# Patient Record
Sex: FEMALE | Race: WHITE | NOT HISPANIC OR LATINO | Employment: OTHER | URBAN - METROPOLITAN AREA
[De-identification: names, ages, dates, MRNs, and addresses within clinical notes are randomized per-mention and may not be internally consistent; named-entity substitution may affect disease eponyms.]

---

## 2017-02-20 DIAGNOSIS — E11.9 DIABETES MELLITUS WITHOUT COMPLICATION (HCC): Primary | ICD-10-CM

## 2017-02-20 DIAGNOSIS — E78.5 HYPERLIPIDEMIA, UNSPECIFIED HYPERLIPIDEMIA TYPE: ICD-10-CM

## 2017-02-21 ENCOUNTER — OFFICE VISIT (OUTPATIENT)
Dept: FAMILY MEDICINE CLINIC | Facility: CLINIC | Age: 72
End: 2017-02-21

## 2017-02-21 ENCOUNTER — LAB (OUTPATIENT)
Dept: FAMILY MEDICINE CLINIC | Facility: CLINIC | Age: 72
End: 2017-02-21

## 2017-02-21 VITALS
DIASTOLIC BLOOD PRESSURE: 84 MMHG | HEART RATE: 106 BPM | HEIGHT: 62 IN | TEMPERATURE: 98.4 F | WEIGHT: 108 LBS | BODY MASS INDEX: 19.88 KG/M2 | OXYGEN SATURATION: 98 % | SYSTOLIC BLOOD PRESSURE: 130 MMHG

## 2017-02-21 VITALS
DIASTOLIC BLOOD PRESSURE: 84 MMHG | OXYGEN SATURATION: 98 % | BODY MASS INDEX: 19.88 KG/M2 | HEIGHT: 62 IN | SYSTOLIC BLOOD PRESSURE: 130 MMHG | TEMPERATURE: 98.4 F | WEIGHT: 108 LBS | HEART RATE: 106 BPM

## 2017-02-21 DIAGNOSIS — S86.011A ACHILLES TENDON SPRAIN, RIGHT, INITIAL ENCOUNTER: Primary | ICD-10-CM
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
## 2017-02-21 DIAGNOSIS — S00.512A ABRASION OF ORAL CAVITY, INITIAL ENCOUNTER: ICD-10-CM

## 2017-02-21 PROBLEM — S86.019A ACHILLES TENDON SPRAIN: Status: ACTIVE | Noted: 2017-02-21

## 2017-02-21 LAB
ALBUMIN SERPL-MCNC: 4.7 G/DL (ref 3.5–5.2)
ALBUMIN/GLOB SERPL: 1.6 G/DL
ALP SERPL-CCNC: 99 U/L (ref 39–117)
ALT SERPL-CCNC: 28 U/L (ref 1–33)
AST SERPL-CCNC: 36 U/L (ref 1–32)
BILIRUB SERPL-MCNC: 0.2 MG/DL (ref 0.1–1.2)
BUN SERPL-MCNC: 11 MG/DL (ref 8–23)
BUN/CREAT SERPL: 12.2 (ref 7–25)
CALCIUM SERPL-MCNC: 10.1 MG/DL (ref 8.6–10.5)
CHLORIDE SERPL-SCNC: 100 MMOL/L (ref 98–107)
CHOLEST SERPL-MCNC: 178 MG/DL (ref 0–200)
CHOLEST/HDLC SERPL: 2.14 {RATIO}
CO2 SERPL-SCNC: 26.1 MMOL/L (ref 22–29)
CREAT SERPL-MCNC: 0.9 MG/DL (ref 0.57–1)
GLOBULIN SER CALC-MCNC: 2.9 GM/DL
GLUCOSE SERPL-MCNC: 162 MG/DL (ref 65–99)
HBA1C MFR BLD: 5.4 % (ref 4.8–5.6)
HDLC SERPL-MCNC: 83 MG/DL (ref 40–60)
LDLC SERPL CALC-MCNC: 75 MG/DL (ref 0–100)
POTASSIUM SERPL-SCNC: 4.9 MMOL/L (ref 3.5–5.2)
PROT SERPL-MCNC: 7.6 G/DL (ref 6–8.5)
SODIUM SERPL-SCNC: 143 MMOL/L (ref 136–145)
TRIGL SERPL-MCNC: 101 MG/DL (ref 0–150)
VLDLC SERPL CALC-MCNC: 20.2 MG/DL (ref 5–40)

## 2017-02-21 PROCEDURE — 99213 OFFICE O/P EST LOW 20 MIN: CPT | Performed by: NURSE PRACTITIONER

## 2017-02-21 NOTE — PROGRESS NOTES
Subjective   Siobhan Prakash is a 72 y.o. female.     History of Present Illness     Pt's wheelchair became caught on sidewalk as she was entering the office this morning.  Patient states she fell backward.  She has a sore lip and sore right ankle.  A  witnessed the incident and assisted her up from the ground.      The following portions of the patient's history were reviewed and updated as appropriate: allergies, current medications, past family history, past medical history, past social history, past surgical history and problem list.    Review of Systems   HENT:        Per HPI   Respiratory: Positive for shortness of breath.    Musculoskeletal:        Per HPI   All other systems reviewed and are negative.      Objective   Physical Exam   Constitutional: She is oriented to person, place, and time. She appears well-developed. She appears cachectic.   HENT:   Mild abrasion of mucosa right side upper lip.  Teeth intact.  She does have an old/chronic crack in #8 right central incisor.     Pulmonary/Chest: Effort normal.   Musculoskeletal:        Right ankle: She exhibits normal range of motion, no swelling, no ecchymosis, no deformity and no laceration. No tenderness. Achilles tendon exhibits pain. Achilles tendon exhibits no defect and normal Lemon's test results.   Neurological: She is alert and oriented to person, place, and time.   Psychiatric: She has a normal mood and affect.   Nursing note and vitals reviewed.      Assessment/Plan   Diagnoses and all orders for this visit:    Achilles tendon sprain, right, initial encounter    Abrasion of oral cavity, initial encounter    Fall, initial encounter        I advised her to see her dentist has she has some gum disease an old fracture of right central incisor crown w/question of a recent fracture.      If right ankle pain worsens, does not improve as expected she should call--will order x-rays.  However, not indicated at this time.      An incident  report was filed by the .

## 2017-02-24 ENCOUNTER — OFFICE VISIT (OUTPATIENT)
Dept: FAMILY MEDICINE CLINIC | Facility: CLINIC | Age: 72
End: 2017-02-24

## 2017-02-24 VITALS
HEART RATE: 123 BPM | WEIGHT: 109.4 LBS | HEIGHT: 62 IN | BODY MASS INDEX: 20.13 KG/M2 | DIASTOLIC BLOOD PRESSURE: 80 MMHG | SYSTOLIC BLOOD PRESSURE: 130 MMHG | OXYGEN SATURATION: 91 %

## 2017-02-24 DIAGNOSIS — E11.9 DIABETES MELLITUS WITHOUT COMPLICATION (HCC): ICD-10-CM

## 2017-02-24 DIAGNOSIS — G47.00 INSOMNIA, UNSPECIFIED TYPE: ICD-10-CM

## 2017-02-24 DIAGNOSIS — B02.9 HERPES ZOSTER WITHOUT COMPLICATION: Primary | ICD-10-CM

## 2017-02-24 DIAGNOSIS — F41.8 MIXED ANXIETY DEPRESSIVE DISORDER: ICD-10-CM

## 2017-02-24 DIAGNOSIS — E78.5 HYPERLIPIDEMIA, UNSPECIFIED HYPERLIPIDEMIA TYPE: ICD-10-CM

## 2017-02-24 PROBLEM — S86.019A ACHILLES TENDON SPRAIN: Status: RESOLVED | Noted: 2017-02-21 | Resolved: 2017-02-24

## 2017-02-24 PROCEDURE — 99214 OFFICE O/P EST MOD 30 MIN: CPT | Performed by: NURSE PRACTITIONER

## 2017-02-24 RX ORDER — VALACYCLOVIR HYDROCHLORIDE 1 G/1
1000 TABLET, FILM COATED ORAL 3 TIMES DAILY
Qty: 21 TABLET | Refills: 0 | Status: SHIPPED | OUTPATIENT
Start: 2017-02-24 | End: 2017-11-15

## 2017-02-24 NOTE — PROGRESS NOTES
Subjective   Siobhan Prakash is a 72 y.o. female.     History of Present Illness       Here for 6 month f/u.    DM:  A1-c nondiabetic range.  Controlled with diet.  She was not fasting with last set of labs.    HLD:  All levels wnl on statin.      Anxiety/depression:  Stable on sertraline.    Insomnia:  Taking zolpidem 5 mg.  Needs refills.  Tolerating lower dose well.    Awakened 3 days ago with painful, itchy rash right side of neck.      The following portions of the patient's history were reviewed and updated as appropriate: allergies, current medications, past family history, past medical history, past social history, past surgical history and problem list.    Review of Systems   Respiratory: Negative.    Cardiovascular: Negative.    Skin: Positive for rash.   Neurological: Positive for tremors.   Psychiatric/Behavioral: Negative.    All other systems reviewed and are negative.      Objective   Physical Exam   Constitutional: She appears well-developed and well-nourished.   Cardiovascular: Normal rate, regular rhythm, S1 normal, S2 normal and normal heart sounds.    No murmur heard.  Pulses:       Carotid pulses are 2+ on the right side, and 2+ on the left side.  No carotid bruits   Pulmonary/Chest: Effort normal and breath sounds normal.   Neurological: She is alert.   Skin: Rash noted. Rash is vesicular.        Psychiatric: She has a normal mood and affect.   Nursing note and vitals reviewed.      Assessment/Plan   Siobhan was seen today for follow-up and rash.    Diagnoses and all orders for this visit:    Herpes zoster without complication    Hyperlipidemia, unspecified hyperlipidemia type    Diabetes mellitus without complication    Mixed anxiety depressive disorder    Insomnia, unspecified type    Other orders  -     valACYclovir (VALTREX) 1000 MG tablet; Take 1 tablet by mouth 3 (Three) Times a Day.      I reviewed lab results with her.  No changes.    If she has any postherpetic neuralgia she should  return to clinic.  Communicability discussed.    RTC 6 months

## 2017-02-26 RX ORDER — ZOLPIDEM TARTRATE 5 MG/1
5 TABLET ORAL NIGHTLY PRN
Qty: 30 TABLET | Refills: 5 | OUTPATIENT
Start: 2017-02-26 | End: 2017-08-07 | Stop reason: SDUPTHER

## 2017-03-06 RX ORDER — ZOLPIDEM TARTRATE 5 MG/1
TABLET ORAL
Qty: 30 TABLET | Refills: 0 | OUTPATIENT
Start: 2017-03-06

## 2017-03-21 RX ORDER — ATORVASTATIN CALCIUM 10 MG/1
TABLET, FILM COATED ORAL
Qty: 90 TABLET | Refills: 0 | Status: SHIPPED | OUTPATIENT
Start: 2017-03-21 | End: 2017-06-16 | Stop reason: SDUPTHER

## 2017-04-03 RX ORDER — LANSOPRAZOLE 15 MG/1
CAPSULE, DELAYED RELEASE ORAL
Qty: 30 CAPSULE | Refills: 6 | Status: SHIPPED | OUTPATIENT
Start: 2017-04-03 | End: 2017-10-17 | Stop reason: SDUPTHER

## 2017-04-03 RX ORDER — BUPROPION HYDROCHLORIDE 200 MG/1
TABLET, EXTENDED RELEASE ORAL
Qty: 180 TABLET | Refills: 0 | Status: SHIPPED | OUTPATIENT
Start: 2017-04-03 | End: 2017-06-29 | Stop reason: SDUPTHER

## 2017-06-08 RX ORDER — SERTRALINE HYDROCHLORIDE 100 MG/1
TABLET, FILM COATED ORAL
Qty: 90 TABLET | Refills: 2 | Status: SHIPPED | OUTPATIENT
Start: 2017-06-08 | End: 2017-11-15 | Stop reason: SDUPTHER

## 2017-06-16 RX ORDER — ATORVASTATIN CALCIUM 10 MG/1
TABLET, FILM COATED ORAL
Qty: 90 TABLET | Refills: 3 | Status: SHIPPED | OUTPATIENT
Start: 2017-06-16 | End: 2017-07-26

## 2017-06-29 RX ORDER — BUPROPION HYDROCHLORIDE 200 MG/1
TABLET, EXTENDED RELEASE ORAL
Qty: 180 TABLET | Refills: 3 | Status: SHIPPED | OUTPATIENT
Start: 2017-06-29 | End: 2017-11-15 | Stop reason: SDUPTHER

## 2017-07-26 ENCOUNTER — OFFICE VISIT (OUTPATIENT)
Dept: FAMILY MEDICINE CLINIC | Facility: CLINIC | Age: 72
End: 2017-07-26

## 2017-07-26 VITALS
SYSTOLIC BLOOD PRESSURE: 134 MMHG | HEIGHT: 62 IN | DIASTOLIC BLOOD PRESSURE: 82 MMHG | BODY MASS INDEX: 20.06 KG/M2 | TEMPERATURE: 97.7 F | HEART RATE: 97 BPM | WEIGHT: 109 LBS | OXYGEN SATURATION: 99 %

## 2017-07-26 DIAGNOSIS — G43.119 INTRACTABLE MIGRAINE WITH AURA WITHOUT STATUS MIGRAINOSUS: Primary | ICD-10-CM

## 2017-07-26 DIAGNOSIS — R25.2 CRAMP OF BOTH LOWER EXTREMITIES: ICD-10-CM

## 2017-07-26 DIAGNOSIS — M79.604 BILATERAL LEG PAIN: ICD-10-CM

## 2017-07-26 DIAGNOSIS — L82.1 SEBORRHEIC KERATOSIS: ICD-10-CM

## 2017-07-26 DIAGNOSIS — M79.605 BILATERAL LEG PAIN: ICD-10-CM

## 2017-07-26 PROBLEM — G43.909 MIGRAINE: Status: ACTIVE | Noted: 2017-07-26

## 2017-07-26 LAB
BUN SERPL-MCNC: 14 MG/DL (ref 8–23)
BUN/CREAT SERPL: 15.1 (ref 7–25)
CALCIUM SERPL-MCNC: 9.7 MG/DL (ref 8.6–10.5)
CHLORIDE SERPL-SCNC: 102 MMOL/L (ref 98–107)
CO2 SERPL-SCNC: 25.1 MMOL/L (ref 22–29)
CREAT SERPL-MCNC: 0.93 MG/DL (ref 0.57–1)
GLUCOSE SERPL-MCNC: 117 MG/DL (ref 65–99)
POTASSIUM SERPL-SCNC: 4.7 MMOL/L (ref 3.5–5.2)
SODIUM SERPL-SCNC: 144 MMOL/L (ref 136–145)

## 2017-07-26 PROCEDURE — 99214 OFFICE O/P EST MOD 30 MIN: CPT | Performed by: NURSE PRACTITIONER

## 2017-07-26 PROCEDURE — 96372 THER/PROPH/DIAG INJ SC/IM: CPT | Performed by: NURSE PRACTITIONER

## 2017-07-26 RX ORDER — SUMATRIPTAN 50 MG/1
TABLET, FILM COATED ORAL
Qty: 9 TABLET | Refills: 2 | Status: SHIPPED | OUTPATIENT
Start: 2017-07-26 | End: 2017-11-15 | Stop reason: SDUPTHER

## 2017-07-26 RX ORDER — KETOROLAC TROMETHAMINE 30 MG/ML
60 INJECTION, SOLUTION INTRAMUSCULAR; INTRAVENOUS ONCE
Status: COMPLETED | OUTPATIENT
Start: 2017-07-26 | End: 2017-07-26

## 2017-07-26 RX ADMIN — KETOROLAC TROMETHAMINE 60 MG: 30 INJECTION, SOLUTION INTRAMUSCULAR; INTRAVENOUS at 12:27

## 2017-07-26 NOTE — PROGRESS NOTES
Subjective   Siobhan Prakash is a 72 y.o. female.     History of Present Illness       C/o's of what sounds like muscle cramps of both legs x 2 months.  Occurs at rest.      Concerned about some skin lesions on her back that her  noticed recently.      C/o's more frequent migraines lately.  She has an aura prior to migraine.  Pain located front/bilateral temple area.  In remote past took Imitrex with good results.      The following portions of the patient's history were reviewed and updated as appropriate: allergies, current medications, past family history, past medical history, past social history, past surgical history and problem list.    Review of Systems   Musculoskeletal:        Leg cramps   Skin:        Per HPI   Neurological: Positive for headaches.   All other systems reviewed and are negative.      Objective   Physical Exam   Constitutional: Vital signs are normal. She appears well-developed and well-nourished.  Non-toxic appearance.   Cardiovascular: Normal rate.    2+/= dorsalis pedis pulses.  2+/= post tibial pulses.  Feet good color, warm to touch.  Capillary refill wnl.   Pulmonary/Chest: Effort normal.   Neurological: She is alert.   Skin:   Several 4-8 mm slightly raised, scaly, mildly hyperpigmented skin lesions located on her back.     Psychiatric: She has a normal mood and affect.   Nursing note and vitals reviewed.      Assessment/Plan   Siobhan was seen today for leg pain.    Diagnoses and all orders for this visit:    Intractable migraine with aura without status migrainosus  -     ketorolac (TORADOL) injection 60 mg; Inject 60 mg into the shoulder, thigh, or buttocks 1 (One) Time.    Bilateral leg pain    Cramp of both lower extremities  -     Basic Metabolic Panel    Seborrheic keratosis    Other orders  -     SUMAtriptan (IMITREX) 50 MG tablet; Take one tablet at onset of headache. May repeat dose one time in 2 hours if headache not relieved.      Consider pravastatin if muscle  cramps stop with d/c of atorvastatin.      Reassured pt/ regarding benign characteristics of skin lesions on her back.      Await BMP results.    RTC 2 weeks for f/u.      25 min spent with patient with >50% spent in counseling and coordination of care.

## 2017-07-27 ENCOUNTER — TELEPHONE (OUTPATIENT)
Dept: FAMILY MEDICINE CLINIC | Facility: CLINIC | Age: 72
End: 2017-07-27

## 2017-07-27 NOTE — TELEPHONE ENCOUNTER
----- Message from YEN Gutierrez sent at 7/27/2017  8:58 AM EDT -----  Call pt.  All electrolytes are wnl.  I am hopeful that her muscle spasms resolve with the discontinuation of the atorvastatin.

## 2017-07-27 NOTE — PROGRESS NOTES
Call pt.  All electrolytes are wnl.  I am hopeful that her muscle spasms resolve with the discontinuation of the atorvastatin.

## 2017-08-02 RX ORDER — BUPROPION HYDROCHLORIDE 200 MG/1
TABLET, EXTENDED RELEASE ORAL
Qty: 180 TABLET | Refills: 3 | Status: SHIPPED | OUTPATIENT
Start: 2017-08-02 | End: 2017-11-15

## 2017-08-07 RX ORDER — ZOLPIDEM TARTRATE 5 MG/1
5 TABLET ORAL NIGHTLY PRN
Qty: 30 TABLET | Refills: 0 | OUTPATIENT
Start: 2017-08-07 | End: 2017-08-09 | Stop reason: SDUPTHER

## 2017-08-09 ENCOUNTER — OFFICE VISIT (OUTPATIENT)
Dept: FAMILY MEDICINE CLINIC | Facility: CLINIC | Age: 72
End: 2017-08-09

## 2017-08-09 VITALS
BODY MASS INDEX: 20.06 KG/M2 | WEIGHT: 109 LBS | HEIGHT: 62 IN | SYSTOLIC BLOOD PRESSURE: 124 MMHG | OXYGEN SATURATION: 96 % | TEMPERATURE: 97.9 F | HEART RATE: 106 BPM | DIASTOLIC BLOOD PRESSURE: 78 MMHG

## 2017-08-09 DIAGNOSIS — R25.2 CRAMP OF BOTH LOWER EXTREMITIES: ICD-10-CM

## 2017-08-09 DIAGNOSIS — Z79.899 HIGH RISK MEDICATION USE: ICD-10-CM

## 2017-08-09 DIAGNOSIS — G47.00 INSOMNIA, UNSPECIFIED TYPE: Primary | ICD-10-CM

## 2017-08-09 PROCEDURE — 99213 OFFICE O/P EST LOW 20 MIN: CPT | Performed by: NURSE PRACTITIONER

## 2017-08-09 RX ORDER — AZITHROMYCIN 500 MG/1
TABLET, FILM COATED ORAL
Refills: 2 | COMMUNITY
Start: 2017-08-02 | End: 2017-08-09

## 2017-08-09 RX ORDER — ZOLPIDEM TARTRATE 5 MG/1
5 TABLET ORAL NIGHTLY PRN
Qty: 30 TABLET | Refills: 5 | OUTPATIENT
Start: 2017-08-09 | End: 2017-08-09 | Stop reason: SDUPTHER

## 2017-08-09 RX ORDER — ZOLPIDEM TARTRATE 5 MG/1
5 TABLET ORAL NIGHTLY PRN
Qty: 30 TABLET | Refills: 5
Start: 2017-08-09 | End: 2017-09-05 | Stop reason: SDUPTHER

## 2017-08-09 NOTE — PROGRESS NOTES
Subjective   Siobhan Prakash is a 72 y.o. female.     History of Present Illness     2 week f/u leg cramps.  Improved dramatically with d/c of atorvastatin.  Still with just very mild occasional cramps.  BMP with normal lytes.    Needs UDS for ambien rx.  Will be due for refills later this month.  Taking/tolerating 5 mg dose well.      The following portions of the patient's history were reviewed and updated as appropriate: allergies, current medications, past family history, past medical history, past social history, past surgical history and problem list.    Review of Systems   Psychiatric/Behavioral: Positive for sleep disturbance.   All other systems reviewed and are negative.      Objective   Physical Exam   Constitutional: Vital signs are normal. She appears well-developed and well-nourished.  Non-toxic appearance.   Cardiovascular: Normal rate.    Pulmonary/Chest: Effort normal.   Neurological: She is alert.   Psychiatric: She has a normal mood and affect.   Nursing note and vitals reviewed.      Assessment/Plan   Siobhan was seen today for follow-up.    Diagnoses and all orders for this visit:    Insomnia, unspecified type  -     Compliance Drug Analysis, Ur    High risk medication use  -     Compliance Drug Analysis, Ur    Cramp of both lower extremities    Other orders  -     Discontinue: zolpidem (AMBIEN) 5 MG tablet; Take 1 tablet by mouth At Night As Needed for Sleep.  -     zolpidem (AMBIEN) 5 MG tablet; Take 1 tablet by mouth At Night As Needed for Sleep.      Hold off on statin tx for now.  Will check labs in 3 months.    UDS today.

## 2017-08-16 LAB — DRUGS UR: NORMAL

## 2017-09-05 RX ORDER — ZOLPIDEM TARTRATE 5 MG/1
5 TABLET ORAL NIGHTLY PRN
Qty: 30 TABLET | Refills: 0 | OUTPATIENT
Start: 2017-09-05 | End: 2017-11-15 | Stop reason: SDUPTHER

## 2017-10-17 RX ORDER — LANSOPRAZOLE 15 MG/1
CAPSULE, DELAYED RELEASE ORAL
Qty: 30 CAPSULE | Refills: 2 | Status: SHIPPED | OUTPATIENT
Start: 2017-10-17 | End: 2017-11-15 | Stop reason: SDUPTHER

## 2017-11-13 DIAGNOSIS — E78.5 HYPERLIPIDEMIA, UNSPECIFIED HYPERLIPIDEMIA TYPE: ICD-10-CM

## 2017-11-13 DIAGNOSIS — J43.8 OTHER EMPHYSEMA (HCC): ICD-10-CM

## 2017-11-13 DIAGNOSIS — E55.9 VITAMIN D DEFICIENCY: ICD-10-CM

## 2017-11-13 DIAGNOSIS — I10 ESSENTIAL HYPERTENSION: Primary | ICD-10-CM

## 2017-11-14 LAB
25(OH)D3+25(OH)D2 SERPL-MCNC: 82.8 NG/ML (ref 30–100)
ALBUMIN SERPL-MCNC: 4.5 G/DL (ref 3.5–5.2)
ALBUMIN/GLOB SERPL: 1.3 G/DL
ALP SERPL-CCNC: 92 U/L (ref 39–117)
ALT SERPL-CCNC: 14 U/L (ref 1–33)
AST SERPL-CCNC: 22 U/L (ref 1–32)
BASOPHILS # BLD AUTO: 0.06 10*3/MM3 (ref 0–0.2)
BASOPHILS NFR BLD AUTO: 0.7 % (ref 0–1.5)
BILIRUB SERPL-MCNC: <0.2 MG/DL (ref 0.1–1.2)
BUN SERPL-MCNC: 16 MG/DL (ref 8–23)
BUN/CREAT SERPL: 17.8 (ref 7–25)
CALCIUM SERPL-MCNC: 10.3 MG/DL (ref 8.6–10.5)
CHLORIDE SERPL-SCNC: 103 MMOL/L (ref 98–107)
CHOLEST SERPL-MCNC: 257 MG/DL (ref 0–200)
CHOLEST/HDLC SERPL: 3.25 {RATIO}
CO2 SERPL-SCNC: 28.2 MMOL/L (ref 22–29)
CREAT SERPL-MCNC: 0.9 MG/DL (ref 0.57–1)
EOSINOPHIL # BLD AUTO: 0.24 10*3/MM3 (ref 0–0.7)
EOSINOPHIL NFR BLD AUTO: 2.8 % (ref 0.3–6.2)
ERYTHROCYTE [DISTWIDTH] IN BLOOD BY AUTOMATED COUNT: 13.5 % (ref 11.7–13)
GFR SERPLBLD CREATININE-BSD FMLA CKD-EPI: 62 ML/MIN/1.73
GFR SERPLBLD CREATININE-BSD FMLA CKD-EPI: 75 ML/MIN/1.73
GLOBULIN SER CALC-MCNC: 3.4 GM/DL
GLUCOSE SERPL-MCNC: 126 MG/DL (ref 65–99)
HCT VFR BLD AUTO: 43.3 % (ref 35.6–45.5)
HDLC SERPL-MCNC: 79 MG/DL (ref 40–60)
HGB BLD-MCNC: 13.4 G/DL (ref 11.9–15.5)
IMM GRANULOCYTES # BLD: 0.07 10*3/MM3 (ref 0–0.03)
IMM GRANULOCYTES NFR BLD: 0.8 % (ref 0–0.5)
LDLC SERPL CALC-MCNC: 152 MG/DL (ref 0–100)
LYMPHOCYTES # BLD AUTO: 3 10*3/MM3 (ref 0.9–4.8)
LYMPHOCYTES NFR BLD AUTO: 34.9 % (ref 19.6–45.3)
MCH RBC QN AUTO: 31.8 PG (ref 26.9–32)
MCHC RBC AUTO-ENTMCNC: 30.9 G/DL (ref 32.4–36.3)
MCV RBC AUTO: 102.9 FL (ref 80.5–98.2)
MONOCYTES # BLD AUTO: 0.63 10*3/MM3 (ref 0.2–1.2)
MONOCYTES NFR BLD AUTO: 7.3 % (ref 5–12)
NEUTROPHILS # BLD AUTO: 4.59 10*3/MM3 (ref 1.9–8.1)
NEUTROPHILS NFR BLD AUTO: 53.5 % (ref 42.7–76)
PLATELET # BLD AUTO: 441 10*3/MM3 (ref 140–500)
POTASSIUM SERPL-SCNC: 4.7 MMOL/L (ref 3.5–5.2)
PROT SERPL-MCNC: 7.9 G/DL (ref 6–8.5)
RBC # BLD AUTO: 4.21 10*6/MM3 (ref 3.9–5.2)
SODIUM SERPL-SCNC: 145 MMOL/L (ref 136–145)
TRIGL SERPL-MCNC: 132 MG/DL (ref 0–150)
VLDLC SERPL CALC-MCNC: 26.4 MG/DL (ref 5–40)
WBC # BLD AUTO: 8.59 10*3/MM3 (ref 4.5–10.7)

## 2017-11-15 ENCOUNTER — TELEPHONE (OUTPATIENT)
Dept: FAMILY MEDICINE CLINIC | Facility: CLINIC | Age: 72
End: 2017-11-15

## 2017-11-15 ENCOUNTER — OFFICE VISIT (OUTPATIENT)
Dept: FAMILY MEDICINE CLINIC | Facility: CLINIC | Age: 72
End: 2017-11-15

## 2017-11-15 VITALS
SYSTOLIC BLOOD PRESSURE: 130 MMHG | OXYGEN SATURATION: 98 % | TEMPERATURE: 98.3 F | HEART RATE: 103 BPM | DIASTOLIC BLOOD PRESSURE: 78 MMHG

## 2017-11-15 DIAGNOSIS — G47.00 INSOMNIA, UNSPECIFIED TYPE: ICD-10-CM

## 2017-11-15 DIAGNOSIS — G43.001 MIGRAINE WITHOUT AURA AND WITH STATUS MIGRAINOSUS, NOT INTRACTABLE: ICD-10-CM

## 2017-11-15 DIAGNOSIS — F41.8 MIXED ANXIETY DEPRESSIVE DISORDER: ICD-10-CM

## 2017-11-15 DIAGNOSIS — E78.5 HYPERLIPIDEMIA, UNSPECIFIED HYPERLIPIDEMIA TYPE: Primary | ICD-10-CM

## 2017-11-15 DIAGNOSIS — E55.9 VITAMIN D DEFICIENCY: ICD-10-CM

## 2017-11-15 DIAGNOSIS — K21.9 GASTROESOPHAGEAL REFLUX DISEASE WITHOUT ESOPHAGITIS: ICD-10-CM

## 2017-11-15 DIAGNOSIS — R73.02 GLUCOSE INTOLERANCE (IMPAIRED GLUCOSE TOLERANCE): ICD-10-CM

## 2017-11-15 PROCEDURE — 99214 OFFICE O/P EST MOD 30 MIN: CPT | Performed by: NURSE PRACTITIONER

## 2017-11-15 RX ORDER — GABAPENTIN 300 MG/1
CAPSULE ORAL
Refills: 2 | Status: ON HOLD | COMMUNITY
Start: 2017-11-08 | End: 2018-01-16 | Stop reason: SDUPTHER

## 2017-11-15 RX ORDER — ZOLPIDEM TARTRATE 5 MG/1
5 TABLET ORAL NIGHTLY PRN
Qty: 30 TABLET | Refills: 5 | OUTPATIENT
Start: 2017-11-15 | End: 2018-03-06 | Stop reason: SDUPTHER

## 2017-11-15 RX ORDER — LANSOPRAZOLE 15 MG/1
15 CAPSULE, DELAYED RELEASE ORAL DAILY
Qty: 90 CAPSULE | Refills: 2 | Status: SHIPPED | OUTPATIENT
Start: 2017-11-15 | End: 2018-01-10 | Stop reason: SDUPTHER

## 2017-11-15 RX ORDER — SUMATRIPTAN 50 MG/1
TABLET, FILM COATED ORAL
Qty: 9 TABLET | Refills: 2 | Status: SHIPPED | OUTPATIENT
Start: 2017-11-15 | End: 2019-04-22 | Stop reason: SDUPTHER

## 2017-11-15 RX ORDER — BUPROPION HYDROCHLORIDE 200 MG/1
200 TABLET, EXTENDED RELEASE ORAL 2 TIMES DAILY
Qty: 180 TABLET | Refills: 2 | Status: SHIPPED | OUTPATIENT
Start: 2017-11-15 | End: 2018-12-19

## 2017-11-15 RX ORDER — SERTRALINE HYDROCHLORIDE 100 MG/1
100 TABLET, FILM COATED ORAL DAILY
Qty: 90 TABLET | Refills: 2 | Status: SHIPPED | OUTPATIENT
Start: 2017-11-15 | End: 2018-02-15

## 2017-11-15 NOTE — PROGRESS NOTES
Subjective   Siobhan Prakash is a 72 y.o. female.     History of Present Illness     Doing well.    HLD shows LDL elevated.  Migraines, gerd, vitamin d deficiency, anxiety and insomnia stable on current plan/meds.    The following portions of the patient's history were reviewed and updated as appropriate: allergies, current medications, past family history, past medical history, past social history, past surgical history and problem list.    Review of Systems   Respiratory: Positive for cough and shortness of breath.         No SOA w/O2   Cardiovascular: Negative.    All other systems reviewed and are negative.      Objective   Physical Exam   Constitutional: Vital signs are normal. She appears well-developed and well-nourished.   Cardiovascular: Normal rate, regular rhythm, S1 normal, S2 normal and normal heart sounds.    No murmur heard.  Pulses:       Carotid pulses are 2+ on the right side, and 2+ on the left side.  No carotid bruits   Pulmonary/Chest: Effort normal and breath sounds normal.   O2 per NC   Neurological: She is alert.   Psychiatric: She has a normal mood and affect.   Nursing note and vitals reviewed.      Assessment/Plan   Siobhan was seen today for follow-up.    Diagnoses and all orders for this visit:    Hyperlipidemia, unspecified hyperlipidemia type    Migraine without aura and with status migrainosus, not intractable    Gastroesophageal reflux disease without esophagitis    Vitamin D deficiency    Glucose intolerance (impaired glucose tolerance)    Mixed anxiety depressive disorder    Insomnia, unspecified type    Other orders  -     zolpidem (AMBIEN) 5 MG tablet; Take 1 tablet by mouth At Night As Needed for Sleep.  -     SUMAtriptan (IMITREX) 50 MG tablet; Take one tablet at onset of headache. May repeat dose one time in 2 hours if headache not relieved.  -     sertraline (ZOLOFT) 50 MG tablet; Take 1 tablet by mouth Daily. 1 po daily with 100 mg dose  -     sertraline (ZOLOFT) 100 MG  tablet; Take 1 tablet by mouth Daily. 1 po q day.  Take with 50 mg dose.  -     lansoprazole (PREVACID) 15 MG capsule; Take 1 capsule by mouth Daily.  -     buPROPion SR (WELLBUTRIN SR) 200 MG 12 hr tablet; Take 1 tablet by mouth 2 (Two) Times a Day.        I reviewed lab results with her.  Decrease sat fat intake to lower LDL.   Refills given.  F/u 6 months.

## 2017-11-15 NOTE — TELEPHONE ENCOUNTER
----- Message from YEN Gutierrez sent at 11/15/2017  2:20 PM EST -----  Call in 6 refills total of patients zolpidem

## 2018-01-10 RX ORDER — LANSOPRAZOLE 15 MG/1
15 CAPSULE, DELAYED RELEASE ORAL DAILY
Qty: 90 CAPSULE | Refills: 0 | Status: SHIPPED | OUTPATIENT
Start: 2018-01-10 | End: 2019-02-28 | Stop reason: SDUPTHER

## 2018-01-15 ENCOUNTER — APPOINTMENT (OUTPATIENT)
Dept: GENERAL RADIOLOGY | Facility: HOSPITAL | Age: 73
End: 2018-01-15

## 2018-01-15 ENCOUNTER — APPOINTMENT (OUTPATIENT)
Dept: CT IMAGING | Facility: HOSPITAL | Age: 73
End: 2018-01-15

## 2018-01-15 ENCOUNTER — HOSPITAL ENCOUNTER (INPATIENT)
Facility: HOSPITAL | Age: 73
LOS: 8 days | Discharge: HOME HEALTH | End: 2018-01-23
Attending: EMERGENCY MEDICINE | Admitting: INTERNAL MEDICINE

## 2018-01-15 DIAGNOSIS — J18.9 PNEUMONIA OF BOTH LUNGS DUE TO INFECTIOUS ORGANISM, UNSPECIFIED PART OF LUNG: Primary | ICD-10-CM

## 2018-01-15 DIAGNOSIS — Z87.09 HISTORY OF COPD: ICD-10-CM

## 2018-01-15 DIAGNOSIS — R09.02 HYPOXIA: ICD-10-CM

## 2018-01-15 LAB
ALBUMIN SERPL-MCNC: 3.1 G/DL (ref 3.5–5.2)
ALBUMIN/GLOB SERPL: 0.7 G/DL
ALP SERPL-CCNC: 94 U/L (ref 39–117)
ALT SERPL W P-5'-P-CCNC: 10 U/L (ref 1–33)
ANION GAP SERPL CALCULATED.3IONS-SCNC: 8 MMOL/L
ARTERIAL PATENCY WRIST A: ABNORMAL
AST SERPL-CCNC: 15 U/L (ref 1–32)
ATMOSPHERIC PRESS: 757.7 MMHG
BASE EXCESS BLDA CALC-SCNC: 5.8 MMOL/L (ref 0–2)
BASOPHILS # BLD AUTO: 0.01 10*3/MM3 (ref 0–0.2)
BASOPHILS NFR BLD AUTO: 0.1 % (ref 0–1.5)
BDY SITE: ABNORMAL
BILIRUB SERPL-MCNC: 0.3 MG/DL (ref 0.1–1.2)
BUN BLD-MCNC: 12 MG/DL (ref 8–23)
BUN/CREAT SERPL: 18.2 (ref 7–25)
CALCIUM SPEC-SCNC: 8.9 MG/DL (ref 8.6–10.5)
CHLORIDE SERPL-SCNC: 93 MMOL/L (ref 98–107)
CO2 SERPL-SCNC: 34 MMOL/L (ref 22–29)
CREAT BLD-MCNC: 0.66 MG/DL (ref 0.57–1)
D-LACTATE SERPL-SCNC: 1.3 MMOL/L (ref 0.5–2)
DEPRECATED RDW RBC AUTO: 46.8 FL (ref 37–54)
EOSINOPHIL # BLD AUTO: 0.07 10*3/MM3 (ref 0–0.7)
EOSINOPHIL NFR BLD AUTO: 0.6 % (ref 0.3–6.2)
ERYTHROCYTE [DISTWIDTH] IN BLOOD BY AUTOMATED COUNT: 12.3 % (ref 11.7–13)
GAS FLOW AIRWAY: 4 LPM
GFR SERPL CREATININE-BSD FRML MDRD: 89 ML/MIN/1.73
GLOBULIN UR ELPH-MCNC: 4.5 GM/DL
GLUCOSE BLD-MCNC: 140 MG/DL (ref 65–99)
HCO3 BLDA-SCNC: 31.8 MMOL/L (ref 22–28)
HCT VFR BLD AUTO: 36.7 % (ref 35.6–45.5)
HGB BLD-MCNC: 11.1 G/DL (ref 11.9–15.5)
HOLD SPECIMEN: NORMAL
HOLD SPECIMEN: NORMAL
IMM GRANULOCYTES # BLD: 0.06 10*3/MM3 (ref 0–0.03)
IMM GRANULOCYTES NFR BLD: 0.5 % (ref 0–0.5)
LYMPHOCYTES # BLD AUTO: 1.32 10*3/MM3 (ref 0.9–4.8)
LYMPHOCYTES NFR BLD AUTO: 11.5 % (ref 19.6–45.3)
MCH RBC QN AUTO: 31.4 PG (ref 26.9–32)
MCHC RBC AUTO-ENTMCNC: 30.2 G/DL (ref 32.4–36.3)
MCV RBC AUTO: 103.7 FL (ref 80.5–98.2)
MODALITY: ABNORMAL
MONOCYTES # BLD AUTO: 1.17 10*3/MM3 (ref 0.2–1.2)
MONOCYTES NFR BLD AUTO: 10.2 % (ref 5–12)
NEUTROPHILS # BLD AUTO: 8.85 10*3/MM3 (ref 1.9–8.1)
NEUTROPHILS NFR BLD AUTO: 77.1 % (ref 42.7–76)
PCO2 BLDA: 51.2 MM HG (ref 35–45)
PH BLDA: 7.4 PH UNITS (ref 7.35–7.45)
PLATELET # BLD AUTO: 489 10*3/MM3 (ref 140–500)
PMV BLD AUTO: 9.6 FL (ref 6–12)
PO2 BLDA: 72.2 MM HG (ref 80–100)
POTASSIUM BLD-SCNC: 3.6 MMOL/L (ref 3.5–5.2)
PROT SERPL-MCNC: 7.6 G/DL (ref 6–8.5)
RBC # BLD AUTO: 3.54 10*6/MM3 (ref 3.9–5.2)
SAO2 % BLDCOA: 94 % (ref 92–99)
SODIUM BLD-SCNC: 135 MMOL/L (ref 136–145)
TOTAL RATE: 22 BREATHS/MINUTE
TROPONIN T SERPL-MCNC: <0.01 NG/ML (ref 0–0.03)
WBC NRBC COR # BLD: 11.48 10*3/MM3 (ref 4.5–10.7)
WHOLE BLOOD HOLD SPECIMEN: NORMAL
WHOLE BLOOD HOLD SPECIMEN: NORMAL

## 2018-01-15 PROCEDURE — 71046 X-RAY EXAM CHEST 2 VIEWS: CPT

## 2018-01-15 PROCEDURE — 36415 COLL VENOUS BLD VENIPUNCTURE: CPT | Performed by: EMERGENCY MEDICINE

## 2018-01-15 PROCEDURE — 85025 COMPLETE CBC W/AUTO DIFF WBC: CPT | Performed by: EMERGENCY MEDICINE

## 2018-01-15 PROCEDURE — 25010000002 CEFTRIAXONE PER 250 MG: Performed by: PHYSICIAN ASSISTANT

## 2018-01-15 PROCEDURE — 93010 ELECTROCARDIOGRAM REPORT: CPT | Performed by: INTERNAL MEDICINE

## 2018-01-15 PROCEDURE — 87040 BLOOD CULTURE FOR BACTERIA: CPT | Performed by: PHYSICIAN ASSISTANT

## 2018-01-15 PROCEDURE — 0 IOPAMIDOL PER 1 ML: Performed by: EMERGENCY MEDICINE

## 2018-01-15 PROCEDURE — 84484 ASSAY OF TROPONIN QUANT: CPT | Performed by: EMERGENCY MEDICINE

## 2018-01-15 PROCEDURE — 83605 ASSAY OF LACTIC ACID: CPT | Performed by: PHYSICIAN ASSISTANT

## 2018-01-15 PROCEDURE — 93005 ELECTROCARDIOGRAM TRACING: CPT

## 2018-01-15 PROCEDURE — 25010000002 AZITHROMYCIN PER 500 MG: Performed by: PHYSICIAN ASSISTANT

## 2018-01-15 PROCEDURE — 82803 BLOOD GASES ANY COMBINATION: CPT

## 2018-01-15 PROCEDURE — 94640 AIRWAY INHALATION TREATMENT: CPT

## 2018-01-15 PROCEDURE — 36600 WITHDRAWAL OF ARTERIAL BLOOD: CPT

## 2018-01-15 PROCEDURE — 80053 COMPREHEN METABOLIC PANEL: CPT | Performed by: EMERGENCY MEDICINE

## 2018-01-15 PROCEDURE — 99284 EMERGENCY DEPT VISIT MOD MDM: CPT

## 2018-01-15 PROCEDURE — 71275 CT ANGIOGRAPHY CHEST: CPT

## 2018-01-15 PROCEDURE — 25010000002 METHYLPREDNISOLONE PER 40 MG: Performed by: INTERNAL MEDICINE

## 2018-01-15 RX ORDER — CEFTRIAXONE SODIUM 1 G/50ML
1 INJECTION, SOLUTION INTRAVENOUS ONCE
Status: COMPLETED | OUTPATIENT
Start: 2018-01-15 | End: 2018-01-15

## 2018-01-15 RX ORDER — HYDROMORPHONE HYDROCHLORIDE 1 MG/ML
0.5 INJECTION, SOLUTION INTRAMUSCULAR; INTRAVENOUS; SUBCUTANEOUS ONCE
Status: COMPLETED | OUTPATIENT
Start: 2018-01-15 | End: 2018-01-15

## 2018-01-15 RX ORDER — SODIUM CHLORIDE 0.9 % (FLUSH) 0.9 %
10 SYRINGE (ML) INJECTION AS NEEDED
Status: DISCONTINUED | OUTPATIENT
Start: 2018-01-15 | End: 2018-01-23 | Stop reason: HOSPADM

## 2018-01-15 RX ORDER — ROFLUMILAST 500 UG/1
500 TABLET ORAL DAILY
Status: DISCONTINUED | OUTPATIENT
Start: 2018-01-16 | End: 2018-01-23 | Stop reason: HOSPADM

## 2018-01-15 RX ORDER — PANTOPRAZOLE SODIUM 40 MG/1
40 TABLET, DELAYED RELEASE ORAL EVERY MORNING
Status: DISCONTINUED | OUTPATIENT
Start: 2018-01-16 | End: 2018-01-23 | Stop reason: HOSPADM

## 2018-01-15 RX ORDER — BUPROPION HYDROCHLORIDE 150 MG/1
150 TABLET, EXTENDED RELEASE ORAL DAILY
Status: DISCONTINUED | OUTPATIENT
Start: 2018-01-16 | End: 2018-01-16

## 2018-01-15 RX ORDER — METHYLPREDNISOLONE SODIUM SUCCINATE 40 MG/ML
40 INJECTION, POWDER, LYOPHILIZED, FOR SOLUTION INTRAMUSCULAR; INTRAVENOUS EVERY 8 HOURS
Status: DISCONTINUED | OUTPATIENT
Start: 2018-01-15 | End: 2018-01-16

## 2018-01-15 RX ORDER — GABAPENTIN 300 MG/1
300 CAPSULE ORAL NIGHTLY
Status: DISCONTINUED | OUTPATIENT
Start: 2018-01-15 | End: 2018-01-23 | Stop reason: HOSPADM

## 2018-01-15 RX ORDER — ZOLPIDEM TARTRATE 5 MG/1
5 TABLET ORAL NIGHTLY PRN
Status: DISCONTINUED | OUTPATIENT
Start: 2018-01-15 | End: 2018-01-23 | Stop reason: HOSPADM

## 2018-01-15 RX ORDER — CEFTRIAXONE SODIUM 1 G/50ML
1 INJECTION, SOLUTION INTRAVENOUS EVERY 24 HOURS
Status: DISCONTINUED | OUTPATIENT
Start: 2018-01-15 | End: 2018-01-15

## 2018-01-15 RX ORDER — OXYCODONE HYDROCHLORIDE AND ACETAMINOPHEN 5; 325 MG/1; MG/1
1 TABLET ORAL EVERY 6 HOURS PRN
Status: DISCONTINUED | OUTPATIENT
Start: 2018-01-15 | End: 2018-01-23 | Stop reason: HOSPADM

## 2018-01-15 RX ORDER — IPRATROPIUM BROMIDE AND ALBUTEROL SULFATE 2.5; .5 MG/3ML; MG/3ML
3 SOLUTION RESPIRATORY (INHALATION)
Status: DISCONTINUED | OUTPATIENT
Start: 2018-01-15 | End: 2018-01-19

## 2018-01-15 RX ORDER — MELOXICAM 15 MG/1
15 TABLET ORAL DAILY
COMMUNITY
End: 2019-02-28 | Stop reason: SDUPTHER

## 2018-01-15 RX ORDER — SERTRALINE HYDROCHLORIDE 100 MG/1
100 TABLET, FILM COATED ORAL DAILY
Status: DISCONTINUED | OUTPATIENT
Start: 2018-01-16 | End: 2018-01-16

## 2018-01-15 RX ADMIN — AZITHROMYCIN MONOHYDRATE 500 MG: 500 INJECTION, POWDER, LYOPHILIZED, FOR SOLUTION INTRAVENOUS at 18:07

## 2018-01-15 RX ADMIN — HYDROCODONE POLISTIREX AND CHLORPHENIRAMINE POLISITREX 5 ML: 10; 8 SUSPENSION, EXTENDED RELEASE ORAL at 22:48

## 2018-01-15 RX ADMIN — SODIUM CHLORIDE 1000 ML: 9 INJECTION, SOLUTION INTRAVENOUS at 16:56

## 2018-01-15 RX ADMIN — HYDROMORPHONE HYDROCHLORIDE 0.5 MG: 10 INJECTION INTRAMUSCULAR; INTRAVENOUS; SUBCUTANEOUS at 18:14

## 2018-01-15 RX ADMIN — IOPAMIDOL 85 ML: 755 INJECTION, SOLUTION INTRAVENOUS at 17:05

## 2018-01-15 RX ADMIN — CEFTRIAXONE SODIUM 1 G: 1 INJECTION, SOLUTION INTRAVENOUS at 19:35

## 2018-01-15 RX ADMIN — GABAPENTIN 300 MG: 300 CAPSULE ORAL at 22:48

## 2018-01-15 RX ADMIN — METHYLPREDNISOLONE SODIUM SUCCINATE 40 MG: 40 INJECTION, POWDER, FOR SOLUTION INTRAMUSCULAR; INTRAVENOUS at 20:11

## 2018-01-15 RX ADMIN — IPRATROPIUM BROMIDE AND ALBUTEROL SULFATE 3 ML: .5; 3 SOLUTION RESPIRATORY (INHALATION) at 22:26

## 2018-01-15 NOTE — ED TRIAGE NOTES
Pt c/o CP that when she woke up at 10:00. Pt's pain was 10/10; pt's pain dropped to 8/10 after 325 mg aspirin and one 0.4 mg nitro.

## 2018-01-15 NOTE — H&P
Group: [facility]         H/p  NOTE    Patient Identification:  Siobhan Prakash  72 y.o.  female  1945  [id number]                 CC: Shortness of breath for the last 2 weeks.    History of Present Illness:  Patient of Dr. Hightower with known history of chronic obstructive pulmonary disease on 4 L of oxygen at home.  Patient states for the last 2 weeks she's been having progressive shortness of breath with purulent sputum low-grade fever.  She also reports some substernal chest pain worse with deep inspiration started early this morning.  No aspiration was reported.  In the emergency room patient had a CT chest done multifocal pneumonia.  She is not being hospitalized in the last 6 months.      Review of Systems  Constitutional: Negative for fever.   HENT: Negative for sore throat.    Eyes: Negative.    Respiratory: Positive for cough. Negative for shortness of breath.    Cardiovascular: Positive for chest pain.   Gastrointestinal: Negative for abdominal pain, diarrhea and vomiting.   Genitourinary: Negative for dysuria.   Musculoskeletal: Positive for back pain (resolved) and myalgias (BLE, resolved). Negative for neck pain.   Skin: Negative for rash.   Allergic/Immunologic: Negative.    Neurological: Negative for weakness, numbness and headaches.   Hematological: Negative.    Psychiatric/Behavioral: Negative.    All other systems reviewed and are negative.  Past Medical History:  Past Medical History:   Diagnosis Date   • Anxiety    • COPD (chronic obstructive pulmonary disease)    • Depression    • GERD (gastroesophageal reflux disease)    • Hyperlipidemia    • Migraine    • Osteoporosis    • Pneumonia        Past Surgical History:  Past Surgical History:   Procedure Laterality Date   • CHOLECYSTECTOMY     • HIP ARTHROPLASTY Right    • REDUCTION MAMMAPLASTY          Home Meds:    (Not in a hospital admission)    Allergies:  Allergies   Allergen Reactions   • Codeine Rash and Other (See  "Comments)     Chills   • Hydrocodone Rash and Other (See Comments)     Chills       Social History:   Social History     Social History   • Marital status:      Spouse name: Vlad   • Number of children: 2   • Years of education: N/A     Occupational History   • retired      Social History Main Topics   • Smoking status: Former Smoker     Quit date: 1999   • Smokeless tobacco: Never Used   • Alcohol use Yes      Comment: ocasional drinker   • Drug use: No   • Sexual activity: Not on file     Other Topics Concern   • Not on file     Social History Narrative       Family History:  History reviewed. No pertinent family history.    Physical Exam:  /64  Pulse 109  Temp 99.5 °F (37.5 °C) (Oral)   Resp 20  Ht 157.5 cm (62\")  Wt 49 kg (108 lb)  SpO2 93%  BMI 19.75 kg/m2 Body mass index is 19.75 kg/(m^2). 93% 49 kg (108 lb)  Physical Exam  Elderly female resting comfortably no distress no labored breathing  Neck is supple no bruit no adenopathy  Oral cavity moist mucous membrane  Chest diminished breath sounds crackles on the bases and occasional wheeze  CVS regular rate and rhythm no murmurs or gallop  Abdomen is soft no masses felt no tenderness  Extremities no edema no sinuses no clubbing  CNS no deficits  No hallucination and delusional joint deformities or skin rashes    LABS:  Lab Results   Component Value Date    CALCIUM 8.9 01/15/2018     Results from last 7 days  Lab Units 01/15/18  1415   SODIUM mmol/L 135*   POTASSIUM mmol/L 3.6   CHLORIDE mmol/L 93*   CO2 mmol/L 34.0*   BUN mg/dL 12   CREATININE mg/dL 0.66   GLUCOSE mg/dL 140*   CALCIUM mg/dL 8.9   WBC 10*3/mm3 11.48*   HEMOGLOBIN g/dL 11.1*   PLATELETS 10*3/mm3 489   ALT (SGPT) U/L 10   AST (SGOT) U/L 15     Lab Results   Component Value Date    TROPONINT <0.010 01/15/2018       Results from last 7 days  Lab Units 01/15/18  1415   TROPONIN T ng/mL <0.010           Results from last 7 days  Lab Units 01/15/18  1805   LACTATE mmol/L 1.3 "       Results from last 7 days  Lab Units 01/15/18  1738   PH, ARTERIAL pH units 7.401   PCO2, ARTERIAL mm Hg 51.2*   PO2 ART mm Hg 72.2*   FLOW RATE lpm 4   MODALITY  Cannula   O2 SATURATION CALC % 94.0                 Lab Results   Component Value Date    TSH 2.910 05/18/2016     Estimated Creatinine Clearance: 50.6 mL/min (by C-G formula based on Cr of 0.66).         Imaging: I personally visualized the images of scans/x-rays performed within last 3 days.      Assessment:  Acute on chronic hypoxemic respiratory failure  Community-acquired pneumonia  Severe underlying COPD  Mild hyponatremia    Recommendations:  Treat patient for community acquired pneumonia protocol with Rocephin and Zithromax  Steroid and bronchodilators  Normal saline and monitor sodium  Wean oxygen to baseline  Home medications were restarted  Dr. Hightower will follow in the morning          Breanna Bustamante MD  1/15/2018  7:08 PM      Much of this encounter note is an electronic transcription/translation of spoken language to printed text using Dragon Software.

## 2018-01-15 NOTE — ED PROVIDER NOTES
"EMERGENCY DEPARTMENT ENCOUNTER    CHIEF COMPLAINT  Chief Complaint: chest pain  History given by: patient and patient's   History limited by: nothing  Room Number: 37/37  PMD: YEN Burnett      HPI:  Pt is a 72 y.o. female who presents with CP that started at 6 hours ago and woke Pt up. Her pain improved from a 10/10 to an 8/10 after a full ASA and 1 NTG. Her pain is located under her left breast and describes it as a \"sharp pressure\". She also presents with a cough x3 weeks and had been experiencing some leg and back pain, but that has since resolved. She is on 4L of O2 at home at all times. Pt has never had a stress test or heart cath. Pt is 95% on 4L and her HR is 100. At presentation, her pain is a 6/10.    Duration: 6 hours  Timing: constant  Location: left chest  Radiation: none  Quality: \"sharp pressure\"  Intensity/Severity: moderate  Progression: improving  Associated Symptoms: cough, leg and back pain  Aggravating Factors: none  Alleviating Factors: none  Previous Episodes: none  Treatment before arrival: Pt took a NTG this morning that decreased her pain from a 10/10 to an 8/10.      MEDICAL RECORD REVIEW  Pt has a h/x of COPD and chronic migraines. Pt does not have any heart history present in [facility].    PAST MEDICAL HISTORY  Active Ambulatory Problems     Diagnosis Date Noted   • Abnormal weight loss 05/13/2016   • Postartificial menopausal syndrome 05/13/2016   • Subcutaneous cyst 05/13/2016   • Mixed anxiety depressive disorder 05/13/2016   • Gastroesophageal reflux disease 05/13/2016   • Hyperlipidemia 05/13/2016   • Insomnia 05/13/2016   • Macrocytosis 05/13/2016   • Tremor 05/13/2016   • Pulmonary emphysema 05/13/2016   • Vitamin D deficiency 05/13/2016   • Glucose intolerance (impaired glucose tolerance) 05/13/2016   • Fatigue 05/13/2016   • Osteoporosis 05/13/2016   • Herpes zoster without complication 02/24/2017   • Migraine 07/26/2017   • Bilateral leg pain 07/26/2017 "     Resolved Ambulatory Problems     Diagnosis Date Noted   • Osteopenia 04/21/2016   • Anemia 05/13/2016   • Iron deficiency 05/13/2016   • Pruritus 05/13/2016   • Urinary tract infection 05/13/2016   • Achilles tendon sprain 02/21/2017     Past Medical History:   Diagnosis Date   • Anxiety    • COPD (chronic obstructive pulmonary disease)    • Depression    • GERD (gastroesophageal reflux disease)    • Hyperlipidemia    • Migraine    • Osteoporosis    • Pneumonia        PAST SURGICAL HISTORY  Past Surgical History:   Procedure Laterality Date   • CHOLECYSTECTOMY     • HIP ARTHROPLASTY Right    • REDUCTION MAMMAPLASTY         FAMILY HISTORY  History reviewed. No pertinent family history.    SOCIAL HISTORY  Social History     Social History   • Marital status:      Spouse name: Vlad   • Number of children: 2   • Years of education: N/A     Occupational History   • retired      Social History Main Topics   • Smoking status: Former Smoker     Quit date: 1999   • Smokeless tobacco: Never Used   • Alcohol use Yes      Comment: ocasional drinker   • Drug use: No   • Sexual activity: Not on file     Other Topics Concern   • Not on file     Social History Narrative       ALLERGIES  Codeine and Hydrocodone    REVIEW OF SYSTEMS  Review of Systems   Constitutional: Negative for fever.   HENT: Negative for sore throat.    Eyes: Negative.    Respiratory: Positive for cough. Negative for shortness of breath.    Cardiovascular: Positive for chest pain.   Gastrointestinal: Negative for abdominal pain, diarrhea and vomiting.   Genitourinary: Negative for dysuria.   Musculoskeletal: Positive for back pain (resolved) and myalgias (BLE, resolved). Negative for neck pain.   Skin: Negative for rash.   Allergic/Immunologic: Negative.    Neurological: Negative for weakness, numbness and headaches.   Hematological: Negative.    Psychiatric/Behavioral: Negative.    All other systems reviewed and are negative.      PHYSICAL  EXAM  ED Triage Vitals   Temp Heart Rate Resp BP SpO2   01/15/18 1210 01/15/18 1207 01/15/18 1207 01/15/18 1207 01/15/18 1207   98.5 °F (36.9 °C) 106 16 105/74 91 %      Temp src Heart Rate Source Patient Position BP Location FiO2 (%)   01/15/18 1210 01/15/18 1207 -- -- --   Oral Monitor          Physical Exam   Constitutional: She is oriented to person, place, and time and well-developed, well-nourished, and in no distress. No distress.   HENT:   Head: Normocephalic and atraumatic.   Mouth/Throat: Oropharynx is clear and moist. Mucous membranes are dry.   Eyes: EOM are normal.   Neck: Normal range of motion. Neck supple.   Cardiovascular: Normal rate and regular rhythm.    Pulmonary/Chest: Effort normal. No respiratory distress. She has wheezes in the left lower field. She exhibits no tenderness.   Abdominal: Soft. She exhibits no distension. There is no tenderness. There is no rebound.   Musculoskeletal: Normal range of motion. She exhibits no edema.   No peripheral edema   Lymphadenopathy:     She has no cervical adenopathy.   Neurological: She is alert and oriented to person, place, and time.   Skin: Skin is warm and dry. No rash noted. No pallor.   Psychiatric: Mood, memory, affect and judgment normal.   Nursing note and vitals reviewed.      LAB RESULTS  Recent Results (from the past 24 hour(s))   Comprehensive Metabolic Panel    Collection Time: 01/15/18  2:15 PM   Result Value Ref Range    Glucose 140 (H) 65 - 99 mg/dL    BUN 12 8 - 23 mg/dL    Creatinine 0.66 0.57 - 1.00 mg/dL    Sodium 135 (L) 136 - 145 mmol/L    Potassium 3.6 3.5 - 5.2 mmol/L    Chloride 93 (L) 98 - 107 mmol/L    CO2 34.0 (H) 22.0 - 29.0 mmol/L    Calcium 8.9 8.6 - 10.5 mg/dL    Total Protein 7.6 6.0 - 8.5 g/dL    Albumin 3.10 (L) 3.50 - 5.20 g/dL    ALT (SGPT) 10 1 - 33 U/L    AST (SGOT) 15 1 - 32 U/L    Alkaline Phosphatase 94 39 - 117 U/L    Total Bilirubin 0.3 0.1 - 1.2 mg/dL    eGFR Non African Amer 89 >60 mL/min/1.73    Globulin 4.5  gm/dL    A/G Ratio 0.7 g/dL    BUN/Creatinine Ratio 18.2 7.0 - 25.0    Anion Gap 8.0 mmol/L   Troponin    Collection Time: 01/15/18  2:15 PM   Result Value Ref Range    Troponin T <0.010 0.000 - 0.030 ng/mL   Light Blue Top    Collection Time: 01/15/18  2:15 PM   Result Value Ref Range    Extra Tube hold for add-on    Green Top (Gel)    Collection Time: 01/15/18  2:15 PM   Result Value Ref Range    Extra Tube Hold for add-ons.    Lavender Top    Collection Time: 01/15/18  2:15 PM   Result Value Ref Range    Extra Tube hold for add-on    Gold Top - SST    Collection Time: 01/15/18  2:15 PM   Result Value Ref Range    Extra Tube Hold for add-ons.    CBC Auto Differential    Collection Time: 01/15/18  2:15 PM   Result Value Ref Range    WBC 11.48 (H) 4.50 - 10.70 10*3/mm3    RBC 3.54 (L) 3.90 - 5.20 10*6/mm3    Hemoglobin 11.1 (L) 11.9 - 15.5 g/dL    Hematocrit 36.7 35.6 - 45.5 %    .7 (H) 80.5 - 98.2 fL    MCH 31.4 26.9 - 32.0 pg    MCHC 30.2 (L) 32.4 - 36.3 g/dL    RDW 12.3 11.7 - 13.0 %    RDW-SD 46.8 37.0 - 54.0 fl    MPV 9.6 6.0 - 12.0 fL    Platelets 489 140 - 500 10*3/mm3    Neutrophil % 77.1 (H) 42.7 - 76.0 %    Lymphocyte % 11.5 (L) 19.6 - 45.3 %    Monocyte % 10.2 5.0 - 12.0 %    Eosinophil % 0.6 0.3 - 6.2 %    Basophil % 0.1 0.0 - 1.5 %    Immature Grans % 0.5 0.0 - 0.5 %    Neutrophils, Absolute 8.85 (H) 1.90 - 8.10 10*3/mm3    Lymphocytes, Absolute 1.32 0.90 - 4.80 10*3/mm3    Monocytes, Absolute 1.17 0.20 - 1.20 10*3/mm3    Eosinophils, Absolute 0.07 0.00 - 0.70 10*3/mm3    Basophils, Absolute 0.01 0.00 - 0.20 10*3/mm3    Immature Grans, Absolute 0.06 (H) 0.00 - 0.03 10*3/mm3   Blood Gas, Arterial    Collection Time: 01/15/18  5:38 PM   Result Value Ref Range    Site Arterial: right brachial     Sergio's Test N/A     pH, Arterial 7.401 7.350 - 7.450 pH units    pCO2, Arterial 51.2 (H) 35.0 - 45.0 mm Hg    pO2, Arterial 72.2 (L) 80.0 - 100.0 mm Hg    HCO3, Arterial 31.8 (H) 22.0 - 28.0 mmol/L    Base  Excess, Arterial 5.8 (H) 0.0 - 2.0 mmol/L    O2 Saturation Calculated 94.0 92.0 - 99.0 %    Barometric Pressure for Blood Gas 757.7 mmHg    Modality Cannula     Flow Rate 4 lpm    Rate 22 Breaths/minute       I ordered the above labs and reviewed the results    RADIOLOGY  CT Angiogram Chest With Contrast   Final Result           No pulmonary embolism. Multifocal pulmonary infiltrates, minimal   right pleural effusion, follow-up recommended.       Discussed by telephone with Dr. Chamberlain at time of interpretation,   1735, 01/15/2018.               This report was finalized on 1/15/2018 5:34 PM by Dr. Jase Singleton MD.          XR Chest 2 View   Final Result   Small atelectasis/infiltrate at the lung bases, may in part   reflect chronic change, follow-up suggested.       This report was finalized on 1/15/2018 1:47 PM by Dr. Jase Singleton MD.            Reviewed CT abd/pelvis which shows bilateral multi-focal infiltrates. No PE present. Independently viewed by me. Interpreted by radiologist.     I ordered the above noted radiological studies and reviewed the images on the PACS system.        EKG    ekg was interpreted by Dr. Smith      COURSE & MEDICAL DECISION MAKING  Pertinent Labs and Imaging studies that were ordered and reviewed are noted above.  Results were reviewed/discussed with the patient and they were also made aware of online assess.  Pt also made aware that some labs, such as cultures, will not be resulted during ER visit and follow up with PMD is necessary.       PROGRESS AND CONSULTS    Progress Notes:    ED Course     1630  I have reviewed Pt's labs prior to evaluation which show a slightly elevated white count and a slightly decreased sodium. Her troponin is normal. Her EKG was reviewed as ST and her CXR shows a questionable infiltrate at the bilateral lung bases.      1642  Reviewed Pt's history and workup with Dr. Smith.  After a bedside evaluation, Dr. Smith agrees with  "the plan of care.    1738  Placed call to Dr. Hightower (pulmonology) for consult.    1747  Dr. Smith spoke with Dr. Kimball (pulmonology) who agrees that Pt should be admitted to Dr. Hightower for telemetry for further evaluation.     1805   Based on the patient's labs and imaging findings and presenting symptoms, the doctor and I feel it is appropriate to admit the patient for further management, evaluation, and treatment.  I have discussed this with the admitting team.  I have also discussed this with the patient/family.  They are in agreement with admission.        MEDICATIONS GIVEN IN ER  Medications   sodium chloride 0.9 % flush 10 mL (not administered)   cefTRIAXone (ROCEPHIN) IVPB 1 g (not administered)     And   azithromycin (ZITHROMAX) 500 mg in sodium chloride 0.9 % 250 mL IVPB (not administered)   sodium chloride 0.9 % bolus 1,000 mL (1,000 mL Intravenous New Bag 1/15/18 1656)   iopamidol (ISOVUE-370) 76 % injection 100 mL (85 mL Intravenous Given 1/15/18 1705)       /69 (Patient Position: Lying)  Pulse 99  Temp 99.5 °F (37.5 °C) (Oral)   Resp 20  Ht 157.5 cm (62\")  Wt 49 kg (108 lb)  SpO2 93%  BMI 19.75 kg/m2      DIAGNOSIS  Final diagnoses:   Pneumonia of both lungs due to infectious organism, unspecified part of lung   History of COPD   Hypoxia       Risks, benefits, alternatives discussed with patient.  Pt consents to treatment and agrees to follow up with PMD tomorrow for further care and any other prescriptions.       Documentation assistance provided by josé luis Pace for Carol Chamberlain PA-C.  Information recorded by the josé luis was done at my direction and has been verified and validated by me.  Electronically signed by Carol Chamberlain on 1/15/2018 at time 6:06 PM     Deedee Pace  01/15/18 1807       Carol Chamberlain PA-C  01/15/18 1816    "

## 2018-01-15 NOTE — ED PROVIDER NOTES
I supervised care provided by the midlevel provider.    We have discussed this patient's history, physical exam, and treatment plan.   I have reviewed the note and personally saw and examined the patient and agree with the plan of care.          Pt has h/o COPD for which pt is on continuous 4 L supplemental O2. Pt presents to the ED c/o flu-like symptoms onset about 2 weeks ago. Pt reports that she has had cough, dyspnea, and congestion. Pt reports that she developed sternal chest pain this AM. Pt states that her chest pain worsens with inspiration. Pt denies recent documented fever and vomiting. On physical exam, pt appears cachetic. Pt is tachycardic. There are rales present in the lungs bilaterally. Pt's WBC is 11.48; pt's CXR shows atelectasis vs. infiltrate in the lung bases. CTA Chest shows no acute PE; there are multifocal pulmonary infiltrates present -> azithromycin and rocephin have been ordered.         Dr. Hightower - pulmonologist         EKG:           EKG time: 12:21 PM  Rhythm/Rate: Sinus tachycardia rate 108  P waves and DC: Normal P waves  QRS, axis: Normal QRS   ST and T waves: Normal ST     Interpreted Contemporaneously by me, independently viewed  Similar compared to prior 06/25/16          PROGRESS NOTES:    5:47 PM:  Discussed case with Dr. Bustamante, pulmonologist on-call for Dr. Hightower. He would like pt admitted to Dr. Hightower, pulmonologist, to a telemetry bed.         Documentation assistance provided by Evelia Llanes. Information recorded by the scribe was done at my direction and has been verified and validated by me.     Entered by Evelia Llanes, acting as scribe for Dr. Luis MD.             Evelia Llanes  01/15/18 7719       Jeff Smith MD  01/15/18 6832

## 2018-01-16 PROCEDURE — 87081 CULTURE SCREEN ONLY: CPT | Performed by: INTERNAL MEDICINE

## 2018-01-16 PROCEDURE — 25010000002 METHYLPREDNISOLONE PER 40 MG: Performed by: INTERNAL MEDICINE

## 2018-01-16 PROCEDURE — 25010000002 CEFTRIAXONE PER 250 MG: Performed by: INTERNAL MEDICINE

## 2018-01-16 PROCEDURE — 94799 UNLISTED PULMONARY SVC/PX: CPT

## 2018-01-16 PROCEDURE — 25010000002 AZITHROMYCIN PER 500 MG: Performed by: INTERNAL MEDICINE

## 2018-01-16 PROCEDURE — 25010000002 METHYLPREDNISOLONE PER 125 MG: Performed by: INTERNAL MEDICINE

## 2018-01-16 RX ORDER — CEFTRIAXONE SODIUM 1 G/50ML
1 INJECTION, SOLUTION INTRAVENOUS EVERY 24 HOURS
Status: DISPENSED | OUTPATIENT
Start: 2018-01-16 | End: 2018-01-21

## 2018-01-16 RX ORDER — METHYLPREDNISOLONE SODIUM SUCCINATE 125 MG/2ML
60 INJECTION, POWDER, LYOPHILIZED, FOR SOLUTION INTRAMUSCULAR; INTRAVENOUS EVERY 8 HOURS
Status: DISCONTINUED | OUTPATIENT
Start: 2018-01-16 | End: 2018-01-17

## 2018-01-16 RX ORDER — ARFORMOTEROL TARTRATE 15 UG/2ML
15 SOLUTION RESPIRATORY (INHALATION)
Status: DISCONTINUED | OUTPATIENT
Start: 2018-01-16 | End: 2018-01-19

## 2018-01-16 RX ORDER — BUPROPION HYDROCHLORIDE 150 MG/1
150 TABLET, EXTENDED RELEASE ORAL EVERY 12 HOURS SCHEDULED
Status: DISCONTINUED | OUTPATIENT
Start: 2018-01-16 | End: 2018-01-23

## 2018-01-16 RX ORDER — GABAPENTIN 300 MG/1
300 CAPSULE ORAL 3 TIMES DAILY
COMMUNITY
End: 2018-12-19

## 2018-01-16 RX ORDER — ROFLUMILAST 500 UG/1
500 TABLET ORAL DAILY
COMMUNITY

## 2018-01-16 RX ADMIN — METHYLPREDNISOLONE SODIUM SUCCINATE 40 MG: 40 INJECTION, POWDER, FOR SOLUTION INTRAMUSCULAR; INTRAVENOUS at 11:01

## 2018-01-16 RX ADMIN — SERTRALINE 100 MG: 100 TABLET, FILM COATED ORAL at 08:56

## 2018-01-16 RX ADMIN — ZOLPIDEM TARTRATE 5 MG: 5 TABLET ORAL at 23:15

## 2018-01-16 RX ADMIN — ZOLPIDEM TARTRATE 5 MG: 5 TABLET ORAL at 00:17

## 2018-01-16 RX ADMIN — METHYLPREDNISOLONE SODIUM SUCCINATE 60 MG: 125 INJECTION, POWDER, FOR SOLUTION INTRAMUSCULAR; INTRAVENOUS at 18:05

## 2018-01-16 RX ADMIN — PANTOPRAZOLE SODIUM 40 MG: 40 TABLET, DELAYED RELEASE ORAL at 08:55

## 2018-01-16 RX ADMIN — CEFTRIAXONE SODIUM 1 G: 1 INJECTION, SOLUTION INTRAVENOUS at 17:35

## 2018-01-16 RX ADMIN — HYDROCODONE POLISTIREX AND CHLORPHENIRAMINE POLISITREX 5 ML: 10; 8 SUSPENSION, EXTENDED RELEASE ORAL at 11:01

## 2018-01-16 RX ADMIN — OXYCODONE HYDROCHLORIDE AND ACETAMINOPHEN 1 TABLET: 5; 325 TABLET ORAL at 21:15

## 2018-01-16 RX ADMIN — ARFORMOTEROL TARTRATE 15 MCG: 15 SOLUTION RESPIRATORY (INHALATION) at 21:24

## 2018-01-16 RX ADMIN — IPRATROPIUM BROMIDE AND ALBUTEROL SULFATE 3 ML: .5; 3 SOLUTION RESPIRATORY (INHALATION) at 15:02

## 2018-01-16 RX ADMIN — BUPROPION HYDROCHLORIDE 150 MG: 150 TABLET, EXTENDED RELEASE ORAL at 08:56

## 2018-01-16 RX ADMIN — IPRATROPIUM BROMIDE AND ALBUTEROL SULFATE 3 ML: .5; 3 SOLUTION RESPIRATORY (INHALATION) at 20:02

## 2018-01-16 RX ADMIN — GABAPENTIN 300 MG: 300 CAPSULE ORAL at 21:01

## 2018-01-16 RX ADMIN — METHYLPREDNISOLONE SODIUM SUCCINATE 40 MG: 40 INJECTION, POWDER, FOR SOLUTION INTRAMUSCULAR; INTRAVENOUS at 03:50

## 2018-01-16 RX ADMIN — AZITHROMYCIN 500 MG: 500 INJECTION, POWDER, LYOPHILIZED, FOR SOLUTION INTRAVENOUS at 18:05

## 2018-01-16 RX ADMIN — BUPROPION HYDROCHLORIDE 150 MG: 150 TABLET, EXTENDED RELEASE ORAL at 21:01

## 2018-01-16 RX ADMIN — IPRATROPIUM BROMIDE AND ALBUTEROL SULFATE 3 ML: .5; 3 SOLUTION RESPIRATORY (INHALATION) at 10:44

## 2018-01-16 RX ADMIN — IPRATROPIUM BROMIDE AND ALBUTEROL SULFATE 3 ML: .5; 3 SOLUTION RESPIRATORY (INHALATION) at 07:31

## 2018-01-16 RX ADMIN — HYDROCODONE POLISTIREX AND CHLORPHENIRAMINE POLISITREX 5 ML: 10; 8 SUSPENSION, EXTENDED RELEASE ORAL at 23:16

## 2018-01-16 RX ADMIN — ROFLUMILAST 500 MCG: 500 TABLET ORAL at 08:56

## 2018-01-16 NOTE — PROGRESS NOTES
Discharge Planning Assessment  [facility]     Patient Name: Siobhan Prakash  MRN: [id number]  Today's Date: 1/16/2018    Admit Date: 1/15/2018          Discharge Needs Assessment       01/16/18 1259    Living Environment    Lives With spouse    Living Arrangements house    Transportation Available car;family or friend will provide            Discharge Plan       01/16/18 1259    Case Management/Social Work Plan    Plan Home.  to transport    Additional Comments IMM 1-15. Face sheet and pharmacy verified . Patient says YEN Greco  was her PCP. Dr. Bro's office has been closed. She has a new PCP but does not know the name.  to check and advise. Patient uses [facility] for home oxygen needs . She uses a transfer chair or a wheel chair . She is agreeable to using HH on discharge  if needed. . States [facility] has used [facility] the past and is agreeable to using [facility] if HH is needed on discharge . Called [facility] with HH and they will follow...........  JENNIFER Starkey        Discharge Placement     No information found                Demographic Summary       01/16/18 1255    Referral Information    Admission Type inpatient   IMM 1-15    Reason For Consult discharge planning    Primary Care Physician Information    Name Patient states her PCP has retired  and she is not sure  of the  new physician's name            Functional Status       01/16/18 1257    Functional Status Current    Ambulation 2-->assistive person    Transferring 2-->assistive person    Toileting 2-->assistive person    Bathing 0-->independent    Dressing 0-->independent    Eating 0-->independent    Communication 0-->understands/communicates without difficulty    Functional Status Prior    Ambulation 1-->assistive equipment    Transferring 0-->independent    Toileting 0-->independent    Bathing 0-->independent    Dressing 0-->independent    Eating 0-->independent    IADL    Medications independent    Meal  Preparation independent   states her  is the cook in training    Housekeeping assistive person    Laundry assistive person    Shopping assistive person    Oral Care independent    IADL Comments  takes care of housekeeping, shopping laundry etc    Cognitive/Perceptual/Developmental    Developmental Stage (Eriksson's Stages of Development) Stage 8 (65 years-death/Late Adulthood) Integrity vs. Despair            Psychosocial     None            Abuse/Neglect     None            Legal     None            Substance Abuse     None            Patient Forms     None          JENNIFER Starkey

## 2018-01-16 NOTE — PROGRESS NOTES
Clinical Pharmacy Services: Medication History    Siobhan Prakash is a 72 y.o. female presenting to [facility] for Hypoxia [R09.02]  History of COPD [Z87.09]  Pneumonia of both lungs due to infectious organism, unspecified part of lung [J18.9]    She  has a past medical history of Anxiety; COPD (chronic obstructive pulmonary disease); Depression; GERD (gastroesophageal reflux disease); Hyperlipidemia; Migraine; Osteoporosis; and Pneumonia.    Allergies as of 01/15/2018 - Yonis as Reviewed 01/15/2018   Allergen Reaction Noted   • Codeine Rash and Other (See Comments) 05/13/2016   • Hydrocodone Rash and Other (See Comments) 05/13/2016       Medication information was obtained from: Patient's pharmacy    Pharmacy and Phone Number: [facility] [address] [place], [place] - [address]  AT [facility] [phone] [facility] [phone] FX    Prior to Admission Medications     Prescriptions Last Dose Informant Patient Reported? Taking?    BIOTIN PO  Pharmacy Yes Yes    Take 1 tablet by mouth Daily.    buPROPion SR (WELLBUTRIN SR) 200 MG 12 hr tablet  Pharmacy No Yes    Take 1 tablet by mouth 2 (Two) Times a Day.    Cholecalciferol (VITAMIN D3) 5000 UNITS capsule capsule  Pharmacy Yes Yes    Take 5,000 Units by mouth Daily.    ferrous sulfate 325 (65 FE) MG tablet  Pharmacy Yes Yes    Take 325 mg by mouth 2 (Two) Times a Day With Meals.    fluticasone-salmeterol (ADVAIR) 500-50 MCG/DOSE DISKUS  Pharmacy Yes Yes    Inhale 1 puff 2 (Two) Times a Day.    gabapentin (NEURONTIN) 300 MG capsule  Pharmacy Yes Yes    Take 300 mg by mouth 3 (Three) Times a Day.    lansoprazole (PREVACID) 15 MG capsule  Pharmacy No Yes    Take 1 capsule by mouth Daily.    meloxicam (MOBIC) 15 MG tablet  Pharmacy Yes Yes    Take 15 mg by mouth Daily.    montelukast (SINGULAIR) 10 MG tablet  Pharmacy Yes Yes    Take 10 mg by mouth Every Night.    Multiple Vitamins-Minerals (MULTIVITAMIN ADULT PO)  Pharmacy Yes Yes     Take 1 tablet by mouth Daily.    PROLIA 60 MG/ML solution syringe  Pharmacy No Yes    INJECT 1ML UNDER THE SKIN EVERY 6 MONTHS    roflumilast (DALIRESP) 500 MCG tablet tablet  Pharmacy Yes Yes    Take 500 mcg by mouth Daily.    sertraline (ZOLOFT) 100 MG tablet  Pharmacy No Yes    Take 1 tablet by mouth Daily. 1 po q day.  Take with 50 mg dose.    sertraline (ZOLOFT) 50 MG tablet  Pharmacy No Yes    Take 1 tablet by mouth Daily. 1 po daily with 100 mg dose    SUMAtriptan (IMITREX) 50 MG tablet  Pharmacy No Yes    Take one tablet at onset of headache. May repeat dose one time in 2 hours if headache not relieved.    Tiotropium Bromide Monohydrate (SPIRIVA RESPIMAT) 2.5 MCG/ACT aerosol solution  Pharmacy Yes Yes    Inhale 2 puffs Daily.    VENTOLIN  (90 BASE) MCG/ACT inhaler  Pharmacy Yes Yes    Inhale 2 puffs Every 4 (Four) Hours As Needed for Wheezing or Shortness of Air.    zolpidem (AMBIEN) 5 MG tablet  Pharmacy No Yes    Take 1 tablet by mouth At Night As Needed for Sleep.            Medication notes:      Per pharmacy, patient has active prescription for atorvastatin 10 mg once daily. Patient said that atorvastatin has been discontinued by her family doctor  in July 2017 due to severe bilateral leg pain.     This medication list is complete to the best of my knowledge as of 1/16/2018    Please call pharmacy for any questions.     Maddie Garcia, PharmD, BCPS  01/16/18 12:18 PM

## 2018-01-16 NOTE — PLAN OF CARE
Problem: Patient Care Overview (Adult)  Goal: Plan of Care Review  Outcome: Ongoing (interventions implemented as appropriate)   01/16/18 9344   Coping/Psychosocial Response Interventions   Plan Of Care Reviewed With patient   Patient Care Overview   Progress improving   Outcome Evaluation   Outcome Summary/Follow up Plan Dyspnea with exertion. O2 at 4L. Anxious at times. Wheezes, lungs coarse bilaterally. Occ non-prod cough.        Problem: Fall Risk (Adult)  Goal: Absence of Falls  Outcome: Ongoing (interventions implemented as appropriate)      Problem: COPD, Chronic Bronchitis/Emphysema (Adult)  Goal: Signs and Symptoms of Listed Potential Problems Will be Absent or Manageable (COPD, Chronic Bronchitis/Emphysema)  Outcome: Ongoing (interventions implemented as appropriate)

## 2018-01-16 NOTE — ED NOTES
Attempted to call report two different times after 7:30, at 7:55 they stated they were still getting report and charge nurse could not come to phone.      Ashley Youssef RN  01/15/18 1956

## 2018-01-16 NOTE — PLAN OF CARE
Problem: Patient Care Overview (Adult)  Goal: Plan of Care Review  Outcome: Ongoing (interventions implemented as appropriate)   01/16/18 0422   Coping/Psychosocial Response Interventions   Plan Of Care Reviewed With patient   Patient Care Overview   Progress no change   Outcome Evaluation   Outcome Summary/Follow up Plan Very SOA with exertion. Sats down into low 70's after ambulating to BR with O2 4L with extension. Anxious at times. Cough better after Tussionex.     Goal: Adult Individualization and Mutuality  Outcome: Ongoing (interventions implemented as appropriate)    Goal: Discharge Needs Assessment  Outcome: Ongoing (interventions implemented as appropriate)      Problem: Fall Risk (Adult)  Goal: Identify Related Risk Factors and Signs and Symptoms  Outcome: Outcome(s) achieved Date Met: 01/16/18    Goal: Absence of Falls  Outcome: Ongoing (interventions implemented as appropriate)      Problem: Pneumonia (Adult)  Goal: Signs and Symptoms of Listed Potential Problems Will be Absent or Manageable (Pneumonia)  Outcome: Ongoing (interventions implemented as appropriate)      Problem: COPD, Chronic Bronchitis/Emphysema (Adult)  Goal: Signs and Symptoms of Listed Potential Problems Will be Absent or Manageable (COPD, Chronic Bronchitis/Emphysema)  Outcome: Ongoing (interventions implemented as appropriate)

## 2018-01-17 LAB
DEPRECATED RDW RBC AUTO: 45.8 FL (ref 37–54)
ERYTHROCYTE [DISTWIDTH] IN BLOOD BY AUTOMATED COUNT: 12.4 % (ref 11.7–13)
HCT VFR BLD AUTO: 31.7 % (ref 35.6–45.5)
HGB BLD-MCNC: 9.8 G/DL (ref 11.9–15.5)
MCH RBC QN AUTO: 31.5 PG (ref 26.9–32)
MCHC RBC AUTO-ENTMCNC: 30.9 G/DL (ref 32.4–36.3)
MCV RBC AUTO: 101.9 FL (ref 80.5–98.2)
PLATELET # BLD AUTO: 589 10*3/MM3 (ref 140–500)
PMV BLD AUTO: 9.6 FL (ref 6–12)
RBC # BLD AUTO: 3.11 10*6/MM3 (ref 3.9–5.2)
WBC NRBC COR # BLD: 16.17 10*3/MM3 (ref 4.5–10.7)

## 2018-01-17 PROCEDURE — 25010000002 METHYLPREDNISOLONE PER 40 MG: Performed by: INTERNAL MEDICINE

## 2018-01-17 PROCEDURE — 94799 UNLISTED PULMONARY SVC/PX: CPT

## 2018-01-17 PROCEDURE — 25010000002 CEFTRIAXONE PER 250 MG: Performed by: INTERNAL MEDICINE

## 2018-01-17 PROCEDURE — 97162 PT EVAL MOD COMPLEX 30 MIN: CPT

## 2018-01-17 PROCEDURE — 85027 COMPLETE CBC AUTOMATED: CPT | Performed by: INTERNAL MEDICINE

## 2018-01-17 PROCEDURE — 25010000002 AZITHROMYCIN PER 500 MG: Performed by: INTERNAL MEDICINE

## 2018-01-17 PROCEDURE — 25010000002 METHYLPREDNISOLONE PER 125 MG: Performed by: INTERNAL MEDICINE

## 2018-01-17 RX ORDER — METHYLPREDNISOLONE SODIUM SUCCINATE 40 MG/ML
40 INJECTION, POWDER, LYOPHILIZED, FOR SOLUTION INTRAMUSCULAR; INTRAVENOUS EVERY 8 HOURS
Status: DISCONTINUED | OUTPATIENT
Start: 2018-01-17 | End: 2018-01-18

## 2018-01-17 RX ORDER — FLUCONAZOLE 100 MG/1
100 TABLET ORAL EVERY 24 HOURS
Status: COMPLETED | OUTPATIENT
Start: 2018-01-17 | End: 2018-01-20

## 2018-01-17 RX ADMIN — PANTOPRAZOLE SODIUM 40 MG: 40 TABLET, DELAYED RELEASE ORAL at 07:02

## 2018-01-17 RX ADMIN — IPRATROPIUM BROMIDE AND ALBUTEROL SULFATE 3 ML: .5; 3 SOLUTION RESPIRATORY (INHALATION) at 15:04

## 2018-01-17 RX ADMIN — METHYLPREDNISOLONE SODIUM SUCCINATE 60 MG: 125 INJECTION, POWDER, FOR SOLUTION INTRAMUSCULAR; INTRAVENOUS at 12:19

## 2018-01-17 RX ADMIN — ARFORMOTEROL TARTRATE 15 MCG: 15 SOLUTION RESPIRATORY (INHALATION) at 20:48

## 2018-01-17 RX ADMIN — GABAPENTIN 300 MG: 300 CAPSULE ORAL at 20:15

## 2018-01-17 RX ADMIN — BUPROPION HYDROCHLORIDE 150 MG: 150 TABLET, EXTENDED RELEASE ORAL at 09:27

## 2018-01-17 RX ADMIN — SERTRALINE 150 MG: 100 TABLET, FILM COATED ORAL at 09:27

## 2018-01-17 RX ADMIN — METHYLPREDNISOLONE SODIUM SUCCINATE 60 MG: 125 INJECTION, POWDER, FOR SOLUTION INTRAMUSCULAR; INTRAVENOUS at 04:10

## 2018-01-17 RX ADMIN — FLUCONAZOLE 100 MG: 100 TABLET ORAL at 21:19

## 2018-01-17 RX ADMIN — CEFTRIAXONE SODIUM 1 G: 1 INJECTION, SOLUTION INTRAVENOUS at 18:27

## 2018-01-17 RX ADMIN — IPRATROPIUM BROMIDE AND ALBUTEROL SULFATE 3 ML: .5; 3 SOLUTION RESPIRATORY (INHALATION) at 12:13

## 2018-01-17 RX ADMIN — ARFORMOTEROL TARTRATE 15 MCG: 15 SOLUTION RESPIRATORY (INHALATION) at 10:12

## 2018-01-17 RX ADMIN — IPRATROPIUM BROMIDE AND ALBUTEROL SULFATE 3 ML: .5; 3 SOLUTION RESPIRATORY (INHALATION) at 08:16

## 2018-01-17 RX ADMIN — METHYLPREDNISOLONE SODIUM SUCCINATE 40 MG: 40 INJECTION, POWDER, FOR SOLUTION INTRAMUSCULAR; INTRAVENOUS at 20:15

## 2018-01-17 RX ADMIN — BUPROPION HYDROCHLORIDE 150 MG: 150 TABLET, EXTENDED RELEASE ORAL at 20:15

## 2018-01-17 RX ADMIN — HYDROCODONE POLISTIREX AND CHLORPHENIRAMINE POLISITREX 5 ML: 10; 8 SUSPENSION, EXTENDED RELEASE ORAL at 12:29

## 2018-01-17 RX ADMIN — IPRATROPIUM BROMIDE AND ALBUTEROL SULFATE 3 ML: .5; 3 SOLUTION RESPIRATORY (INHALATION) at 19:17

## 2018-01-17 RX ADMIN — OXYCODONE HYDROCHLORIDE AND ACETAMINOPHEN 1 TABLET: 5; 325 TABLET ORAL at 15:29

## 2018-01-17 RX ADMIN — OXYCODONE HYDROCHLORIDE AND ACETAMINOPHEN 1 TABLET: 5; 325 TABLET ORAL at 04:25

## 2018-01-17 RX ADMIN — OXYCODONE HYDROCHLORIDE AND ACETAMINOPHEN 1 TABLET: 5; 325 TABLET ORAL at 09:27

## 2018-01-17 RX ADMIN — OXYCODONE HYDROCHLORIDE AND ACETAMINOPHEN 1 TABLET: 5; 325 TABLET ORAL at 23:01

## 2018-01-17 RX ADMIN — AZITHROMYCIN 500 MG: 500 INJECTION, POWDER, LYOPHILIZED, FOR SOLUTION INTRAVENOUS at 19:07

## 2018-01-17 RX ADMIN — ROFLUMILAST 500 MCG: 500 TABLET ORAL at 09:27

## 2018-01-17 RX ADMIN — ZOLPIDEM TARTRATE 5 MG: 5 TABLET ORAL at 23:54

## 2018-01-17 NOTE — PROGRESS NOTES
"[facility] PULMONARY CARE         Dr Carlos Sayied   LOS: 2 days   Patient Care Team:  YEN Gutierrez as PCP - General  YEN Gutierrez as PCP - Family Medicine  Carl Perez MD as Consulting Physician (Orthopedic Surgery)    Chief Complaint: Acute on chronic hypoxemic respiratory failure in the setting off community-acquired pneumonia and underlying severe COPD    Interval History: Improved with higher dose of steroids.  Still with cough and congestion    REVIEW OF SYSTEMS:   CARDIOVASCULAR: No chest pain, chest pressure or chest discomfort. No palpitations or edema.   RESPIRATORY: Short of breath at rest.  Cough ongoing.  GASTROINTESTINAL: No anorexia, nausea, vomiting or diarrhea. No abdominal pain or blood.   HEMATOLOGIC: No bleeding or bruising.     Ventilator/Non-Invasive Ventilation Settings     None            Vital Signs  Temp:  [97.8 °F (36.6 °C)-98.6 °F (37 °C)] 98.4 °F (36.9 °C)  Heart Rate:  [107-124] 112  Resp:  [18-24] 18  BP: (130-158)/() 130/69    Intake/Output Summary (Last 24 hours) at 01/17/18 1438  Last data filed at 01/17/18 1147   Gross per 24 hour   Intake              360 ml   Output              100 ml   Net              260 ml     Flowsheet Rows         First Filed Value    Admission Height  157.5 cm (62\") Documented at 01/15/2018 1207    Admission Weight  49 kg (108 lb) Documented at 01/15/2018 1207          Physical Exam:   General Appearance:    Alert, cooperative, in no acute distress   Lungs:     Diminished breath sound with bilateral wheezing on the bases     Heart:    Regular rhythm and normal rate, normal S1 and S2, no            murmur, no gallop, no rub, no click   Chest Wall:    No abnormalities observed   Abdomen:     Normal bowel sounds, no masses, no organomegaly, soft        non-tender, non-distended, no guarding, no rebound                tenderness   Extremities:   Moves all extremities well, no edema, no cyanosis, no             redness "     Results Review:          Results from last 7 days  Lab Units 01/15/18  1415   SODIUM mmol/L 135*   POTASSIUM mmol/L 3.6   CHLORIDE mmol/L 93*   CO2 mmol/L 34.0*   BUN mg/dL 12   CREATININE mg/dL 0.66   GLUCOSE mg/dL 140*   CALCIUM mg/dL 8.9       Results from last 7 days  Lab Units 01/15/18  1415   TROPONIN T ng/mL <0.010       Results from last 7 days  Lab Units 01/17/18  0526 01/15/18  1415   WBC 10*3/mm3 16.17* 11.48*   HEMOGLOBIN g/dL 9.8* 11.1*   HEMATOCRIT % 31.7* 36.7   PLATELETS 10*3/mm3 589* 489                       Results from last 7 days  Lab Units 01/15/18  1738   PH, ARTERIAL pH units 7.401   PO2 ART mm Hg 72.2*   PCO2, ARTERIAL mm Hg 51.2*   HCO3 ART mmol/L 31.8*       I reviewed the patient's new clinical results.  I personally viewed and interpreted the patient's CXR        Medication Review:     arformoterol 15 mcg Nebulization BID - RT   azithromycin 500 mg Intravenous Q24H   buPROPion  mg Oral Q12H   ceftriaxone 1 g Intravenous Q24H   gabapentin 300 mg Oral Nightly   ipratropium-albuterol 3 mL Nebulization 4x Daily - RT   methylPREDNISolone sodium succinate 60 mg Intravenous Q8H   pantoprazole 40 mg Oral QAM   roflumilast 500 mcg Oral Daily   sertraline 150 mg Oral Daily            ASSESSMENT:   Acute on chronic hypoxemic respiratory failure  Community-acquired pneumonia  Severe underlying COPD  Mild hyponatremia    PLAN:  Continue antibiotics.  De-escalate based on cultures  Patient improved with increasing steroids.  We will wean steroids as tolerated   Continue aggressive bronchodilators with laba and short acting  Discontinue IV fluids  Wean oxygen to baseline  Continue physical therapy  Slow to improve  Discussed with   May need Trilogy long-term.    Breanna Bustamante MD  01/17/18  2:38 PM

## 2018-01-17 NOTE — THERAPY EVALUATION
Acute Care - Physical Therapy Initial Evaluation  [facility]     Patient Name: Siobhan Prakash  : 1945  MRN: [id number]  Today's Date: 2018      Date of Referral to PT: 18         Admit Date: 1/15/2018     Visit Dx:    ICD-10-CM ICD-9-CM   1. Pneumonia of both lungs due to infectious organism, unspecified part of lung J18.9 483.8   2. History of COPD Z87.09 V12.69   3. Hypoxia R09.02 799.02     Patient Active Problem List   Diagnosis   • Abnormal weight loss   • Postartificial menopausal syndrome   • Subcutaneous cyst   • Mixed anxiety depressive disorder   • Gastroesophageal reflux disease   • Hyperlipidemia   • Insomnia   • Macrocytosis   • Tremor   • Pulmonary emphysema   • Vitamin D deficiency   • Glucose intolerance (impaired glucose tolerance)   • Fatigue   • Osteoporosis   • Herpes zoster without complication   • Migraine   • Bilateral leg pain   • Pneumonia of both lungs due to infectious organism     Past Medical History:   Diagnosis Date   • Anxiety    • COPD (chronic obstructive pulmonary disease)    • Depression    • GERD (gastroesophageal reflux disease)    • Hyperlipidemia    • Migraine    • Osteoporosis    • Pneumonia      Past Surgical History:   Procedure Laterality Date   • CHOLECYSTECTOMY     • HIP ARTHROPLASTY Right    • REDUCTION MAMMAPLASTY            PT ASSESSMENT (last 72 hours)      PT Evaluation       18 0800 18 1259    Rehab Evaluation    Document Type evaluation  -LB     Subjective Information agree to therapy;complains of;dyspnea  -LB     Patient Effort, Rehab Treatment good  -LB     Symptoms Noted During/After Treatment shortness of breath  -LB     Symptoms Noted Comment pt performed PLB, still SOA with exertion.  Advised only short distance ambulation at this point  -LB     General Information    General Observations Pt in bed, 02  -LB     Pertinent History Of Current Problem pt admitted with chest pain and increased SOA, PNA  -LB      Precautions/Limitations fall precautions;oxygen therapy device and L/min   4 L 02 at home  -LB     Equipment Currently Used at Home none   Rec to use Rwx in home, wc in community  -LB     Plans/Goals Discussed With patient  -LB     Living Environment    Lives With  spouse  -SG    Living Arrangements  house  -SG    Transportation Available  car;family or friend will provide  -SG    Clinical Impression    Date of Referral to PT 01/17/18  -LB     Patient/Family Goals Statement To go home and feel better  -LB     Criteria for Skilled Therapeutic Interventions Met treatment indicated  -LB     Pathology/Pathophysiology Noted (Describe Specifically for Each System) pulmonary  -LB     Rehab Potential good, to achieve stated therapy goals  -LB     Vital Signs    Pretreatment Heart Rate (beats/min) 111  -LB     Posttreatment Resp Rate (breaths/min) 134  -LB     Pre SpO2 (%) 91  -LB     O2 Delivery Pre Treatment supplemental O2  -LB     Intra SpO2 (%) 76  -LB     O2 Delivery Intra Treatment supplemental O2  -LB     Post SpO2 (%) 88  -LB     O2 Delivery Post Treatment supplemental O2  -LB     Pre Patient Position Supine  -LB     Intra Patient Position Standing  -LB     Post Patient Position Supine  -LB     Recovery Time Pt needed at least 4-5 mins to recover to the high 80's  -LB     Pain Assessment    Pain Assessment No/denies pain  -LB     Cognitive Assessment/Intervention    Current Cognitive/Communication Assessment functional  -LB     Orientation Status oriented x 4  -LB     Follows Commands/Answers Questions 100% of the time;able to follow multi-step instructions  -LB     Personal Safety WNL/WFL  -LB     Personal Safety Interventions fall prevention program maintained;gait belt;muscle strengthening facilitated;nonskid shoes/slippers when out of bed  -LB     ROM (Range of Motion)    General ROM no range of motion deficits identified  -LB     MMT (Manual Muscle Testing)    General MMT Assessment no strength deficits  identified  -LB     Bed Mobility, Assessment/Treatment    Bed Mobility, Scoot/Bridge, [place] independent  -LB     Bed Mob, Supine to Sit, [place] independent  -LB     Bed Mob, Sit to Supine, [place] independent  -LB     Transfer Assessment/Treatment    Transfers, Sit-Stand [place] stand by assist  -LB     Transfers, Stand-Sit [place] stand by assist  -LB     Transfers, Sit-Stand-Sit, Assist Device rolling walker  -LB     Gait Assessment/Treatment    Gait, [place] Level contact guard assist;stand by assist  -LB     Gait, Assistive Device rolling walker  -LB     Gait, Distance (Feet) 50  -LB     Gait, Gait Deviations bilateral:;decreased heel strike;narrow base  -LB     Gait, Safety Issues balance decreased during turns  -LB     Gait, Comment increased resp rate with activity; encouraged PLB  -LB       01/15/18 2124 01/15/18 2117    General Information    Equipment Currently Used at Home  wheelchair;oxygen   wheelchair for long distances  -KJ    Living Environment    Lives With spouse  -KJ     Living Arrangements house  -KJ     Home Accessibility no concerns  -KJ     Stair Railings at Home none  -KJ     Type of Financial/Environmental Concern none  -KJ     Transportation Available car;family or friend will provide  -KJ       User Key  (r) = Recorded By, (t) = Taken By, (c) = Cosigned By    Initials Name Provider Type    LB An Lagunas, PT Physical Therapist    ELIDA Granados, RN Registered Nurse    JENNIFER Rao           Physical Therapy Education     Title: PT OT SLP Therapies (Done)     Topic: Physical Therapy (Done)     Point: Mobility training (Done)    Learning Progress Summary    Learner Readiness Method Response Comment Documented by Status   Patient Acceptance E SANDRA,NR  LB 01/17/18 0848 Done               Point: Precautions (Done)    Learning Progress Summary    Learner Readiness Method Response Comment Documented by Status   Patient Acceptance E SANDRA,NR   LB 01/17/18 0848 Done                      User Key     Initials Effective Dates Name Provider Type Discipline    LB 10/06/15 -  An Lagunas PT Physical Therapist PT                PT Recommendation and Plan  Anticipated Equipment Needs At Discharge: front wheeled walker (encouraged pt to use in the home)  Anticipated Discharge Disposition: home with home health  Planned Therapy Interventions: gait training, strengthening, transfer training  Plan of Care Review  Plan Of Care Reviewed With: patient  Outcome Summary/Follow up Plan: In PT, pt experienced increased resp rate and SOA with all activity.  Pt was maintained on 4L02 at all times and required 4-5 mins of recovery time after a short ambulation.  Encouraged pt to use her rolling walker at home          IP PT Goals       01/17/18 0849          Transfer Training PT LTG    Transfer Training PT LTG, Date Established 01/17/18  -LB      Transfer Training PT LTG, Time to Achieve 1 wk  -LB      Transfer Training PT LTG, Activity Type sit to stand/stand to sit  -LB      Transfer Training PT LTG, [place] Level conditional independence  -LB      Transfer Training PT LTG, Assist Device walker, rolling  -LB      Gait Training PT LTG    Gait Training Goal PT LTG, Date Established 01/17/18  -LB      Gait Training Goal PT LTG, Time to Achieve 1 wk  -LB      Gait Training Goal PT LTG, [place] Level supervision required  -LB      Gait Training Goal PT LTG, Assist Device walker, rolling  -LB      Gait Training Goal PT LTG, Distance to Achieve 65  -LB      Cardiopulmonary PT LTG    Cardiopulmonary PT LTG, Date Established 01/17/18  -LB      Cardiopulmonary PT LTG, Time to Achieve 1 wk  -LB      Cardiopulmonary PT LTG, Additional Goal 02 sats stay above 82% with activity  -LB        User Key  (r) = Recorded By, (t) = Taken By, (c) = Cosigned By    Initials Name Provider Type    LB An Lagunas PT Physical Therapist                Outcome Measures       01/17/18 0800           How much help from another person do you currently need...    Turning from your back to your side while in flat bed without using bedrails? 4  -LB      Moving from lying on back to sitting on the side of a flat bed without bedrails? 4  -LB      Moving to and from a bed to a chair (including a wheelchair)? 3  -LB      Standing up from a chair using your arms (e.g., wheelchair, bedside chair)? 3  -LB      Climbing 3-5 steps with a railing? 2  -LB      To walk in hospital room? 3  -LB      AM-PAC 6 Clicks Score 19  -LB      Functional Assessment    Outcome Measure Options AM-PAC 6 Clicks Basic Mobility (PT)  -LB        User Key  (r) = Recorded By, (t) = Taken By, (c) = Cosigned By    Initials Name Provider Type    DAVID Lagunas PT Physical Therapist           Time Calculation:         PT Charges       01/17/18 0853          Time Calculation    Start Time 0822  -LB      Stop Time 0841  -LB      Time Calculation (min) 19 min  -LB      PT Received On 01/17/18  -LB      PT - Next Appointment 01/18/18  -LB      PT Goal Re-Cert Due Date 01/24/18  -LB        User Key  (r) = Recorded By, (t) = Taken By, (c) = Cosigned By    Initials Name Provider Type    DAVID Lagunas PT Physical Therapist          Therapy Charges for Today     Code Description Service Date Service Provider Modifiers Qty    22236027495  PT EVAL MOD COMPLEXITY 2 1/17/2018 An Lagunas, PT GP 1          PT G-Codes  Outcome Measure Options: AM-PAC 6 Clicks Basic Mobility (PT)      An Lagunas PT  1/17/2018

## 2018-01-17 NOTE — PLAN OF CARE
Problem: Patient Care Overview (Adult)  Goal: Plan of Care Review  Outcome: Ongoing (interventions implemented as appropriate)   01/17/18 0849   Coping/Psychosocial Response Interventions   Plan Of Care Reviewed With patient   Outcome Evaluation   Outcome Summary/Follow up Plan In PT, pt experienced increased resp rate and SOA with all activity. Pt was maintained on 4L02 at all times and required 4-5 mins of recovery time after a short ambulation. Encouraged pt to use her rolling walker at home       Problem: Inpatient Physical Therapy  Goal: Transfer Training Goal 1 LTG- PT  Outcome: Ongoing (interventions implemented as appropriate)   01/17/18 0849   Transfer Training PT LTG   Transfer Training PT LTG, Date Established 01/17/18   Transfer Training PT LTG, Time to Achieve 1 wk   Transfer Training PT LTG, Activity Type sit to stand/stand to sit   Transfer Training PT LTG, [place] Level conditional independence   Transfer Training PT LTG, Assist Device walker, rolling     Goal: Gait Training Goal LTG- PT  Outcome: Ongoing (interventions implemented as appropriate)   01/17/18 0849   Gait Training PT LTG   Gait Training Goal PT LTG, Date Established 01/17/18   Gait Training Goal PT LTG, Time to Achieve 1 wk   Gait Training Goal PT LTG, [place] Level supervision required   Gait Training Goal PT LTG, Assist Device walker, rolling   Gait Training Goal PT LTG, Distance to Achieve 65     Goal: Cardiopulmonary Goal LTG- PT  Outcome: Ongoing (interventions implemented as appropriate)   01/17/18 0849   Cardiopulmonary PT LTG   Cardiopulmonary PT LTG, Date Established 01/17/18   Cardiopulmonary PT LTG, Time to Achieve 1 wk   Cardiopulmonary PT LTG, Additional Goal 02 sats stay above 82% with activity

## 2018-01-17 NOTE — PROGRESS NOTES
"[facility] PULMONARY CARE         Dr Carlos Sayied   LOS: 2 days   Patient Care Team:  YEN Gutierrez as PCP - General  YEN Gutierrez as PCP - Family Medicine  Carl Perez MD as Consulting Physician (Orthopedic Surgery)    Chief Complaint: Acute on chronic hypoxemic respiratory failure in the setting off community-acquired pneumonia and underlying severe COPD    Interval History: Feels a little better.  Still extremely short of breath at rest.    REVIEW OF SYSTEMS:   CARDIOVASCULAR: No chest pain, chest pressure or chest discomfort. No palpitations or edema.   RESPIRATORY: Short of breath at rest.  Cough ongoing.  GASTROINTESTINAL: No anorexia, nausea, vomiting or diarrhea. No abdominal pain or blood.   HEMATOLOGIC: No bleeding or bruising.     Ventilator/Non-Invasive Ventilation Settings     None            Vital Signs  Temp:  [97.8 °F (36.6 °C)-98.6 °F (37 °C)] 98.4 °F (36.9 °C)  Heart Rate:  [107-124] 112  Resp:  [18-24] 18  BP: (130-158)/() 130/69    Intake/Output Summary (Last 24 hours) at 01/17/18 1436  Last data filed at 01/17/18 1147   Gross per 24 hour   Intake              360 ml   Output              100 ml   Net              260 ml     Flowsheet Rows         First Filed Value    Admission Height  157.5 cm (62\") Documented at 01/15/2018 1207    Admission Weight  49 kg (108 lb) Documented at 01/15/2018 1207          Physical Exam:   General Appearance:    Alert, cooperative, in no acute distress   Lungs:     Diminished breath sound with bilateral wheezing on the bases     Heart:    Regular rhythm and normal rate, normal S1 and S2, no            murmur, no gallop, no rub, no click   Chest Wall:    No abnormalities observed   Abdomen:     Normal bowel sounds, no masses, no organomegaly, soft        non-tender, non-distended, no guarding, no rebound                tenderness   Extremities:   Moves all extremities well, no edema, no cyanosis, no             redness     Results " Review:          Results from last 7 days  Lab Units 01/15/18  1415   SODIUM mmol/L 135*   POTASSIUM mmol/L 3.6   CHLORIDE mmol/L 93*   CO2 mmol/L 34.0*   BUN mg/dL 12   CREATININE mg/dL 0.66   GLUCOSE mg/dL 140*   CALCIUM mg/dL 8.9       Results from last 7 days  Lab Units 01/15/18  1415   TROPONIN T ng/mL <0.010       Results from last 7 days  Lab Units 01/17/18  0526 01/15/18  1415   WBC 10*3/mm3 16.17* 11.48*   HEMOGLOBIN g/dL 9.8* 11.1*   HEMATOCRIT % 31.7* 36.7   PLATELETS 10*3/mm3 589* 489                       Results from last 7 days  Lab Units 01/15/18  1738   PH, ARTERIAL pH units 7.401   PO2 ART mm Hg 72.2*   PCO2, ARTERIAL mm Hg 51.2*   HCO3 ART mmol/L 31.8*       I reviewed the patient's new clinical results.  I personally viewed and interpreted the patient's CXR        Medication Review:     arformoterol 15 mcg Nebulization BID - RT   azithromycin 500 mg Intravenous Q24H   buPROPion  mg Oral Q12H   ceftriaxone 1 g Intravenous Q24H   gabapentin 300 mg Oral Nightly   ipratropium-albuterol 3 mL Nebulization 4x Daily - RT   methylPREDNISolone sodium succinate 60 mg Intravenous Q8H   pantoprazole 40 mg Oral QAM   roflumilast 500 mcg Oral Daily   sertraline 150 mg Oral Daily            ASSESSMENT:   Acute on chronic hypoxemic respiratory failure  Community-acquired pneumonia  Severe underlying COPD  Mild hyponatremia    PLAN:  Continue antibiotics  Patient improved with increasing steroids.    Continue aggressive bronchodilators with laba and short acting  Discontinue IV fluids  Wean oxygen to baseline  Continue physical therapy  Slow to improve    Breanna Bustamante MD  01/17/18  2:36 PM

## 2018-01-17 NOTE — PROGRESS NOTES
Continued Stay Note  [facility]     Patient Name: Siobhan Prakash  MRN: [id number]  Today's Date: 1/17/2018    Admit Date: 1/15/2018          Discharge Plan       01/17/18 1228    Case Management/Social Work Plan    Plan Home.  to transport    Additional Comments Patient had questions regarding her oxygen. She has used [employer] since 2003 and wanted a different home oxygen system/ concentrator. With patient permission, CSW called Charanjit Stafford/ [employer]. He called patient in her room to discuss options. Patient will contact Charanjit at discharge and he will arrange to meet with her at home to discuss needs. Patient says she has a rolling walker at  home. If DME is needed at discharge, she is agreeable to using [facility]. CCP to follow...........  JENNIFER Starkey              Discharge Codes     None            JENNIFER Starkey

## 2018-01-18 LAB — MRSA SPEC QL CULT: NORMAL

## 2018-01-18 PROCEDURE — 97110 THERAPEUTIC EXERCISES: CPT

## 2018-01-18 PROCEDURE — 25010000002 METHYLPREDNISOLONE PER 40 MG: Performed by: INTERNAL MEDICINE

## 2018-01-18 PROCEDURE — 94799 UNLISTED PULMONARY SVC/PX: CPT

## 2018-01-18 PROCEDURE — 25010000002 CEFTRIAXONE PER 250 MG: Performed by: INTERNAL MEDICINE

## 2018-01-18 RX ORDER — METHYLPREDNISOLONE SODIUM SUCCINATE 40 MG/ML
40 INJECTION, POWDER, LYOPHILIZED, FOR SOLUTION INTRAMUSCULAR; INTRAVENOUS EVERY 12 HOURS
Status: DISCONTINUED | OUTPATIENT
Start: 2018-01-19 | End: 2018-01-19

## 2018-01-18 RX ORDER — AZITHROMYCIN 250 MG/1
500 TABLET, FILM COATED ORAL
Status: COMPLETED | OUTPATIENT
Start: 2018-01-18 | End: 2018-01-22

## 2018-01-18 RX ADMIN — ARFORMOTEROL TARTRATE 15 MCG: 15 SOLUTION RESPIRATORY (INHALATION) at 09:34

## 2018-01-18 RX ADMIN — SERTRALINE 150 MG: 100 TABLET, FILM COATED ORAL at 09:34

## 2018-01-18 RX ADMIN — GABAPENTIN 300 MG: 300 CAPSULE ORAL at 20:34

## 2018-01-18 RX ADMIN — BUPROPION HYDROCHLORIDE 150 MG: 150 TABLET, EXTENDED RELEASE ORAL at 09:34

## 2018-01-18 RX ADMIN — HYDROCODONE POLISTIREX AND CHLORPHENIRAMINE POLISITREX 5 ML: 10; 8 SUSPENSION, EXTENDED RELEASE ORAL at 14:13

## 2018-01-18 RX ADMIN — PANTOPRAZOLE SODIUM 40 MG: 40 TABLET, DELAYED RELEASE ORAL at 09:33

## 2018-01-18 RX ADMIN — METHYLPREDNISOLONE SODIUM SUCCINATE 40 MG: 40 INJECTION, POWDER, FOR SOLUTION INTRAMUSCULAR; INTRAVENOUS at 14:15

## 2018-01-18 RX ADMIN — METHYLPREDNISOLONE SODIUM SUCCINATE 40 MG: 40 INJECTION, POWDER, FOR SOLUTION INTRAMUSCULAR; INTRAVENOUS at 04:25

## 2018-01-18 RX ADMIN — ROFLUMILAST 500 MCG: 500 TABLET ORAL at 09:34

## 2018-01-18 RX ADMIN — OXYCODONE HYDROCHLORIDE AND ACETAMINOPHEN 1 TABLET: 5; 325 TABLET ORAL at 22:32

## 2018-01-18 RX ADMIN — BUPROPION HYDROCHLORIDE 150 MG: 150 TABLET, EXTENDED RELEASE ORAL at 20:34

## 2018-01-18 RX ADMIN — FLUCONAZOLE 100 MG: 100 TABLET ORAL at 20:37

## 2018-01-18 RX ADMIN — OXYCODONE HYDROCHLORIDE AND ACETAMINOPHEN 1 TABLET: 5; 325 TABLET ORAL at 09:44

## 2018-01-18 RX ADMIN — IPRATROPIUM BROMIDE AND ALBUTEROL SULFATE 3 ML: .5; 3 SOLUTION RESPIRATORY (INHALATION) at 07:07

## 2018-01-18 RX ADMIN — AZITHROMYCIN 500 MG: 250 TABLET, FILM COATED ORAL at 18:22

## 2018-01-18 RX ADMIN — OXYCODONE HYDROCHLORIDE AND ACETAMINOPHEN 1 TABLET: 5; 325 TABLET ORAL at 15:51

## 2018-01-18 RX ADMIN — IPRATROPIUM BROMIDE AND ALBUTEROL SULFATE 3 ML: .5; 3 SOLUTION RESPIRATORY (INHALATION) at 16:57

## 2018-01-18 RX ADMIN — ARFORMOTEROL TARTRATE 15 MCG: 15 SOLUTION RESPIRATORY (INHALATION) at 20:57

## 2018-01-18 RX ADMIN — IPRATROPIUM BROMIDE AND ALBUTEROL SULFATE 3 ML: .5; 3 SOLUTION RESPIRATORY (INHALATION) at 19:05

## 2018-01-18 RX ADMIN — IPRATROPIUM BROMIDE AND ALBUTEROL SULFATE 3 ML: .5; 3 SOLUTION RESPIRATORY (INHALATION) at 11:57

## 2018-01-18 RX ADMIN — OXYCODONE HYDROCHLORIDE AND ACETAMINOPHEN 1 TABLET: 5; 325 TABLET ORAL at 04:25

## 2018-01-18 RX ADMIN — CEFTRIAXONE SODIUM 1 G: 1 INJECTION, SOLUTION INTRAVENOUS at 18:22

## 2018-01-18 NOTE — PROGRESS NOTES
"[facility] PULMONARY CARE         Dr Carlos Sayied   LOS: 3 days   Patient Care Team:  YEN Gutierrez as PCP - General  YEN Gutierrez as PCP - Family Medicine  Carl Perez MD as Consulting Physician (Orthopedic Surgery)    Chief Complaint: Acute on chronic hypoxemic respiratory failure in the setting off community-acquired pneumonia and underlying severe COPD    Interval History: Feels a little better today.  Still short of breath with minimal exertion.    REVIEW OF SYSTEMS:   CARDIOVASCULAR: No chest pain, chest pressure or chest discomfort. No palpitations or edema.   RESPIRATORY: Short of breath at rest.    GASTROINTESTINAL: No anorexia, nausea, vomiting or diarrhea. No abdominal pain or blood.   HEMATOLOGIC: No bleeding or bruising.     Ventilator/Non-Invasive Ventilation Settings     None            Vital Signs  Temp:  [97.9 °F (36.6 °C)-98.6 °F (37 °C)] 98.4 °F (36.9 °C)  Heart Rate:  [109-123] 119  Resp:  [18-24] 18  BP: (137-180)/() 173/87    Intake/Output Summary (Last 24 hours) at 01/18/18 1420  Last data filed at 01/18/18 0756   Gross per 24 hour   Intake              360 ml   Output              350 ml   Net               10 ml     Flowsheet Rows         First Filed Value    Admission Height  157.5 cm (62\") Documented at 01/15/2018 1207    Admission Weight  49 kg (108 lb) Documented at 01/15/2018 1207          Physical Exam:   General Appearance:    Alert, cooperative, in no acute distress   Lungs:     Diminished breath sound with bilateral wheezing on the bases     Heart:    Regular rhythm and normal rate, normal S1 and S2, no            murmur, no gallop, no rub, no click   Chest Wall:    No abnormalities observed   Abdomen:     Normal bowel sounds, no masses, no organomegaly, soft        non-tender, non-distended, no guarding, no rebound                tenderness   Extremities:   Moves all extremities well, no edema, no cyanosis, no             redness     Results " Review:          Results from last 7 days  Lab Units 01/15/18  1415   SODIUM mmol/L 135*   POTASSIUM mmol/L 3.6   CHLORIDE mmol/L 93*   CO2 mmol/L 34.0*   BUN mg/dL 12   CREATININE mg/dL 0.66   GLUCOSE mg/dL 140*   CALCIUM mg/dL 8.9       Results from last 7 days  Lab Units 01/15/18  1415   TROPONIN T ng/mL <0.010       Results from last 7 days  Lab Units 01/17/18  0526 01/15/18  1415   WBC 10*3/mm3 16.17* 11.48*   HEMOGLOBIN g/dL 9.8* 11.1*   HEMATOCRIT % 31.7* 36.7   PLATELETS 10*3/mm3 589* 489                       Results from last 7 days  Lab Units 01/15/18  1738   PH, ARTERIAL pH units 7.401   PO2 ART mm Hg 72.2*   PCO2, ARTERIAL mm Hg 51.2*   HCO3 ART mmol/L 31.8*       I reviewed the patient's new clinical results.  I personally viewed and interpreted the patient's CXR        Medication Review:     arformoterol 15 mcg Nebulization BID - RT   azithromycin 500 mg Oral Q24H   buPROPion  mg Oral Q12H   ceftriaxone 1 g Intravenous Q24H   fluconazole 100 mg Oral Q24H   gabapentin 300 mg Oral Nightly   ipratropium-albuterol 3 mL Nebulization 4x Daily - RT   methylPREDNISolone sodium succinate 40 mg Intravenous Q8H   pantoprazole 40 mg Oral QAM   roflumilast 500 mcg Oral Daily   sertraline 150 mg Oral Daily            ASSESSMENT:   Acute on chronic hypoxemic respiratory failure  Community-acquired pneumonia  Severe underlying COPD  Mild hyponatremia    PLAN:  Continue antibiotics.  Possibly switch to oral antibiotics in the morning.  Cultures negative to date.  Wean steroids as tolerated.  Continue aggressive bronchodilators with laba and short acting  Wean oxygen to baseline  Continue physical therapy  Slow to improve  Discussed with   May need Trilogy long-term.    Breanna Bustamante MD  01/18/18  2:20 PM

## 2018-01-18 NOTE — THERAPY TREATMENT NOTE
Acute Care - Physical Therapy Treatment Note  [facility]     Patient Name: Siobhan Prakash  : 1945  MRN: [id number]  Today's Date: 2018     Date of Referral to PT: 18       Admit Date: 1/15/2018    Visit Dx:    ICD-10-CM ICD-9-CM   1. Pneumonia of both lungs due to infectious organism, unspecified part of lung J18.9 483.8   2. History of COPD Z87.09 V12.69   3. Hypoxia R09.02 799.02     Patient Active Problem List   Diagnosis   • Abnormal weight loss   • Postartificial menopausal syndrome   • Subcutaneous cyst   • Mixed anxiety depressive disorder   • Gastroesophageal reflux disease   • Hyperlipidemia   • Insomnia   • Macrocytosis   • Tremor   • Pulmonary emphysema   • Vitamin D deficiency   • Glucose intolerance (impaired glucose tolerance)   • Fatigue   • Osteoporosis   • Herpes zoster without complication   • Migraine   • Bilateral leg pain   • Pneumonia of both lungs due to infectious organism               Adult Rehabilitation Note       18 0915          Rehab Assessment/Intervention    Discipline physical therapist  -EE      Document Type therapy note (daily note)  -EE      Subjective Information agree to therapy;complains of;dyspnea  -EE      Patient Effort, Rehab Treatment good  -EE      Symptoms Noted During/After Treatment shortness of breath;significant change in vital signs  -EE      Precautions/Limitations fall precautions;oxygen therapy device and L/min   4 L O2  -EE      Recorded by [EE] Sheeba Palafox, PT      Vital Signs    Pre SpO2 (%) 91  -EE      O2 Delivery Pre Treatment supplemental O2  -EE      Intra SpO2 (%) 75  -EE      O2 Delivery Intra Treatment supplemental O2  -EE      Post SpO2 (%) 89  -EE      O2 Delivery Post Treatment supplemental O2  -EE      Pre Patient Position Supine  -EE      Intra Patient Position Standing  -EE      Post Patient Position Supine  -EE      Recovery Time required 4 min to recover sats to upper 80s  -EE      Recorded by [EE] Sheeba Palafox  PT      Pain Assessment    Pain Assessment No/denies pain  -EE      Recorded by [EE] Sheeba Palafox, PT      Cognitive Assessment/Intervention    Current Cognitive/Communication Assessment functional  -EE      Orientation Status oriented x 4  -EE      Follows Commands/Answers Questions 100% of the time  -EE      Personal Safety WNL/WFL  -EE      Personal Safety Interventions fall prevention program maintained;gait belt;nonskid shoes/slippers when out of bed;supervised activity  -EE      Recorded by [EE] Sheeba Palafox, PT      Bed Mobility, Assessment/Treatment    Bed Mob, Supine to Sit, Harvey independent  -EE      Bed Mob, Sit to Supine, Harvey independent  -EE      Recorded by [EE] Sheeba Palafox, PT      Transfer Assessment/Treatment    Transfers, Sit-Stand Harvey stand by assist  -EE      Transfers, Stand-Sit Harvey stand by assist  -EE      Transfers, Sit-Stand-Sit, Assist Device rolling walker  -EE      Recorded by [EE] Sheeba Palafox, PT      Gait Assessment/Treatment    Gait, Harvey Level contact guard assist  -EE      Gait, Assistive Device rolling walker  -EE      Gait, Distance (Feet) 30  -EE      Gait, Gait Deviations ashley decreased;narrow base  -EE      Gait, Comment Increased SOA with activity; dyspnea limiting gait distance  -EE      Recorded by [EE] Sheeba Palafox PT      Positioning and Restraints    Pre-Treatment Position in bed  -EE      Post Treatment Position bed  -EE      In Bed fowlers;call light within reach;encouraged to call for assist;with nsg  -EE      Recorded by [EE] Sheeba Palafox PT        User Key  (r) = Recorded By, (t) = Taken By, (c) = Cosigned By    Initials Name Effective Dates    EE Sheeba Palafox PT 12/01/15 -                 IP PT Goals       01/17/18 0849          Transfer Training PT LTG    Transfer Training PT LTG, Date Established 01/17/18  -LB      Transfer Training PT LTG, Time to Achieve 1 wk  -LB      Transfer Training PT LTG, Activity Type sit to  stand/stand to sit  -LB      Transfer Training PT LTG, Mars Level conditional independence  -LB      Transfer Training PT LTG, Assist Device walker, rolling  -LB      Gait Training PT LTG    Gait Training Goal PT LTG, Date Established 01/17/18  -LB      Gait Training Goal PT LTG, Time to Achieve 1 wk  -LB      Gait Training Goal PT LTG, Mars Level supervision required  -LB      Gait Training Goal PT LTG, Assist Device walker, rolling  -LB      Gait Training Goal PT LTG, Distance to Achieve 65  -LB      Cardiopulmonary PT LTG    Cardiopulmonary PT LTG, Date Established 01/17/18  -LB      Cardiopulmonary PT LTG, Time to Achieve 1 wk  -LB      Cardiopulmonary PT LTG, Additional Goal 02 sats stay above 82% with activity  -LB        User Key  (r) = Recorded By, (t) = Taken By, (c) = Cosigned By    Initials Name Provider Type    LB An Lagunas, PT Physical Therapist          Physical Therapy Education     Title: PT OT SLP Therapies (Done)     Topic: Physical Therapy (Done)     Point: Mobility training (Done)    Learning Progress Summary    Learner Readiness Method Response Comment Documented by Status   Patient Acceptance E VU,NR  EE 01/18/18 0936 Done    Acceptance E VU,NR  LB 01/17/18 0848 Done               Point: Precautions (Done)    Learning Progress Summary    Learner Readiness Method Response Comment Documented by Status   Patient Acceptance E VU,NR  EE 01/18/18 0936 Done    Acceptance E VU,NR  LB 01/17/18 0848 Done                      User Key     Initials Effective Dates Name Provider Type Discipline    LB 10/06/15 -  An Lagunas, PT Physical Therapist PT     12/01/15 -  Sheeba Palafox, PT Physical Therapist PT                    PT Recommendation and Plan  Anticipated Equipment Needs At Discharge: front wheeled walker (encouraged pt to use in the home)  Anticipated Discharge Disposition: home with home health  Planned Therapy Interventions: gait training, strengthening, transfer training  PT  Frequency: daily  Plan of Care Review  Plan Of Care Reviewed With: patient  Progress: progress toward functional goals is gradual  Outcome Summary/Follow up Plan: Pt continues to demonstrate significant dyspnea on exertion; sats decreased into mid 70s after ambulating short distance on 4 L O2. Endurance and SOA limiting overall activity tolerance.           Outcome Measures       01/18/18 0900 01/17/18 0800       How much help from another person do you currently need...    Turning from your back to your side while in flat bed without using bedrails? 4  -EE 4  -LB     Moving from lying on back to sitting on the side of a flat bed without bedrails? 4  -EE 4  -LB     Moving to and from a bed to a chair (including a wheelchair)? 3  -EE 3  -LB     Standing up from a chair using your arms (e.g., wheelchair, bedside chair)? 3  -EE 3  -LB     Climbing 3-5 steps with a railing? 2  -EE 2  -LB     To walk in hospital room? 3  -EE 3  -LB     AM-PAC 6 Clicks Score 19  -EE 19  -LB     Functional Assessment    Outcome Measure Options AM-PAC 6 Clicks Basic Mobility (PT)  -EE AM-PAC 6 Clicks Basic Mobility (PT)  -LB       User Key  (r) = Recorded By, (t) = Taken By, (c) = Cosigned By    Initials Name Provider Type    DAVID Lagunas, NIKITA Physical Therapist    EE Sheeba Palafox PT Physical Therapist           Time Calculation:         PT Charges       01/18/18 0937          Time Calculation    Start Time 0915  -EE      Stop Time 0931  -EE      Time Calculation (min) 16 min  -EE      PT Received On 01/18/18  -EE      PT - Next Appointment 01/19/18  -EE        User Key  (r) = Recorded By, (t) = Taken By, (c) = Cosigned By    Initials Name Provider Type    EE Sheeba Palafox PT Physical Therapist          Therapy Charges for Today     Code Description Service Date Service Provider Modifiers Qty    46541507712 HC PT THER PROC EA 15 MIN 1/18/2018 Sheeba Palafox PT GP 1    89695620224 HC PT THER SUPP EA 15 MIN 1/18/2018 Sheeba Palafox PT GP 1           PT G-Codes  Outcome Measure Options: AM-PAC 6 Clicks Basic Mobility (PT)    Sheeba Palafox, PT  1/18/2018

## 2018-01-18 NOTE — PLAN OF CARE
Problem: Patient Care Overview (Adult)  Goal: Plan of Care Review  Outcome: Ongoing (interventions implemented as appropriate)   01/18/18 0435   Coping/Psychosocial Response Interventions   Plan Of Care Reviewed With patient   Patient Care Overview   Progress improving   Outcome Evaluation   Outcome Summary/Follow up Plan Pt on O2 4L. Lungs diminished. Pt has SOA on exertion and often at rest. Pt complains of back pain at times for which she was given norco. Will continue to monitor.      Goal: Adult Individualization and Mutuality  Outcome: Ongoing (interventions implemented as appropriate)    Goal: Discharge Needs Assessment  Outcome: Ongoing (interventions implemented as appropriate)      Problem: Fall Risk (Adult)  Goal: Absence of Falls  Outcome: Ongoing (interventions implemented as appropriate)      Problem: Pneumonia (Adult)  Goal: Signs and Symptoms of Listed Potential Problems Will be Absent or Manageable (Pneumonia)  Outcome: Ongoing (interventions implemented as appropriate)      Problem: COPD, Chronic Bronchitis/Emphysema (Adult)  Goal: Signs and Symptoms of Listed Potential Problems Will be Absent or Manageable (COPD, Chronic Bronchitis/Emphysema)  Outcome: Ongoing (interventions implemented as appropriate)

## 2018-01-18 NOTE — PLAN OF CARE
Problem: Patient Care Overview (Adult)  Goal: Plan of Care Review   01/18/18 0936   Coping/Psychosocial Response Interventions   Plan Of Care Reviewed With patient   Patient Care Overview   Progress progress toward functional goals is gradual   Outcome Evaluation   Outcome Summary/Follow up Plan Pt continues to demonstrate significant dyspnea on exertion; sats decreased into mid 70s after ambulating short distance on 4 L O2. Endurance and SOA limiting overall activity tolerance.

## 2018-01-19 PROCEDURE — 97110 THERAPEUTIC EXERCISES: CPT

## 2018-01-19 PROCEDURE — 94799 UNLISTED PULMONARY SVC/PX: CPT

## 2018-01-19 PROCEDURE — 25010000002 METHYLPREDNISOLONE PER 40 MG: Performed by: INTERNAL MEDICINE

## 2018-01-19 PROCEDURE — 25010000002 CEFTRIAXONE PER 250 MG: Performed by: INTERNAL MEDICINE

## 2018-01-19 RX ORDER — BUDESONIDE AND FORMOTEROL FUMARATE DIHYDRATE 160; 4.5 UG/1; UG/1
2 AEROSOL RESPIRATORY (INHALATION)
Status: DISCONTINUED | OUTPATIENT
Start: 2018-01-19 | End: 2018-01-23 | Stop reason: HOSPADM

## 2018-01-19 RX ORDER — GUAIFENESIN 600 MG/1
1200 TABLET, EXTENDED RELEASE ORAL EVERY 12 HOURS SCHEDULED
Status: DISCONTINUED | OUTPATIENT
Start: 2018-01-19 | End: 2018-01-23 | Stop reason: HOSPADM

## 2018-01-19 RX ORDER — PREDNISONE 20 MG/1
40 TABLET ORAL
Status: DISCONTINUED | OUTPATIENT
Start: 2018-01-20 | End: 2018-01-23 | Stop reason: HOSPADM

## 2018-01-19 RX ORDER — IPRATROPIUM BROMIDE AND ALBUTEROL SULFATE 2.5; .5 MG/3ML; MG/3ML
3 SOLUTION RESPIRATORY (INHALATION) EVERY 4 HOURS PRN
Status: DISCONTINUED | OUTPATIENT
Start: 2018-01-19 | End: 2018-01-23 | Stop reason: HOSPADM

## 2018-01-19 RX ADMIN — CEFTRIAXONE SODIUM 1 G: 1 INJECTION, SOLUTION INTRAVENOUS at 18:12

## 2018-01-19 RX ADMIN — OXYCODONE HYDROCHLORIDE AND ACETAMINOPHEN 1 TABLET: 5; 325 TABLET ORAL at 13:23

## 2018-01-19 RX ADMIN — ROFLUMILAST 500 MCG: 500 TABLET ORAL at 10:27

## 2018-01-19 RX ADMIN — HYDROCODONE POLISTIREX AND CHLORPHENIRAMINE POLISITREX 5 ML: 10; 8 SUSPENSION, EXTENDED RELEASE ORAL at 14:53

## 2018-01-19 RX ADMIN — IPRATROPIUM BROMIDE AND ALBUTEROL SULFATE 3 ML: .5; 3 SOLUTION RESPIRATORY (INHALATION) at 06:57

## 2018-01-19 RX ADMIN — METHYLPREDNISOLONE SODIUM SUCCINATE 40 MG: 40 INJECTION, POWDER, FOR SOLUTION INTRAMUSCULAR; INTRAVENOUS at 14:51

## 2018-01-19 RX ADMIN — GUAIFENESIN 1200 MG: 600 TABLET, EXTENDED RELEASE ORAL at 20:41

## 2018-01-19 RX ADMIN — IPRATROPIUM BROMIDE AND ALBUTEROL SULFATE 3 ML: .5; 3 SOLUTION RESPIRATORY (INHALATION) at 16:20

## 2018-01-19 RX ADMIN — ARFORMOTEROL TARTRATE 15 MCG: 15 SOLUTION RESPIRATORY (INHALATION) at 09:55

## 2018-01-19 RX ADMIN — METHYLPREDNISOLONE SODIUM SUCCINATE 40 MG: 40 INJECTION, POWDER, FOR SOLUTION INTRAMUSCULAR; INTRAVENOUS at 00:38

## 2018-01-19 RX ADMIN — GABAPENTIN 300 MG: 300 CAPSULE ORAL at 20:42

## 2018-01-19 RX ADMIN — ZOLPIDEM TARTRATE 5 MG: 5 TABLET ORAL at 00:29

## 2018-01-19 RX ADMIN — BUPROPION HYDROCHLORIDE 150 MG: 150 TABLET, EXTENDED RELEASE ORAL at 10:39

## 2018-01-19 RX ADMIN — OXYCODONE HYDROCHLORIDE AND ACETAMINOPHEN 1 TABLET: 5; 325 TABLET ORAL at 07:00

## 2018-01-19 RX ADMIN — AZITHROMYCIN 500 MG: 250 TABLET, FILM COATED ORAL at 18:11

## 2018-01-19 RX ADMIN — PANTOPRAZOLE SODIUM 40 MG: 40 TABLET, DELAYED RELEASE ORAL at 07:00

## 2018-01-19 RX ADMIN — IPRATROPIUM BROMIDE AND ALBUTEROL SULFATE 3 ML: .5; 3 SOLUTION RESPIRATORY (INHALATION) at 12:30

## 2018-01-19 RX ADMIN — FLUCONAZOLE 100 MG: 100 TABLET ORAL at 20:41

## 2018-01-19 RX ADMIN — OXYCODONE HYDROCHLORIDE AND ACETAMINOPHEN 1 TABLET: 5; 325 TABLET ORAL at 19:37

## 2018-01-19 RX ADMIN — SERTRALINE 150 MG: 100 TABLET, FILM COATED ORAL at 10:27

## 2018-01-19 RX ADMIN — BUPROPION HYDROCHLORIDE 150 MG: 150 TABLET, EXTENDED RELEASE ORAL at 20:41

## 2018-01-19 RX ADMIN — HYDROCODONE POLISTIREX AND CHLORPHENIRAMINE POLISITREX 5 ML: 10; 8 SUSPENSION, EXTENDED RELEASE ORAL at 02:34

## 2018-01-19 NOTE — PROGRESS NOTES
LOS: 4 days   Patient Care Team:  YEN Gutierrez as PCP - General  YEN Gutierrez as PCP - Family Medicine  Carl Perez MD as Consulting Physician (Orthopedic Surgery)    Subjective     Patient reports she is doing much better than when she came in she has improved she still gets very winded with exertion.  She did make it from her room to the nurse's desk today and back is a pretty short distance but the yesterday she can just make it to the commode and back.  There are nurses report that she is totally spent in oxygen saturation dropped into the 70s to do this on 4 L nasal cannula.  Patient reports today's is the first day she has really started coughing and getting just a little bit of mucus up.  She notes that she is urinating frequently almost like she has a urinary tract infection    Review of Systems:          Objective     Vital Signs  Vital Sign Min/Max for last 24 hours  Temp  Min: 97.9 °F (36.6 °C)  Max: 98.8 °F (37.1 °C)   BP  Min: 143/96  Max: 180/90   Pulse  Min: 103  Max: 123   Resp  Min: 18  Max: 24   SpO2  Min: 89 %  Max: 96 %   Flow (L/min)  Min: 4  Max: 5   No Data Recorded        Ventilator/Non-Invasive Ventilation Settings     None                       Body mass index is 20.12 kg/(m^2).  I/O last 3 completed shifts:  In: 480 [P.O.:480]  Out: 600 [Urine:600]  I/O this shift:  In: 540 [P.O.:540]  Out: 1200 [Urine:1200]        Physical Exam:  General Appearance: Well-developed white female she is sitting up in bed she is on 4 L nasal cannula O2 she gets quite winded talking but she certainly can talk well.  At rest her oxygen saturations are running 92-94%  Eyes: Conjunctiva are clear and anicteric  ENT: Oral mucous membranes are little dry no erythema or exudates nasal septum midline nasal mucosa is dry  Neck: No adenopathy or thyromegaly no jugular venous distention trachea midline  Lungs: Decreased but clear no wheezes rales or rhonchi no dullness on percussion  symmetric nonlabored expansion mild thoracic kyphosis  Cardiac: Tachycardic heart rates in the 1 teens regular rhythm no murmur  Abdomen: Soft no palpable organomegaly or masses  : Not examined  Musc/Skel: Grossly normal  Skin: No jaundice no petechiae she does have a lesion in the mid back just slightly to the left of midline that has a different lipid levels of pigmentation it is raised and rough 5 think is most likely a senile and to go but I can't rule out a melanoma  Neuro: Alert oriented cooperative pleasant following commands  Extremities/P Vascular: No clubbing cyanosis or edema she has palpable radial and dorsalis pedis pulses bilaterally  MSE: She is a little anxious       Labs:    Results from last 7 days  Lab Units 01/15/18  1415   GLUCOSE mg/dL 140*   SODIUM mmol/L 135*   POTASSIUM mmol/L 3.6   CO2 mmol/L 34.0*   CHLORIDE mmol/L 93*   ANION GAP mmol/L 8.0   CREATININE mg/dL 0.66   BUN mg/dL 12   BUN / CREAT RATIO  18.2   CALCIUM mg/dL 8.9   EGFR IF NONAFRICN AM mL/min/1.73 89   ALK PHOS U/L 94   TOTAL PROTEIN g/dL 7.6   ALT (SGPT) U/L 10   AST (SGOT) U/L 15   BILIRUBIN mg/dL 0.3   ALBUMIN g/dL 3.10*   GLOBULIN gm/dL 4.5   A/G RATIO g/dL 0.7     Estimated Creatinine Clearance: 50.8 mL/min (by C-G formula based on Cr of 0.66).        Results from last 7 days  Lab Units 01/17/18  0526 01/15/18  1415   WBC 10*3/mm3 16.17* 11.48*   RBC 10*6/mm3 3.11* 3.54*   HEMOGLOBIN g/dL 9.8* 11.1*   HEMATOCRIT % 31.7* 36.7   MCV fL 101.9* 103.7*   MCH pg 31.5 31.4   MCHC g/dL 30.9* 30.2*   RDW % 12.4 12.3   RDW-SD fl 45.8 46.8   MPV fL 9.6 9.6   PLATELETS 10*3/mm3 589* 489   NEUTROPHIL % %  --  77.1*   LYMPHOCYTE % %  --  11.5*   MONOCYTES % %  --  10.2   EOSINOPHIL % %  --  0.6   BASOPHIL % %  --  0.1   IMM GRAN % %  --  0.5   NEUTROS ABS 10*3/mm3  --  8.85*   LYMPHS ABS 10*3/mm3  --  1.32   MONOS ABS 10*3/mm3  --  1.17   EOS ABS 10*3/mm3  --  0.07   BASOS ABS 10*3/mm3  --  0.01   IMMATURE GRANS (ABS) 10*3/mm3  --   0.06*       Results from last 7 days  Lab Units 01/15/18  1738   PH, ARTERIAL pH units 7.401   PO2 ART mm Hg 72.2*   PCO2, ARTERIAL mm Hg 51.2*   HCO3 ART mmol/L 31.8*       Results from last 7 days  Lab Units 01/15/18  1415   TROPONIN T ng/mL <0.010               Results from last 7 days  Lab Units 01/15/18  1805   LACTATE mmol/L 1.3         Microbiology Results (last 10 days)     Procedure Component Value - Date/Time    MRSA Screen Culture - Swab, Nares [[id number]]  (Normal) Collected:  01/16/18 1412    Lab Status:  Final result Specimen:  Swab from Nares Updated:  01/18/18 1101     MRSA SCREEN CX No Methicillin Resistant Staphylococcus aureus isolated    Blood Culture - Blood, Blood, Venous Line [[id number]]  (Normal) Collected:  01/15/18 1805    Lab Status:  Preliminary result Specimen:  Blood from Arm, Right Updated:  01/19/18 1831     Blood Culture No growth at 4 days    Blood Culture - Blood, Blood, Venous Line [[id number]]  (Normal) Collected:  01/15/18 1757    Lab Status:  Preliminary result Specimen:  Blood from Arm, Left Updated:  01/19/18 1831     Blood Culture No growth at 4 days                arformoterol 15 mcg Nebulization BID - RT   azithromycin 500 mg Oral Q24H   buPROPion  mg Oral Q12H   ceftriaxone 1 g Intravenous Q24H   fluconazole 100 mg Oral Q24H   gabapentin 300 mg Oral Nightly   ipratropium-albuterol 3 mL Nebulization 4x Daily - RT   methylPREDNISolone sodium succinate 40 mg Intravenous Q12H   pantoprazole 40 mg Oral QAM   roflumilast 500 mcg Oral Daily   sertraline 150 mg Oral Daily          Diagnostics:  Xr Chest 2 View    Result Date: 1/15/2018  XR CHEST 2 VW-  HISTORY: Female who is 72 years-old,  chest pain  TECHNIQUE: Frontal and lateral views of the chest  COMPARISON: 06/25/2016  FINDINGS: Heart, mediastinum and pulmonary vasculature are unremarkable. Small atelectasis/infiltrate at the lung bases, may in part reflect chronic change. Minimal blunting of posterior costophrenic  angle could reflect minimal pleural effusion. Emphysematous changes are seen. No pneumothorax. Chronic midthoracic compression fracture.      Small atelectasis/infiltrate at the lung bases, may in part reflect chronic change, follow-up suggested.  This report was finalized on 1/15/2018 1:47 PM by Dr. Jase Singleton MD.      Ct Angiogram Chest With Contrast    Result Date: 1/15/2018  CT ANGIOGRAM CHEST W CONTRAST-  INDICATIONS: Chest pain, dyspnea, possible pulmonary embolism  Radiation dose reduction techniques were utilized, including automated exposure control and exposure modulation based on body size.  TECHNIQUE: CT ANGIOGRAPHY WITH THREE-DIMENSIONAL RECONSTRUCTIONS  COMPARISON: 06/25/2016  FINDINGS:   No pulmonary embolism. No aortic dissection.   The heart size is normal with minimal pericardial effusion. A few small subcentimeter short axis mediastinal lymph nodes are seen that are not significant by size criteria. 1 cm short axis bilateral hilar lymph nodes, similar to prior exam.  The airways appear clear.    Emphysematous changes are conspicuous. Minimal right pleural effusion.  The lungs show multifocal infiltrates, for example anteriorly in the right middle lobe, medially in the right lower lobe peripherally in the left upper and lower lobes. This appearance is generally worsened from the prior exam, aeration at the right lower lobe is actually improved from the prior exam.  Bronchiectasis is noted particularly in the right lower lobe, but also in the left lower lobe.    Upper abdominal structures show right adrenal myelolipoma similar to prior.  Degenerative changes are seen in the spine. Chronic T7 compression fracture. T12 compression fracture (40% loss of height) Is age indeterminate, change from the prior exam, correlate clinically (if further evaluation is indicated, MRI could be considered).           No pulmonary embolism. Multifocal pulmonary infiltrates, minimal right pleural effusion,  follow-up recommended.  Discussed by telephone with Dr. Chamberlain at time of interpretation, 1735, 01/15/2018.    This report was finalized on 1/15/2018 5:34 PM by Dr. Jase Singleton MD.           Did review her CT scan of the chest    Active Hospital Problems (** Indicates Principal Problem)    Diagnosis Date Noted   • Pneumonia of both lungs due to infectious organism [J18.9] 01/15/2018      Resolved Hospital Problems    Diagnosis Date Noted Date Resolved   No resolved problems to display.         Assessment/Plan     1. Pneumonia community-acquired complete antibiotic course  2. Acute exacerbation COPD better we will try and transition her to inhalers with when necessary nebs and to by mouth steroids  3. Acute exacerbation bronchiectasis I will add Mucinex to her regimen  4. Acute on chronic hypoxemic and hypercapnic respiratory failure he is weaned back to her home O2 she had mild hypercapnia when she presented acutely ill suspect that has improved as her respiratory function is improved.  5. anemia mild, macrocytic we'll check indices  6. Mild hyponatremia follow-up sodium multiple possible reasons with her chronic lung disease and SSRI use  7. Gastroesophageal reflux disease  8. Hyperlipidemia  9. Osteoporosis  10. Skin lesion mid back does have some unusual discoloration it's probably just senile and to go up but I can't rule out a melanoma and I recommend she see a dermatologist or her primary care physician once she is better for further evaluation she verbalizes understanding  11. Polyuria I am going to check a urinalysis I would find it on likely that she's got a urinary tract infection a left which she thinks she has given the antibiotic she is on.  Will follow-up the glucose level as well with her on steroids    Plan for disposition: Hopefully home in the next several days depending on her functional status.  I did discuss with the patient she is a no code.  She would want most other  therapies.    Alfie Hightower MD  01/19/18  6:47 PM    Time:

## 2018-01-19 NOTE — PROGRESS NOTES
Continued Stay Note  [facility]     Patient Name: Siobhan Prakash  MRN: [id number]  Today's Date: 1/19/2018    Admit Date: 1/15/2018          Discharge Plan       01/19/18 1651    Case Management/Social Work Plan    Plan Home.  to transport     Additional Comments Patient still plans to go home on discharge and anticipates no needs . States her  will transport her. If HH is indicated, patient agreeable to using [facility] . ................JENNIFER Starkey              Discharge Codes     None            JENNIFER Starkey

## 2018-01-19 NOTE — PLAN OF CARE
Problem: Patient Care Overview (Adult)  Goal: Plan of Care Review  Outcome: Ongoing (interventions implemented as appropriate)   01/19/18 0354   Coping/Psychosocial Response Interventions   Plan Of Care Reviewed With patient   Patient Care Overview   Progress no change   Outcome Evaluation   Outcome Summary/Follow up Plan Remains very SOA with any exertion. Nonproductive cough. O2 4L NC. BSC with asist of 1 PRN.     Goal: Adult Individualization and Mutuality  Outcome: Ongoing (interventions implemented as appropriate)    Goal: Discharge Needs Assessment  Outcome: Ongoing (interventions implemented as appropriate)      Problem: Fall Risk (Adult)  Goal: Absence of Falls  Outcome: Ongoing (interventions implemented as appropriate)      Problem: Pneumonia (Adult)  Goal: Signs and Symptoms of Listed Potential Problems Will be Absent or Manageable (Pneumonia)  Outcome: Ongoing (interventions implemented as appropriate)      Problem: COPD, Chronic Bronchitis/Emphysema (Adult)  Goal: Signs and Symptoms of Listed Potential Problems Will be Absent or Manageable (COPD, Chronic Bronchitis/Emphysema)  Outcome: Ongoing (interventions implemented as appropriate)

## 2018-01-19 NOTE — PLAN OF CARE
Problem: Patient Care Overview (Adult)  Goal: Plan of Care Review   01/19/18 0908   Coping/Psychosocial Response Interventions   Plan Of Care Reviewed With patient   Patient Care Overview   Progress progress towards functional goals is fair   Outcome Evaluation   Outcome Summary/Follow up Plan Pt able to tolerate increased gait distance, but continues to demnonstrate significant change in O2 sats with activity. O2 sats decreased to mid 70s while ambulating on 5 L O2; required 4-5 min of recovery to return to high 80s.

## 2018-01-19 NOTE — THERAPY TREATMENT NOTE
Acute Care - Physical Therapy Treatment Note  [facility]     Patient Name: Siobhan Prakash  : 1945  MRN: [id number]  Today's Date: 2018     Date of Referral to PT: 18       Admit Date: 1/15/2018    Visit Dx:    ICD-10-CM ICD-9-CM   1. Pneumonia of both lungs due to infectious organism, unspecified part of lung J18.9 483.8   2. History of COPD Z87.09 V12.69   3. Hypoxia R09.02 799.02     Patient Active Problem List   Diagnosis   • Abnormal weight loss   • Postartificial menopausal syndrome   • Subcutaneous cyst   • Mixed anxiety depressive disorder   • Gastroesophageal reflux disease   • Hyperlipidemia   • Insomnia   • Macrocytosis   • Tremor   • Pulmonary emphysema   • Vitamin D deficiency   • Glucose intolerance (impaired glucose tolerance)   • Fatigue   • Osteoporosis   • Herpes zoster without complication   • Migraine   • Bilateral leg pain   • Pneumonia of both lungs due to infectious organism               Adult Rehabilitation Note       18 0848 18 0915       Rehab Assessment/Intervention    Discipline physical therapist  -EE physical therapist  -EE     Document Type therapy note (daily note)  -EE therapy note (daily note)  -EE     Subjective Information agree to therapy;complains of;dyspnea   cough  -EE agree to therapy;complains of;dyspnea  -EE     Patient Effort, Rehab Treatment good  -EE good  -EE     Symptoms Noted During/After Treatment fatigue;shortness of breath;significant change in vital signs  -EE shortness of breath;significant change in vital signs  -EE     Precautions/Limitations fall precautions;oxygen therapy device and L/min   4.5 L O2  -EE fall precautions;oxygen therapy device and L/min   4 L O2  -EE     Recorded by [EE] Sheeba Palafox, PT [EE] Sheeba Palafox, PT     Vital Signs    Pretreatment Heart Rate (beats/min) 119  -EE      Intratreatment Heart Rate (beats/min) 146  -EE      Posttreatment Heart Rate (beats/min) 131  -EE      Pre SpO2 (%) 90  -EE 91  -EE      O2 Delivery Pre Treatment supplemental O2  -EE supplemental O2  -EE     Intra SpO2 (%) 76  -EE 75  -EE     O2 Delivery Intra Treatment supplemental O2  -EE supplemental O2  -EE     Post SpO2 (%) 88  -EE 89  -EE     O2 Delivery Post Treatment supplemental O2  -EE supplemental O2  -EE     Pre Patient Position Supine  -EE Supine  -EE     Intra Patient Position Standing  -EE Standing  -EE     Post Patient Position Supine  -EE Supine  -EE     Recovery Time 4 min  -EE required 4 min to recover sats to upper 80s  -EE     Recorded by [EE] Sheeba Palafox, PT [EE] Sheeba Palafox PT     Pain Assessment    Pain Assessment No/denies pain  -EE No/denies pain  -EE     Recorded by [EE] Sheeba Palafox PT [EE] Sheeba Palafox PT     Cognitive Assessment/Intervention    Current Cognitive/Communication Assessment functional  -EE functional  -EE     Orientation Status oriented x 4  -EE oriented x 4  -EE     Follows Commands/Answers Questions 100% of the time  -% of the time  -EE     Personal Safety WNL/WFL  -EE WNL/WFL  -EE     Personal Safety Interventions fall prevention program maintained;gait belt;supervised activity;nonskid shoes/slippers when out of bed  -EE fall prevention program maintained;gait belt;nonskid shoes/slippers when out of bed;supervised activity  -EE     Recorded by [EE] Sheeba Palafox PT [EE] Sheeba Palafox PT     Bed Mobility, Assessment/Treatment    Bed Mob, Supine to Sit, [place] independent  -EE independent  -EE     Bed Mob, Sit to Supine, [place] independent  -EE independent  -EE     Recorded by [EE] Sheeba Palafox PT [EE] Sheeba Palafox PT     Transfer Assessment/Treatment    Transfers, Sit-Stand [place] stand by assist  -EE stand by assist  -EE     Transfers, Stand-Sit [place] stand by assist  -EE stand by assist  -EE     Transfers, Sit-Stand-Sit, Assist Device rolling walker  -EE rolling walker  -EE     Recorded by [EE] Sheeba Palafox PT [EE] Sheeba Palafox PT     Gait Assessment/Treatment    Gait,  [place] Level contact guard assist  -EE contact guard assist  -EE     Gait, Assistive Device rolling walker  -EE rolling walker  -EE     Gait, Distance (Feet) 65  -EE 30  -EE     Gait, Gait Deviations ashley decreased;narrow base  -EE ashley decreased;narrow base  -EE     Gait, Safety Issues supplemental O2  -EE      Gait, Impairments strength decreased  -EE      Gait, Comment Verbal cues for pursed lip breathing  -EE Increased SOA with activity; dyspnea limiting gait distance  -EE     Recorded by [EE] Sheeba Palafox, PT [EE] Sheeba Palafox PT     Positioning and Restraints    Pre-Treatment Position in bed  -EE in bed  -EE     Post Treatment Position bed  -EE bed  -EE     In Bed fowlers;call light within reach;encouraged to call for assist;notified nsg;with family/caregiver  -EE fowlers;call light within reach;encouraged to call for assist;with nsg  -EE     Recorded by [EE] Sheeba Palafox, PT [EE] Sheeba Palafox PT       User Key  (r) = Recorded By, (t) = Taken By, (c) = Cosigned By    Initials Name Effective Dates    EE Sheeba Palafox, PT 12/01/15 -                 IP PT Goals       01/17/18 0849          Transfer Training PT LTG    Transfer Training PT LTG, Date Established 01/17/18  -LB      Transfer Training PT LTG, Time to Achieve 1 wk  -LB      Transfer Training PT LTG, Activity Type sit to stand/stand to sit  -LB      Transfer Training PT LTG, [place] Level conditional independence  -LB      Transfer Training PT LTG, Assist Device walker, rolling  -LB      Gait Training PT LTG    Gait Training Goal PT LTG, Date Established 01/17/18  -LB      Gait Training Goal PT LTG, Time to Achieve 1 wk  -LB      Gait Training Goal PT LTG, [place] Level supervision required  -LB      Gait Training Goal PT LTG, Assist Device walker, rolling  -LB      Gait Training Goal PT LTG, Distance to Achieve 65  -LB      Cardiopulmonary PT LTG    Cardiopulmonary PT LTG, Date Established 01/17/18  -LB      Cardiopulmonary PT LTG,  Time to Achieve 1 wk  -LB      Cardiopulmonary PT LTG, Additional Goal 02 sats stay above 82% with activity  -LB        User Key  (r) = Recorded By, (t) = Taken By, (c) = Cosigned By    Initials Name Provider Type    LB An Lagunas, PT Physical Therapist          Physical Therapy Education     Title: PT OT SLP Therapies (Done)     Topic: Physical Therapy (Done)     Point: Mobility training (Done)    Learning Progress Summary    Learner Readiness Method Response Comment Documented by Status   Patient Acceptance E,TB VU,NR  EE 01/19/18 0908 Done    Acceptance E VU,NR  EE 01/18/18 0936 Done    Acceptance E VU,NR  LB 01/17/18 0848 Done               Point: Precautions (Done)    Learning Progress Summary    Learner Readiness Method Response Comment Documented by Status   Patient Acceptance E VU,NR  EE 01/18/18 0936 Done    Acceptance E VU,NR  LB 01/17/18 0848 Done                      User Key     Initials Effective Dates Name Provider Type Discipline     10/06/15 -  An Lagunas, PT Physical Therapist PT     12/01/15 -  Sheeba Palafox, PT Physical Therapist PT                    PT Recommendation and Plan  Anticipated Equipment Needs At Discharge: front wheeled walker (encouraged pt to use in the home)  Anticipated Discharge Disposition: home with home health  Planned Therapy Interventions: gait training, strengthening, transfer training  PT Frequency: daily  Plan of Care Review  Plan Of Care Reviewed With: patient  Progress: progress towards functional goals is fair  Outcome Summary/Follow up Plan: Pt able to tolerate increased gait distance, but continues to demnonstrate significant change in O2 sats with activity. O2 sats decreased to mid 70s while ambulating on 5 L O2; required 4-5 min of recovery to return to high 80s.           Outcome Measures       01/19/18 0900 01/18/18 0900 01/17/18 0800    How much help from another person do you currently need...    Turning from your back to your side while in flat bed  without using bedrails? 4  -EE 4  -EE 4  -LB    Moving from lying on back to sitting on the side of a flat bed without bedrails? 4  -EE 4  -EE 4  -LB    Moving to and from a bed to a chair (including a wheelchair)? 3  -EE 3  -EE 3  -LB    Standing up from a chair using your arms (e.g., wheelchair, bedside chair)? 3  -EE 3  -EE 3  -LB    Climbing 3-5 steps with a railing? 2  -EE 2  -EE 2  -LB    To walk in hospital room? 3  -EE 3  -EE 3  -LB    AM-PAC 6 Clicks Score 19  -EE 19  -EE 19  -LB    Functional Assessment    Outcome Measure Options AM-PAC 6 Clicks Basic Mobility (PT)  -EE AM-PAC 6 Clicks Basic Mobility (PT)  -EE AM-PAC 6 Clicks Basic Mobility (PT)  -LB      User Key  (r) = Recorded By, (t) = Taken By, (c) = Cosigned By    Initials Name Provider Type    DAVID Lagunas, PT Physical Therapist    EE Sheeba Palafox PT Physical Therapist           Time Calculation:         PT Charges       01/19/18 0909          Time Calculation    Start Time 0848  -EE      Stop Time 0905  -EE      Time Calculation (min) 17 min  -EE      PT Received On 01/19/18  -EE      PT - Next Appointment 01/20/18  -EE        User Key  (r) = Recorded By, (t) = Taken By, (c) = Cosigned By    Initials Name Provider Type    EE Sheeba Palafox PT Physical Therapist          Therapy Charges for Today     Code Description Service Date Service Provider Modifiers Qty    71159918930 HC PT THER PROC EA 15 MIN 1/18/2018 Sheeba Palafox PT GP 1    34919720548 HC PT THER SUPP EA 15 MIN 1/18/2018 Sheeba Palafox PT GP 1    01286663444 HC PT THER PROC EA 15 MIN 1/19/2018 Sheeba Palafox, PT GP 1          PT G-Codes  Outcome Measure Options: AM-PAC 6 Clicks Basic Mobility (PT)    Sheeba Palafox PT  1/19/2018

## 2018-01-20 LAB
ANION GAP SERPL CALCULATED.3IONS-SCNC: 8.6 MMOL/L
BACTERIA SPEC AEROBE CULT: NORMAL
BACTERIA SPEC AEROBE CULT: NORMAL
BILIRUB UR QL STRIP: NEGATIVE
BUN BLD-MCNC: 16 MG/DL (ref 8–23)
BUN/CREAT SERPL: 24.6 (ref 7–25)
CALCIUM SPEC-SCNC: 8.3 MG/DL (ref 8.6–10.5)
CHLORIDE SERPL-SCNC: 95 MMOL/L (ref 98–107)
CLARITY UR: ABNORMAL
CO2 SERPL-SCNC: 34.4 MMOL/L (ref 22–29)
COLOR UR: YELLOW
CREAT BLD-MCNC: 0.65 MG/DL (ref 0.57–1)
DEPRECATED RDW RBC AUTO: 48 FL (ref 37–54)
ERYTHROCYTE [DISTWIDTH] IN BLOOD BY AUTOMATED COUNT: 12.8 % (ref 11.7–13)
FOLATE SERPL-MCNC: 6.43 NG/ML (ref 4.78–24.2)
GFR SERPL CREATININE-BSD FRML MDRD: 90 ML/MIN/1.73
GLUCOSE BLD-MCNC: 99 MG/DL (ref 65–99)
GLUCOSE UR STRIP-MCNC: NEGATIVE MG/DL
HCT VFR BLD AUTO: 33.2 % (ref 35.6–45.5)
HGB BLD-MCNC: 10.2 G/DL (ref 11.9–15.5)
HGB UR QL STRIP.AUTO: NEGATIVE
IRON 24H UR-MRATE: 54 MCG/DL (ref 37–145)
IRON SATN MFR SERPL: 19 % (ref 20–50)
KETONES UR QL STRIP: NEGATIVE
LEUKOCYTE ESTERASE UR QL STRIP.AUTO: NEGATIVE
MCH RBC QN AUTO: 31.8 PG (ref 26.9–32)
MCHC RBC AUTO-ENTMCNC: 30.7 G/DL (ref 32.4–36.3)
MCV RBC AUTO: 103.4 FL (ref 80.5–98.2)
NITRITE UR QL STRIP: NEGATIVE
PH UR STRIP.AUTO: 7.5 [PH] (ref 5–8)
PLATELET # BLD AUTO: 671 10*3/MM3 (ref 140–500)
PMV BLD AUTO: 9.3 FL (ref 6–12)
POTASSIUM BLD-SCNC: 3.8 MMOL/L (ref 3.5–5.2)
PROT UR QL STRIP: NEGATIVE
RBC # BLD AUTO: 3.21 10*6/MM3 (ref 3.9–5.2)
RETICS/RBC NFR AUTO: 2.8 % (ref 0.5–1.5)
SODIUM BLD-SCNC: 138 MMOL/L (ref 136–145)
SP GR UR STRIP: 1.01 (ref 1–1.03)
TIBC SERPL-MCNC: 277 MCG/DL
TRANSFERRIN SERPL-MCNC: 186 MG/DL (ref 200–360)
UROBILINOGEN UR QL STRIP: ABNORMAL
VIT B12 BLD-MCNC: 869 PG/ML (ref 211–946)
WBC NRBC COR # BLD: 14.37 10*3/MM3 (ref 4.5–10.7)

## 2018-01-20 PROCEDURE — 94799 UNLISTED PULMONARY SVC/PX: CPT

## 2018-01-20 PROCEDURE — 63710000001 PREDNISONE PER 1 MG: Performed by: INTERNAL MEDICINE

## 2018-01-20 PROCEDURE — 97110 THERAPEUTIC EXERCISES: CPT

## 2018-01-20 PROCEDURE — 82607 VITAMIN B-12: CPT | Performed by: INTERNAL MEDICINE

## 2018-01-20 PROCEDURE — 82746 ASSAY OF FOLIC ACID SERUM: CPT | Performed by: INTERNAL MEDICINE

## 2018-01-20 PROCEDURE — 85027 COMPLETE CBC AUTOMATED: CPT | Performed by: INTERNAL MEDICINE

## 2018-01-20 PROCEDURE — 84466 ASSAY OF TRANSFERRIN: CPT | Performed by: INTERNAL MEDICINE

## 2018-01-20 PROCEDURE — 83540 ASSAY OF IRON: CPT | Performed by: INTERNAL MEDICINE

## 2018-01-20 PROCEDURE — 25010000002 CEFTRIAXONE PER 250 MG: Performed by: INTERNAL MEDICINE

## 2018-01-20 PROCEDURE — 94640 AIRWAY INHALATION TREATMENT: CPT

## 2018-01-20 PROCEDURE — 85045 AUTOMATED RETICULOCYTE COUNT: CPT | Performed by: INTERNAL MEDICINE

## 2018-01-20 PROCEDURE — 83880 ASSAY OF NATRIURETIC PEPTIDE: CPT | Performed by: INTERNAL MEDICINE

## 2018-01-20 PROCEDURE — 81003 URINALYSIS AUTO W/O SCOPE: CPT | Performed by: INTERNAL MEDICINE

## 2018-01-20 PROCEDURE — 80048 BASIC METABOLIC PNL TOTAL CA: CPT | Performed by: INTERNAL MEDICINE

## 2018-01-20 RX ADMIN — AZITHROMYCIN 500 MG: 250 TABLET, FILM COATED ORAL at 17:13

## 2018-01-20 RX ADMIN — GUAIFENESIN 1200 MG: 600 TABLET, EXTENDED RELEASE ORAL at 20:16

## 2018-01-20 RX ADMIN — OXYCODONE HYDROCHLORIDE AND ACETAMINOPHEN 1 TABLET: 5; 325 TABLET ORAL at 13:06

## 2018-01-20 RX ADMIN — ROFLUMILAST 500 MCG: 500 TABLET ORAL at 08:23

## 2018-01-20 RX ADMIN — ZOLPIDEM TARTRATE 5 MG: 5 TABLET ORAL at 01:37

## 2018-01-20 RX ADMIN — GUAIFENESIN 1200 MG: 600 TABLET, EXTENDED RELEASE ORAL at 08:23

## 2018-01-20 RX ADMIN — OXYCODONE HYDROCHLORIDE AND ACETAMINOPHEN 1 TABLET: 5; 325 TABLET ORAL at 01:38

## 2018-01-20 RX ADMIN — BUDESONIDE AND FORMOTEROL FUMARATE DIHYDRATE 2 PUFF: 160; 4.5 AEROSOL RESPIRATORY (INHALATION) at 19:07

## 2018-01-20 RX ADMIN — HYDROCODONE POLISTIREX AND CHLORPHENIRAMINE POLISITREX 5 ML: 10; 8 SUSPENSION, EXTENDED RELEASE ORAL at 23:06

## 2018-01-20 RX ADMIN — BUDESONIDE AND FORMOTEROL FUMARATE DIHYDRATE 2 PUFF: 160; 4.5 AEROSOL RESPIRATORY (INHALATION) at 11:05

## 2018-01-20 RX ADMIN — OXYCODONE HYDROCHLORIDE AND ACETAMINOPHEN 1 TABLET: 5; 325 TABLET ORAL at 18:57

## 2018-01-20 RX ADMIN — OXYCODONE HYDROCHLORIDE AND ACETAMINOPHEN 1 TABLET: 5; 325 TABLET ORAL at 07:14

## 2018-01-20 RX ADMIN — FLUCONAZOLE 100 MG: 100 TABLET ORAL at 20:16

## 2018-01-20 RX ADMIN — BUPROPION HYDROCHLORIDE 150 MG: 150 TABLET, EXTENDED RELEASE ORAL at 08:23

## 2018-01-20 RX ADMIN — HYDROCODONE POLISTIREX AND CHLORPHENIRAMINE POLISITREX 5 ML: 10; 8 SUSPENSION, EXTENDED RELEASE ORAL at 07:14

## 2018-01-20 RX ADMIN — GABAPENTIN 300 MG: 300 CAPSULE ORAL at 20:17

## 2018-01-20 RX ADMIN — IPRATROPIUM BROMIDE AND ALBUTEROL SULFATE 3 ML: .5; 3 SOLUTION RESPIRATORY (INHALATION) at 11:08

## 2018-01-20 RX ADMIN — PREDNISONE 40 MG: 20 TABLET ORAL at 08:23

## 2018-01-20 RX ADMIN — PANTOPRAZOLE SODIUM 40 MG: 40 TABLET, DELAYED RELEASE ORAL at 06:29

## 2018-01-20 RX ADMIN — BUPROPION HYDROCHLORIDE 150 MG: 150 TABLET, EXTENDED RELEASE ORAL at 20:16

## 2018-01-20 RX ADMIN — SERTRALINE 150 MG: 100 TABLET, FILM COATED ORAL at 08:23

## 2018-01-20 NOTE — PLAN OF CARE
Problem: Patient Care Overview (Adult)  Goal: Plan of Care Review  Outcome: Ongoing (interventions implemented as appropriate)   01/20/18 0450   Coping/Psychosocial Response Interventions   Plan Of Care Reviewed With patient   Outcome Evaluation   Outcome Summary/Follow up Plan Patient sleeping at this time. SOA increase with exertion. Urine sent. On O2. Vitals as charted. Monitored.     Goal: Discharge Needs Assessment  Outcome: Ongoing (interventions implemented as appropriate)      Problem: Fall Risk (Adult)  Goal: Absence of Falls  Outcome: Ongoing (interventions implemented as appropriate)      Problem: COPD, Chronic Bronchitis/Emphysema (Adult)  Goal: Signs and Symptoms of Listed Potential Problems Will be Absent or Manageable (COPD, Chronic Bronchitis/Emphysema)  Outcome: Ongoing (interventions implemented as appropriate)

## 2018-01-20 NOTE — PLAN OF CARE
Problem: Patient Care Overview (Adult)  Goal: Plan of Care Review  Outcome: Ongoing (interventions implemented as appropriate)   01/20/18 1500   Coping/Psychosocial Response Interventions   Plan Of Care Reviewed With patient   Patient Care Overview   Progress no change   Outcome Evaluation   Outcome Summary/Follow up Plan c/o right back pain percocet given with relief,, n/p cough, tussinex ordered with relief, soa with exertion, sats drop when up to bsc, o2 5 l n/c, up with help, will continue to monitor      Goal: Adult Individualization and Mutuality  Outcome: Ongoing (interventions implemented as appropriate)    Goal: Discharge Needs Assessment  Outcome: Ongoing (interventions implemented as appropriate)      Problem: Fall Risk (Adult)  Goal: Absence of Falls  Outcome: Ongoing (interventions implemented as appropriate)      Problem: Pneumonia (Adult)  Goal: Signs and Symptoms of Listed Potential Problems Will be Absent or Manageable (Pneumonia)  Outcome: Ongoing (interventions implemented as appropriate)      Problem: COPD, Chronic Bronchitis/Emphysema (Adult)  Goal: Signs and Symptoms of Listed Potential Problems Will be Absent or Manageable (COPD, Chronic Bronchitis/Emphysema)  Outcome: Ongoing (interventions implemented as appropriate)

## 2018-01-20 NOTE — PLAN OF CARE
Problem: Patient Care Overview (Adult)  Goal: Plan of Care Review   01/20/18 1640   Coping/Psychosocial Response Interventions   Plan Of Care Reviewed With patient   Patient Care Overview   Progress improving   AMBULATING MUCH FURTHER.  HAS TO STOP MULTIPLE TIME IN ORDER FOR O2 SATS TO RECOVER.

## 2018-01-20 NOTE — THERAPY TREATMENT NOTE
Acute Care - Physical Therapy Treatment Note  [facility]     Patient Name: Siobhan Prakash  : 1945  MRN: [id number]  Today's Date: 2018     Date of Referral to PT: 18       Admit Date: 1/15/2018    Visit Dx:    ICD-10-CM ICD-9-CM   1. Pneumonia of both lungs due to infectious organism, unspecified part of lung J18.9 483.8   2. History of COPD Z87.09 V12.69   3. Hypoxia R09.02 799.02     Patient Active Problem List   Diagnosis   • Abnormal weight loss   • Postartificial menopausal syndrome   • Subcutaneous cyst   • Mixed anxiety depressive disorder   • Gastroesophageal reflux disease   • Hyperlipidemia   • Insomnia   • Macrocytosis   • Tremor   • Pulmonary emphysema   • Vitamin D deficiency   • Glucose intolerance (impaired glucose tolerance)   • Fatigue   • Osteoporosis   • Herpes zoster without complication   • Migraine   • Bilateral leg pain   • Pneumonia of both lungs due to infectious organism               Adult Rehabilitation Note       18 1638 18 0848 18 0915    Rehab Assessment/Intervention    Discipline physical therapist  -JR physical therapist  -EE physical therapist  -EE    Document Type therapy note (daily note)  -JR therapy note (daily note)  -EE therapy note (daily note)  -EE    Subjective Information agree to therapy;complains of;fatigue  -JR agree to therapy;complains of;dyspnea   cough  -EE agree to therapy;complains of;dyspnea  -EE    Patient Effort, Rehab Treatment good  -JR good  -EE good  -EE    Symptoms Noted During/After Treatment fatigue;shortness of breath  -JR fatigue;shortness of breath;significant change in vital signs  -EE shortness of breath;significant change in vital signs  -EE    Precautions/Limitations oxygen therapy device and L/min   USES 4 L OF O2 AT HOME.   -JR fall precautions;oxygen therapy device and L/min   4.5 L O2  -EE fall precautions;oxygen therapy device and L/min   4 L O2  -EE    Recorded by [JR] Deni Calvillo, PT [EE] Sheeba  Javy, PT [EE] Sheeba Palafox, PT    Vital Signs    Pretreatment Heart Rate (beats/min) 123  -  -EE     Intratreatment Heart Rate (beats/min) 137  -  -EE     Posttreatment Heart Rate (beats/min) 130  -  -EE     Pre SpO2 (%) 92  -JR 90  -EE 91  -EE    O2 Delivery Pre Treatment supplemental O2   4L  -JR supplemental O2  -EE supplemental O2  -EE    Intra SpO2 (%) 80  -JR 76  -EE 75  -EE    O2 Delivery Intra Treatment supplemental O2   4L.  TURNED O2 UP TO 10L TO INCREASE SATS.   -JR supplemental O2  -EE supplemental O2  -EE    Post SpO2 (%) 91  -JR 88  -EE 89  -EE    O2 Delivery Post Treatment supplemental O2  -JR supplemental O2  -EE supplemental O2  -EE    Pre Patient Position  Supine  -EE Supine  -EE    Intra Patient Position  Standing  -EE Standing  -EE    Post Patient Position  Supine  -EE Supine  -EE    Recovery Time  4 min  -EE required 4 min to recover sats to upper 80s  -EE    Recorded by [JR] Deni Calvillo, PT [EE] Sheeba Palafox PT [EE] Sheeba Palafox PT    Pain Assessment    Pain Assessment No/denies pain  -JR No/denies pain  -EE No/denies pain  -EE    Recorded by [JR] Deni Calvillo PT [EE] Sheeba Palafox PT [EE] Sheeba Palafox PT    Cognitive Assessment/Intervention    Current Cognitive/Communication Assessment functional  -JR functional  -EE functional  -EE    Orientation Status oriented x 4  -JR oriented x 4  -EE oriented x 4  -EE    Follows Commands/Answers Questions 100% of the time  -% of the time  -% of the time  -EE    Personal Safety WNL/WFL  -JR WNL/WFL  -EE WNL/WFL  -EE    Personal Safety Interventions  fall prevention program maintained;gait belt;supervised activity;nonskid shoes/slippers when out of bed  -EE fall prevention program maintained;gait belt;nonskid shoes/slippers when out of bed;supervised activity  -EE    Recorded by [JR] Deni Calvillo, PT [EE] Sheeba Palafox PT [EE] Sheeba Palafox, PT    Bed Mobility, Assessment/Treatment    Bed Mob, Supine to Sit,  [place] independent  -JR independent  -EE independent  -EE    Bed Mob, Sit to Supine, [place] independent  -JR independent  -EE independent  -EE    Recorded by [JR] Deni Calvillo, PT [EE] Sheeba Palafox PT [EE] Sheeba Palafox PT    Transfer Assessment/Treatment    Transfers, Sit-Stand [place] stand by assist  -JR stand by assist  -EE stand by assist  -EE    Transfers, Stand-Sit [place] stand by assist  -JR stand by assist  -EE stand by assist  -EE    Transfers, Sit-Stand-Sit, Assist Device rolling walker  -JR rolling walker  -EE rolling walker  -EE    Recorded by [JR] Deni Calvillo, PT [EE] Sheeba Palafox PT [EE] Sheeba Palafox PT    Gait Assessment/Treatment    Gait, [place] Level contact guard assist  -JR contact guard assist  -EE contact guard assist  -EE    Gait, Assistive Device rolling walker  -JR rolling walker  -EE rolling walker  -EE    Gait, Distance (Feet) 200  -JR 65  -EE 30  -EE    Gait, Gait Deviations ashley decreased  -JR ashley decreased;narrow base  -EE ashley decreased;narrow base  -EE    Gait, Safety Issues supplemental O2  -JR supplemental O2  -EE     Gait, Impairments strength decreased;other (see comments)   MULTIPLE BREAKS DUE TO O2 SATS DROPPING.    -JR strength decreased  -EE     Gait, Comment  Verbal cues for pursed lip breathing  -EE Increased SOA with activity; dyspnea limiting gait distance  -EE    Recorded by [JR] Deni Calvillo, PT [EE] Sheeba Palafox PT [EE] Sheeba Palafox PT    Positioning and Restraints    Pre-Treatment Position in bed  -JR in bed  -EE in bed  -EE    Post Treatment Position bed  -JR bed  -EE bed  -EE    In Bed notified nsg;supine;call light within reach;encouraged to call for assist  -JR fowlers;call light within reach;encouraged to call for assist;notified nsg;with family/caregiver  -EE fowlers;call light within reach;encouraged to call for assist;with nsg  -EE    Recorded by [JR] Deni Calvillo, PT [EE] Sheeba Palafox PT [EE] Sheeba Palafox PT       User Key  (r) = Recorded By, (t) = Taken By, (c) = Cosigned By    Initials Name Effective Dates    EE Sheeba Palafox, PT 12/01/15 -     JR Deni Calvillo, PT 01/07/16 -                 IP PT Goals       01/17/18 0849          Transfer Training PT LTG    Transfer Training PT LTG, Date Established 01/17/18  -LB      Transfer Training PT LTG, Time to Achieve 1 wk  -LB      Transfer Training PT LTG, Activity Type sit to stand/stand to sit  -LB      Transfer Training PT LTG, [place] Level conditional independence  -LB      Transfer Training PT LTG, Assist Device walker, rolling  -LB      Gait Training PT LTG    Gait Training Goal PT LTG, Date Established 01/17/18  -LB      Gait Training Goal PT LTG, Time to Achieve 1 wk  -LB      Gait Training Goal PT LTG, [place] Level supervision required  -LB      Gait Training Goal PT LTG, Assist Device walker, rolling  -LB      Gait Training Goal PT LTG, Distance to Achieve 65  -LB      Cardiopulmonary PT LTG    Cardiopulmonary PT LTG, Date Established 01/17/18  -LB      Cardiopulmonary PT LTG, Time to Achieve 1 wk  -LB      Cardiopulmonary PT LTG, Additional Goal 02 sats stay above 82% with activity  -LB        User Key  (r) = Recorded By, (t) = Taken By, (c) = Cosigned By    Initials Name Provider Type    DAVID Lagunas, PT Physical Therapist          Physical Therapy Education     Title: PT OT SLP Therapies (Done)     Topic: Physical Therapy (Done)     Point: Mobility training (Done)    Learning Progress Summary    Learner Readiness Method Response Comment Documented by Status   Patient Acceptance E HAILY FLORES JR 01/20/18 1642 Done    Acceptance E,TB VU,NR  EE 01/19/18 0908 Done    Acceptance E VU,NR  EE 01/18/18 0936 Done    Acceptance E VU,NR  LB 01/17/18 0848 Done               Point: Precautions (Done)    Learning Progress Summary    Learner Readiness Method Response Comment Documented by Status   Patient Acceptance E HAILY FLORES JR 01/20/18 1642 Done    Acceptance E  VU,NR  EE 01/18/18 0936 Done    Acceptance E VU,NR  LB 01/17/18 0848 Done                      User Key     Initials Effective Dates Name Provider Type Discipline    LB 10/06/15 -  An Lagunas, PT Physical Therapist PT    EE 12/01/15 -  Sheeba Palafox, PT Physical Therapist PT     01/07/16 -  Deni Calvillo, PT Physical Therapist PT                    PT Recommendation and Plan  Anticipated Equipment Needs At Discharge: front wheeled walker (encouraged pt to use in the home)  Anticipated Discharge Disposition: home with home health  Planned Therapy Interventions: gait training, strengthening, transfer training  PT Frequency: daily  Plan of Care Review  Plan Of Care Reviewed With: patient  Progress: improving          Outcome Measures       01/20/18 1643 01/19/18 0900 01/18/18 0900    How much help from another person do you currently need...    Turning from your back to your side while in flat bed without using bedrails? 4  -JR 4  -EE 4  -EE    Moving from lying on back to sitting on the side of a flat bed without bedrails? 4  -JR 4  -EE 4  -EE    Moving to and from a bed to a chair (including a wheelchair)? 4  -JR 3  -EE 3  -EE    Standing up from a chair using your arms (e.g., wheelchair, bedside chair)? 3  -JR 3  -EE 3  -EE    Climbing 3-5 steps with a railing? 3  -JR 2  -EE 2  -EE    To walk in hospital room? 3  -JR 3  -EE 3  -EE    AM-PAC 6 Clicks Score 21  -JR 19  -EE 19  -EE    Functional Assessment    Outcome Measure Options  AM-PAC 6 Clicks Basic Mobility (PT)  -EE AM-PAC 6 Clicks Basic Mobility (PT)  -EE      User Key  (r) = Recorded By, (t) = Taken By, (c) = Cosigned By    Initials Name Provider Type    EE Sheeba Palafox, PT Physical Therapist    JR Deni Calvillo, PT Physical Therapist           Time Calculation:         PT Charges       01/20/18 1643          Time Calculation    Start Time 1610  -JR      Stop Time 1644  -JR      Time Calculation (min) 34 min  -JR      PT Received On 01/20/18  -JR      PT  - Next Appointment 01/21/18  -        User Key  (r) = Recorded By, (t) = Taken By, (c) = Cosigned By    Initials Name Provider Type    JR Deni Calvillo, PT Physical Therapist          Therapy Charges for Today     Code Description Service Date Service Provider Modifiers Qty    98405380475 HC PT THER PROC EA 15 MIN 1/20/2018 Deni Calvillo, PT GP 2          PT G-Codes  Outcome Measure Options: AM-PAC 6 Clicks Basic Mobility (PT)    Deni Calvillo, PT  1/20/2018

## 2018-01-20 NOTE — PLAN OF CARE
Problem: Patient Care Overview (Adult)  Goal: Plan of Care Review  Outcome: Ongoing (interventions implemented as appropriate)   01/19/18 6955   Coping/Psychosocial Response Interventions   Plan Of Care Reviewed With patient   Patient Care Overview   Progress no change   Outcome Evaluation   Outcome Summary/Follow up Plan Pt balanced rest with activity throughout day. Becomes SOA with activity with significant changes in O2 sats. Rests and O2 returns to baseline. C/O pain, PO pain medication given with some relief noted. VSS, will continue to monitor     Goal: Adult Individualization and Mutuality  Outcome: Ongoing (interventions implemented as appropriate)    Goal: Discharge Needs Assessment  Outcome: Ongoing (interventions implemented as appropriate)      Problem: Fall Risk (Adult)  Goal: Absence of Falls  Outcome: Ongoing (interventions implemented as appropriate)      Problem: Pneumonia (Adult)  Goal: Signs and Symptoms of Listed Potential Problems Will be Absent or Manageable (Pneumonia)  Outcome: Ongoing (interventions implemented as appropriate)      Problem: COPD, Chronic Bronchitis/Emphysema (Adult)  Goal: Signs and Symptoms of Listed Potential Problems Will be Absent or Manageable (COPD, Chronic Bronchitis/Emphysema)  Outcome: Ongoing (interventions implemented as appropriate)

## 2018-01-21 ENCOUNTER — APPOINTMENT (OUTPATIENT)
Dept: GENERAL RADIOLOGY | Facility: HOSPITAL | Age: 73
End: 2018-01-21

## 2018-01-21 PROCEDURE — 94799 UNLISTED PULMONARY SVC/PX: CPT

## 2018-01-21 PROCEDURE — 63710000001 PREDNISONE PER 1 MG: Performed by: INTERNAL MEDICINE

## 2018-01-21 PROCEDURE — 71045 X-RAY EXAM CHEST 1 VIEW: CPT

## 2018-01-21 RX ADMIN — OXYCODONE HYDROCHLORIDE AND ACETAMINOPHEN 1 TABLET: 5; 325 TABLET ORAL at 01:28

## 2018-01-21 RX ADMIN — OXYCODONE HYDROCHLORIDE AND ACETAMINOPHEN 1 TABLET: 5; 325 TABLET ORAL at 19:19

## 2018-01-21 RX ADMIN — PREDNISONE 40 MG: 20 TABLET ORAL at 09:18

## 2018-01-21 RX ADMIN — GUAIFENESIN 1200 MG: 600 TABLET, EXTENDED RELEASE ORAL at 21:03

## 2018-01-21 RX ADMIN — OXYCODONE HYDROCHLORIDE AND ACETAMINOPHEN 1 TABLET: 5; 325 TABLET ORAL at 13:28

## 2018-01-21 RX ADMIN — PANTOPRAZOLE SODIUM 40 MG: 40 TABLET, DELAYED RELEASE ORAL at 06:27

## 2018-01-21 RX ADMIN — AZITHROMYCIN 500 MG: 250 TABLET, FILM COATED ORAL at 18:02

## 2018-01-21 RX ADMIN — GUAIFENESIN 1200 MG: 600 TABLET, EXTENDED RELEASE ORAL at 09:21

## 2018-01-21 RX ADMIN — ROFLUMILAST 500 MCG: 500 TABLET ORAL at 09:18

## 2018-01-21 RX ADMIN — HYDROCODONE POLISTIREX AND CHLORPHENIRAMINE POLISITREX 5 ML: 10; 8 SUSPENSION, EXTENDED RELEASE ORAL at 11:51

## 2018-01-21 RX ADMIN — OXYCODONE HYDROCHLORIDE AND ACETAMINOPHEN 1 TABLET: 5; 325 TABLET ORAL at 07:13

## 2018-01-21 RX ADMIN — BUPROPION HYDROCHLORIDE 150 MG: 150 TABLET, EXTENDED RELEASE ORAL at 09:18

## 2018-01-21 RX ADMIN — GABAPENTIN 300 MG: 300 CAPSULE ORAL at 21:03

## 2018-01-21 RX ADMIN — SERTRALINE 150 MG: 100 TABLET, FILM COATED ORAL at 09:18

## 2018-01-21 RX ADMIN — BUPROPION HYDROCHLORIDE 150 MG: 150 TABLET, EXTENDED RELEASE ORAL at 21:04

## 2018-01-21 NOTE — PLAN OF CARE
Problem: Patient Care Overview (Adult)  Goal: Plan of Care Review  Outcome: Ongoing (interventions implemented as appropriate)   01/21/18 3090   Coping/Psychosocial Response Interventions   Plan Of Care Reviewed With patient   Patient Care Overview   Progress improving   Outcome Evaluation   Outcome Summary/Follow up Plan c/o back pain percocet given with relief, soa with exertion, o2 4l N/C, up to bsc, 4 bm Dr Elaine restrepo, will continue to monitor      Goal: Adult Individualization and Mutuality  Outcome: Ongoing (interventions implemented as appropriate)    Goal: Discharge Needs Assessment  Outcome: Ongoing (interventions implemented as appropriate)      Problem: Pneumonia (Adult)  Goal: Signs and Symptoms of Listed Potential Problems Will be Absent or Manageable (Pneumonia)  Outcome: Ongoing (interventions implemented as appropriate)      Problem: COPD, Chronic Bronchitis/Emphysema (Adult)  Goal: Signs and Symptoms of Listed Potential Problems Will be Absent or Manageable (COPD, Chronic Bronchitis/Emphysema)  Outcome: Ongoing (interventions implemented as appropriate)

## 2018-01-21 NOTE — PROGRESS NOTES
"[facility]     Mar/chart reviewed  F/u copd with ae  Still with cough, she said using the spiriva and duonebs together caused a bad \"reaction\"  She feels better overall    Vital Sign Min/Max for last 24 hours  Temp  Min: 97.9 °F (36.6 °C)  Max: 98 °F (36.7 °C)   BP  Min: 150/81  Max: 172/96   Pulse  Min: 99  Max: 116   Resp  Min: 18  Max: 22   SpO2  Min: 90 %  Max: 100 %   Flow (L/min)  Min: 4  Max: 5   No Data Recorded     Nad, axox3,   perrl, eomi, no icterus,  mmm, no jvd, trachea midline, neck supple,  chest decreased, coarse bilaterally, no crackles, + wheezes faint,   rrr,   soft, nt, nd +bs,  no c/c/ e    1. Pneumonia community-acquired complete antibiotic course  2. Acute exacerbation COPD better we will try and transition her to inhalers with when necessary nebs and to by mouth steroids  3. Acute exacerbation bronchiectasis I will add Mucinex to her regimen  4. Acute on chronic hypoxemic and hypercapnic respiratory failure he is weaned back to her home O2 she had mild hypercapnia when she presented acutely ill suspect that has improved as her respiratory function is improved.  5. anemia mild, macrocytic we'll check indices  6. Mild hyponatremia follow-up sodium multiple possible reasons with her chronic lung disease and SSRI use  7. Gastroesophageal reflux disease  8. Hyperlipidemia  9. Osteoporosis  10. Skin lesion mid back does have some unusual discoloration it's probably just senile and to go up but I can't rule out a melanoma and I recommend she see a dermatologist or her primary care physician once she is better for further evaluation she verbalizes understanding  11. Polyuria I am going to check a urinalysis I would find it on likely that she's got a urinary tract infection a left which she thinks she has given the antibiotic she is on.  Will follow-up the glucose level as well with her on steroids     Plan for disposition: Hopefully home in the next several days depending on her " functional status.

## 2018-01-21 NOTE — PLAN OF CARE
Problem: Patient Care Overview (Adult)  Goal: Plan of Care Review  Outcome: Ongoing (interventions implemented as appropriate)   01/21/18 0518   Coping/Psychosocial Response Interventions   Plan Of Care Reviewed With patient   Outcome Evaluation   Outcome Summary/Follow up Plan Patient medicated for back pain. Still with SOA with exertion. O2 sats dropped with exertion as well. Still having tremors,patient thinks due to symbicort. Vitals as charted. Monitored.      Goal: Discharge Needs Assessment  Outcome: Ongoing (interventions implemented as appropriate)      Problem: Fall Risk (Adult)  Goal: Absence of Falls  Outcome: Ongoing (interventions implemented as appropriate)      Problem: Pneumonia (Adult)  Goal: Signs and Symptoms of Listed Potential Problems Will be Absent or Manageable (Pneumonia)  Outcome: Ongoing (interventions implemented as appropriate)

## 2018-01-21 NOTE — SIGNIFICANT NOTE
01/21/18 0941   Rehab Treatment   Discipline physical therapist   Treatment Not Performed patient/family declined treatment  (Patient reports she is having more pain this morning, did not sleep last night, and had a bad reaction to one of the medications. Does not want to participate at this time. Will check back on patient tomorrow for skilled PT.)   Recommendation   PT - Next Appointment 01/22/18

## 2018-01-22 LAB — NT-PROBNP SERPL-MCNC: 1362 PG/ML (ref 5–900)

## 2018-01-22 PROCEDURE — 94799 UNLISTED PULMONARY SVC/PX: CPT

## 2018-01-22 PROCEDURE — 63710000001 PREDNISONE PER 1 MG: Performed by: INTERNAL MEDICINE

## 2018-01-22 PROCEDURE — 25010000002 FUROSEMIDE PER 20 MG: Performed by: INTERNAL MEDICINE

## 2018-01-22 PROCEDURE — 94640 AIRWAY INHALATION TREATMENT: CPT

## 2018-01-22 PROCEDURE — 97110 THERAPEUTIC EXERCISES: CPT

## 2018-01-22 RX ORDER — FUROSEMIDE 10 MG/ML
60 INJECTION INTRAMUSCULAR; INTRAVENOUS ONCE
Status: COMPLETED | OUTPATIENT
Start: 2018-01-22 | End: 2018-01-22

## 2018-01-22 RX ADMIN — GUAIFENESIN 1200 MG: 600 TABLET, EXTENDED RELEASE ORAL at 20:45

## 2018-01-22 RX ADMIN — AZITHROMYCIN 500 MG: 250 TABLET, FILM COATED ORAL at 17:40

## 2018-01-22 RX ADMIN — PREDNISONE 40 MG: 20 TABLET ORAL at 08:59

## 2018-01-22 RX ADMIN — GABAPENTIN 300 MG: 300 CAPSULE ORAL at 20:46

## 2018-01-22 RX ADMIN — BUPROPION HYDROCHLORIDE 150 MG: 150 TABLET, EXTENDED RELEASE ORAL at 08:59

## 2018-01-22 RX ADMIN — OXYCODONE HYDROCHLORIDE AND ACETAMINOPHEN 1 TABLET: 5; 325 TABLET ORAL at 07:50

## 2018-01-22 RX ADMIN — BUPROPION HYDROCHLORIDE 150 MG: 150 TABLET, EXTENDED RELEASE ORAL at 20:45

## 2018-01-22 RX ADMIN — ROFLUMILAST 500 MCG: 500 TABLET ORAL at 08:59

## 2018-01-22 RX ADMIN — HYDROCODONE POLISTIREX AND CHLORPHENIRAMINE POLISITREX 5 ML: 10; 8 SUSPENSION, EXTENDED RELEASE ORAL at 12:44

## 2018-01-22 RX ADMIN — OXYCODONE HYDROCHLORIDE AND ACETAMINOPHEN 1 TABLET: 5; 325 TABLET ORAL at 01:52

## 2018-01-22 RX ADMIN — OXYCODONE HYDROCHLORIDE AND ACETAMINOPHEN 1 TABLET: 5; 325 TABLET ORAL at 20:46

## 2018-01-22 RX ADMIN — HYDROCODONE POLISTIREX AND CHLORPHENIRAMINE POLISITREX 5 ML: 10; 8 SUSPENSION, EXTENDED RELEASE ORAL at 00:27

## 2018-01-22 RX ADMIN — PANTOPRAZOLE SODIUM 40 MG: 40 TABLET, DELAYED RELEASE ORAL at 06:35

## 2018-01-22 RX ADMIN — OXYCODONE HYDROCHLORIDE AND ACETAMINOPHEN 1 TABLET: 5; 325 TABLET ORAL at 14:10

## 2018-01-22 RX ADMIN — GUAIFENESIN 1200 MG: 600 TABLET, EXTENDED RELEASE ORAL at 08:59

## 2018-01-22 RX ADMIN — TIOTROPIUM BROMIDE 1 CAPSULE: 18 CAPSULE ORAL; RESPIRATORY (INHALATION) at 22:01

## 2018-01-22 RX ADMIN — FUROSEMIDE 60 MG: 10 INJECTION, SOLUTION INTRAMUSCULAR; INTRAVENOUS at 09:20

## 2018-01-22 RX ADMIN — SERTRALINE 150 MG: 100 TABLET, FILM COATED ORAL at 08:59

## 2018-01-22 NOTE — PROGRESS NOTES
LOS: 7 days   Patient Care Team:  YEN Gutierrez as PCP - General  YEN Gutierrez as PCP - Family Medicine  Carl Perez MD as Consulting Physician (Orthopedic Surgery)    Subjective   Patient reports she is doing better although she was going to attempt to shower with some assistance today and just didn't think she could make it.  Patient reports she is not taking her Symbicort and nor is she using nebulizers.  The combination made her too shaky.  Patient reports a brisk diuresis after the diuretics this morning    Review of Systems:          Objective     Vital Signs  Vital Sign Min/Max for last 24 hours  Temp  Min: 97.5 °F (36.4 °C)  Max: 97.8 °F (36.6 °C)   BP  Min: 140/80  Max: 157/79   Pulse  Min: 95  Max: 108   Resp  Min: 20  Max: 20   SpO2  Min: 94 %  Max: 100 %   Flow (L/min)  Min: 4  Max: 4   No Data Recorded        Ventilator/Non-Invasive Ventilation Settings     None                       Body mass index is 20.12 kg/(m^2).  I/O last 3 completed shifts:  In: 250 [P.O.:250]  Out: 102 [Urine:100; Stool:2]           Physical Exam:  General Appearance: Well-developed white female she is sitting up in bed she is on 4 L nasal cannula O2 she gets quite winded talking but she certainly can talk well.  At rest her oxygen saturations are running 92-93% but she desaturates with talking.  Eyes: Conjunctiva are clear and anicteric  ENT: Oral mucous membranes are little dry no erythema or exudates nasal septum midline nasal mucosa is dry  Neck: No adenopathy or thyromegaly no jugular venous distention trachea midline  Lungs: Decreased but clear no wheezes rales or rhonchi no dullness on percussion symmetric nonlabored expansion mild thoracic kyphosis  Cardiac: Regular rate and rhythm heart rate in the mid 90s  Abdomen: Soft no palpable organomegaly or masses  : Not examined  Musc/Skel: Grossly normal  Skin: No jaundice no petechiae she does have a lesion in the mid back just slightly to the  left of midline that has a different lipid levels of pigmentation it is raised and rough 5 think is most likely a senile lentigo but I can't rule out a melanoma  Neuro: Alert oriented cooperative pleasant following commands  Extremities/P Vascular: No clubbing cyanosis or edema she has palpable radial and dorsalis pedis pulses bilaterally  MSE: She is a little anxious       Labs:    Results from last 7 days  Lab Units 01/20/18  0634 01/15/18  1415   GLUCOSE mg/dL 99 140*   SODIUM mmol/L 138 135*   POTASSIUM mmol/L 3.8 3.6   CO2 mmol/L 34.4* 34.0*   CHLORIDE mmol/L 95* 93*   ANION GAP mmol/L 8.6 8.0   CREATININE mg/dL 0.65 0.66   BUN mg/dL 16 12   BUN / CREAT RATIO  24.6 18.2   CALCIUM mg/dL 8.3* 8.9   EGFR IF NONAFRICN AM mL/min/1.73 90 89   ALK PHOS U/L  --  94   TOTAL PROTEIN g/dL  --  7.6   ALT (SGPT) U/L  --  10   AST (SGOT) U/L  --  15   BILIRUBIN mg/dL  --  0.3   ALBUMIN g/dL  --  3.10*   GLOBULIN gm/dL  --  4.5   A/G RATIO g/dL  --  0.7     Estimated Creatinine Clearance: 50.8 mL/min (by C-G formula based on Cr of 0.65).        Results from last 7 days  Lab Units 01/20/18  0634 01/17/18  0526 01/15/18  1415   WBC 10*3/mm3 14.37* 16.17* 11.48*   RBC 10*6/mm3 3.21* 3.11* 3.54*   HEMOGLOBIN g/dL 10.2* 9.8* 11.1*   HEMATOCRIT % 33.2* 31.7* 36.7   MCV fL 103.4* 101.9* 103.7*   MCH pg 31.8 31.5 31.4   MCHC g/dL 30.7* 30.9* 30.2*   RDW % 12.8 12.4 12.3   RDW-SD fl 48.0 45.8 46.8   MPV fL 9.3 9.6 9.6   PLATELETS 10*3/mm3 671* 589* 489   NEUTROPHIL % %  --   --  77.1*   LYMPHOCYTE % %  --   --  11.5*   MONOCYTES % %  --   --  10.2   EOSINOPHIL % %  --   --  0.6   BASOPHIL % %  --   --  0.1   IMM GRAN % %  --   --  0.5   NEUTROS ABS 10*3/mm3  --   --  8.85*   LYMPHS ABS 10*3/mm3  --   --  1.32   MONOS ABS 10*3/mm3  --   --  1.17   EOS ABS 10*3/mm3  --   --  0.07   BASOS ABS 10*3/mm3  --   --  0.01   IMMATURE GRANS (ABS) 10*3/mm3  --   --  0.06*       Results from last 7 days  Lab Units 01/15/18  1738   PH, ARTERIAL pH  units 7.401   PO2 ART mm Hg 72.2*   PCO2, ARTERIAL mm Hg 51.2*   HCO3 ART mmol/L 31.8*       Results from last 7 days  Lab Units 01/15/18  1415   TROPONIN T ng/mL <0.010               Results from last 7 days  Lab Units 01/15/18  1805   LACTATE mmol/L 1.3         Microbiology Results (last 10 days)     Procedure Component Value - Date/Time    MRSA Screen Culture - Swab, Nares [[id number]]  (Normal) Collected:  01/16/18 1412    Lab Status:  Final result Specimen:  Swab from Nares Updated:  01/18/18 1101     MRSA SCREEN CX No Methicillin Resistant Staphylococcus aureus isolated    Blood Culture - Blood, Blood, Venous Line [[id number]]  (Normal) Collected:  01/15/18 1805    Lab Status:  Final result Specimen:  Blood from Arm, Right Updated:  01/20/18 1831     Blood Culture No growth at 5 days    Blood Culture - Blood, Blood, Venous Line [[id number]]  (Normal) Collected:  01/15/18 1757    Lab Status:  Final result Specimen:  Blood from Arm, Left Updated:  01/20/18 1831     Blood Culture No growth at 5 days                azithromycin 500 mg Oral Q24H   budesonide-formoterol 2 puff Inhalation BID - RT   buPROPion  mg Oral Q12H   gabapentin 300 mg Oral Nightly   guaiFENesin 1,200 mg Oral Q12H   pantoprazole 40 mg Oral QAM   predniSONE 40 mg Oral Daily With Breakfast   roflumilast 500 mcg Oral Daily   sertraline 150 mg Oral Daily          Diagnostics:  Xr Chest 2 View    Result Date: 1/15/2018  XR CHEST 2 VW-  HISTORY: Female who is 72 years-old,  chest pain  TECHNIQUE: Frontal and lateral views of the chest  COMPARISON: 06/25/2016  FINDINGS: Heart, mediastinum and pulmonary vasculature are unremarkable. Small atelectasis/infiltrate at the lung bases, may in part reflect chronic change. Minimal blunting of posterior costophrenic angle could reflect minimal pleural effusion. Emphysematous changes are seen. No pneumothorax. Chronic midthoracic compression fracture.      Small atelectasis/infiltrate at the lung bases, may  in part reflect chronic change, follow-up suggested.  This report was finalized on 1/15/2018 1:47 PM by Dr. Jase Singleton MD.      Ct Angiogram Chest With Contrast    Result Date: 1/15/2018  CT ANGIOGRAM CHEST W CONTRAST-  INDICATIONS: Chest pain, dyspnea, possible pulmonary embolism  Radiation dose reduction techniques were utilized, including automated exposure control and exposure modulation based on body size.  TECHNIQUE: CT ANGIOGRAPHY WITH THREE-DIMENSIONAL RECONSTRUCTIONS  COMPARISON: 06/25/2016  FINDINGS:   No pulmonary embolism. No aortic dissection.   The heart size is normal with minimal pericardial effusion. A few small subcentimeter short axis mediastinal lymph nodes are seen that are not significant by size criteria. 1 cm short axis bilateral hilar lymph nodes, similar to prior exam.  The airways appear clear.    Emphysematous changes are conspicuous. Minimal right pleural effusion.  The lungs show multifocal infiltrates, for example anteriorly in the right middle lobe, medially in the right lower lobe peripherally in the left upper and lower lobes. This appearance is generally worsened from the prior exam, aeration at the right lower lobe is actually improved from the prior exam.  Bronchiectasis is noted particularly in the right lower lobe, but also in the left lower lobe.    Upper abdominal structures show right adrenal myelolipoma similar to prior.  Degenerative changes are seen in the spine. Chronic T7 compression fracture. T12 compression fracture (40% loss of height) Is age indeterminate, change from the prior exam, correlate clinically (if further evaluation is indicated, MRI could be considered).           No pulmonary embolism. Multifocal pulmonary infiltrates, minimal right pleural effusion, follow-up recommended.  Discussed by telephone with Dr. Chamberlain at time of interpretation, 1735, 01/15/2018.    This report was finalized on 1/15/2018 5:34 PM by Dr. Jase Singleton MD.            CXR reviewed severe hyperinflation and emphysema changes new / increased from admission bilateral right greater than Left pleural effusio    Active Hospital Problems (** Indicates Principal Problem)    Diagnosis Date Noted   • Pneumonia of both lungs due to infectious organism [J18.9] 01/15/2018      Resolved Hospital Problems    Diagnosis Date Noted Date Resolved   No resolved problems to display.         Assessment/Plan     1. Pneumonia community-acquired complete antibiotic course  2. Acute exacerbation COPD better we will try and transition her to inhalers with when necessary nebs and to by mouth steroids  3. Acute exacerbation bronchiectasis I will add Mucinex to her regimen  4. Acute on chronic hypoxemic and hypercapnic respiratory failure he is weaned back to her home O2 she had mild hypercapnia when she presented acutely ill suspect that has improved as her respiratory function is improved.I will check gas tomorrow  5. anemia mild, looks like chronic disease  6. Mild hyponatremia follow-up sodium multiple possible reasons with her chronic lung disease and SSRI use  7. Gastroesophageal reflux disease  8. Hyperlipidemia  9. Osteoporosis  10. Skin lesion mid back does have some unusual discoloration it's probably just senile and to go up but I can't rule out a melanoma and I recommend she see a dermatologist or her primary care physician once she is better for further evaluation she verbalizes understanding  11. Polyuria UA negative  12. Bilateral pleural effusion right greater than left suspect some CHF will diurese and check BNP    Plan for disposition: Possibly home tomorrow    Alfie Hightower MD  01/22/18  8:51 AM    Time:

## 2018-01-22 NOTE — PLAN OF CARE
Problem: Patient Care Overview (Adult)  Goal: Plan of Care Review  Outcome: Ongoing (interventions implemented as appropriate)   01/22/18 1720   Coping/Psychosocial Response Interventions   Plan Of Care Reviewed With patient   Patient Care Overview   Progress progress toward functional goals as expected   Outcome Evaluation   Outcome Summary/Follow up Plan pt complains of intermittent pain in back and chest due to coughing. pain medication given with good results. coughing medication given with good results. pt worked with physical therapy today. pt currenty resting in bed. no signs or symptoms of distress. will continue to monitor.      Goal: Adult Individualization and Mutuality  Outcome: Ongoing (interventions implemented as appropriate)    Goal: Discharge Needs Assessment  Outcome: Ongoing (interventions implemented as appropriate)      Problem: Fall Risk (Adult)  Goal: Absence of Falls  Outcome: Ongoing (interventions implemented as appropriate)      Problem: Pneumonia (Adult)  Goal: Signs and Symptoms of Listed Potential Problems Will be Absent or Manageable (Pneumonia)  Outcome: Ongoing (interventions implemented as appropriate)      Problem: COPD, Chronic Bronchitis/Emphysema (Adult)  Goal: Signs and Symptoms of Listed Potential Problems Will be Absent or Manageable (COPD, Chronic Bronchitis/Emphysema)  Outcome: Ongoing (interventions implemented as appropriate)

## 2018-01-22 NOTE — PLAN OF CARE
Problem: Patient Care Overview (Adult)  Goal: Plan of Care Review  Outcome: Ongoing (interventions implemented as appropriate)   01/22/18 0833   Coping/Psychosocial Response Interventions   Plan Of Care Reviewed With patient   Outcome Evaluation   Outcome Summary/Follow up Plan Medicated for back pain with good results. On 4 l NC the whole night. SOB with exertion noted. No tremors or diarrhea . Fall precautions enforced. Monitored.     Goal: Discharge Needs Assessment  Outcome: Ongoing (interventions implemented as appropriate)      Problem: Fall Risk (Adult)  Goal: Absence of Falls  Outcome: Ongoing (interventions implemented as appropriate)      Problem: Pneumonia (Adult)  Goal: Signs and Symptoms of Listed Potential Problems Will be Absent or Manageable (Pneumonia)  Outcome: Ongoing (interventions implemented as appropriate)

## 2018-01-22 NOTE — PLAN OF CARE
Problem: Patient Care Overview (Adult)  Goal: Plan of Care Review   01/22/18 9459   Coping/Psychosocial Response Interventions   Plan Of Care Reviewed With patient   Patient Care Overview   Progress progress toward functional goals as expected   Outcome Evaluation   Outcome Summary/Follow up Plan Pt able to tolerate increased gait distance; continues to demonstrate significant dyspnea on exertion. O2 increased from 4 to 5 L during activity; O2 sats at 79% following ambulation with multiple rest breaks. Several minutes rest required to recover sats to upper 80s. No new concerns; will continue to increase activity tolerance and endurance as able.

## 2018-01-22 NOTE — PROGRESS NOTES
"[facility]      Mar/chart reviewed  F/u copd with ae  Still with cough,  She feels better overall  Having back/flank pain  Also c/o \"diarrhea\" but on closer question she is just have frequent small stools it sounds like    Vital Sign Min/Max for last 24 hours  Temp  Min: 97.6 °F (36.4 °C)  Max: 97.8 °F (36.6 °C)   BP  Min: 140/80  Max: 156/84   Pulse  Min: 96  Max: 105   Resp  Min: 20  Max: 22   SpO2  Min: 92 %  Max: 100 %   Flow (L/min)  Min: 4  Max: 5   No Data Recorded     Nad, axox3,   perrl, eomi, no icterus,  mmm, no jvd, trachea midline, neck supple,  chest decreased, coarse bilaterally, no crackles, + wheezes faint,   rrr,   soft, nt, nd +bs,  no c/c/ e     1. Pneumonia community-acquired complete antibiotic course  2. Acute exacerbation COPD better we will try and transition her to inhalers with when necessary nebs and to by mouth steroids  3. Acute exacerbation bronchiectasis I will add Mucinex to her regimen  4. Acute on chronic hypoxemic and hypercapnic respiratory failure he is weaned back to her home O2 she had mild hypercapnia when she presented acutely ill suspect that has improved as her respiratory function is improved.  5. anemia mild, macrocytic we'll check indices  6. Mild hyponatremia follow-up sodium multiple possible reasons with her chronic lung disease and SSRI use  7. Gastroesophageal reflux disease  8. Hyperlipidemia  9. Osteoporosis  10. Skin lesion mid back does have some unusual discoloration it's probably just senile and to go up but I can't rule out a melanoma and I recommend she see a dermatologist or her primary care physician once she is better for further evaluation she verbalizes understanding  11. Polyuria I am going to check a urinalysis I would find it on likely that she's got a urinary tract infection a left which she thinks she has given the antibiotic she is on.  Will follow-up the glucose level as well with her on steroids    \"diarrhea\"  I think this is " actually more constipation or fecal incotinence  Would try fiber, she said she will bring her benefiber from home

## 2018-01-23 VITALS
BODY MASS INDEX: 20.24 KG/M2 | RESPIRATION RATE: 22 BRPM | OXYGEN SATURATION: 93 % | TEMPERATURE: 97.4 F | HEIGHT: 62 IN | DIASTOLIC BLOOD PRESSURE: 82 MMHG | SYSTOLIC BLOOD PRESSURE: 162 MMHG | WEIGHT: 110 LBS | HEART RATE: 106 BPM

## 2018-01-23 LAB
ARTERIAL PATENCY WRIST A: POSITIVE
ATMOSPHERIC PRESS: 741.9 MMHG
BASE EXCESS BLDA CALC-SCNC: 9 MMOL/L (ref 0–2)
BDY SITE: ABNORMAL
GAS FLOW AIRWAY: 3 LPM
HCO3 BLDA-SCNC: 34.8 MMOL/L (ref 22–28)
MODALITY: ABNORMAL
PCO2 BLDA: 51.8 MM HG (ref 35–45)
PH BLDA: 7.44 PH UNITS (ref 7.35–7.45)
PO2 BLDA: 57.5 MM HG (ref 80–100)
SAO2 % BLDCOA: 89.7 % (ref 92–99)
TOTAL RATE: 24 BREATHS/MINUTE

## 2018-01-23 PROCEDURE — 94799 UNLISTED PULMONARY SVC/PX: CPT

## 2018-01-23 PROCEDURE — 82803 BLOOD GASES ANY COMBINATION: CPT

## 2018-01-23 PROCEDURE — 36600 WITHDRAWAL OF ARTERIAL BLOOD: CPT

## 2018-01-23 PROCEDURE — 63710000001 PREDNISONE PER 1 MG: Performed by: INTERNAL MEDICINE

## 2018-01-23 RX ORDER — MONTELUKAST SODIUM 10 MG/1
10 TABLET ORAL NIGHTLY
Status: DISCONTINUED | OUTPATIENT
Start: 2018-01-23 | End: 2018-01-23 | Stop reason: HOSPADM

## 2018-01-23 RX ORDER — GUAIFENESIN 600 MG/1
1200 TABLET, EXTENDED RELEASE ORAL EVERY 12 HOURS SCHEDULED
Qty: 60 TABLET | Refills: 11 | Status: SHIPPED | OUTPATIENT
Start: 2018-01-23

## 2018-01-23 RX ORDER — BUPROPION HYDROCHLORIDE 100 MG/1
200 TABLET, EXTENDED RELEASE ORAL EVERY 12 HOURS SCHEDULED
Status: DISCONTINUED | OUTPATIENT
Start: 2018-01-23 | End: 2018-01-23 | Stop reason: HOSPADM

## 2018-01-23 RX ADMIN — PREDNISONE 40 MG: 20 TABLET ORAL at 08:53

## 2018-01-23 RX ADMIN — TIOTROPIUM BROMIDE 1 CAPSULE: 18 CAPSULE ORAL; RESPIRATORY (INHALATION) at 12:55

## 2018-01-23 RX ADMIN — PANTOPRAZOLE SODIUM 40 MG: 40 TABLET, DELAYED RELEASE ORAL at 06:33

## 2018-01-23 RX ADMIN — OXYCODONE HYDROCHLORIDE AND ACETAMINOPHEN 1 TABLET: 5; 325 TABLET ORAL at 10:43

## 2018-01-23 RX ADMIN — IPRATROPIUM BROMIDE AND ALBUTEROL SULFATE 3 ML: .5; 3 SOLUTION RESPIRATORY (INHALATION) at 09:46

## 2018-01-23 RX ADMIN — HYDROCODONE POLISTIREX AND CHLORPHENIRAMINE POLISITREX 5 ML: 10; 8 SUSPENSION, EXTENDED RELEASE ORAL at 13:16

## 2018-01-23 RX ADMIN — SERTRALINE 150 MG: 100 TABLET, FILM COATED ORAL at 08:53

## 2018-01-23 RX ADMIN — HYDROCODONE POLISTIREX AND CHLORPHENIRAMINE POLISITREX 5 ML: 10; 8 SUSPENSION, EXTENDED RELEASE ORAL at 00:56

## 2018-01-23 RX ADMIN — BUPROPION HYDROCHLORIDE 150 MG: 150 TABLET, EXTENDED RELEASE ORAL at 08:53

## 2018-01-23 RX ADMIN — OXYCODONE HYDROCHLORIDE AND ACETAMINOPHEN 1 TABLET: 5; 325 TABLET ORAL at 03:44

## 2018-01-23 RX ADMIN — GUAIFENESIN 1200 MG: 600 TABLET, EXTENDED RELEASE ORAL at 08:52

## 2018-01-23 RX ADMIN — ROFLUMILAST 500 MCG: 500 TABLET ORAL at 08:53

## 2018-01-23 NOTE — PROGRESS NOTES
LOS: 8 days   Patient Care Team:  YEN Gutierrez as PCP - General  YEN Gutierrez as PCP - Family Medicine  Carl Perez MD as Consulting Physician (Orthopedic Surgery)    Subjective   Patient's upset today she says everybody misunderstands her about the Trilogy she's willing to try it she just can't wear any kind of mask on her face.  I explained to her that we'll have to see off her mouth and nose to blow air into one or both of these 2 ventilate her.  She understands that she says she just can't tolerate one at this time.  She is complaining she didn't sleep well last night because of her body Waking her up.  She is complaining that she doesn't like Symbicort she wants her Advair which we don't carry here.  She just wants to go home today    Review of Systems:          Objective     Vital Signs  Vital Sign Min/Max for last 24 hours  Temp  Min: 97.2 °F (36.2 °C)  Max: 97.5 °F (36.4 °C)   BP  Min: 130/68  Max: 167/80   Pulse  Min: 94  Max: 110   Resp  Min: 18  Max: 28   SpO2  Min: 92 %  Max: 96 %   Flow (L/min)  Min: 4  Max: 4   No Data Recorded        Ventilator/Non-Invasive Ventilation Settings     None                       Body mass index is 20.12 kg/(m^2).  I/O last 3 completed shifts:  In: 820 [P.O.:820]  Out: -   I/O this shift:  In: -   Out: 100 [Urine:100]        Physical Exam:  General Appearance: Well-developed white female she is sitting up in bed she is on 4 L nasal cannula O2 she gets quite winded talking but she certainly can talk well.  At rest her oxygen saturations are running 93% but she desaturates with talking.  Eyes: Conjunctiva are clear and anicteric  ENT: Oral mucous membranes are little dry no erythema or exudates nasal septum midline nasal mucosa is dry  Neck: No adenopathy or thyromegaly no jugular venous distention trachea midline  Lungs: Decreased but clear no wheezes rales or rhonchi no dullness on percussion symmetric nonlabored expansion mild thoracic  kyphosis  Cardiac: Regular rate and rhythm heart rate in the mid 90s  Abdomen: Soft no palpable organomegaly or masses  : Not examined  Musc/Skel: Grossly normal  Skin: No jaundice no petechiae she does have a lesion in the mid back just slightly to the left of midline that has a different lipid levels of pigmentation it is raised and rough 5 think is most likely a senile lentigo but I can't rule out a melanoma  Neuro: Alert oriented cooperative pleasant following commands  Extremities/P Vascular: No clubbing cyanosis or edema she has palpable radial and dorsalis pedis pulses bilaterally  MSE: She is  anxious       Labs:    Results from last 7 days  Lab Units 01/20/18  0634   GLUCOSE mg/dL 99   SODIUM mmol/L 138   POTASSIUM mmol/L 3.8   CO2 mmol/L 34.4*   CHLORIDE mmol/L 95*   ANION GAP mmol/L 8.6   CREATININE mg/dL 0.65   BUN mg/dL 16   BUN / CREAT RATIO  24.6   CALCIUM mg/dL 8.3*   EGFR IF NONAFRICN AM mL/min/1.73 90     Estimated Creatinine Clearance: 50.8 mL/min (by C-G formula based on Cr of 0.65).        Results from last 7 days  Lab Units 01/20/18  0634 01/17/18  0526   WBC 10*3/mm3 14.37* 16.17*   RBC 10*6/mm3 3.21* 3.11*   HEMOGLOBIN g/dL 10.2* 9.8*   HEMATOCRIT % 33.2* 31.7*   MCV fL 103.4* 101.9*   MCH pg 31.8 31.5   MCHC g/dL 30.7* 30.9*   RDW % 12.8 12.4   RDW-SD fl 48.0 45.8   MPV fL 9.3 9.6   PLATELETS 10*3/mm3 671* 589*       Results from last 7 days  Lab Units 01/23/18  0603   PH, ARTERIAL pH units 7.436   PO2 ART mm Hg 57.5*   PCO2, ARTERIAL mm Hg 51.8*   HCO3 ART mmol/L 34.8*           Results from last 7 days  Lab Units 01/20/18  0634   PROBNP pg/mL 1362.0*                 Microbiology Results (last 10 days)     Procedure Component Value - Date/Time    MRSA Screen Culture - Swab, Nares [[id number]]  (Normal) Collected:  01/16/18 1412    Lab Status:  Final result Specimen:  Swab from Nares Updated:  01/18/18 1101     MRSA SCREEN CX No Methicillin Resistant Staphylococcus aureus isolated    Blood  Culture - Blood, Blood, Venous Line [[id number]]  (Normal) Collected:  01/15/18 1805    Lab Status:  Final result Specimen:  Blood from Arm, Right Updated:  01/20/18 1831     Blood Culture No growth at 5 days    Blood Culture - Blood, Blood, Venous Line [[id number]]  (Normal) Collected:  01/15/18 1757    Lab Status:  Final result Specimen:  Blood from Arm, Left Updated:  01/20/18 1831     Blood Culture No growth at 5 days                budesonide-formoterol 2 puff Inhalation BID - RT   buPROPion  mg Oral Q12H   gabapentin 300 mg Oral Nightly   guaiFENesin 1,200 mg Oral Q12H   montelukast 10 mg Oral Nightly   pantoprazole 40 mg Oral QAM   predniSONE 40 mg Oral Daily With Breakfast   roflumilast 500 mcg Oral Daily   sertraline 150 mg Oral Daily   tiotropium 1 capsule Inhalation Daily - RT          Diagnostics:  Xr Chest 2 View    Result Date: 1/15/2018  XR CHEST 2 VW-  HISTORY: Female who is 72 years-old,  chest pain  TECHNIQUE: Frontal and lateral views of the chest  COMPARISON: 06/25/2016  FINDINGS: Heart, mediastinum and pulmonary vasculature are unremarkable. Small atelectasis/infiltrate at the lung bases, may in part reflect chronic change. Minimal blunting of posterior costophrenic angle could reflect minimal pleural effusion. Emphysematous changes are seen. No pneumothorax. Chronic midthoracic compression fracture.      Small atelectasis/infiltrate at the lung bases, may in part reflect chronic change, follow-up suggested.  This report was finalized on 1/15/2018 1:47 PM by Dr. Jase Singleton MD.      Ct Angiogram Chest With Contrast    Result Date: 1/15/2018  CT ANGIOGRAM CHEST W CONTRAST-  INDICATIONS: Chest pain, dyspnea, possible pulmonary embolism  Radiation dose reduction techniques were utilized, including automated exposure control and exposure modulation based on body size.  TECHNIQUE: CT ANGIOGRAPHY WITH THREE-DIMENSIONAL RECONSTRUCTIONS  COMPARISON: 06/25/2016  FINDINGS:   No pulmonary  embolism. No aortic dissection.   The heart size is normal with minimal pericardial effusion. A few small subcentimeter short axis mediastinal lymph nodes are seen that are not significant by size criteria. 1 cm short axis bilateral hilar lymph nodes, similar to prior exam.  The airways appear clear.    Emphysematous changes are conspicuous. Minimal right pleural effusion.  The lungs show multifocal infiltrates, for example anteriorly in the right middle lobe, medially in the right lower lobe peripherally in the left upper and lower lobes. This appearance is generally worsened from the prior exam, aeration at the right lower lobe is actually improved from the prior exam.  Bronchiectasis is noted particularly in the right lower lobe, but also in the left lower lobe.    Upper abdominal structures show right adrenal myelolipoma similar to prior.  Degenerative changes are seen in the spine. Chronic T7 compression fracture. T12 compression fracture (40% loss of height) Is age indeterminate, change from the prior exam, correlate clinically (if further evaluation is indicated, MRI could be considered).           No pulmonary embolism. Multifocal pulmonary infiltrates, minimal right pleural effusion, follow-up recommended.  Discussed by telephone with Dr. Chamberlain at time of interpretation, 1735, 01/15/2018.    This report was finalized on 1/15/2018 5:34 PM by Dr. Jase Singleton MD.           CXR reviewed severe hyperinflation and emphysema changes new / increased from admission bilateral right greater than Left pleural effusio    Active Hospital Problems (** Indicates Principal Problem)    Diagnosis Date Noted   • Pneumonia of both lungs due to infectious organism [J18.9] 01/15/2018      Resolved Hospital Problems    Diagnosis Date Noted Date Resolved   No resolved problems to display.         Assessment/Plan     1. Pneumonia community-acquired complete antibiotic course  2. Acute exacerbation COPD better we will try  and transition her to inhalers with when necessary nebs and to by mouth steroids  3. Acute exacerbation bronchiectasis I will add Mucinex to her regimen  4. Acute on chronic hypoxemic and hypercapnic respiratory failure he is weaned back to her home O2 she had mild hypercapnia After breaking yesterday to try Trilogy.  We checked her blood gases she does qualify.  She decided today she was way too claustrophobic to where the mask.  She says she's claustrophobic and closet there is no way she can wear her mask tight fitting over her face ,mouth and nose.  Before I don't think noninvasive ventilation is can be an option  5. anemia mild, looks like chronic disease  6. Mild hyponatremia follow-up sodium multiple possible reasons with her chronic lung disease and SSRI use  7. Gastroesophageal reflux disease  8. Hyperlipidemia  9. Osteoporosis  10. Skin lesion mid back does have some unusual discoloration it's probably just senile and to go up but I can't rule out a melanoma and I recommend she see a dermatologist or her primary care physician once she is better for further evaluation she verbalizes understanding  11. Polyuria UA negative  12. Bilateral pleural effusion right greater than left suspect some CHF looks like she did have a little bit of diastolic CHF she did diurese very well to Lasix    Plan for disposition: Home today    Alfie Hightower MD  01/23/18  2:28 PM    Time:

## 2018-01-23 NOTE — PROGRESS NOTES
Discharge Planning Assessment  [facility]     Patient Name: Siobhan Prakash  MRN: [id number]  Today's Date: 1/23/2018    Admit Date: 1/15/2018      Discharge Plan       01/23/18 1235    Case Management/Social Work Plan    Plan Home with  and [facility]      Additional Comments Spoke with pt at bedside. Pt plans to return home with her spouse and [facility] at discharge. Per MD note pt to likely dc today. Pt is requesting a 3n1 commode. Pt would like to use [employer] and have the 3n1 commode delivered to her house. Pt will need an order from the MD for the 3n1 commode. CCP to follow.         Discharge Placement     Facility/Agency Request Status Selected? Address Phone Number Fax Number    [facility] Accepted    Yes [address] [address] [phone]              Cinthya Aldridge RN

## 2018-01-23 NOTE — NURSING NOTE
Per Dr. Carroll, pt needs to follow up with Dr. Hightower tomorrow for PO cough medicine.  Karoline Trevino RN

## 2018-01-23 NOTE — NURSING NOTE
ABG results called to Dr. Hightower. No new orders. Per MD, have CCP start working on obtaining the patient a trilogy machine  Karoline Trevino RN

## 2018-01-23 NOTE — DISCHARGE SUMMARY
Discharge summary      Diagnoses:  1. Community-acquired pneumonia  2. Acute exacerbation COPD  3. Acute exacerbation bronchiectasis  4. Acute on chronic hypoxemic and hypercapnic respiratory failure  5. Anemia chronic disease  6. Hyponatremia resolved  7. Gastroesophageal reflux disease  8. Hyperlipidemia  9. Osteoporosis  10. Mild diastolic CHF with bilateral pleural effusions  11. Skin lesion back needs follow-up with dermatology    Hospital course: Patient was admitted with exacerbation of bronchiectasis COPD possible pneumonia she completed an antibiotic course she has improved she had a lot of anxiety she has hypoxic and hypercapnic rest or failure PCO2 is about 51 initially she was off her trying the noninvasive ventilator but once they went to try and fit her for a mask she realizes no way she could tolerate she has very severe claustrophobia can even be in a closet and doesn't feel there is any way she can be unaware a mask device to seal mouth and nose for ventilation.  She will go home with 3 L O2 at rest may increase to 4 L with exertion.  I'll see her in follow-up in a few weeks in the office she'll follow-up with her primary care physician in about a week.  She has completed steroids and antibiotics and she doesn't like the end inhalers and nebulizer here she is going to resume her home Advair and Spiriva.  Patient has a skin lesion on her back that I was asked to look at I think it's most likely a senile keratoses but there is certainly some unusual coloration to this lesion and I strongly recommend she see her primary care or a dermatologist to rule out a melanoma

## 2018-01-23 NOTE — DISCHARGE INSTR - LAB
Please make a follow-up appointment with Dr. Hightower in 3 weeks. His office is located at [address] [place] [address]. His office phone number is [phone].

## 2018-01-24 NOTE — PROGRESS NOTES
Case Management Discharge Note    Final Note: Pt dc'd home with [facility]     Discharge Placement     Facility/Agency Request Status Selected? Address Phone Number Fax Number    [facility] Accepted    Yes [address] [place] [address], [facility] [address] [phone]        Other: Other (private vehicle )    Discharge Codes: 06  Discharged/transferred to home under care of organized home health service organization in anticipation of skilled care

## 2018-01-29 ENCOUNTER — OFFICE VISIT (OUTPATIENT)
Dept: FAMILY MEDICINE CLINIC | Facility: CLINIC | Age: 73
End: 2018-01-29

## 2018-01-29 VITALS
SYSTOLIC BLOOD PRESSURE: 126 MMHG | HEART RATE: 113 BPM | DIASTOLIC BLOOD PRESSURE: 70 MMHG | HEIGHT: 62 IN | OXYGEN SATURATION: 88 %

## 2018-01-29 DIAGNOSIS — J44.1 COPD EXACERBATION (HCC): Primary | ICD-10-CM

## 2018-01-29 PROCEDURE — 99214 OFFICE O/P EST MOD 30 MIN: CPT | Performed by: NURSE PRACTITIONER

## 2018-01-29 RX ORDER — BENZONATATE 100 MG/1
200 CAPSULE ORAL 3 TIMES DAILY PRN
Qty: 30 CAPSULE | Refills: 0 | Status: SHIPPED | OUTPATIENT
Start: 2018-01-29 | End: 2020-01-01

## 2018-01-29 NOTE — PROGRESS NOTES
Siobhan Prakash is a 73 y.o. female.NP states that she was at [facility] for 8 days with pneumonia. She was released last Tuesday. She admits to weakness, severe back spasms, and loose BM's.  H/O COPD and was released from the hospital on the 23rd, states she feels about the same.  Accompanied by her  who reports that she is doing ok, he does not think she is worse.      Assessment/Plan   Problem List Items Addressed This Visit     None             No Follow-up on file.  There are no Patient Instructions on file for this visit.    No chief complaint on file.    Social History   Substance Use Topics   • Smoking status: Former Smoker     Quit date: 1999   • Smokeless tobacco: Never Used   • Alcohol use Yes      Comment: ocasional drinker       History of Present Illness     The following portions of the patient's history were reviewed and updated as appropriate:PMHroutine: Social history , Allergies, Current Medications and Active Problem List    Review of Systems    Objective   There were no vitals filed for this visit.  There is no height or weight on file to calculate BMI.  Physical Exam   Constitutional: She appears well-developed and well-nourished. She appears distressed.   HENT:   Head: Normocephalic and atraumatic.   Right Ear: External ear normal.   Left Ear: External ear normal.   Cardiovascular: Normal rate.    Pulmonary/Chest: She is in respiratory distress. She has rales.   Musculoskeletal: Normal range of motion.   Neurological: She is alert.   Skin: Skin is warm.   Nursing note and vitals reviewed.    Reviewed Data:  Admission on 01/15/2018, Discharged on 01/23/2018   Component Date Value Ref Range Status   • Glucose 01/15/2018 140* 65 - 99 mg/dL Final   • BUN 01/15/2018 12  8 - 23 mg/dL Final   • Creatinine 01/15/2018 0.66  0.57 - 1.00 mg/dL Final   • Sodium 01/15/2018 135* 136 - 145 mmol/L Final   • Potassium 01/15/2018 3.6  3.5 - 5.2 mmol/L Final   • Chloride 01/15/2018 93* 98 - 107  mmol/L Final   • CO2 01/15/2018 34.0* 22.0 - 29.0 mmol/L Final   • Calcium 01/15/2018 8.9  8.6 - 10.5 mg/dL Final   • Total Protein 01/15/2018 7.6  6.0 - 8.5 g/dL Final   • Albumin 01/15/2018 3.10* 3.50 - 5.20 g/dL Final   • ALT (SGPT) 01/15/2018 10  1 - 33 U/L Final   • AST (SGOT) 01/15/2018 15  1 - 32 U/L Final   • Alkaline Phosphatase 01/15/2018 94  39 - 117 U/L Final   • Total Bilirubin 01/15/2018 0.3  0.1 - 1.2 mg/dL Final   • eGFR Non African Amer 01/15/2018 89  >60 mL/min/1.73 Final   • Globulin 01/15/2018 4.5  gm/dL Final   • A/G Ratio 01/15/2018 0.7  g/dL Final   • BUN/Creatinine Ratio 01/15/2018 18.2  7.0 - 25.0 Final   • Anion Gap 01/15/2018 8.0  mmol/L Final   • Troponin T 01/15/2018 <0.010  0.000 - 0.030 ng/mL Final   • Extra Tube 01/15/2018 hold for add-on   Final    Auto resulted   • Extra Tube 01/15/2018 Hold for add-ons.   Final    Auto resulted.   • Extra Tube 01/15/2018 hold for add-on   Final    Auto resulted   • Extra Tube 01/15/2018 Hold for add-ons.   Final    Auto resulted.   • WBC 01/15/2018 11.48* 4.50 - 10.70 10*3/mm3 Final   • RBC 01/15/2018 3.54* 3.90 - 5.20 10*6/mm3 Final   • Hemoglobin 01/15/2018 11.1* 11.9 - 15.5 g/dL Final   • Hematocrit 01/15/2018 36.7  35.6 - 45.5 % Final   • MCV 01/15/2018 103.7* 80.5 - 98.2 fL Final   • MCH 01/15/2018 31.4  26.9 - 32.0 pg Final   • MCHC 01/15/2018 30.2* 32.4 - 36.3 g/dL Final   • RDW 01/15/2018 12.3  11.7 - 13.0 % Final   • RDW-SD 01/15/2018 46.8  37.0 - 54.0 fl Final   • MPV 01/15/2018 9.6  6.0 - 12.0 fL Final   • Platelets 01/15/2018 489  140 - 500 10*3/mm3 Final   • Neutrophil % 01/15/2018 77.1* 42.7 - 76.0 % Final   • Lymphocyte % 01/15/2018 11.5* 19.6 - 45.3 % Final   • Monocyte % 01/15/2018 10.2  5.0 - 12.0 % Final   • Eosinophil % 01/15/2018 0.6  0.3 - 6.2 % Final   • Basophil % 01/15/2018 0.1  0.0 - 1.5 % Final   • Immature Grans % 01/15/2018 0.5  0.0 - 0.5 % Final   • Neutrophils, Absolute 01/15/2018 8.85* 1.90 - 8.10 10*3/mm3 Final   •  Lymphocytes, Absolute 01/15/2018 1.32  0.90 - 4.80 10*3/mm3 Final   • Monocytes, Absolute 01/15/2018 1.17  0.20 - 1.20 10*3/mm3 Final   • Eosinophils, Absolute 01/15/2018 0.07  0.00 - 0.70 10*3/mm3 Final   • Basophils, Absolute 01/15/2018 0.01  0.00 - 0.20 10*3/mm3 Final   • Immature Grans, Absolute 01/15/2018 0.06* 0.00 - 0.03 10*3/mm3 Final   • Blood Culture 01/15/2018 No growth at 5 days   Final   • Blood Culture 01/15/2018 No growth at 5 days   Final   • Lactate 01/15/2018 1.3  0.5 - 2.0 mmol/L Final   • Site 01/15/2018 Arterial: right brachial   Final   • Sergio's Test 01/15/2018 N/A   Final   • pH, Arterial 01/15/2018 7.401  7.350 - 7.450 pH units Final   • pCO2, Arterial 01/15/2018 51.2* 35.0 - 45.0 mm Hg Final   • pO2, Arterial 01/15/2018 72.2* 80.0 - 100.0 mm Hg Final   • HCO3, Arterial 01/15/2018 31.8* 22.0 - 28.0 mmol/L Final   • Base Excess, Arterial 01/15/2018 5.8* 0.0 - 2.0 mmol/L Final   • O2 Saturation Calculated 01/15/2018 94.0  92.0 - 99.0 % Final   • Barometric Pressure for Blood Gas 01/15/2018 757.7  mmHg Final   • Modality 01/15/2018 Cannula   Final   • Flow Rate 01/15/2018 4  lpm Final   • Rate 01/15/2018 22  Breaths/minute Final   • MRSA SCREEN CX 01/16/2018 No Methicillin Resistant Staphylococcus aureus isolated   Final   • WBC 01/17/2018 16.17* 4.50 - 10.70 10*3/mm3 Final   • RBC 01/17/2018 3.11* 3.90 - 5.20 10*6/mm3 Final   • Hemoglobin 01/17/2018 9.8* 11.9 - 15.5 g/dL Final   • Hematocrit 01/17/2018 31.7* 35.6 - 45.5 % Final   • MCV 01/17/2018 101.9* 80.5 - 98.2 fL Final   • MCH 01/17/2018 31.5  26.9 - 32.0 pg Final   • MCHC 01/17/2018 30.9* 32.4 - 36.3 g/dL Final   • RDW 01/17/2018 12.4  11.7 - 13.0 % Final   • RDW-SD 01/17/2018 45.8  37.0 - 54.0 fl Final   • MPV 01/17/2018 9.6  6.0 - 12.0 fL Final   • Platelets 01/17/2018 589* 140 - 500 10*3/mm3 Final   • Color,  01/20/2018 Yellow  Yellow, Straw Final   • Appearance,  01/20/2018 Cloudy* Clear Final   • pH,  01/20/2018 7.5  5.0 - 8.0  Final   • Specific Gravity, UA 01/20/2018 1.012  1.005 - 1.030 Final   • Glucose, UA 01/20/2018 Negative  Negative Final   • Ketones, UA 01/20/2018 Negative  Negative Final   • Bilirubin, UA 01/20/2018 Negative  Negative Final   • Blood, UA 01/20/2018 Negative  Negative Final   • Protein, UA 01/20/2018 Negative  Negative Final   • Leuk Esterase, UA 01/20/2018 Negative  Negative Final   • Nitrite, UA 01/20/2018 Negative  Negative Final   • Urobilinogen, UA 01/20/2018 0.2 E.U./dL  0.2 - 1.0 E.U./dL Final   • Vitamin B-12 01/20/2018 869  211 - 946 pg/mL Final   • Folate 01/20/2018 6.43  4.78 - 24.20 ng/mL Final   • Iron 01/20/2018 54  37 - 145 mcg/dL Final   • Iron Saturation 01/20/2018 19* 20 - 50 % Final   • Transferrin 01/20/2018 186* 200 - 360 mg/dL Final   • TIBC 01/20/2018 277  mcg/dL Final   • Reticulocyte % 01/20/2018 2.80* 0.50 - 1.50 % Final   • WBC 01/20/2018 14.37* 4.50 - 10.70 10*3/mm3 Final   • RBC 01/20/2018 3.21* 3.90 - 5.20 10*6/mm3 Final   • Hemoglobin 01/20/2018 10.2* 11.9 - 15.5 g/dL Final   • Hematocrit 01/20/2018 33.2* 35.6 - 45.5 % Final   • MCV 01/20/2018 103.4* 80.5 - 98.2 fL Final   • MCH 01/20/2018 31.8  26.9 - 32.0 pg Final   • MCHC 01/20/2018 30.7* 32.4 - 36.3 g/dL Final   • RDW 01/20/2018 12.8  11.7 - 13.0 % Final   • RDW-SD 01/20/2018 48.0  37.0 - 54.0 fl Final   • MPV 01/20/2018 9.3  6.0 - 12.0 fL Final   • Platelets 01/20/2018 671* 140 - 500 10*3/mm3 Final   • Glucose 01/20/2018 99  65 - 99 mg/dL Final   • BUN 01/20/2018 16  8 - 23 mg/dL Final   • Creatinine 01/20/2018 0.65  0.57 - 1.00 mg/dL Final   • Sodium 01/20/2018 138  136 - 145 mmol/L Final   • Potassium 01/20/2018 3.8  3.5 - 5.2 mmol/L Final   • Chloride 01/20/2018 95* 98 - 107 mmol/L Final   • CO2 01/20/2018 34.4* 22.0 - 29.0 mmol/L Final   • Calcium 01/20/2018 8.3* 8.6 - 10.5 mg/dL Final   • eGFR Non African Amer 01/20/2018 90  >60 mL/min/1.73 Final   • BUN/Creatinine Ratio 01/20/2018 24.6  7.0 - 25.0 Final   • Anion Gap  01/20/2018 8.6  mmol/L Final   • proBNP 01/20/2018 1362.0* 5.0 - 900.0 pg/mL Final   • Site 01/23/2018 Arterial: left radial   Final   • Sergio's Test 01/23/2018 Positive   Final   • pH, Arterial 01/23/2018 7.436  7.350 - 7.450 pH units Final   • pCO2, Arterial 01/23/2018 51.8* 35.0 - 45.0 mm Hg Final   • pO2, Arterial 01/23/2018 57.5* 80.0 - 100.0 mm Hg Final   • HCO3, Arterial 01/23/2018 34.8* 22.0 - 28.0 mmol/L Final   • Base Excess, Arterial 01/23/2018 9.0* 0.0 - 2.0 mmol/L Final   • O2 Saturation Calculated 01/23/2018 89.7* 92.0 - 99.0 % Final   • Barometric Pressure for Blood Gas 01/23/2018 741.9  mmHg Final   • Modality 01/23/2018 Cannula   Final   • Flow Rate 01/23/2018 3  lpm Final   • Rate 01/23/2018 24  Breaths/minute Final     Discussed going to the emergency room and she and her  did not think that she needed to go at this time but would take her if she worsened.

## 2018-02-09 ENCOUNTER — HOSPITAL ENCOUNTER (EMERGENCY)
Facility: HOSPITAL | Age: 73
Discharge: HOME | End: 2018-02-09
Attending: EMERGENCY MEDICINE | Admitting: EMERGENCY MEDICINE

## 2018-02-09 VITALS
BODY MASS INDEX: 20.03 KG/M2 | SYSTOLIC BLOOD PRESSURE: 152 MMHG | RESPIRATION RATE: 24 BRPM | DIASTOLIC BLOOD PRESSURE: 86 MMHG | HEART RATE: 112 BPM | WEIGHT: 102 LBS | TEMPERATURE: 97.3 F | HEIGHT: 60 IN | OXYGEN SATURATION: 98 %

## 2018-02-09 DIAGNOSIS — R04.0 ACUTE ANTERIOR EPISTAXIS: Primary | ICD-10-CM

## 2018-02-09 PROCEDURE — 99284 EMERGENCY DEPT VISIT MOD MDM: CPT

## 2018-02-09 RX ORDER — OXYMETAZOLINE HYDROCHLORIDE 0.05 G/100ML
2 SPRAY NASAL 2 TIMES DAILY
Qty: 20 ML | Refills: 0 | Status: SHIPPED | OUTPATIENT
Start: 2018-02-09 | End: 2018-12-19

## 2018-02-09 RX ADMIN — SILVER NITRATE APPLICATORS 1 APPLICATION: 25; 75 STICK TOPICAL at 04:41

## 2018-02-09 RX ADMIN — PHENYLEPHRINE HYDROCHLORIDE 2 SPRAY: 0.5 SPRAY NASAL at 04:41

## 2018-02-09 NOTE — DISCHARGE INSTRUCTIONS
Nosebleed  Nosebleeds are common. A nosebleed can be caused by many things, including:  · Getting hit hard in the nose.  · Infections.  · Dryness in your nose.  · A dry climate.  · Medicines.  · Picking your nose.  · Your home heating and cooling systems.  HOME CARE   · Try controlling your nosebleed by pinching your nostrils gently. Do this for at least 10 minutes.  · Avoid blowing or sniffing your nose for a number of hours after having a nosebleed.  · Do not put gauze inside of your nose yourself. If your nose was packed by your doctor, try to keep the pack inside of your nose until your doctor removes it.  ¨ If a gauze pack was used and it starts to fall out, gently replace it or cut off the end of it.  ¨ If a balloon catheter was used to pack your nose, do not cut or remove it unless told by your doctor.  · Avoid lying down while you are having a nosebleed. Sit up and lean forward.  · Use a nasal spray decongestant to help with a nosebleed as told by your doctor.  · Do not use petroleum jelly or mineral oil in your nose. These can drip into your lungs.  · Keep your house humid by using:  ¨ Less air conditioning.  ¨ A humidifier.  · Aspirin and blood thinners make bleeding more likely. If you are prescribed these medicines and you have nosebleeds, ask your doctor if you should stop taking the medicines or adjust the dose. Do not stop medicines unless told by your doctor.  · Resume your normal activities as you are able. Avoid straining, lifting, or bending at your waist for several days.  · If your nosebleed was caused by dryness in your nose, use over-the-counter saline nasal spray or gel. If you must use a lubricant:  ¨ Choose one that is water-soluble.  ¨ Use it only as needed.  ¨ Do not use it within several hours of lying down.  · Keep all follow-up visits as told by your doctor. This is important.  GET HELP IF:  · You have a fever.  · You get frequent nosebleeds.  · You are getting nosebleeds more  often.  GET HELP RIGHT AWAY IF:  · Your nosebleed lasts longer than 20 minutes.  · Your nosebleed occurs after an injury to your face, and your nose looks crooked or broken.  · You have unusual bleeding from other parts of your body.  · You have unusual bruising on other parts of your body.  · You feel light-headed or dizzy.  · You become sweaty.  · You throw up (vomit) blood.  · You have a nosebleed after a head injury.  This information is not intended to replace advice given to you by your health care provider. Make sure you discuss any questions you have with your health care provider.  Document Released: 09/26/2009 Document Revised: 01/08/2016 Document Reviewed: 08/03/2015  [employer] Interactive Patient Education © 2017 [employer]

## 2018-02-09 NOTE — ED PROVIDER NOTES
The patient presents complaining of L sided nose bleed, her third in 5 days.    PEx:  Recurrent nosebleed on L side  Oozing from L nare and large fresh clot that was able to be removed with suction and blowing nose  Put neosynephrine drops in the nose  Will reexamine to see if it needs to be packed    I supervised care provided by the midlevel provider.   We have discussed this patient's history, physical exam, and treatment plan.  I have reviewed the note and personally saw and examined the patient and agree with the plan of care. See attending note.    Documentation assistance provided by josé luis Verma for Dr. Banegas.  Information recorded by the scribe was done at my direction and has been verified and validated by me.             Ashley Verma  02/09/18 0605       Patrick Banegas MD  02/09/18 6809

## 2018-02-09 NOTE — ED PROVIDER NOTES
EMERGENCY DEPARTMENT ENCOUNTER    CHIEF COMPLAINT  Chief Complaint: epistaxis  History given by: patient, son  History limited by: none  Room Number: 11/11  PMD: YEN Joseph      HPI:  Pt is a 73 y.o. female who presents complaining of left sided epistaxis. Pt's son is bedside and reports this is the third bleed in 5 days. Previous nose bleeds were alleviated with packing and reoccurred after a day. Family have purchased a humidifier for the room and is due to have a humidifier attached to her home O2 concentrator later today. Family and pt estimate a coffee cup total of blood loss over the course of the episodes. She denies dizziness or light headedness.    Duration: pta  Onset: sudden  Timing: constant  Location: left nare  Radiation: none  Quality: n/a  Intensity/Severity: moderate  Progression: improved  Associated Symptoms: none  Aggravating Factors: none  Alleviating Factors: none  Previous Episodes: pt had previous episodes over the past 5 days  Treatment before arrival: EMS care and intervention (packing placed in left nare).    PAST MEDICAL HISTORY  Active Ambulatory Problems     Diagnosis Date Noted   • Abnormal weight loss 05/13/2016   • Postartificial menopausal syndrome 05/13/2016   • Subcutaneous cyst 05/13/2016   • Mixed anxiety depressive disorder 05/13/2016   • Gastroesophageal reflux disease 05/13/2016   • Hyperlipidemia 05/13/2016   • Insomnia 05/13/2016   • Macrocytosis 05/13/2016   • Tremor 05/13/2016   • Pulmonary emphysema 05/13/2016   • Vitamin D deficiency 05/13/2016   • Glucose intolerance (impaired glucose tolerance) 05/13/2016   • Fatigue 05/13/2016   • Osteoporosis 05/13/2016   • Herpes zoster without complication 02/24/2017   • Migraine 07/26/2017   • Bilateral leg pain 07/26/2017   • Pneumonia of both lungs due to infectious organism 01/15/2018     Resolved Ambulatory Problems     Diagnosis Date Noted   • Osteopenia 04/21/2016   • Anemia 05/13/2016   • Iron deficiency  05/13/2016   • Pruritus 05/13/2016   • Urinary tract infection 05/13/2016   • Achilles tendon sprain 02/21/2017     Past Medical History:   Diagnosis Date   • Anxiety    • COPD (chronic obstructive pulmonary disease)    • Depression    • GERD (gastroesophageal reflux disease)    • Hyperlipidemia    • Migraine    • Osteoporosis    • Pneumonia        PAST SURGICAL HISTORY  Past Surgical History:   Procedure Laterality Date   • CHOLECYSTECTOMY     • HIP ARTHROPLASTY Right    • REDUCTION MAMMAPLASTY         FAMILY HISTORY  No family history on file.    SOCIAL HISTORY  Social History     Social History   • Marital status:      Spouse name: Vlad   • Number of children: 2   • Years of education: N/A     Occupational History   • retired      Social History Main Topics   • Smoking status: Former Smoker     Quit date: 1999   • Smokeless tobacco: Never Used   • Alcohol use Yes      Comment: ocasional drinker   • Drug use: No   • Sexual activity: Not on file     Other Topics Concern   • Not on file     Social History Narrative       ALLERGIES  Codeine and Hydrocodone    REVIEW OF SYSTEMS  Review of Systems   Constitutional: Negative for fever.   HENT: Positive for nosebleeds.    Respiratory: Negative for shortness of breath.    Cardiovascular: Negative for chest pain.   Neurological: Negative for dizziness, weakness and light-headedness.       PHYSICAL EXAM  ED Triage Vitals   Temp Heart Rate Resp BP SpO2   02/09/18 0349 02/09/18 0349 02/09/18 0349 02/09/18 0349 02/09/18 0349   97.3 °F (36.3 °C) 123 18 152/100 95 %      Temp src Heart Rate Source Patient Position BP Location FiO2 (%)   02/09/18 0349 -- -- -- --   Tympanic           Physical Exam   Constitutional: She is oriented to person, place, and time and well-developed, well-nourished, and in no distress.   HENT:   Nose: Epistaxis (left nare) is observed.   Bleeding appears to be stopped. No broken mucosa or areas to be cauterized.   Eyes: EOM are normal.   Neck:  Normal range of motion.   Cardiovascular: Normal rate and regular rhythm.    Pulmonary/Chest: Effort normal and breath sounds normal. No respiratory distress.   Neurological: She is alert and oriented to person, place, and time. She has normal sensation and normal strength.   Skin: Skin is warm and dry.   Psychiatric: Affect normal.   Nursing note and vitals reviewed.    PROCEDURES  Epistaxis Management  Date/Time: 2/9/2018 5:42 AM  Performed by: JUANA SIMMONS III  Authorized by: CELESTINE PERDOMO     Procedure details:     Treatment site:  L posterior    Repair method: skyla synephrine.    Treatment complexity:  Limited    Treatment episode: recurring                  PROGRESS AND CONSULTS  ED Course   0550  Dr. Perdomo made aware of the pt.    0620  Pt reports bleeding has stopped. No fresh blood noted. Discussed the plan to d/c with skyla synephrine and follow up with ENT.     MEDICAL DECISION MAKING  Results were reviewed/discussed with the patient and they were also made aware of online access. Pt also made aware that some labs, such as cultures, will not be resulted during ER visit and follow up with PMD is necessary.     MDM       DIAGNOSIS  Final diagnoses:   Acute anterior epistaxis       DISPOSITION  DISCHARGE    Patient discharged in stable condition.    Reviewed implications of results, diagnosis, meds, responsibility to follow up, warning signs and symptoms of possible worsening, potential complications and reasons to return to ER.    Patient/Family voiced understanding of above instructions.    Discussed plan for discharge, as there is no emergent indication for admission.  Pt/family is agreeable and understands need for follow up and repeat testing.  Pt is aware that discharge does not mean that nothing is wrong but it indicates no emergency is present that requires admission and they must continue care with follow-up as given below or physician of their choice.     FOLLOW-UP  Lyudmila Granados,  APRN  [address] [facility] [id number] [phone]          Librado Prajapati MD  [address]  [facility] [address] [phone]               Medication List      New Prescriptions          oxymetazoline 0.05 % nasal spray   Commonly known as:  AFRIN NASAL SPRAY   2 sprays into each nostril 2 (Two) Times a Day.           Latest Documented Vital Signs:  As of 6:36 AM  BP- 152/86 HR- 106 Temp- 97.3 °F (36.3 °C) (Tympanic) O2 sat- 92%    --  Documentation assistance provided by josé luis Carpenter for Zack Kumar PA-C.  Information recorded by the scribe was done at my direction and has been verified and validated by me.     Mi Carpenter  02/09/18 0623       SHAILA Sargent III  02/09/18 0636

## 2018-02-13 ENCOUNTER — TELEPHONE (OUTPATIENT)
Dept: SOCIAL WORK | Facility: HOSPITAL | Age: 73
End: 2018-02-13

## 2018-02-13 NOTE — TELEPHONE ENCOUNTER
Spoke with pt today in f/u and she states she has not had another nose bleed since she left the ER. She is waiting for her APRN's office to call her back for f/u. She states she will not be following up with Dr. Prajapati per her choice. No other questions or concerns voiced by pt at this time. Araceli BERNABE

## 2018-02-15 ENCOUNTER — OFFICE VISIT (OUTPATIENT)
Dept: FAMILY MEDICINE CLINIC | Facility: CLINIC | Age: 73
End: 2018-02-15

## 2018-02-15 VITALS
SYSTOLIC BLOOD PRESSURE: 120 MMHG | WEIGHT: 102 LBS | BODY MASS INDEX: 20.03 KG/M2 | HEIGHT: 60 IN | DIASTOLIC BLOOD PRESSURE: 62 MMHG | HEART RATE: 70 BPM | OXYGEN SATURATION: 98 %

## 2018-02-15 DIAGNOSIS — J43.8 OTHER EMPHYSEMA (HCC): ICD-10-CM

## 2018-02-15 DIAGNOSIS — R04.0 EPISTAXIS: Primary | ICD-10-CM

## 2018-02-15 DIAGNOSIS — F41.9 ANXIETY: ICD-10-CM

## 2018-02-15 PROCEDURE — 99214 OFFICE O/P EST MOD 30 MIN: CPT | Performed by: NURSE PRACTITIONER

## 2018-02-15 RX ORDER — SERTRALINE HYDROCHLORIDE 100 MG/1
100 TABLET, FILM COATED ORAL 2 TIMES DAILY
Qty: 60 TABLET | Refills: 1 | Status: SHIPPED | OUTPATIENT
Start: 2018-02-15 | End: 2018-12-19 | Stop reason: SDUPTHER

## 2018-02-15 NOTE — PROGRESS NOTES
Siobhan Prakash is a 73 y.o. female.Patient states that for 2 weeks she has nose bleeds. One lasted for 14 hours, she was seen in the ER. Is able to get them stopped is on O2 24 hrs a day.  Seen by pulmonology yesterday, decreased appetite with weight loss.   Having anxiety which seems to worsen when she is short of breath, has home nurse who wanted her to come and get a prescription for xanax.      Assessment/Plan   Problem List Items Addressed This Visit     None             No Follow-up on file.  There are no Patient Instructions on file for this visit.    No chief complaint on file.    Social History   Substance Use Topics   • Smoking status: Former Smoker     Quit date: 1999   • Smokeless tobacco: Never Used   • Alcohol use Yes      Comment: ocasional drinker       History of Present Illness     The following portions of the patient's history were reviewed and updated as appropriate:PMHroutine: Social history , Allergies, Current Medications and Active Problem List    Review of Systems   Constitutional: Positive for appetite change and fatigue.   HENT: Positive for congestion.    Respiratory: Positive for cough and shortness of breath.    Psychiatric/Behavioral: The patient is nervous/anxious.        Objective   There were no vitals filed for this visit.  There is no height or weight on file to calculate BMI.  Physical Exam   Constitutional: She appears distressed.   HENT:   Head: Normocephalic.   Right Ear: External ear normal.   Left Ear: External ear normal.   Eyes: EOM are normal.   Cardiovascular: Normal rate and regular rhythm.    Pulmonary/Chest: She is in respiratory distress.   Neurological: She is alert.   Nursing note and vitals reviewed.    Reviewed Data:  Admission on 01/15/2018, Discharged on 01/23/2018   Component Date Value Ref Range Status   • Glucose 01/15/2018 140* 65 - 99 mg/dL Final   • BUN 01/15/2018 12  8 - 23 mg/dL Final   • Creatinine 01/15/2018 0.66  0.57 - 1.00 mg/dL Final   •  Sodium 01/15/2018 135* 136 - 145 mmol/L Final   • Potassium 01/15/2018 3.6  3.5 - 5.2 mmol/L Final   • Chloride 01/15/2018 93* 98 - 107 mmol/L Final   • CO2 01/15/2018 34.0* 22.0 - 29.0 mmol/L Final   • Calcium 01/15/2018 8.9  8.6 - 10.5 mg/dL Final   • Total Protein 01/15/2018 7.6  6.0 - 8.5 g/dL Final   • Albumin 01/15/2018 3.10* 3.50 - 5.20 g/dL Final   • ALT (SGPT) 01/15/2018 10  1 - 33 U/L Final   • AST (SGOT) 01/15/2018 15  1 - 32 U/L Final   • Alkaline Phosphatase 01/15/2018 94  39 - 117 U/L Final   • Total Bilirubin 01/15/2018 0.3  0.1 - 1.2 mg/dL Final   • eGFR Non African Amer 01/15/2018 89  >60 mL/min/1.73 Final   • Globulin 01/15/2018 4.5  gm/dL Final   • A/G Ratio 01/15/2018 0.7  g/dL Final   • BUN/Creatinine Ratio 01/15/2018 18.2  7.0 - 25.0 Final   • Anion Gap 01/15/2018 8.0  mmol/L Final   • Troponin T 01/15/2018 <0.010  0.000 - 0.030 ng/mL Final   • Extra Tube 01/15/2018 hold for add-on   Final    Auto resulted   • Extra Tube 01/15/2018 Hold for add-ons.   Final    Auto resulted.   • Extra Tube 01/15/2018 hold for add-on   Final    Auto resulted   • Extra Tube 01/15/2018 Hold for add-ons.   Final    Auto resulted.   • WBC 01/15/2018 11.48* 4.50 - 10.70 10*3/mm3 Final   • RBC 01/15/2018 3.54* 3.90 - 5.20 10*6/mm3 Final   • Hemoglobin 01/15/2018 11.1* 11.9 - 15.5 g/dL Final   • Hematocrit 01/15/2018 36.7  35.6 - 45.5 % Final   • MCV 01/15/2018 103.7* 80.5 - 98.2 fL Final   • MCH 01/15/2018 31.4  26.9 - 32.0 pg Final   • MCHC 01/15/2018 30.2* 32.4 - 36.3 g/dL Final   • RDW 01/15/2018 12.3  11.7 - 13.0 % Final   • RDW-SD 01/15/2018 46.8  37.0 - 54.0 fl Final   • MPV 01/15/2018 9.6  6.0 - 12.0 fL Final   • Platelets 01/15/2018 489  140 - 500 10*3/mm3 Final   • Neutrophil % 01/15/2018 77.1* 42.7 - 76.0 % Final   • Lymphocyte % 01/15/2018 11.5* 19.6 - 45.3 % Final   • Monocyte % 01/15/2018 10.2  5.0 - 12.0 % Final   • Eosinophil % 01/15/2018 0.6  0.3 - 6.2 % Final   • Basophil % 01/15/2018 0.1  0.0 - 1.5 %  Final   • Immature Grans % 01/15/2018 0.5  0.0 - 0.5 % Final   • Neutrophils, Absolute 01/15/2018 8.85* 1.90 - 8.10 10*3/mm3 Final   • Lymphocytes, Absolute 01/15/2018 1.32  0.90 - 4.80 10*3/mm3 Final   • Monocytes, Absolute 01/15/2018 1.17  0.20 - 1.20 10*3/mm3 Final   • Eosinophils, Absolute 01/15/2018 0.07  0.00 - 0.70 10*3/mm3 Final   • Basophils, Absolute 01/15/2018 0.01  0.00 - 0.20 10*3/mm3 Final   • Immature Grans, Absolute 01/15/2018 0.06* 0.00 - 0.03 10*3/mm3 Final   • Blood Culture 01/15/2018 No growth at 5 days   Final   • Blood Culture 01/15/2018 No growth at 5 days   Final   • Lactate 01/15/2018 1.3  0.5 - 2.0 mmol/L Final   • Site 01/15/2018 Arterial: right brachial   Final   • Sergio's Test 01/15/2018 N/A   Final   • pH, Arterial 01/15/2018 7.401  7.350 - 7.450 pH units Final   • pCO2, Arterial 01/15/2018 51.2* 35.0 - 45.0 mm Hg Final   • pO2, Arterial 01/15/2018 72.2* 80.0 - 100.0 mm Hg Final   • HCO3, Arterial 01/15/2018 31.8* 22.0 - 28.0 mmol/L Final   • Base Excess, Arterial 01/15/2018 5.8* 0.0 - 2.0 mmol/L Final   • O2 Saturation Calculated 01/15/2018 94.0  92.0 - 99.0 % Final   • Barometric Pressure for Blood Gas 01/15/2018 757.7  mmHg Final   • Modality 01/15/2018 Cannula   Final   • Flow Rate 01/15/2018 4  lpm Final   • Rate 01/15/2018 22  Breaths/minute Final   • MRSA SCREEN CX 01/16/2018 No Methicillin Resistant Staphylococcus aureus isolated   Final   • WBC 01/17/2018 16.17* 4.50 - 10.70 10*3/mm3 Final   • RBC 01/17/2018 3.11* 3.90 - 5.20 10*6/mm3 Final   • Hemoglobin 01/17/2018 9.8* 11.9 - 15.5 g/dL Final   • Hematocrit 01/17/2018 31.7* 35.6 - 45.5 % Final   • MCV 01/17/2018 101.9* 80.5 - 98.2 fL Final   • MCH 01/17/2018 31.5  26.9 - 32.0 pg Final   • MCHC 01/17/2018 30.9* 32.4 - 36.3 g/dL Final   • RDW 01/17/2018 12.4  11.7 - 13.0 % Final   • RDW-SD 01/17/2018 45.8  37.0 - 54.0 fl Final   • MPV 01/17/2018 9.6  6.0 - 12.0 fL Final   • Platelets 01/17/2018 589* 140 - 500 10*3/mm3 Final   •  Color, UA 01/20/2018 Yellow  Yellow, Straw Final   • Appearance, UA 01/20/2018 Cloudy* Clear Final   • pH, UA 01/20/2018 7.5  5.0 - 8.0 Final   • Specific Gravity, UA 01/20/2018 1.012  1.005 - 1.030 Final   • Glucose, UA 01/20/2018 Negative  Negative Final   • Ketones, UA 01/20/2018 Negative  Negative Final   • Bilirubin, UA 01/20/2018 Negative  Negative Final   • Blood, UA 01/20/2018 Negative  Negative Final   • Protein, UA 01/20/2018 Negative  Negative Final   • Leuk Esterase, UA 01/20/2018 Negative  Negative Final   • Nitrite, UA 01/20/2018 Negative  Negative Final   • Urobilinogen, UA 01/20/2018 0.2 E.U./dL  0.2 - 1.0 E.U./dL Final   • Vitamin B-12 01/20/2018 869  211 - 946 pg/mL Final   • Folate 01/20/2018 6.43  4.78 - 24.20 ng/mL Final   • Iron 01/20/2018 54  37 - 145 mcg/dL Final   • Iron Saturation 01/20/2018 19* 20 - 50 % Final   • Transferrin 01/20/2018 186* 200 - 360 mg/dL Final   • TIBC 01/20/2018 277  mcg/dL Final   • Reticulocyte % 01/20/2018 2.80* 0.50 - 1.50 % Final   • WBC 01/20/2018 14.37* 4.50 - 10.70 10*3/mm3 Final   • RBC 01/20/2018 3.21* 3.90 - 5.20 10*6/mm3 Final   • Hemoglobin 01/20/2018 10.2* 11.9 - 15.5 g/dL Final   • Hematocrit 01/20/2018 33.2* 35.6 - 45.5 % Final   • MCV 01/20/2018 103.4* 80.5 - 98.2 fL Final   • MCH 01/20/2018 31.8  26.9 - 32.0 pg Final   • MCHC 01/20/2018 30.7* 32.4 - 36.3 g/dL Final   • RDW 01/20/2018 12.8  11.7 - 13.0 % Final   • RDW-SD 01/20/2018 48.0  37.0 - 54.0 fl Final   • MPV 01/20/2018 9.3  6.0 - 12.0 fL Final   • Platelets 01/20/2018 671* 140 - 500 10*3/mm3 Final   • Glucose 01/20/2018 99  65 - 99 mg/dL Final   • BUN 01/20/2018 16  8 - 23 mg/dL Final   • Creatinine 01/20/2018 0.65  0.57 - 1.00 mg/dL Final   • Sodium 01/20/2018 138  136 - 145 mmol/L Final   • Potassium 01/20/2018 3.8  3.5 - 5.2 mmol/L Final   • Chloride 01/20/2018 95* 98 - 107 mmol/L Final   • CO2 01/20/2018 34.4* 22.0 - 29.0 mmol/L Final   • Calcium 01/20/2018 8.3* 8.6 - 10.5 mg/dL Final   •  eGFR Non African Amer 01/20/2018 90  >60 mL/min/1.73 Final   • BUN/Creatinine Ratio 01/20/2018 24.6  7.0 - 25.0 Final   • Anion Gap 01/20/2018 8.6  mmol/L Final   • proBNP 01/20/2018 1362.0* 5.0 - 900.0 pg/mL Final   • Site 01/23/2018 Arterial: left radial   Final   • Sergio's Test 01/23/2018 Positive   Final   • pH, Arterial 01/23/2018 7.436  7.350 - 7.450 pH units Final   • pCO2, Arterial 01/23/2018 51.8* 35.0 - 45.0 mm Hg Final   • pO2, Arterial 01/23/2018 57.5* 80.0 - 100.0 mm Hg Final   • HCO3, Arterial 01/23/2018 34.8* 22.0 - 28.0 mmol/L Final   • Base Excess, Arterial 01/23/2018 9.0* 0.0 - 2.0 mmol/L Final   • O2 Saturation Calculated 01/23/2018 89.7* 92.0 - 99.0 % Final   • Barometric Pressure for Blood Gas 01/23/2018 741.9  mmHg Final   • Modality 01/23/2018 Cannula   Final   • Flow Rate 01/23/2018 3  lpm Final   • Rate 01/23/2018 24  Breaths/minute Final     Discussed weight loss and will start with ensure daily and she needs to take in more calories.Vaseline to bilateral naris for nose bleeds

## 2018-02-18 ENCOUNTER — HOSPITAL ENCOUNTER (EMERGENCY)
Facility: HOSPITAL | Age: 73
Discharge: HOME | End: 2018-02-18
Attending: EMERGENCY MEDICINE | Admitting: EMERGENCY MEDICINE

## 2018-02-18 VITALS
RESPIRATION RATE: 20 BRPM | SYSTOLIC BLOOD PRESSURE: 130 MMHG | HEIGHT: 62 IN | OXYGEN SATURATION: 97 % | TEMPERATURE: 98.5 F | HEART RATE: 77 BPM | DIASTOLIC BLOOD PRESSURE: 79 MMHG | WEIGHT: 102 LBS | BODY MASS INDEX: 18.77 KG/M2

## 2018-02-18 DIAGNOSIS — R04.0 ACUTE ANTERIOR EPISTAXIS: Primary | ICD-10-CM

## 2018-02-18 DIAGNOSIS — J44.9 CHRONIC OBSTRUCTIVE PULMONARY DISEASE, UNSPECIFIED COPD TYPE (HCC): ICD-10-CM

## 2018-02-18 LAB
BASOPHILS # BLD AUTO: 0.01 10*3/MM3 (ref 0–0.2)
BASOPHILS NFR BLD AUTO: 0.1 % (ref 0–1.5)
DEPRECATED RDW RBC AUTO: 54.4 FL (ref 37–54)
EOSINOPHIL # BLD AUTO: 0.01 10*3/MM3 (ref 0–0.7)
EOSINOPHIL NFR BLD AUTO: 0.1 % (ref 0.3–6.2)
ERYTHROCYTE [DISTWIDTH] IN BLOOD BY AUTOMATED COUNT: 14.7 % (ref 11.7–13)
HCT VFR BLD AUTO: 33.4 % (ref 35.6–45.5)
HGB BLD-MCNC: 10 G/DL (ref 11.9–15.5)
IMM GRANULOCYTES # BLD: 0.04 10*3/MM3 (ref 0–0.03)
IMM GRANULOCYTES NFR BLD: 0.4 % (ref 0–0.5)
LYMPHOCYTES # BLD AUTO: 1.9 10*3/MM3 (ref 0.9–4.8)
LYMPHOCYTES NFR BLD AUTO: 19.3 % (ref 19.6–45.3)
MCH RBC QN AUTO: 30.6 PG (ref 26.9–32)
MCHC RBC AUTO-ENTMCNC: 29.9 G/DL (ref 32.4–36.3)
MCV RBC AUTO: 102.1 FL (ref 80.5–98.2)
MONOCYTES # BLD AUTO: 0.59 10*3/MM3 (ref 0.2–1.2)
MONOCYTES NFR BLD AUTO: 6 % (ref 5–12)
NEUTROPHILS # BLD AUTO: 7.27 10*3/MM3 (ref 1.9–8.1)
NEUTROPHILS NFR BLD AUTO: 74.1 % (ref 42.7–76)
PLATELET # BLD AUTO: 507 10*3/MM3 (ref 140–500)
PMV BLD AUTO: 8.9 FL (ref 6–12)
RBC # BLD AUTO: 3.27 10*6/MM3 (ref 3.9–5.2)
WBC NRBC COR # BLD: 9.82 10*3/MM3 (ref 4.5–10.7)

## 2018-02-18 PROCEDURE — 85025 COMPLETE CBC W/AUTO DIFF WBC: CPT | Performed by: EMERGENCY MEDICINE

## 2018-02-18 PROCEDURE — 99284 EMERGENCY DEPT VISIT MOD MDM: CPT

## 2018-02-18 PROCEDURE — 36415 COLL VENOUS BLD VENIPUNCTURE: CPT

## 2018-02-18 RX ADMIN — PHENYLEPHRINE HYDROCHLORIDE 2 SPRAY: 0.5 SPRAY NASAL at 14:45

## 2018-02-18 RX ADMIN — COCAINE HYDROCHLORIDE: 40 SOLUTION TOPICAL at 14:45

## 2018-02-18 NOTE — ED TRIAGE NOTES
Pt reports nosebleed every other day for last 3 weeks, today bleeding led to SOA d/t not being able to use NC for oxygen

## 2018-02-18 NOTE — ED PROVIDER NOTES
EMERGENCY DEPARTMENT ENCOUNTER    CHIEF COMPLAINT  Chief Complaint: epistasis  History given by: patient  History limited by: none  Room Number: 53/53  PMD: YEN Joseph Dr., pulmonology     HPI:  Pt is a 73 y.o. female who presents complaining of acute epistasis w/ a hx of frequent episodes for the past month. She denies CP, acute/worsening SOA, and injuries. Pt uses supplemental O2 at home and advised her humidifier on the machine does not work properly. She is compliant with her medications and is scheduled to see her ENT (Dr. Pinzon) in two days. Pt used her skyla-synephrine earlier today w/o relief.    Duration/Onset/Timing: pta  Location: nose  Radiation: n/a  Quality: bleeding  Intensity/Severity: moderate  Associated Symptoms: none  Aggravating or Alleviating Factors: pt uses supplemental O2 at home.  Previous Episodes: Pt reports a hx of recent nose bleeds      PAST MEDICAL HISTORY  Active Ambulatory Problems     Diagnosis Date Noted   • Abnormal weight loss 05/13/2016   • Postartificial menopausal syndrome 05/13/2016   • Subcutaneous cyst 05/13/2016   • Mixed anxiety depressive disorder 05/13/2016   • Gastroesophageal reflux disease 05/13/2016   • Hyperlipidemia 05/13/2016   • Insomnia 05/13/2016   • Macrocytosis 05/13/2016   • Tremor 05/13/2016   • Pulmonary emphysema 05/13/2016   • Vitamin D deficiency 05/13/2016   • Glucose intolerance (impaired glucose tolerance) 05/13/2016   • Fatigue 05/13/2016   • Osteoporosis 05/13/2016   • Herpes zoster without complication 02/24/2017   • Migraine 07/26/2017   • Bilateral leg pain 07/26/2017   • Pneumonia of both lungs due to infectious organism 01/15/2018     Resolved Ambulatory Problems     Diagnosis Date Noted   • Osteopenia 04/21/2016   • Anemia 05/13/2016   • Iron deficiency 05/13/2016   • Pruritus 05/13/2016   • Urinary tract infection 05/13/2016   • Achilles tendon sprain 02/21/2017     Past Medical History:   Diagnosis Date   • Anxiety    •  COPD (chronic obstructive pulmonary disease)    • Depression    • GERD (gastroesophageal reflux disease)    • Hyperlipidemia    • Migraine    • Osteoporosis    • Pneumonia        PAST SURGICAL HISTORY  Past Surgical History:   Procedure Laterality Date   • CHOLECYSTECTOMY     • HIP ARTHROPLASTY Right    • REDUCTION MAMMAPLASTY         FAMILY HISTORY  History reviewed. No pertinent family history.    SOCIAL HISTORY  Social History     Social History   • Marital status:      Spouse name: Vlad   • Number of children: 2   • Years of education: N/A     Occupational History   • retired      Social History Main Topics   • Smoking status: Former Smoker     Quit date: 1999   • Smokeless tobacco: Never Used   • Alcohol use Yes      Comment: ocasional drinker   • Drug use: No   • Sexual activity: Not on file     Other Topics Concern   • Not on file     Social History Narrative   • No narrative on file       ALLERGIES  Codeine and Hydrocodone    REVIEW OF SYSTEMS  Review of Systems   Constitutional: Negative for fever.   HENT: Positive for nosebleeds.    Respiratory: Positive for shortness of breath (chronic).    Cardiovascular: Negative for chest pain.       PHYSICAL EXAM  ED Triage Vitals   Temp Heart Rate Resp BP SpO2   02/18/18 1416 02/18/18 1413 02/18/18 1416 02/18/18 1413 02/18/18 1413   98 °F (36.7 °C) 98 20 132/82 99 %      Temp src Heart Rate Source Patient Position BP Location FiO2 (%)   -- -- -- -- --              Physical Exam   Constitutional: No distress.   HENT:   Head: Normocephalic and atraumatic.   Pt has dried blood in both nares, R>L   Cardiovascular: Regular rhythm.    No murmur heard.  Slightly tachycardiac.    Pulmonary/Chest: No respiratory distress. She has decreased breath sounds (slightly decreased bilaterally).   Abdominal: There is no tenderness.   Musculoskeletal: She exhibits no tenderness.   No pedal edema or calf tenderness.   Skin: No rash noted.   Nursing note and vitals  reviewed.      LAB RESULTS  Lab Results (last 24 hours)     Procedure Component Value Units Date/Time    CBC & Differential [[id number]] Collected:  02/18/18 1439    Specimen:  Blood Updated:  02/18/18 1509    Narrative:       The following orders were created for panel order CBC & Differential.  Procedure                               Abnormality         Status                     ---------                               -----------         ------                     CBC Auto Differential[[id number]]        Abnormal            Final result                 Please view results for these tests on the individual orders.    CBC Auto Differential [[id number]]  (Abnormal) Collected:  02/18/18 1439    Specimen:  Blood Updated:  02/18/18 1509     WBC 9.82 10*3/mm3      RBC 3.27 (L) 10*6/mm3      Hemoglobin 10.0 (L) g/dL      Hematocrit 33.4 (L) %      .1 (H) fL      MCH 30.6 pg      MCHC 29.9 (L) g/dL      RDW 14.7 (H) %      RDW-SD 54.4 (H) fl      MPV 8.9 fL      Platelets 507 (H) 10*3/mm3      Neutrophil % 74.1 %      Lymphocyte % 19.3 (L) %      Monocyte % 6.0 %      Eosinophil % 0.1 (L) %      Basophil % 0.1 %      Immature Grans % 0.4 %      Neutrophils, Absolute 7.27 10*3/mm3      Lymphocytes, Absolute 1.90 10*3/mm3      Monocytes, Absolute 0.59 10*3/mm3      Eosinophils, Absolute 0.01 10*3/mm3      Basophils, Absolute 0.01 10*3/mm3      Immature Grans, Absolute 0.04 (H) 10*3/mm3           I ordered the above labs and reviewed the results    PROCEDURES  Epistaxis Management  Date/Time: 2/18/2018 3:25 PM  Performed by: DAVE SUN  Authorized by: DAVE SUN     Consent:     Consent obtained:  Verbal    Consent given by:  Patient  Anesthesia (see MAR for exact dosages):     Anesthesia method:  Topical application    Topical anesthetic:  Cocaine  Procedure details:     Treatment site:  L posterior    Treatment method:  Silver nitrate    Treatment complexity:  Limited    Treatment episode: recurring     Post-procedure details:     Patient tolerance of procedure:  Tolerated well, no immediate complications          PROGRESS AND CONSULTS  ED Course   1433  CBC ordered. Lawrence-synephrine and 4% cocaine external solution ordered.    1445  Applied lawrence-synephrine and exteranl cocaine solution in the left nostril.     1525  BP- 124/77 HR- 78 Temp- 98 °F (36.7 °C) O2 sat- 97%  Rechecked the patient who is in NAD and is resting comfortably. Performed nasal cautery. Discussed labs being unremarkable and the plan to get the bleeding managed and to d/c. Pt understands and agrees with the plan, all questions answered.    1553  BP- 124/77 HR- 78 Temp- 98 °F (36.7 °C) O2 sat- 97%  Rechecked the patient who is in NAD and is resting comfortably. Pt's bleeding has improved. Pt made aware the plan to d/c after a brief period of continued monitoring. Pt understands and agrees with the plan, all questions answered.    MEDICAL DECISION MAKING  Results were reviewed/discussed with the patient and they were also made aware of online access. Pt also made aware that some labs, such as cultures, will not be resulted during ER visit and follow up with PMD is necessary.     MDM       DIAGNOSIS  Final diagnoses:   Acute anterior epistaxis   Chronic obstructive pulmonary disease, unspecified COPD type       DISPOSITION  DISCHARGE    Patient discharged in stable condition.    Reviewed implications of results, diagnosis, meds, responsibility to follow up, warning signs and symptoms of possible worsening, potential complications and reasons to return to ER.    Patient/Family voiced understanding of above instructions.    Discussed plan for discharge, as there is no emergent indication for admission.  Pt/family is agreeable and understands need for follow up and repeat testing.  Pt is aware that discharge does not mean that nothing is wrong but it indicates no emergency is present that requires admission and they must continue care with follow-up as  given below or physician of their choice.     FOLLOW-UP  Lyudmila Granados, APRN  [address] [facility] [address] [phone]          Cipriano Pinzon MD  [address] [facility] [address]  [facility] [id number] [phone]               Medication List      Notice     No changes were made to your prescriptions during this visit.        Latest Documented Vital Signs:  As of 3:54 PM  BP- 124/77 HR- 78 Temp- 98 °F (36.7 °C) O2 sat- 97%    --  Documentation assistance provided by josé luis Carpenter for Dr. Sebastian.  Information recorded by the scribe was done at my direction and has been verified and validated by me.     Mi Carpenter  02/18/18 1358       Carl Sebastian MD  02/18/18 3167

## 2018-02-21 ENCOUNTER — TELEPHONE (OUTPATIENT)
Dept: SOCIAL WORK | Facility: HOSPITAL | Age: 73
End: 2018-02-21

## 2018-02-21 NOTE — TELEPHONE ENCOUNTER
Called pt today in f/u and her  answered the phone and state's she is in the ENT's office at this time. He also states that her nose bleed has stopped at this time. No other questions or concerns voiced by  at this time. Araceli BERNABE

## 2018-02-27 ENCOUNTER — TELEPHONE (OUTPATIENT)
Dept: FAMILY MEDICINE CLINIC | Facility: CLINIC | Age: 73
End: 2018-02-27

## 2018-02-27 ENCOUNTER — OFFICE VISIT (OUTPATIENT)
Dept: FAMILY MEDICINE CLINIC | Facility: CLINIC | Age: 73
End: 2018-02-27

## 2018-02-27 VITALS
TEMPERATURE: 98.5 F | SYSTOLIC BLOOD PRESSURE: 110 MMHG | OXYGEN SATURATION: 90 % | HEART RATE: 116 BPM | DIASTOLIC BLOOD PRESSURE: 64 MMHG

## 2018-02-27 DIAGNOSIS — R35.0 FREQUENCY OF URINATION: Primary | ICD-10-CM

## 2018-02-27 PROCEDURE — 99213 OFFICE O/P EST LOW 20 MIN: CPT | Performed by: NURSE PRACTITIONER

## 2018-02-27 RX ORDER — SULFAMETHOXAZOLE AND TRIMETHOPRIM 800; 160 MG/1; MG/1
1 TABLET ORAL 2 TIMES DAILY
Qty: 20 TABLET | Refills: 0 | Status: SHIPPED | OUTPATIENT
Start: 2018-02-27 | End: 2018-03-02

## 2018-02-27 RX ORDER — PHENAZOPYRIDINE HYDROCHLORIDE 200 MG/1
200 TABLET, FILM COATED ORAL 3 TIMES DAILY PRN
Qty: 6 TABLET | Refills: 0 | Status: SHIPPED | OUTPATIENT
Start: 2018-02-27 | End: 2018-12-19

## 2018-02-27 NOTE — PROGRESS NOTES
Siobhan Prakash is a 73 y.o. female.Patient has had urine frequency for 2 days. No pain. Using AZO.      Assessment/Plan   Problem List Items Addressed This Visit     None      Visit Diagnoses     Frequency of urination    -  Primary    Relevant Orders    Urine Culture - Urine, Urine, Clean Catch             No Follow-up on file.  There are no Patient Instructions on file for this visit.    Chief Complaint   Patient presents with   • Urinary Tract Infection     Social History   Substance Use Topics   • Smoking status: Former Smoker     Quit date: 1999   • Smokeless tobacco: Never Used   • Alcohol use Yes      Comment: ocasional drinker       History of Present Illness     The following portions of the patient's history were reviewed and updated as appropriate:PMHroutine: Social history , Allergies, Current Medications and Active Problem List    Review of Systems   Constitutional: Positive for fatigue.   Respiratory: Positive for shortness of breath.    Genitourinary: Positive for frequency and urgency. Negative for dysuria, hematuria and pelvic pain.       Objective   Vitals:    02/27/18 1435   BP: 110/64   Pulse: 116   Temp: 98.5 °F (36.9 °C)   SpO2: 90%     There is no height or weight on file to calculate BMI.  Physical Exam   Constitutional: She appears well-developed and well-nourished. She appears distressed.   HENT:   Head: Normocephalic and atraumatic.   Eyes: EOM are normal.   Neck: No thyromegaly present.   Cardiovascular: Normal rate and regular rhythm.    Pulmonary/Chest: Effort normal and breath sounds normal.   Abdominal: Soft. Bowel sounds are normal.   Musculoskeletal: Normal range of motion.   Neurological: She is alert.   Skin: Skin is warm.   Nursing note and vitals reviewed.    Reviewed Data:  Admission on 02/18/2018, Discharged on 02/18/2018   Component Date Value Ref Range Status   • WBC 02/18/2018 9.82  4.50 - 10.70 10*3/mm3 Final   • RBC 02/18/2018 3.27* 3.90 - 5.20 10*6/mm3 Final   •  Hemoglobin 02/18/2018 10.0* 11.9 - 15.5 g/dL Final   • Hematocrit 02/18/2018 33.4* 35.6 - 45.5 % Final   • MCV 02/18/2018 102.1* 80.5 - 98.2 fL Final   • MCH 02/18/2018 30.6  26.9 - 32.0 pg Final   • MCHC 02/18/2018 29.9* 32.4 - 36.3 g/dL Final   • RDW 02/18/2018 14.7* 11.7 - 13.0 % Final   • RDW-SD 02/18/2018 54.4* 37.0 - 54.0 fl Final   • MPV 02/18/2018 8.9  6.0 - 12.0 fL Final   • Platelets 02/18/2018 507* 140 - 500 10*3/mm3 Final   • Neutrophil % 02/18/2018 74.1  42.7 - 76.0 % Final   • Lymphocyte % 02/18/2018 19.3* 19.6 - 45.3 % Final   • Monocyte % 02/18/2018 6.0  5.0 - 12.0 % Final   • Eosinophil % 02/18/2018 0.1* 0.3 - 6.2 % Final   • Basophil % 02/18/2018 0.1  0.0 - 1.5 % Final   • Immature Grans % 02/18/2018 0.4  0.0 - 0.5 % Final   • Neutrophils, Absolute 02/18/2018 7.27  1.90 - 8.10 10*3/mm3 Final   • Lymphocytes, Absolute 02/18/2018 1.90  0.90 - 4.80 10*3/mm3 Final   • Monocytes, Absolute 02/18/2018 0.59  0.20 - 1.20 10*3/mm3 Final   • Eosinophils, Absolute 02/18/2018 0.01  0.00 - 0.70 10*3/mm3 Final   • Basophils, Absolute 02/18/2018 0.01  0.00 - 0.20 10*3/mm3 Final   • Immature Grans, Absolute 02/18/2018 0.04* 0.00 - 0.03 10*3/mm3 Final     Started on bactrim and pyridium and will call with urine culture results

## 2018-02-28 ENCOUNTER — TELEPHONE (OUTPATIENT)
Dept: FAMILY MEDICINE CLINIC | Facility: CLINIC | Age: 73
End: 2018-02-28

## 2018-02-28 NOTE — TELEPHONE ENCOUNTER
Spouse called stating that patient has had uncontrollable shaking, he was advised to take Siobhan to the emergency room. He voiced understanding.

## 2018-03-01 LAB
BACTERIA UR CULT: NO GROWTH
BACTERIA UR CULT: NORMAL

## 2018-03-02 ENCOUNTER — TELEPHONE (OUTPATIENT)
Dept: FAMILY MEDICINE CLINIC | Facility: CLINIC | Age: 73
End: 2018-03-02

## 2018-03-05 ENCOUNTER — OFFICE VISIT (OUTPATIENT)
Dept: FAMILY MEDICINE CLINIC | Facility: CLINIC | Age: 73
End: 2018-03-05

## 2018-03-05 VITALS
HEIGHT: 62 IN | DIASTOLIC BLOOD PRESSURE: 64 MMHG | SYSTOLIC BLOOD PRESSURE: 120 MMHG | HEART RATE: 118 BPM | OXYGEN SATURATION: 90 %

## 2018-03-05 DIAGNOSIS — N32.81 OAB (OVERACTIVE BLADDER): Primary | ICD-10-CM

## 2018-03-05 DIAGNOSIS — F51.01 PRIMARY INSOMNIA: ICD-10-CM

## 2018-03-05 PROCEDURE — 99213 OFFICE O/P EST LOW 20 MIN: CPT | Performed by: NURSE PRACTITIONER

## 2018-03-05 RX ORDER — DARIFENACIN HYDROBROMIDE 7.5 MG/1
7.5 TABLET, EXTENDED RELEASE ORAL DAILY
Qty: 30 TABLET | Refills: 1 | Status: SHIPPED | OUTPATIENT
Start: 2018-03-05 | End: 2018-03-06 | Stop reason: ALTCHOICE

## 2018-03-05 NOTE — PROGRESS NOTES
Siobhan Prakash is a 73 y.o. female.Patient presents for a follow up of urinary problems.  A urine culture was ordered at her previous visit on 2/27/18, it came back normal. Patient also reports diarrhea. She has taken Imodium, which does help.     Also needs a refill on her ambien for her insomnia.       Assessment/Plan   Problem List Items Addressed This Visit     None             No Follow-up on file.  There are no Patient Instructions on file for this visit.    No chief complaint on file.    Social History   Substance Use Topics   • Smoking status: Former Smoker     Quit date: 1999   • Smokeless tobacco: Never Used   • Alcohol use Yes      Comment: ocasional drinker       History of Present Illness     The following portions of the patient's history were reviewed and updated as appropriate:PMHroutine: Social history , Allergies, Current Medications and Active Problem List    Review of Systems   Gastrointestinal: Positive for diarrhea.   Genitourinary: Positive for decreased urine volume, frequency and urgency. Negative for difficulty urinating, flank pain and hematuria.       Objective   There were no vitals filed for this visit.  There is no height or weight on file to calculate BMI.  Physical Exam   Constitutional: She appears well-developed and well-nourished. No distress.   HENT:   Head: Normocephalic and atraumatic.   Eyes: EOM are normal.   Cardiovascular: Normal rate and regular rhythm.    Pulmonary/Chest: Effort normal and breath sounds normal.   Musculoskeletal: Normal range of motion.   Neurological: She is alert.   Skin: Skin is warm.   Nursing note and vitals reviewed.    Reviewed Data:  Office Visit on 02/27/2018   Component Date Value Ref Range Status   • Urine Culture 02/27/2018 Final report   Final   • Result 1 02/27/2018 No growth   Final   Admission on 02/18/2018, Discharged on 02/18/2018   Component Date Value Ref Range Status   • WBC 02/18/2018 9.82  4.50 - 10.70 10*3/mm3 Final   • RBC  02/18/2018 3.27* 3.90 - 5.20 10*6/mm3 Final   • Hemoglobin 02/18/2018 10.0* 11.9 - 15.5 g/dL Final   • Hematocrit 02/18/2018 33.4* 35.6 - 45.5 % Final   • MCV 02/18/2018 102.1* 80.5 - 98.2 fL Final   • MCH 02/18/2018 30.6  26.9 - 32.0 pg Final   • MCHC 02/18/2018 29.9* 32.4 - 36.3 g/dL Final   • RDW 02/18/2018 14.7* 11.7 - 13.0 % Final   • RDW-SD 02/18/2018 54.4* 37.0 - 54.0 fl Final   • MPV 02/18/2018 8.9  6.0 - 12.0 fL Final   • Platelets 02/18/2018 507* 140 - 500 10*3/mm3 Final   • Neutrophil % 02/18/2018 74.1  42.7 - 76.0 % Final   • Lymphocyte % 02/18/2018 19.3* 19.6 - 45.3 % Final   • Monocyte % 02/18/2018 6.0  5.0 - 12.0 % Final   • Eosinophil % 02/18/2018 0.1* 0.3 - 6.2 % Final   • Basophil % 02/18/2018 0.1  0.0 - 1.5 % Final   • Immature Grans % 02/18/2018 0.4  0.0 - 0.5 % Final   • Neutrophils, Absolute 02/18/2018 7.27  1.90 - 8.10 10*3/mm3 Final   • Lymphocytes, Absolute 02/18/2018 1.90  0.90 - 4.80 10*3/mm3 Final   • Monocytes, Absolute 02/18/2018 0.59  0.20 - 1.20 10*3/mm3 Final   • Eosinophils, Absolute 02/18/2018 0.01  0.00 - 0.70 10*3/mm3 Final   • Basophils, Absolute 02/18/2018 0.01  0.00 - 0.20 10*3/mm3 Final   • Immature Grans, Absolute 02/18/2018 0.04* 0.00 - 0.03 10*3/mm3 Final     Will try the vesicare and refer to urology which pt is requesting

## 2018-03-06 ENCOUNTER — TELEPHONE (OUTPATIENT)
Dept: FAMILY MEDICINE CLINIC | Facility: CLINIC | Age: 73
End: 2018-03-06

## 2018-03-06 DIAGNOSIS — N32.81 OAB (OVERACTIVE BLADDER): Primary | ICD-10-CM

## 2018-03-06 RX ORDER — SOLIFENACIN SUCCINATE 5 MG/1
5 TABLET, FILM COATED ORAL DAILY
Qty: 30 TABLET | Refills: 1 | Status: SHIPPED | OUTPATIENT
Start: 2018-03-06 | End: 2018-11-19 | Stop reason: SDUPTHER

## 2018-03-06 RX ORDER — ZOLPIDEM TARTRATE 5 MG/1
5 TABLET ORAL NIGHTLY PRN
Qty: 30 TABLET | Refills: 5 | OUTPATIENT
Start: 2018-03-06 | End: 2018-08-27 | Stop reason: SDUPTHER

## 2018-03-06 NOTE — TELEPHONE ENCOUNTER
----- Message from Shira Deluna sent at 3/6/2018 11:26 AM EST -----  darifenacin (ENABLEX) 7.5 MG 24 hr tablet  - Lyudmila prescribed yesterday - prescription is not covered by insurance - cost is $400.  Needs an alternative    [facility] [address] [place], [place] - [address] AT [facility] - [phone] [facility] [phone] FX

## 2018-04-05 ENCOUNTER — TELEPHONE (OUTPATIENT)
Dept: FAMILY MEDICINE CLINIC | Facility: CLINIC | Age: 73
End: 2018-04-05

## 2018-04-06 ENCOUNTER — TELEPHONE (OUTPATIENT)
Dept: FAMILY MEDICINE CLINIC | Facility: CLINIC | Age: 73
End: 2018-04-06

## 2018-04-24 ENCOUNTER — TELEPHONE (OUTPATIENT)
Dept: FAMILY MEDICINE CLINIC | Facility: CLINIC | Age: 73
End: 2018-04-24

## 2018-04-24 DIAGNOSIS — M81.8 OTHER OSTEOPOROSIS, UNSPECIFIED PATHOLOGICAL FRACTURE PRESENCE: Primary | ICD-10-CM

## 2018-04-24 NOTE — TELEPHONE ENCOUNTER
Patient states that she needs a calcium check for a Prolia injections .Please contact the patient to schedule a lab appointment.

## 2018-04-24 NOTE — TELEPHONE ENCOUNTER
[facility] called requesting to have Prolia shipped to our office for administration. Tala Bro ordered this. We have never done this at the office. What do you recommend?

## 2018-04-25 ENCOUNTER — RESULTS ENCOUNTER (OUTPATIENT)
Dept: FAMILY MEDICINE CLINIC | Facility: CLINIC | Age: 73
End: 2018-04-25

## 2018-04-25 DIAGNOSIS — M81.8 OTHER OSTEOPOROSIS, UNSPECIFIED PATHOLOGICAL FRACTURE PRESENCE: ICD-10-CM

## 2018-04-25 DIAGNOSIS — E83.51 LOW CALCIUM LEVELS: Primary | ICD-10-CM

## 2018-06-25 ENCOUNTER — LAB (OUTPATIENT)
Dept: FAMILY MEDICINE CLINIC | Facility: CLINIC | Age: 73
End: 2018-06-25

## 2018-06-25 ENCOUNTER — TELEPHONE (OUTPATIENT)
Dept: FAMILY MEDICINE CLINIC | Facility: CLINIC | Age: 73
End: 2018-06-25

## 2018-06-25 DIAGNOSIS — Z79.899 LONG-TERM USE OF HIGH-RISK MEDICATION: Primary | ICD-10-CM

## 2018-06-25 DIAGNOSIS — M81.8 OTHER OSTEOPOROSIS WITHOUT CURRENT PATHOLOGICAL FRACTURE: Primary | ICD-10-CM

## 2018-06-25 DIAGNOSIS — Z79.899 LONG-TERM USE OF HIGH-RISK MEDICATION: ICD-10-CM

## 2018-06-25 NOTE — TELEPHONE ENCOUNTER
----- Message from Carlton Amaya sent at 6/25/2018 10:36 AM EDT -----  Regarding: Pt Question  Contact: [phone]  Pt wants to know if she needs appt for lab or to see Lyudmila for calcium blood draw because she's getting a prolia shot this Wed.

## 2018-06-26 LAB
BUN SERPL-MCNC: 17 MG/DL (ref 8–27)
BUN/CREAT SERPL: 23 (ref 12–28)
CALCIUM SERPL-MCNC: 9.8 MG/DL (ref 8.7–10.3)
CHLORIDE SERPL-SCNC: 102 MMOL/L (ref 96–106)
CO2 SERPL-SCNC: 26 MMOL/L (ref 20–29)
CREAT SERPL-MCNC: 0.73 MG/DL (ref 0.57–1)
GFR SERPLBLD CREATININE-BSD FMLA CKD-EPI: 83 ML/MIN/1.73
GFR SERPLBLD CREATININE-BSD FMLA CKD-EPI: 96 ML/MIN/1.73
GLUCOSE SERPL-MCNC: 132 MG/DL (ref 65–99)
POTASSIUM SERPL-SCNC: 4 MMOL/L (ref 3.5–5.2)
SODIUM SERPL-SCNC: 145 MMOL/L (ref 134–144)

## 2018-06-27 ENCOUNTER — TELEPHONE (OUTPATIENT)
Dept: FAMILY MEDICINE CLINIC | Facility: CLINIC | Age: 73
End: 2018-06-27

## 2018-06-28 ENCOUNTER — HOSPITAL ENCOUNTER (OUTPATIENT)
Dept: INFUSION THERAPY | Facility: HOSPITAL | Age: 73
Discharge: HOME | End: 2018-06-28

## 2018-07-06 NOTE — TELEPHONE ENCOUNTER
Susanne at [facility] made aware of authorization. She will contact [facility] to have them contact the patient.

## 2018-07-16 ENCOUNTER — TELEPHONE (OUTPATIENT)
Dept: FAMILY MEDICINE CLINIC | Facility: CLINIC | Age: 73
End: 2018-07-16

## 2018-07-16 NOTE — TELEPHONE ENCOUNTER
Patient contacted the office with questions regarding Prolia injections. She was made aware that this been approved from 6/2018-6-2020

## 2018-07-18 ENCOUNTER — INFUSION (OUTPATIENT)
Dept: ONCOLOGY | Facility: HOSPITAL | Age: 73
End: 2018-07-18

## 2018-07-18 DIAGNOSIS — M81.0 OSTEOPOROSIS, UNSPECIFIED OSTEOPOROSIS TYPE, UNSPECIFIED PATHOLOGICAL FRACTURE PRESENCE: Primary | ICD-10-CM

## 2018-07-18 PROCEDURE — 96372 THER/PROPH/DIAG INJ SC/IM: CPT | Performed by: NURSE PRACTITIONER

## 2018-07-18 PROCEDURE — 25010000002 DENOSUMAB 60 MG/ML SOLUTION: Performed by: NURSE PRACTITIONER

## 2018-07-18 RX ADMIN — DENOSUMAB 60 MG: 60 INJECTION SUBCUTANEOUS at 14:49

## 2018-07-24 ENCOUNTER — TRANSCRIBE ORDERS (OUTPATIENT)
Dept: ADMINISTRATIVE | Facility: HOSPITAL | Age: 73
End: 2018-07-24

## 2018-07-24 DIAGNOSIS — J44.9 CHRONIC OBSTRUCTIVE PULMONARY DISEASE, UNSPECIFIED COPD TYPE (HCC): Primary | ICD-10-CM

## 2018-08-02 ENCOUNTER — HOSPITAL ENCOUNTER (OUTPATIENT)
Dept: CT IMAGING | Facility: HOSPITAL | Age: 73
Discharge: HOME | End: 2018-08-02
Attending: INTERNAL MEDICINE | Admitting: INTERNAL MEDICINE

## 2018-08-02 DIAGNOSIS — J44.9 CHRONIC OBSTRUCTIVE PULMONARY DISEASE, UNSPECIFIED COPD TYPE (HCC): ICD-10-CM

## 2018-08-02 PROCEDURE — 71250 CT THORAX DX C-: CPT

## 2018-08-27 DIAGNOSIS — F51.01 PRIMARY INSOMNIA: ICD-10-CM

## 2018-08-28 ENCOUNTER — TELEPHONE (OUTPATIENT)
Dept: FAMILY MEDICINE CLINIC | Facility: CLINIC | Age: 73
End: 2018-08-28

## 2018-08-28 RX ORDER — ZOLPIDEM TARTRATE 5 MG/1
TABLET ORAL
Qty: 30 TABLET | Refills: 1 | OUTPATIENT
Start: 2018-08-28 | End: 2018-10-31 | Stop reason: SDUPTHER

## 2018-09-12 ENCOUNTER — OFFICE VISIT (OUTPATIENT)
Dept: FAMILY MEDICINE CLINIC | Facility: CLINIC | Age: 73
End: 2018-09-12

## 2018-09-12 VITALS
WEIGHT: 105 LBS | DIASTOLIC BLOOD PRESSURE: 88 MMHG | HEIGHT: 62 IN | BODY MASS INDEX: 19.32 KG/M2 | HEART RATE: 65 BPM | SYSTOLIC BLOOD PRESSURE: 158 MMHG

## 2018-09-12 DIAGNOSIS — D50.9 IRON DEFICIENCY ANEMIA, UNSPECIFIED IRON DEFICIENCY ANEMIA TYPE: ICD-10-CM

## 2018-09-12 DIAGNOSIS — Z79.899 HIGH RISK MEDICATION USE: ICD-10-CM

## 2018-09-12 DIAGNOSIS — Z00.00 MEDICARE ANNUAL WELLNESS VISIT, SUBSEQUENT: Primary | ICD-10-CM

## 2018-09-12 DIAGNOSIS — E55.9 VITAMIN D DEFICIENCY: ICD-10-CM

## 2018-09-12 DIAGNOSIS — Z12.39 SCREENING FOR BREAST CANCER: ICD-10-CM

## 2018-09-12 DIAGNOSIS — M81.8 OTHER OSTEOPOROSIS WITHOUT CURRENT PATHOLOGICAL FRACTURE: ICD-10-CM

## 2018-09-12 PROCEDURE — 96160 PT-FOCUSED HLTH RISK ASSMT: CPT | Performed by: NURSE PRACTITIONER

## 2018-09-12 PROCEDURE — G0439 PPPS, SUBSEQ VISIT: HCPCS | Performed by: NURSE PRACTITIONER

## 2018-09-12 RX ORDER — THEOPHYLLINE 400 MG/1
400 TABLET, EXTENDED RELEASE ORAL DAILY
COMMUNITY
Start: 2018-08-20

## 2018-09-12 NOTE — PROGRESS NOTES
QUICK REFERENCE INFORMATION:  The ABCs of the Annual Wellness Visit    Subsequent Medicare Wellness Visit    HEALTH RISK ASSESSMENT    1945    Recent Hospitalizations:  Recently treated at the following:  [facility].        Current Medical Providers:  Patient Care Team:  Lyudmila Granados APRN as PCP - General (Family Medicine)  Carl Perez MD as Consulting Physician (Orthopedic Surgery)        Smoking Status:  History   Smoking Status   • Former Smoker   • Quit date: 1999   Smokeless Tobacco   • Never Used       Alcohol Consumption:  History   Alcohol Use   • Yes     Comment: ocasional drinker       Depression Screen:   PHQ-2/PHQ-9 Depression Screening 9/12/2018   Little interest or pleasure in doing things 0   Feeling down, depressed, or hopeless 1   Total Score 1       Health Habits and Functional and Cognitive Screening:  Functional & Cognitive Status 9/12/2018   Do you have difficulty preparing food and eating? Yes   Do you have difficulty bathing yourself, getting dressed or grooming yourself? Yes   Do you have difficulty using the toilet? Yes   Do you have difficulty moving around from place to place? Yes   Do you have trouble with steps or getting out of a bed or a chair? Yes   In the past year have you fallen or experienced a near fall? No   Current Diet Well Balanced Diet   Dental Exam Not up to date   Eye Exam Not up to date   Exercise (times per week) 0 times per week   Do you need help using the phone?  No   Are you deaf or do you have serious difficulty hearing?  No   Do you need help with transportation? Yes   Do you need help shopping? Yes   Do you need help preparing meals?  Yes   Do you need help with housework?  Yes   Do you need help with laundry? Yes   Do you need help taking your medications? No   Do you need help managing money? No   Do you ever drive or ride in a car without wearing a seat belt? Yes   Have you felt unusual stress, anger or loneliness in the last month?  Yes   Who do you live with? Spouse   If you need help, do you have trouble finding someone available to you? No   Have you been bothered in the last four weeks by sexual problems? No   Do you have difficulty concentrating, remembering or making decisions? No           Does the patient have evidence of cognitive impairment? No    Aspirin use counseling: Does not need ASA (and currently is not on it)      Recent Lab Results:  CMP:  Lab Results   Component Value Date     (H) 09/12/2018    BUN 19 09/12/2018    CREATININE 0.71 09/12/2018    EGFRIFNONA 86 09/12/2018    EGFRIFAFRI 99 09/12/2018    BCR 27 09/12/2018     09/12/2018    K 3.5 09/12/2018    CO2 29 09/12/2018    CALCIUM 10.0 09/12/2018    PROTENTOTREF 7.1 09/12/2018    ALBUMIN 4.2 09/12/2018    LABGLOBREF 2.9 09/12/2018    LABIL2 1.4 09/12/2018    BILITOT <0.2 09/12/2018    ALKPHOS 90 09/12/2018    AST 14 09/12/2018    ALT 9 09/12/2018     Lipid Panel:  Lab Results   Component Value Date    TRIG 132 11/13/2017    HDL 79 (H) 11/13/2017    VLDL 26.4 11/13/2017    LDLHDL 0.9 11/02/2015     HbA1c:  Lab Results   Component Value Date    HGBA1C 5.40 02/21/2017       Visual Acuity:  No exam data present    Age-appropriate Screening Schedule:  Refer to the list below for future screening recommendations based on patient's age, sex and/or medical conditions. Orders for these recommended tests are listed in the plan section. The patient has been provided with a written plan.    Health Maintenance   Topic Date Due   • URINE MICROALBUMIN  1945   • TDAP/TD VACCINES (1 - Tdap) 01/18/1966   • ZOSTER VACCINE (1 of 2) 01/18/1997   • DIABETIC FOOT EXAM  02/01/2016   • HEMOGLOBIN A1C  08/21/2017   • MAMMOGRAM  07/13/2018   • DXA SCAN  07/13/2018   • DIABETIC EYE EXAM  07/26/2018   • INFLUENZA VACCINE  08/01/2018   • LIPID PANEL  11/13/2018   • COLONOSCOPY  01/01/2021   • PNEUMOCOCCAL VACCINES (65+ LOW/MEDIUM RISK)  Completed        Subjective   History of  Present Illness    Siobhan Prakash is a 73 y.o. female who presents for an Subsequent Wellness Visit.    The following portions of the patient's history were reviewed and updated as appropriate: allergies, current medications, past family history, past medical history, past social history, past surgical history and problem list.    Outpatient Medications Prior to Visit   Medication Sig Dispense Refill   • benzonatate (TESSALON PERLES) 100 MG capsule Take 2 capsules by mouth 3 (Three) Times a Day As Needed (cough). 30 capsule 0   • BIOTIN PO Take 1 tablet by mouth Daily.     • buPROPion SR (WELLBUTRIN SR) 200 MG 12 hr tablet Take 1 tablet by mouth 2 (Two) Times a Day. 180 tablet 2   • Cholecalciferol (VITAMIN D3) 5000 UNITS capsule capsule Take 5,000 Units by mouth Daily.     • ferrous sulfate 325 (65 FE) MG tablet Take 325 mg by mouth 2 (Two) Times a Day With Meals.     • fluticasone-salmeterol (ADVAIR) 500-50 MCG/DOSE DISKUS Inhale 1 puff 2 (Two) Times a Day.     • gabapentin (NEURONTIN) 300 MG capsule Take 300 mg by mouth 3 (Three) Times a Day.     • guaiFENesin (MUCINEX) 600 MG 12 hr tablet Take 2 tablets by mouth Every 12 (Twelve) Hours. 60 tablet 11   • lansoprazole (PREVACID) 15 MG capsule Take 1 capsule by mouth Daily. 90 capsule 0   • meloxicam (MOBIC) 15 MG tablet Take 15 mg by mouth Daily.     • montelukast (SINGULAIR) 10 MG tablet Take 10 mg by mouth Every Night.     • Multiple Vitamins-Minerals (MULTIVITAMIN ADULT PO) Take 1 tablet by mouth Daily.     • oxymetazoline (AFRIN NASAL SPRAY) 0.05 % nasal spray 2 sprays into each nostril 2 (Two) Times a Day. 20 mL 0   • phenazopyridine (PYRIDIUM) 200 MG tablet Take 1 tablet by mouth 3 (Three) Times a Day As Needed for bladder spasms. 6 tablet 0   • roflumilast (DALIRESP) 500 MCG tablet tablet Take 500 mcg by mouth Daily.     • sertraline (ZOLOFT) 100 MG tablet Take 1 tablet by mouth 2 (Two) Times a Day. 60 tablet 1   • SUMAtriptan (IMITREX) 50 MG tablet Take  "one tablet at onset of headache. May repeat dose one time in 2 hours if headache not relieved. 9 tablet 2   • Tiotropium Bromide Monohydrate (SPIRIVA RESPIMAT) 2.5 MCG/ACT aerosol solution Inhale 2 puffs Daily.     • VENTOLIN  (90 BASE) MCG/ACT inhaler Inhale 2 puffs Every 4 (Four) Hours As Needed for Wheezing or Shortness of Air.     • zolpidem (AMBIEN) 5 MG tablet TAKE 1 TABLET BY MOUTH EVERY NIGHT AT BEDTIME AS NEEDED 30 tablet 1   • solifenacin (VESICARE) 5 MG tablet Take 1 tablet by mouth Daily. 30 tablet 1     No facility-administered medications prior to visit.        Patient Active Problem List   Diagnosis   • Abnormal weight loss   • Postartificial menopausal syndrome   • Subcutaneous cyst   • Mixed anxiety depressive disorder   • Gastroesophageal reflux disease   • Hyperlipidemia   • Insomnia   • Macrocytosis   • Tremor   • Pulmonary emphysema (CMS/HCC)   • Vitamin D deficiency   • Glucose intolerance (impaired glucose tolerance)   • Fatigue   • Osteoporosis   • Herpes zoster without complication   • Migraine   • Bilateral leg pain   • Pneumonia of both lungs due to infectious organism       Advance Care Planning:  has an advance directive - a copy HAS NOT been provided    Identification of Risk Factors:  Risk factors include: chronic pain and depression.    Review of Systems    Compared to one year ago, the patient feels her physical health is the same.  Compared to one year ago, the patient feels her mental health is the same.    Objective     Physical Exam    Vitals:    09/12/18 1443   BP: 158/88   Pulse: 65   Weight: 47.6 kg (105 lb)   Height: 157.5 cm (62\")   PainSc: 0-No pain  Comment: patient reported       Patient's Body mass index is 19.2 kg/m². BMI is below normal parameters. Recommendations include: is followe dy pulmonology.      Assessment/Plan   Patient Self-Management and Personalized Health Advice  The patient has been provided with information about: weight management and preventive " services including:   · Advance directive, Bone densitometry screening.    Visit Diagnoses:    ICD-10-CM ICD-9-CM   1. Medicare annual wellness visit, subsequent Z00.00 V70.0   2. Iron deficiency anemia, unspecified iron deficiency anemia type D50.9 280.9   3. Vitamin D deficiency E55.9 268.9   4. Other osteoporosis without current pathological fracture M81.8 733.09   5. Screening for breast cancer Z12.31 V76.10   6. High risk medication use Z79.899 V58.69       Orders Placed This Encounter   Procedures   • dexa bone density axial     Order Specific Question:   Reason for Exam:     Answer:   osteoporosis   • Mammo Screening Bilateral With CAD     Standing Status:   Future     Standing Expiration Date:   9/12/2019     Order Specific Question:   Reason for Exam:     Answer:   screening   • Iron   • Vitamin B12 and Folate   • Ferritin   • Reticulocytes   • Comprehensive Metabolic Panel   • Vitamin D 25 hydroxy   • CBC and Differential     Order Specific Question:   Manual Differential     Answer:   No       Outpatient Encounter Prescriptions as of 9/12/2018   Medication Sig Dispense Refill   • benzonatate (TESSALON PERLES) 100 MG capsule Take 2 capsules by mouth 3 (Three) Times a Day As Needed (cough). 30 capsule 0   • BIOTIN PO Take 1 tablet by mouth Daily.     • buPROPion SR (WELLBUTRIN SR) 200 MG 12 hr tablet Take 1 tablet by mouth 2 (Two) Times a Day. 180 tablet 2   • Cholecalciferol (VITAMIN D3) 5000 UNITS capsule capsule Take 5,000 Units by mouth Daily.     • ferrous sulfate 325 (65 FE) MG tablet Take 325 mg by mouth 2 (Two) Times a Day With Meals.     • fluticasone-salmeterol (ADVAIR) 500-50 MCG/DOSE DISKUS Inhale 1 puff 2 (Two) Times a Day.     • gabapentin (NEURONTIN) 300 MG capsule Take 300 mg by mouth 3 (Three) Times a Day.     • guaiFENesin (MUCINEX) 600 MG 12 hr tablet Take 2 tablets by mouth Every 12 (Twelve) Hours. 60 tablet 11   • lansoprazole (PREVACID) 15 MG capsule Take 1 capsule by mouth Daily. 90  capsule 0   • meloxicam (MOBIC) 15 MG tablet Take 15 mg by mouth Daily.     • montelukast (SINGULAIR) 10 MG tablet Take 10 mg by mouth Every Night.     • Multiple Vitamins-Minerals (MULTIVITAMIN ADULT PO) Take 1 tablet by mouth Daily.     • oxymetazoline (AFRIN NASAL SPRAY) 0.05 % nasal spray 2 sprays into each nostril 2 (Two) Times a Day. 20 mL 0   • phenazopyridine (PYRIDIUM) 200 MG tablet Take 1 tablet by mouth 3 (Three) Times a Day As Needed for bladder spasms. 6 tablet 0   • roflumilast (DALIRESP) 500 MCG tablet tablet Take 500 mcg by mouth Daily.     • sertraline (ZOLOFT) 100 MG tablet Take 1 tablet by mouth 2 (Two) Times a Day. 60 tablet 1   • SUMAtriptan (IMITREX) 50 MG tablet Take one tablet at onset of headache. May repeat dose one time in 2 hours if headache not relieved. 9 tablet 2   • theophylline (UNIPHYL) 400 MG 24 hr tablet      • Tiotropium Bromide Monohydrate (SPIRIVA RESPIMAT) 2.5 MCG/ACT aerosol solution Inhale 2 puffs Daily.     • VENTOLIN  (90 BASE) MCG/ACT inhaler Inhale 2 puffs Every 4 (Four) Hours As Needed for Wheezing or Shortness of Air.     • zolpidem (AMBIEN) 5 MG tablet TAKE 1 TABLET BY MOUTH EVERY NIGHT AT BEDTIME AS NEEDED 30 tablet 1   • solifenacin (VESICARE) 5 MG tablet Take 1 tablet by mouth Daily. 30 tablet 1     No facility-administered encounter medications on file as of 9/12/2018.        Reviewed use of high risk medication in the elderly: yes  Reviewed for potential of harmful drug interactions in the elderly: not applicable    Follow Up:  No Follow-up on file.     An After Visit Summary and PPPS with all of these plans were given to the patient.

## 2018-09-13 LAB
25(OH)D3+25(OH)D2 SERPL-MCNC: 59 NG/ML (ref 30–100)
ALBUMIN SERPL-MCNC: 4.2 G/DL (ref 3.5–4.8)
ALBUMIN/GLOB SERPL: 1.4 {RATIO} (ref 1.2–2.2)
ALP SERPL-CCNC: 90 IU/L (ref 39–117)
ALT SERPL-CCNC: 9 IU/L (ref 0–32)
AST SERPL-CCNC: 14 IU/L (ref 0–40)
BASOPHILS # BLD AUTO: 0 X10E3/UL (ref 0–0.2)
BASOPHILS NFR BLD AUTO: 0 %
BILIRUB SERPL-MCNC: <0.2 MG/DL (ref 0–1.2)
BUN SERPL-MCNC: 19 MG/DL (ref 8–27)
BUN/CREAT SERPL: 27 (ref 12–28)
CALCIUM SERPL-MCNC: 10 MG/DL (ref 8.7–10.3)
CHLORIDE SERPL-SCNC: 100 MMOL/L (ref 96–106)
CO2 SERPL-SCNC: 29 MMOL/L (ref 20–29)
CREAT SERPL-MCNC: 0.71 MG/DL (ref 0.57–1)
EOSINOPHIL # BLD AUTO: 0.1 X10E3/UL (ref 0–0.4)
EOSINOPHIL NFR BLD AUTO: 1 %
ERYTHROCYTE [DISTWIDTH] IN BLOOD BY AUTOMATED COUNT: 13.2 % (ref 12.3–15.4)
FERRITIN SERPL-MCNC: 40 NG/ML (ref 15–150)
FOLATE SERPL-MCNC: 8.1 NG/ML
GLOBULIN SER CALC-MCNC: 2.9 G/DL (ref 1.5–4.5)
GLUCOSE SERPL-MCNC: 122 MG/DL (ref 65–99)
HCT VFR BLD AUTO: 40.3 % (ref 34–46.6)
HGB BLD-MCNC: 13.4 G/DL (ref 11.1–15.9)
IMM GRANULOCYTES # BLD: 0 X10E3/UL (ref 0–0.1)
IMM GRANULOCYTES NFR BLD: 0 %
IRON SERPL-MCNC: 74 UG/DL (ref 27–139)
LYMPHOCYTES # BLD AUTO: 1.6 X10E3/UL (ref 0.7–3.1)
LYMPHOCYTES NFR BLD AUTO: 23 %
MCH RBC QN AUTO: 31.7 PG (ref 26.6–33)
MCHC RBC AUTO-ENTMCNC: 33.3 G/DL (ref 31.5–35.7)
MCV RBC AUTO: 95 FL (ref 79–97)
MONOCYTES # BLD AUTO: 0.5 X10E3/UL (ref 0.1–0.9)
MONOCYTES NFR BLD AUTO: 7 %
NEUTROPHILS # BLD AUTO: 4.9 X10E3/UL (ref 1.4–7)
NEUTROPHILS NFR BLD AUTO: 69 %
PLATELET # BLD AUTO: 410 X10E3/UL (ref 150–379)
POTASSIUM SERPL-SCNC: 3.5 MMOL/L (ref 3.5–5.2)
PROT SERPL-MCNC: 7.1 G/DL (ref 6–8.5)
RBC # BLD AUTO: 4.23 X10E6/UL (ref 3.77–5.28)
RETICS/RBC NFR AUTO: 1.3 % (ref 0.6–2.6)
SODIUM SERPL-SCNC: 144 MMOL/L (ref 134–144)
VIT B12 SERPL-MCNC: 325 PG/ML (ref 232–1245)
WBC # BLD AUTO: 7.2 X10E3/UL (ref 3.4–10.8)

## 2018-09-20 ENCOUNTER — TELEPHONE (OUTPATIENT)
Dept: FAMILY MEDICINE CLINIC | Facility: CLINIC | Age: 73
End: 2018-09-20

## 2018-09-21 DIAGNOSIS — F32.A ANXIETY AND DEPRESSION: Primary | ICD-10-CM

## 2018-09-21 DIAGNOSIS — F41.9 ANXIETY AND DEPRESSION: Primary | ICD-10-CM

## 2018-10-04 ENCOUNTER — HOSPITAL ENCOUNTER (OUTPATIENT)
Dept: MAMMOGRAPHY | Facility: HOSPITAL | Age: 73
Discharge: HOME | End: 2018-10-04

## 2018-10-04 ENCOUNTER — HOSPITAL ENCOUNTER (OUTPATIENT)
Dept: BONE DENSITY | Facility: HOSPITAL | Age: 73
Discharge: HOME | End: 2018-10-04
Admitting: NURSE PRACTITIONER

## 2018-10-04 DIAGNOSIS — Z12.39 SCREENING FOR BREAST CANCER: ICD-10-CM

## 2018-10-04 PROCEDURE — 77067 SCR MAMMO BI INCL CAD: CPT

## 2018-10-04 PROCEDURE — 77080 DXA BONE DENSITY AXIAL: CPT

## 2018-10-18 ENCOUNTER — OFFICE VISIT (OUTPATIENT)
Dept: FAMILY MEDICINE CLINIC | Facility: CLINIC | Age: 73
End: 2018-10-18

## 2018-10-18 VITALS
BODY MASS INDEX: 19.88 KG/M2 | HEART RATE: 89 BPM | HEIGHT: 62 IN | DIASTOLIC BLOOD PRESSURE: 90 MMHG | WEIGHT: 108 LBS | SYSTOLIC BLOOD PRESSURE: 172 MMHG

## 2018-10-18 DIAGNOSIS — G89.29 CHRONIC BILATERAL THORACIC BACK PAIN: Primary | ICD-10-CM

## 2018-10-18 DIAGNOSIS — M40.204 KYPHOSIS OF THORACIC REGION, UNSPECIFIED KYPHOSIS TYPE: ICD-10-CM

## 2018-10-18 DIAGNOSIS — M54.6 CHRONIC BILATERAL THORACIC BACK PAIN: Primary | ICD-10-CM

## 2018-10-18 PROCEDURE — 99213 OFFICE O/P EST LOW 20 MIN: CPT | Performed by: NURSE PRACTITIONER

## 2018-10-18 RX ORDER — BUDESONIDE 0.5 MG/2ML
0.5 INHALANT ORAL 2 TIMES DAILY
COMMUNITY
Start: 2018-08-09

## 2018-10-18 RX ORDER — ARFORMOTEROL TARTRATE 15 UG/2ML
15 SOLUTION RESPIRATORY (INHALATION)
Status: ON HOLD | COMMUNITY
End: 2020-01-01

## 2018-10-18 NOTE — PROGRESS NOTES
"Siobhan Prakash is a 73 y.o. female.Siobhan states that she has had back pain that has been present for over a month. The pain is a constant dull pain and can become severe.     Siobhan does need a referral to the [facility].       Assessment/Plan   Problem List Items Addressed This Visit     None      Visit Diagnoses     Chronic bilateral thoracic back pain    -  Primary    Kyphosis of thoracic region, unspecified kyphosis type                 No Follow-up on file.  There are no Patient Instructions on file for this visit.    No chief complaint on file.    Social History   Substance Use Topics   • Smoking status: Former Smoker     Quit date: 1999   • Smokeless tobacco: Never Used   • Alcohol use Yes      Comment: ocasional drinker       History of Present Illness     The following portions of the patient's history were reviewed and updated as appropriate:PMHroutine: Social history , Allergies, Current Medications, Active Problem List and Health Maintenance    Review of Systems   Constitutional: Negative for activity change and appetite change.   Respiratory: Positive for shortness of breath.    Musculoskeletal: Positive for back pain.       Objective   Vitals:    10/18/18 1323   BP: 172/90   Pulse: 89   Weight: 49 kg (108 lb)   Height: 157.5 cm (62\")     Body mass index is 19.75 kg/m².  Physical Exam   Constitutional: She appears well-developed and well-nourished. No distress.   HENT:   Head: Normocephalic and atraumatic.   Pulmonary/Chest: She is in respiratory distress.   Musculoskeletal: Normal range of motion.   Pain to thoracic back with moderate kyphosis   Neurological: She is alert.   Nursing note and vitals reviewed.    Reviewed Data:  No visits with results within 1 Month(s) from this visit.   Latest known visit with results is:   Office Visit on 09/12/2018   Component Date Value Ref Range Status   • Iron 09/12/2018 74  27 - 139 ug/dL Final   • Vitamin B-12 09/12/2018 325  232 - 1,245 pg/mL Final "   • Folate 09/12/2018 8.1  >3.0 ng/mL Final    Comment: A serum folate concentration of less than 3.1 ng/mL is  considered to represent clinical deficiency.     • Ferritin 09/12/2018 40  15 - 150 ng/mL Final   • WBC 09/12/2018 7.2  3.4 - 10.8 x10E3/uL Final   • RBC 09/12/2018 4.23  3.77 - 5.28 x10E6/uL Final   • Hemoglobin 09/12/2018 13.4  11.1 - 15.9 g/dL Final   • Hematocrit 09/12/2018 40.3  34.0 - 46.6 % Final   • MCV 09/12/2018 95  79 - 97 fL Final   • MCH 09/12/2018 31.7  26.6 - 33.0 pg Final   • MCHC 09/12/2018 33.3  31.5 - 35.7 g/dL Final   • RDW 09/12/2018 13.2  12.3 - 15.4 % Final   • Platelets 09/12/2018 410* 150 - 379 x10E3/uL Final   • Neutrophil Rel % 09/12/2018 69  Not Estab. % Final   • Lymphocyte Rel % 09/12/2018 23  Not Estab. % Final   • Monocyte Rel % 09/12/2018 7  Not Estab. % Final   • Eosinophil Rel % 09/12/2018 1  Not Estab. % Final   • Basophil Rel % 09/12/2018 0  Not Estab. % Final   • Neutrophils Absolute 09/12/2018 4.9  1.4 - 7.0 x10E3/uL Final   • Lymphocytes Absolute 09/12/2018 1.6  0.7 - 3.1 x10E3/uL Final   • Monocytes Absolute 09/12/2018 0.5  0.1 - 0.9 x10E3/uL Final   • Eosinophils Absolute 09/12/2018 0.1  0.0 - 0.4 x10E3/uL Final   • Basophils Absolute 09/12/2018 0.0  0.0 - 0.2 x10E3/uL Final   • Immature Granulocyte Rel % 09/12/2018 0  Not Estab. % Final   • Immature Grans Absolute 09/12/2018 0.0  0.0 - 0.1 x10E3/uL Final   • Reticulocyte Absolute 09/12/2018 1.3  0.6 - 2.6 % Final   • Glucose 09/12/2018 122* 65 - 99 mg/dL Final   • BUN 09/12/2018 19  8 - 27 mg/dL Final   • Creatinine 09/12/2018 0.71  0.57 - 1.00 mg/dL Final   • eGFR Non  Am 09/12/2018 86  >59 mL/min/1.73 Final   • eGFR African Am 09/12/2018 99  >59 mL/min/1.73 Final   • BUN/Creatinine Ratio 09/12/2018 27  12 - 28 Final   • Sodium 09/12/2018 144  134 - 144 mmol/L Final   • Potassium 09/12/2018 3.5  3.5 - 5.2 mmol/L Final   • Chloride 09/12/2018 100  96 - 106 mmol/L Final   • Total CO2 09/12/2018 29  20 - 29  mmol/L Final   • Calcium 09/12/2018 10.0  8.7 - 10.3 mg/dL Final   • Total Protein 09/12/2018 7.1  6.0 - 8.5 g/dL Final   • Albumin 09/12/2018 4.2  3.5 - 4.8 g/dL Final   • Globulin 09/12/2018 2.9  1.5 - 4.5 g/dL Final   • A/G Ratio 09/12/2018 1.4  1.2 - 2.2 Final   • Total Bilirubin 09/12/2018 <0.2  0.0 - 1.2 mg/dL Final   • Alkaline Phosphatase 09/12/2018 90  39 - 117 IU/L Final   • AST (SGOT) 09/12/2018 14  0 - 40 IU/L Final   • ALT (SGPT) 09/12/2018 9  0 - 32 IU/L Final   • 25 Hydroxy, Vitamin D 09/12/2018 59.0  30.0 - 100.0 ng/mL Final    Comment: Vitamin D deficiency has been defined by the [place] of  Medicine and an Endocrine Society practice guideline as a  level of serum 25-OH vitamin D less than 20 ng/mL (1,2).  The Endocrine Society went on to further define vitamin D  insufficiency as a level between 21 and 29 ng/mL (2).  1. IOM ([place] of Medicine). 2010. Dietary reference     intakes for calcium and D. [place]: The     National Academies Press.  2. Tennille AJ, Joyce JOHNSON, Rosalia ANDERSON, et al.     Evaluation, treatment, and prevention of vitamin D     deficiency: an Endocrine Society clinical practice     guideline. JCEM. 2011 Jul; 96(7):1911-30.

## 2018-10-23 ENCOUNTER — TELEPHONE (OUTPATIENT)
Dept: FAMILY MEDICINE CLINIC | Facility: CLINIC | Age: 73
End: 2018-10-23

## 2018-10-23 DIAGNOSIS — J44.9 COPD, SEVERE (HCC): Primary | ICD-10-CM

## 2018-10-24 NOTE — TELEPHONE ENCOUNTER
I called Dr Hightower's office yesterday and they did speak to her about her pain and that they thought it might be her osteoporosis, and  She requested that we order the tests that the [facility] wanted done

## 2018-10-31 DIAGNOSIS — F51.01 PRIMARY INSOMNIA: ICD-10-CM

## 2018-10-31 RX ORDER — ZOLPIDEM TARTRATE 5 MG/1
TABLET ORAL
Qty: 30 TABLET | Refills: 0 | Status: SHIPPED | OUTPATIENT
Start: 2018-10-31 | End: 2018-12-05

## 2018-11-02 ENCOUNTER — TELEPHONE (OUTPATIENT)
Dept: FAMILY MEDICINE CLINIC | Facility: CLINIC | Age: 73
End: 2018-11-02

## 2018-11-02 NOTE — TELEPHONE ENCOUNTER
Spoke to patient about sleep medication, she is in agreement to see Dr. Mccormick regarding a better alternative.

## 2018-11-19 ENCOUNTER — TELEPHONE (OUTPATIENT)
Dept: FAMILY MEDICINE CLINIC | Facility: CLINIC | Age: 73
End: 2018-11-19

## 2018-11-19 DIAGNOSIS — N32.81 OAB (OVERACTIVE BLADDER): ICD-10-CM

## 2018-11-19 RX ORDER — SOLIFENACIN SUCCINATE 5 MG/1
5 TABLET, FILM COATED ORAL DAILY
Qty: 30 TABLET | Refills: 1 | Status: SHIPPED | OUTPATIENT
Start: 2018-11-19 | End: 2019-02-27 | Stop reason: ALTCHOICE

## 2018-11-19 NOTE — TELEPHONE ENCOUNTER
Please call Siobhan about the referral to [facility], she states that [facility] has not received this referral.    Secondly patient asked for pain cream, I will fax this today.

## 2018-11-28 DIAGNOSIS — R06.02 SHORTNESS OF BREATH: Primary | ICD-10-CM

## 2018-11-29 ENCOUNTER — OFFICE VISIT (OUTPATIENT)
Dept: FAMILY MEDICINE CLINIC | Facility: CLINIC | Age: 73
End: 2018-11-29

## 2018-11-29 VITALS
DIASTOLIC BLOOD PRESSURE: 80 MMHG | OXYGEN SATURATION: 91 % | RESPIRATION RATE: 20 BRPM | SYSTOLIC BLOOD PRESSURE: 130 MMHG | HEART RATE: 103 BPM

## 2018-11-29 DIAGNOSIS — M54.6 CHRONIC BILATERAL THORACIC BACK PAIN: Primary | ICD-10-CM

## 2018-11-29 DIAGNOSIS — J43.9 PULMONARY EMPHYSEMA, UNSPECIFIED EMPHYSEMA TYPE (HCC): ICD-10-CM

## 2018-11-29 DIAGNOSIS — G47.01 INSOMNIA DUE TO MEDICAL CONDITION: ICD-10-CM

## 2018-11-29 DIAGNOSIS — G89.29 CHRONIC BILATERAL THORACIC BACK PAIN: Primary | ICD-10-CM

## 2018-11-29 PROCEDURE — 99214 OFFICE O/P EST MOD 30 MIN: CPT | Performed by: NURSE PRACTITIONER

## 2018-11-29 RX ORDER — BACLOFEN 20 MG/1
TABLET ORAL
Qty: 270 TABLET | Refills: 3 | Status: SHIPPED | OUTPATIENT
Start: 2018-11-29 | End: 2019-01-01 | Stop reason: SDUPTHER

## 2018-11-29 RX ORDER — BACLOFEN 20 MG/1
20 TABLET ORAL 3 TIMES DAILY
Qty: 30 TABLET | Refills: 3 | Status: SHIPPED | OUTPATIENT
Start: 2018-11-29 | End: 2018-11-29 | Stop reason: SDUPTHER

## 2018-11-29 NOTE — PROGRESS NOTES
Siobhan Prakash is a 73 y.o. female.Siobhan presents with back pain. She has been using a topical cream that stopped helping.   Is followed by pulmonology and they do not think it has anything to do with her copd.  Pain with movement, she seems to think it is muscle pain.    Assessment/Plan   Problem List Items Addressed This Visit     None             No Follow-up on file.  There are no Patient Instructions on file for this visit.    Chief Complaint   Patient presents with   • Back Pain     Social History     Tobacco Use   • Smoking status: Former Smoker     Last attempt to quit:      Years since quittin.9   • Smokeless tobacco: Never Used   Substance Use Topics   • Alcohol use: Yes     Comment: ocasional drinker   • Drug use: No       History of Present Illness     The following portions of the patient's history were reviewed and updated as appropriate:PMHroutine: Social history , Past Medical History, Surgical history , Allergies, Current Medications, Active Problem List, Family History and Health Maintenance    Review of Systems   Constitutional: Negative for fever.   Cardiovascular: Negative for chest pain.   Gastrointestinal: Negative for abdominal pain.   Genitourinary: Negative for dysuria and pelvic pain.   Musculoskeletal: Positive for back pain.   Neurological: Negative for weakness, numbness and headaches.       Objective   There were no vitals filed for this visit.  There is no height or weight on file to calculate BMI.  Physical Exam   Constitutional: She appears well-developed and well-nourished. No distress.   HENT:   Head: Normocephalic and atraumatic.   Eyes: EOM are normal.   Cardiovascular: Normal rate and regular rhythm.   Pulmonary/Chest: She is in respiratory distress. She has no rales.   Musculoskeletal: She exhibits tenderness.   Has generalized tenderness to her upper back   Skin: Skin is warm.   Nursing note and vitals reviewed.    Reviewed Data:  No visits with results within 1  Month(s) from this visit.   Latest known visit with results is:   Office Visit on 09/12/2018   Component Date Value Ref Range Status   • Iron 09/12/2018 74  27 - 139 ug/dL Final   • Vitamin B-12 09/12/2018 325  232 - 1,245 pg/mL Final   • Folate 09/12/2018 8.1  >3.0 ng/mL Final    Comment: A serum folate concentration of less than 3.1 ng/mL is  considered to represent clinical deficiency.     • Ferritin 09/12/2018 40  15 - 150 ng/mL Final   • WBC 09/12/2018 7.2  3.4 - 10.8 x10E3/uL Final   • RBC 09/12/2018 4.23  3.77 - 5.28 x10E6/uL Final   • Hemoglobin 09/12/2018 13.4  11.1 - 15.9 g/dL Final   • Hematocrit 09/12/2018 40.3  34.0 - 46.6 % Final   • MCV 09/12/2018 95  79 - 97 fL Final   • MCH 09/12/2018 31.7  26.6 - 33.0 pg Final   • MCHC 09/12/2018 33.3  31.5 - 35.7 g/dL Final   • RDW 09/12/2018 13.2  12.3 - 15.4 % Final   • Platelets 09/12/2018 410* 150 - 379 x10E3/uL Final   • Neutrophil Rel % 09/12/2018 69  Not Estab. % Final   • Lymphocyte Rel % 09/12/2018 23  Not Estab. % Final   • Monocyte Rel % 09/12/2018 7  Not Estab. % Final   • Eosinophil Rel % 09/12/2018 1  Not Estab. % Final   • Basophil Rel % 09/12/2018 0  Not Estab. % Final   • Neutrophils Absolute 09/12/2018 4.9  1.4 - 7.0 x10E3/uL Final   • Lymphocytes Absolute 09/12/2018 1.6  0.7 - 3.1 x10E3/uL Final   • Monocytes Absolute 09/12/2018 0.5  0.1 - 0.9 x10E3/uL Final   • Eosinophils Absolute 09/12/2018 0.1  0.0 - 0.4 x10E3/uL Final   • Basophils Absolute 09/12/2018 0.0  0.0 - 0.2 x10E3/uL Final   • Immature Granulocyte Rel % 09/12/2018 0  Not Estab. % Final   • Immature Grans Absolute 09/12/2018 0.0  0.0 - 0.1 x10E3/uL Final   • Reticulocyte Absolute 09/12/2018 1.3  0.6 - 2.6 % Final   • Glucose 09/12/2018 122* 65 - 99 mg/dL Final   • BUN 09/12/2018 19  8 - 27 mg/dL Final   • Creatinine 09/12/2018 0.71  0.57 - 1.00 mg/dL Final   • eGFR Non  Am 09/12/2018 86  >59 mL/min/1.73 Final   • eGFR African Am 09/12/2018 99  >59 mL/min/1.73 Final   •  BUN/Creatinine Ratio 09/12/2018 27  12 - 28 Final   • Sodium 09/12/2018 144  134 - 144 mmol/L Final   • Potassium 09/12/2018 3.5  3.5 - 5.2 mmol/L Final   • Chloride 09/12/2018 100  96 - 106 mmol/L Final   • Total CO2 09/12/2018 29  20 - 29 mmol/L Final   • Calcium 09/12/2018 10.0  8.7 - 10.3 mg/dL Final   • Total Protein 09/12/2018 7.1  6.0 - 8.5 g/dL Final   • Albumin 09/12/2018 4.2  3.5 - 4.8 g/dL Final   • Globulin 09/12/2018 2.9  1.5 - 4.5 g/dL Final   • A/G Ratio 09/12/2018 1.4  1.2 - 2.2 Final   • Total Bilirubin 09/12/2018 <0.2  0.0 - 1.2 mg/dL Final   • Alkaline Phosphatase 09/12/2018 90  39 - 117 IU/L Final   • AST (SGOT) 09/12/2018 14  0 - 40 IU/L Final   • ALT (SGPT) 09/12/2018 9  0 - 32 IU/L Final   • 25 Hydroxy, Vitamin D 09/12/2018 59.0  30.0 - 100.0 ng/mL Final    Comment: Vitamin D deficiency has been defined by the [place] of  Medicine and an Endocrine Society practice guideline as a  level of serum 25-OH vitamin D less than 20 ng/mL (1,2).  The Endocrine Society went on to further define vitamin D  insufficiency as a level between 21 and 29 ng/mL (2).  1. IOM ([place] of Medicine). 2010. Dietary reference     intakes for calcium and D. [place]: The     National Academies Press.  2. Tennille MF, Joyce NC, Rosalia ANDERSON, et al.     Evaluation, treatment, and prevention of vitamin D     deficiency: an Endocrine Society clinical practice     guideline. JCEM. 2011 Jul; 96(7):1911-30.       Pt suffers from insomnia and has been on ambien for the last 5 years, Dr Mccormick came and talked to Ms Prakash to see about transitioning her to ativan because it does not have the side effect of respiratory depression, she is going to speak with Dr. Hightower about this.

## 2018-12-05 ENCOUNTER — TELEPHONE (OUTPATIENT)
Dept: FAMILY MEDICINE CLINIC | Facility: CLINIC | Age: 73
End: 2018-12-05

## 2018-12-05 RX ORDER — TRAMADOL HYDROCHLORIDE 50 MG/1
TABLET ORAL
Qty: 30 TABLET | Refills: 3 | Status: SHIPPED | OUTPATIENT
Start: 2018-12-05 | End: 2018-12-19

## 2018-12-05 NOTE — TELEPHONE ENCOUNTER
----- Message from Marisela Barton MA sent at 12/5/2018  1:01 PM EST -----   called and tried to leave you a message. Pt given baclofen on 11/29/18 and seems to be having a reaction. Pt is not sleeping well at all. Left message on Monday but never heard back. Phone [phone] or  at [phone]

## 2018-12-07 ENCOUNTER — TELEPHONE (OUTPATIENT)
Dept: FAMILY MEDICINE CLINIC | Facility: CLINIC | Age: 73
End: 2018-12-07

## 2018-12-07 NOTE — TELEPHONE ENCOUNTER
The pharmacy called stating that patient's Tramadol was sent in to take once at night, the  disagree's with the directions.

## 2018-12-18 RX ORDER — SERTRALINE HYDROCHLORIDE 100 MG/1
100 TABLET, FILM COATED ORAL 2 TIMES DAILY
Qty: 60 TABLET | Refills: 1 | Status: CANCELLED | OUTPATIENT
Start: 2018-12-18

## 2018-12-19 ENCOUNTER — OFFICE VISIT (OUTPATIENT)
Dept: FAMILY MEDICINE CLINIC | Facility: CLINIC | Age: 73
End: 2018-12-19

## 2018-12-19 VITALS
RESPIRATION RATE: 16 BRPM | WEIGHT: 108 LBS | SYSTOLIC BLOOD PRESSURE: 140 MMHG | OXYGEN SATURATION: 90 % | DIASTOLIC BLOOD PRESSURE: 60 MMHG | BODY MASS INDEX: 19.88 KG/M2 | HEIGHT: 62 IN | HEART RATE: 103 BPM

## 2018-12-19 DIAGNOSIS — F51.01 PERSISTENT INSOMNIA OF NON-ORGANIC ORIGIN: Primary | ICD-10-CM

## 2018-12-19 PROCEDURE — 99213 OFFICE O/P EST LOW 20 MIN: CPT | Performed by: FAMILY MEDICINE

## 2018-12-19 RX ORDER — SERTRALINE HYDROCHLORIDE 100 MG/1
100 TABLET, FILM COATED ORAL 2 TIMES DAILY
Qty: 60 TABLET | Refills: 5 | Status: SHIPPED | OUTPATIENT
Start: 2018-12-19 | End: 2019-06-06 | Stop reason: SDUPTHER

## 2018-12-19 RX ORDER — TRAZODONE HYDROCHLORIDE 50 MG/1
50 TABLET ORAL NIGHTLY
Qty: 30 TABLET | Refills: 6 | Status: SHIPPED | OUTPATIENT
Start: 2018-12-19 | End: 2019-02-27 | Stop reason: ALTCHOICE

## 2018-12-19 NOTE — PROGRESS NOTES
"Subjective   Siobhan Prakash is a 73 y.o. female.     History of Present Illness   Pt of Lyudmila Granados here today to discuss sleep medication.  She has taken Ambien for many years and would like to continue but is open to other medications as well.  She is currently on O2. She is willing to try trazodone.    The following portions of the patient's history were reviewed and updated as appropriate: allergies, current medications, past medical history, past social history, past surgical history and problem list.    Review of Systems   All other systems reviewed and are negative.      Objective   Physical Exam   Constitutional: She is oriented to person, place, and time. She appears well-developed.   HENT:   Head: Normocephalic and atraumatic.   Eyes: EOM are normal. Pupils are equal, round, and reactive to light.   Cardiovascular: Normal rate and regular rhythm.   Pulmonary/Chest:   On nasal cannula O2   Neurological: She is alert and oriented to person, place, and time.   Skin: Skin is warm and dry.   Psychiatric: She has a normal mood and affect. Her behavior is normal. Judgment and thought content normal.   Nursing note and vitals reviewed.      Assessment/Plan   Siobhan was seen today for insomnia.    Diagnoses and all orders for this visit:    Persistent insomnia of non-organic origin  She readily admits that she is a \"night owl\" and would rather be up all night.Unfortunately, most days she needs toget up in the morning so she is willing to try trazodone. I have explained to her the risks of taking any sleep med and encouraged her to try to stay awake during the day and get her days an nights re-arranged and to take sleep meds only as needed.   -     traZODone (DESYREL) 50 MG tablet; Take 1 tablet by mouth Every Night.    Patient Instructions   Work on staying awake during the day and let's check in 3 weeks,               Patient has been erroneously marked as diabetic. Based on the available clinical " information, she does not have diabetes and should therefore be excluded from diabetic health maintenance and quality measures for the remainder of the reporting period.+

## 2019-01-01 RX ORDER — SERTRALINE HYDROCHLORIDE 100 MG/1
TABLET, FILM COATED ORAL
Qty: 180 TABLET | Refills: 0 | Status: SHIPPED | OUTPATIENT
Start: 2019-01-01 | End: 2020-01-01

## 2019-01-01 RX ORDER — BACLOFEN 20 MG/1
TABLET ORAL
Qty: 270 TABLET | Refills: 0 | Status: SHIPPED | OUTPATIENT
Start: 2019-01-01 | End: 2020-01-01

## 2019-01-14 ENCOUNTER — OFFICE VISIT (OUTPATIENT)
Dept: FAMILY MEDICINE CLINIC | Facility: CLINIC | Age: 74
End: 2019-01-14

## 2019-01-14 VITALS — OXYGEN SATURATION: 91 % | DIASTOLIC BLOOD PRESSURE: 70 MMHG | HEART RATE: 107 BPM | SYSTOLIC BLOOD PRESSURE: 122 MMHG

## 2019-01-14 DIAGNOSIS — G47.00 INSOMNIA, UNSPECIFIED TYPE: Primary | ICD-10-CM

## 2019-01-14 DIAGNOSIS — J43.9 PULMONARY EMPHYSEMA, UNSPECIFIED EMPHYSEMA TYPE (HCC): ICD-10-CM

## 2019-01-14 PROCEDURE — 99213 OFFICE O/P EST LOW 20 MIN: CPT | Performed by: NURSE PRACTITIONER

## 2019-01-14 RX ORDER — ZOLPIDEM TARTRATE 10 MG/1
10 TABLET ORAL NIGHTLY PRN
Qty: 30 TABLET | Refills: 0 | Status: SHIPPED | OUTPATIENT
Start: 2019-01-14 | End: 2019-08-01 | Stop reason: SDUPTHER

## 2019-01-14 NOTE — PROGRESS NOTES
Siobhan Prakash is a 73 y.o. female.Siobhan presents for a follow up of insomnia. She started Trazodone at her last visit with Dr. Mccormick. Siobhan is sleeping during the day not the night and she is having nightmares.     She reports that the trazodone did not help and would like to go back on the ambien    H/O emphysema and is followed by pulmonary      Assessment/Plan   Problem List Items Addressed This Visit     None             No Follow-up on file.  There are no Patient Instructions on file for this visit.    No chief complaint on file.    Social History     Tobacco Use   • Smoking status: Former Smoker     Last attempt to quit:      Years since quittin.0   • Smokeless tobacco: Never Used   Substance Use Topics   • Alcohol use: Yes     Comment: ocasional drinker   • Drug use: No       History of Present Illness     The following portions of the patient's history were reviewed and updated as appropriate:PMHroutine: Social history , Allergies, Current Medications, Active Problem List and Health Maintenance    Review of Systems   Constitutional: Positive for fatigue. Negative for activity change and appetite change.   HENT: Negative for congestion.    Respiratory: Positive for shortness of breath.    Psychiatric/Behavioral: Positive for sleep disturbance.       Objective   There were no vitals filed for this visit.  There is no height or weight on file to calculate BMI.  Physical Exam   Constitutional: She appears well-developed and well-nourished. No distress.   HENT:   Head: Normocephalic and atraumatic.   Right Ear: External ear normal.   Left Ear: External ear normal.   Eyes: EOM are normal.   Neck: Neck supple.   Cardiovascular: Normal rate and regular rhythm.   Pulmonary/Chest: She is in respiratory distress.   Neurological: She is alert.   Nursing note and vitals reviewed.    Reviewed Data:  No visits with results within 1 Month(s) from this visit.   Latest known visit with results is:   Office  Visit on 09/12/2018   Component Date Value Ref Range Status   • Iron 09/12/2018 74  27 - 139 ug/dL Final   • Vitamin B-12 09/12/2018 325  232 - 1,245 pg/mL Final   • Folate 09/12/2018 8.1  >3.0 ng/mL Final    Comment: A serum folate concentration of less than 3.1 ng/mL is  considered to represent clinical deficiency.     • Ferritin 09/12/2018 40  15 - 150 ng/mL Final   • WBC 09/12/2018 7.2  3.4 - 10.8 x10E3/uL Final   • RBC 09/12/2018 4.23  3.77 - 5.28 x10E6/uL Final   • Hemoglobin 09/12/2018 13.4  11.1 - 15.9 g/dL Final   • Hematocrit 09/12/2018 40.3  34.0 - 46.6 % Final   • MCV 09/12/2018 95  79 - 97 fL Final   • MCH 09/12/2018 31.7  26.6 - 33.0 pg Final   • MCHC 09/12/2018 33.3  31.5 - 35.7 g/dL Final   • RDW 09/12/2018 13.2  12.3 - 15.4 % Final   • Platelets 09/12/2018 410* 150 - 379 x10E3/uL Final   • Neutrophil Rel % 09/12/2018 69  Not Estab. % Final   • Lymphocyte Rel % 09/12/2018 23  Not Estab. % Final   • Monocyte Rel % 09/12/2018 7  Not Estab. % Final   • Eosinophil Rel % 09/12/2018 1  Not Estab. % Final   • Basophil Rel % 09/12/2018 0  Not Estab. % Final   • Neutrophils Absolute 09/12/2018 4.9  1.4 - 7.0 x10E3/uL Final   • Lymphocytes Absolute 09/12/2018 1.6  0.7 - 3.1 x10E3/uL Final   • Monocytes Absolute 09/12/2018 0.5  0.1 - 0.9 x10E3/uL Final   • Eosinophils Absolute 09/12/2018 0.1  0.0 - 0.4 x10E3/uL Final   • Basophils Absolute 09/12/2018 0.0  0.0 - 0.2 x10E3/uL Final   • Immature Granulocyte Rel % 09/12/2018 0  Not Estab. % Final   • Immature Grans Absolute 09/12/2018 0.0  0.0 - 0.1 x10E3/uL Final   • Reticulocyte Absolute 09/12/2018 1.3  0.6 - 2.6 % Final   • Glucose 09/12/2018 122* 65 - 99 mg/dL Final   • BUN 09/12/2018 19  8 - 27 mg/dL Final   • Creatinine 09/12/2018 0.71  0.57 - 1.00 mg/dL Final   • eGFR Non  Am 09/12/2018 86  >59 mL/min/1.73 Final   • eGFR African Am 09/12/2018 99  >59 mL/min/1.73 Final   • BUN/Creatinine Ratio 09/12/2018 27  12 - 28 Final   • Sodium 09/12/2018 144  134  - 144 mmol/L Final   • Potassium 09/12/2018 3.5  3.5 - 5.2 mmol/L Final   • Chloride 09/12/2018 100  96 - 106 mmol/L Final   • Total CO2 09/12/2018 29  20 - 29 mmol/L Final   • Calcium 09/12/2018 10.0  8.7 - 10.3 mg/dL Final   • Total Protein 09/12/2018 7.1  6.0 - 8.5 g/dL Final   • Albumin 09/12/2018 4.2  3.5 - 4.8 g/dL Final   • Globulin 09/12/2018 2.9  1.5 - 4.5 g/dL Final   • A/G Ratio 09/12/2018 1.4  1.2 - 2.2 Final   • Total Bilirubin 09/12/2018 <0.2  0.0 - 1.2 mg/dL Final   • Alkaline Phosphatase 09/12/2018 90  39 - 117 IU/L Final   • AST (SGOT) 09/12/2018 14  0 - 40 IU/L Final   • ALT (SGPT) 09/12/2018 9  0 - 32 IU/L Final   • 25 Hydroxy, Vitamin D 09/12/2018 59.0  30.0 - 100.0 ng/mL Final    Comment: Vitamin D deficiency has been defined by the [place] [employer] and an Endocrine Society practice guideline as a  level of serum 25-OH vitamin D less than 20 ng/mL (1,2).  The Endocrine Society went on to further define vitamin D  insufficiency as a level between 21 and 29 ng/mL (2).  1. IOM ([place] [employer]). 2010. Dietary reference     intakes for calcium and D. [place]: The     National Academies Press.  2. Tennille MF, Joyce NC, Rosalia ANDERSON, et al.     Evaluation, treatment, and prevention of vitamin D     deficiency: an Endocrine Society clinical practice     guideline. JCEM. 2011 Jul; 96(7):1911-30.

## 2019-01-18 ENCOUNTER — TELEPHONE (OUTPATIENT)
Dept: FAMILY MEDICINE CLINIC | Facility: CLINIC | Age: 74
End: 2019-01-18

## 2019-02-27 ENCOUNTER — OFFICE VISIT (OUTPATIENT)
Dept: CARDIOLOGY | Facility: CLINIC | Age: 74
End: 2019-02-27

## 2019-02-27 ENCOUNTER — HOSPITAL ENCOUNTER (OUTPATIENT)
Dept: CARDIOLOGY | Facility: HOSPITAL | Age: 74
Discharge: HOME | End: 2019-02-27
Admitting: INTERNAL MEDICINE

## 2019-02-27 VITALS
SYSTOLIC BLOOD PRESSURE: 128 MMHG | HEART RATE: 110 BPM | BODY MASS INDEX: 18.03 KG/M2 | DIASTOLIC BLOOD PRESSURE: 70 MMHG | WEIGHT: 98 LBS | HEIGHT: 62 IN

## 2019-02-27 DIAGNOSIS — R94.31 ABNORMAL ECG: ICD-10-CM

## 2019-02-27 DIAGNOSIS — J43.9 PULMONARY EMPHYSEMA, UNSPECIFIED EMPHYSEMA TYPE (HCC): Primary | ICD-10-CM

## 2019-02-27 LAB
ASCENDING AORTA: 2.8 CM
BH CV ECHO MEAS - ACS: 2.1 CM
BH CV ECHO MEAS - AO MAX PG (FULL): 2.4 MMHG
BH CV ECHO MEAS - AO MAX PG: 4.9 MMHG
BH CV ECHO MEAS - AO MEAN PG (FULL): 1.8 MMHG
BH CV ECHO MEAS - AO MEAN PG: 3 MMHG
BH CV ECHO MEAS - AO ROOT AREA (BSA CORRECTED): 2.4
BH CV ECHO MEAS - AO ROOT AREA: 8.9 CM^2
BH CV ECHO MEAS - AO ROOT DIAM: 3.4 CM
BH CV ECHO MEAS - AO V2 MAX: 110.1 CM/SEC
BH CV ECHO MEAS - AO V2 MEAN: 84.5 CM/SEC
BH CV ECHO MEAS - AO V2 VTI: 15.8 CM
BH CV ECHO MEAS - AVA(I,A): 1.7 CM^2
BH CV ECHO MEAS - AVA(I,D): 1.7 CM^2
BH CV ECHO MEAS - AVA(V,A): 2 CM^2
BH CV ECHO MEAS - AVA(V,D): 2 CM^2
BH CV ECHO MEAS - BSA(HAYCOCK): 1.4 M^2
BH CV ECHO MEAS - BSA: 1.4 M^2
BH CV ECHO MEAS - BZI_BMI: 17.9 KILOGRAMS/M^2
BH CV ECHO MEAS - BZI_METRIC_HEIGHT: 157.5 CM
BH CV ECHO MEAS - BZI_METRIC_WEIGHT: 44.5 KG
BH CV ECHO MEAS - EDV(MOD-SP2): 51 ML
BH CV ECHO MEAS - EDV(MOD-SP4): 45 ML
BH CV ECHO MEAS - EDV(TEICH): 95.3 ML
BH CV ECHO MEAS - EF(CUBED): 72.6 %
BH CV ECHO MEAS - EF(MOD-BP): 60 %
BH CV ECHO MEAS - EF(MOD-SP2): 58.8 %
BH CV ECHO MEAS - EF(MOD-SP4): 62.2 %
BH CV ECHO MEAS - EF(TEICH): 64.4 %
BH CV ECHO MEAS - ESV(MOD-SP2): 21 ML
BH CV ECHO MEAS - ESV(MOD-SP4): 17 ML
BH CV ECHO MEAS - ESV(TEICH): 33.9 ML
BH CV ECHO MEAS - IVS/LVPW: 0.92
BH CV ECHO MEAS - IVSD: 1.1 CM
BH CV ECHO MEAS - LAT PEAK E' VEL: 6 CM/SEC
BH CV ECHO MEAS - LV DIASTOLIC VOL/BSA (35-75): 31.9 ML/M^2
BH CV ECHO MEAS - LV MASS(C)D: 189.7 GRAMS
BH CV ECHO MEAS - LV MASS(C)DI: 134.4 GRAMS/M^2
BH CV ECHO MEAS - LV MAX PG: 2.4 MMHG
BH CV ECHO MEAS - LV MEAN PG: 1.2 MMHG
BH CV ECHO MEAS - LV SYSTOLIC VOL/BSA (12-30): 12 ML/M^2
BH CV ECHO MEAS - LV V1 MAX: 78.1 CM/SEC
BH CV ECHO MEAS - LV V1 MEAN: 49 CM/SEC
BH CV ECHO MEAS - LV V1 VTI: 9.3 CM
BH CV ECHO MEAS - LVIDD: 4.6 CM
BH CV ECHO MEAS - LVIDS: 3 CM
BH CV ECHO MEAS - LVLD AP2: 7.4 CM
BH CV ECHO MEAS - LVLD AP4: 7.4 CM
BH CV ECHO MEAS - LVLS AP2: 6.3 CM
BH CV ECHO MEAS - LVLS AP4: 6 CM
BH CV ECHO MEAS - LVOT AREA (M): 2.8 CM^2
BH CV ECHO MEAS - LVOT AREA: 2.8 CM^2
BH CV ECHO MEAS - LVOT DIAM: 1.9 CM
BH CV ECHO MEAS - LVPWD: 1.2 CM
BH CV ECHO MEAS - MED PEAK E' VEL: 8 CM/SEC
BH CV ECHO MEAS - MV A DUR: 0.11 SEC
BH CV ECHO MEAS - MV A MAX VEL: 78.1 CM/SEC
BH CV ECHO MEAS - MV DEC SLOPE: 338.8 CM/SEC^2
BH CV ECHO MEAS - MV DEC TIME: 0.11 SEC
BH CV ECHO MEAS - MV E MAX VEL: 40.7 CM/SEC
BH CV ECHO MEAS - MV E/A: 0.52
BH CV ECHO MEAS - MV MAX PG: 3.3 MMHG
BH CV ECHO MEAS - MV MEAN PG: 1.3 MMHG
BH CV ECHO MEAS - MV P1/2T MAX VEL: 41.2 CM/SEC
BH CV ECHO MEAS - MV P1/2T: 35.6 MSEC
BH CV ECHO MEAS - MV V2 MAX: 90.8 CM/SEC
BH CV ECHO MEAS - MV V2 MEAN: 52.2 CM/SEC
BH CV ECHO MEAS - MV V2 VTI: 16.5 CM
BH CV ECHO MEAS - MVA P1/2T LCG: 5.3 CM^2
BH CV ECHO MEAS - MVA(P1/2T): 6.2 CM^2
BH CV ECHO MEAS - MVA(VTI): 1.6 CM^2
BH CV ECHO MEAS - PA ACC TIME: 0.08 SEC
BH CV ECHO MEAS - PA MAX PG (FULL): 1.4 MMHG
BH CV ECHO MEAS - PA MAX PG: 2.8 MMHG
BH CV ECHO MEAS - PA PR(ACCEL): 41 MMHG
BH CV ECHO MEAS - PA V2 MAX: 83 CM/SEC
BH CV ECHO MEAS - PULM A REVS DUR: 0.08 SEC
BH CV ECHO MEAS - PULM A REVS VEL: 94.1 CM/SEC
BH CV ECHO MEAS - PULM DIAS VEL: 30.1 CM/SEC
BH CV ECHO MEAS - PULM S/D: 1.9
BH CV ECHO MEAS - PULM SYS VEL: 55.8 CM/SEC
BH CV ECHO MEAS - PVA(V,A): 2 CM^2
BH CV ECHO MEAS - PVA(V,D): 2 CM^2
BH CV ECHO MEAS - QP/QS: 0.98
BH CV ECHO MEAS - RAP SYSTOLE: 3 MMHG
BH CV ECHO MEAS - RV MAX PG: 1.3 MMHG
BH CV ECHO MEAS - RV MEAN PG: 0.72 MMHG
BH CV ECHO MEAS - RV V1 MAX: 57.2 CM/SEC
BH CV ECHO MEAS - RV V1 MEAN: 40.5 CM/SEC
BH CV ECHO MEAS - RV V1 VTI: 9.1 CM
BH CV ECHO MEAS - RVOT AREA: 2.8 CM^2
BH CV ECHO MEAS - RVOT DIAM: 1.9 CM
BH CV ECHO MEAS - RVSP: 23 MMHG
BH CV ECHO MEAS - SI(AO): 99.5 ML/M^2
BH CV ECHO MEAS - SI(CUBED): 48.7 ML/M^2
BH CV ECHO MEAS - SI(LVOT): 18.7 ML/M^2
BH CV ECHO MEAS - SI(MOD-SP2): 21.3 ML/M^2
BH CV ECHO MEAS - SI(MOD-SP4): 19.8 ML/M^2
BH CV ECHO MEAS - SI(TEICH): 43.5 ML/M^2
BH CV ECHO MEAS - SUP REN AO DIAM: 1.4 CM
BH CV ECHO MEAS - SV(AO): 140.5 ML
BH CV ECHO MEAS - SV(CUBED): 68.8 ML
BH CV ECHO MEAS - SV(LVOT): 26.4 ML
BH CV ECHO MEAS - SV(MOD-SP2): 30 ML
BH CV ECHO MEAS - SV(MOD-SP4): 28 ML
BH CV ECHO MEAS - SV(RVOT): 25.8 ML
BH CV ECHO MEAS - SV(TEICH): 61.4 ML
BH CV ECHO MEAS - TAPSE (>1.6): 1.3 CM2
BH CV ECHO MEAS - TR MAX VEL: 220.8 CM/SEC
BH CV ECHO MEASUREMENTS AVERAGE E/E' RATIO: 5.81
BH CV XLRA - RV BASE: 2.2 CM
BH CV XLRA - TDI S': 8 CM/SEC
LEFT ATRIUM VOLUME INDEX: 14 ML/M2
SINUS: 3.1 CM
STJ: 2.1 CM

## 2019-02-27 PROCEDURE — 93306 TTE W/DOPPLER COMPLETE: CPT

## 2019-02-27 PROCEDURE — 93000 ELECTROCARDIOGRAM COMPLETE: CPT | Performed by: INTERNAL MEDICINE

## 2019-02-27 PROCEDURE — 99204 OFFICE O/P NEW MOD 45 MIN: CPT | Performed by: INTERNAL MEDICINE

## 2019-02-27 PROCEDURE — 93306 TTE W/DOPPLER COMPLETE: CPT | Performed by: INTERNAL MEDICINE

## 2019-02-27 NOTE — ADDENDUM NOTE
Addended by: REG MOLINA on: 2/27/2019 11:22 AM     Modules accepted: [facility]     [facility] Occupational Therapy Triage Note  No skilled OT services required    Occupational therapy order received and discharged following established triage process with  Physical therapy.      PT completed the following to assess the need  for skilled OT services.      Clock drawing and cognitive screen and ADL screens for cognitive and ADL/selfcare  deficits completed by the physical therapist (see results and discharge recommendations in PT plan of care note).   Results indicate that no acute care level Occupational therapy services are indicated.      Post acute recommendations:    OT:  No skilled OT services indicated at this time.       PT:           Recommendation for Discharge: PT: Home (03/09/18 3685)

## 2019-02-27 NOTE — PROGRESS NOTES
Date of Office Visit: 2019  Encounter Provider: Shahid Lee MD  Place of Service: [facility] CARDIOLOGY  Patient Name: Siobhan Prakash  :1945  [id number]    Chief Complaint   Patient presents with   • Cardiac Evaluation   :     HPI: Siobhan Prakash is a 74 y.o. female  She is here for a cardiac evaluation.  She is a lady with severe endstage COPD, is looking to undergo potentially what sounds like lung reduction surgery at the [facility].  She got sick with her pulmonary disease from tobacco abuse.  She stopped 20 years ago.  She has never had cardiac problems.  No PND or orthopnea.  No edema, no syncope, no palpitations.  She does not have diabetes or hypertension.  Her lipid status is good.  She has not had any syncope.          Past Medical History:   Diagnosis Date   • Allergic    • Anxiety    • Arthritis 10/17   • Asthma    • Cataract    • Cholelithiasis    • COPD (chronic obstructive pulmonary disease) (CMS/[facility])    • Depression    • GERD (gastroesophageal reflux disease)    • HL (hearing loss)    • Hyperlipidemia    • Irritable bowel syndrome    • Low back pain    • Migraine    • Osteopenia    • Osteoporosis    • Pneumonia    • Tremor    • Urinary tract infection        Past Surgical History:   Procedure Laterality Date   • CHOLECYSTECTOMY     • COLONOSCOPY     • EYE SURGERY     • HIP ARTHROPLASTY Right    • JOINT REPLACEMENT     • REDUCTION MAMMAPLASTY     • TONSILLECTOMY         Social History     Socioeconomic History   • Marital status:      Spouse name: Vlad   • Number of children: 2   • Years of education: Not on file   • Highest education level: Not on file   Social Needs   • Financial resource strain: Not on file   • Food insecurity - worry: Not on file   • Food insecurity - inability: Not on file   • Transportation needs - medical: Not on file   • Transportation needs - non-medical: Not on file    Occupational History   • Occupation: retired   Tobacco Use   • Smoking status: Former Smoker     Packs/day: 1.00     Years: 15.00     Pack years: 15.00     Start date: 1963     Last attempt to quit:      Years since quittin.1   • Smokeless tobacco: Never Used   • Tobacco comment: Ultra light menthol brand   Substance and Sexual Activity   • Alcohol use: Yes     Comment: Approx. 1 or 2 a month   • Drug use: No   • Sexual activity: Not Currently     Partners: Male     Birth control/protection: Post-menopausal   Other Topics Concern   • Not on file   Social History Narrative   • Not on file       Family History   Problem Relation Age of Onset   • Alcohol abuse Father             • Heart attack Father    • Birth defects Brother    • Hearing loss Brother    • Heart disease Son         Aortic stenosis   • Hyperlipidemia Sister         Hbp       Review of Systems   Constitution: Negative for decreased appetite, fever, malaise/fatigue and weight loss.   HENT: Negative for nosebleeds.    Eyes: Negative for double vision.   Cardiovascular: Negative for chest pain, claudication, cyanosis, dyspnea on exertion, irregular heartbeat, leg swelling, near-syncope, orthopnea, palpitations, paroxysmal nocturnal dyspnea and syncope.   Respiratory: Negative for cough, hemoptysis and shortness of breath.    Hematologic/Lymphatic: Negative for bleeding problem.   Skin: Negative for rash.   Musculoskeletal: Negative for falls and myalgias.   Gastrointestinal: Negative for hematochezia, jaundice, melena, nausea and vomiting.   Genitourinary: Negative for hematuria.   Neurological: Negative for dizziness and seizures.   Psychiatric/Behavioral: Negative for altered mental status and memory loss.       Allergies   Allergen Reactions   • Sulfa Antibiotics Other (See Comments)     Severe weakness   • Codeine Rash and Other (See Comments)     Chills   • Hydrocodone Rash and Other (See Comments)     Chills         Current  Outpatient Medications:   •  arformoterol (BROVANA) 15 MCG/2ML nebulizer solution, Inhale 15 mcg., Disp: , Rfl:   •  baclofen (LIORESAL) 20 MG tablet, TAKE 1 TABLET BY MOUTH THREE TIMES DAILY, Disp: 270 tablet, Rfl: 3  •  benzonatate (TESSALON PERLES) 100 MG capsule, Take 2 capsules by mouth 3 (Three) Times a Day As Needed (cough)., Disp: 30 capsule, Rfl: 0  •  BIOTIN PO, Take 1 tablet by mouth Daily., Disp: , Rfl:   •  budesonide (PULMICORT) 0.5 MG/2ML nebulizer solution, 0.5 mg., Disp: , Rfl:   •  Cholecalciferol (VITAMIN D3) 5000 UNITS capsule capsule, Take 5,000 Units by mouth Daily., Disp: , Rfl:   •  ferrous sulfate 325 (65 FE) MG tablet, Take 325 mg by mouth 2 (Two) Times a Day With Meals., Disp: , Rfl:   •  guaiFENesin (MUCINEX) 600 MG 12 hr tablet, Take 2 tablets by mouth Every 12 (Twelve) Hours., Disp: 60 tablet, Rfl: 11  •  lansoprazole (PREVACID) 15 MG capsule, Take 1 capsule by mouth Daily., Disp: 90 capsule, Rfl: 0  •  meloxicam (MOBIC) 15 MG tablet, Take 15 mg by mouth Daily., Disp: , Rfl:   •  montelukast (SINGULAIR) 10 MG tablet, Take 10 mg by mouth Every Night., Disp: , Rfl:   •  O2 (OXYGEN), Inhale 1 (One) Time., Disp: , Rfl:   •  roflumilast (DALIRESP) 500 MCG tablet tablet, Take 500 mcg by mouth Daily., Disp: , Rfl:   •  sertraline (ZOLOFT) 100 MG tablet, Take 1 tablet by mouth 2 (Two) Times a Day., Disp: 60 tablet, Rfl: 5  •  SUMAtriptan (IMITREX) 50 MG tablet, Take one tablet at onset of headache. May repeat dose one time in 2 hours if headache not relieved., Disp: 9 tablet, Rfl: 2  •  theophylline (UNIPHYL) 400 MG 24 hr tablet, , Disp: , Rfl:   •  Tiotropium Bromide Monohydrate (SPIRIVA RESPIMAT) 2.5 MCG/ACT aerosol solution, Inhale 2 puffs Daily., Disp: , Rfl:   •  VENTOLIN  (90 BASE) MCG/ACT inhaler, Inhale 2 puffs Every 4 (Four) Hours As Needed for Wheezing or Shortness of Air., Disp: , Rfl:   •  zolpidem (AMBIEN) 10 MG tablet, Take 1 tablet by mouth At Night As Needed for Sleep.,  "Disp: 30 tablet, Rfl: 0      Objective:     Vitals:    02/27/19 1014   BP: 128/70   Pulse: 110   Weight: 44.5 kg (98 lb)   Height: 157.5 cm (62\")     Body mass index is 17.92 kg/m².    Physical Exam   Constitutional: She is oriented to person, place, and time. She appears well-developed and well-nourished.   Very thin   HENT:   Head: Normocephalic.   Eyes: No scleral icterus.   Neck: No JVD present. No thyromegaly present.   Cardiovascular: Normal rate, regular rhythm and normal heart sounds. Exam reveals no gallop and no friction rub.   No murmur heard.  Pulmonary/Chest: Effort normal and breath sounds normal. She has no wheezes. She has no rales.   Abdominal: Soft. There is no hepatosplenomegaly. There is no tenderness.   Musculoskeletal: Normal range of motion. She exhibits no edema.   Lymphadenopathy:     She has no cervical adenopathy.   Neurological: She is alert and oriented to person, place, and time.   Skin: Skin is warm and dry. No rash noted.   Psychiatric: She has a normal mood and affect.         ECG 12 Lead  Date/Time: 2/27/2019 10:52 AM  Performed by: Shahid Lee MD  Authorized by: Shahid Lee MD   Previous ECG: no previous ECG available  Rhythm: sinus tachycardia  Conduction: left anterior fascicular block  Other findings: right atrial abnormality    Clinical impression: abnormal EKG             Assessment:       Diagnosis Plan   1. Pulmonary emphysema, unspecified emphysema type (CMS/HCC)     2. Abnormal ECG            Plan:       She does not appear to be having any active cardiac problems.  She is tachycardic, likely from her lung disease and her medical treatment for it.  She does have an abnormal ECG.  I think an echocardiogram is reasonable.  I will not be surprised if it shows right-sided enlargement or a problem.  We will get that, and she will need a copy of that and go to the [facility] with it.          As always, it has been a pleasure to participate in your patient's " care.      Sincerely,       Shahid Lee MD

## 2019-02-28 ENCOUNTER — OFFICE VISIT (OUTPATIENT)
Dept: FAMILY MEDICINE CLINIC | Facility: CLINIC | Age: 74
End: 2019-02-28

## 2019-02-28 ENCOUNTER — TELEPHONE (OUTPATIENT)
Dept: FAMILY MEDICINE CLINIC | Facility: CLINIC | Age: 74
End: 2019-02-28

## 2019-02-28 VITALS — HEART RATE: 90 BPM | DIASTOLIC BLOOD PRESSURE: 82 MMHG | OXYGEN SATURATION: 89 % | SYSTOLIC BLOOD PRESSURE: 120 MMHG

## 2019-02-28 DIAGNOSIS — R63.4 WEIGHT LOSS: ICD-10-CM

## 2019-02-28 DIAGNOSIS — R19.7 DIARRHEA, UNSPECIFIED TYPE: Primary | ICD-10-CM

## 2019-02-28 PROCEDURE — 99213 OFFICE O/P EST LOW 20 MIN: CPT | Performed by: NURSE PRACTITIONER

## 2019-02-28 RX ORDER — MELOXICAM 15 MG/1
15 TABLET ORAL DAILY
Qty: 90 TABLET | Refills: 1 | Status: SHIPPED | OUTPATIENT
Start: 2019-02-28

## 2019-02-28 RX ORDER — LANSOPRAZOLE 15 MG/1
15 CAPSULE, DELAYED RELEASE ORAL DAILY
Qty: 90 CAPSULE | Refills: 0 | Status: SHIPPED | OUTPATIENT
Start: 2019-02-28 | End: 2020-01-01

## 2019-02-28 NOTE — PROGRESS NOTES
Subjective  She has had flare up of IBS.   Has used fiber, imodium, and is having severe diarrhea after she eats    Secondly, she is having nightmares that are keeping her up.   Siobhan Prakash is a 74 y.o. female.     History of Present Illness     The following portions of the patient's history were reviewed and updated as appropriate: allergies, current medications, past family history, past medical history, past social history, past surgical history and problem list.    Review of Systems   Constitutional: Positive for activity change and appetite change.   Gastrointestinal: Positive for diarrhea. Negative for nausea and vomiting.       Objective   Physical Exam   Constitutional: She appears well-developed and well-nourished. No distress.   Very thin   HENT:   Head: Normocephalic and atraumatic.   Right Ear: External ear normal.   Left Ear: External ear normal.   Eyes: EOM are normal.   Cardiovascular: Normal rate and regular rhythm.   Pulmonary/Chest: She is in respiratory distress.   Abdominal: Soft. She exhibits no distension. There is no tenderness. There is no guarding.   Musculoskeletal: Normal range of motion.   Neurological: She is alert.   Skin: Skin is warm.   Nursing note and vitals reviewed.        Assessment/Plan   Diagnoses and all orders for this visit:    Diarrhea, unspecified type  -     Ambulatory Referral to Gastroenterology  -     CT Abdomen Pelvis With & Without Contrast    Weight loss  -     Ambulatory Referral to Gastroenterology      Requested to send pt to the hospital but she refused told me she would go if she thought she needed to.

## 2019-02-28 NOTE — TELEPHONE ENCOUNTER
----- Message from Carlton Amaya sent at 2/28/2019  3:28 PM EST -----  Regarding: Refill Request  Contact: [phone]  Pt said that she forgot to tell Lyudmila Granados that she needs a refill on her meloxicam (MOBIC) 15 MG tablet.

## 2019-02-28 NOTE — PATIENT INSTRUCTIONS
Diarrhea, Adult  Diarrhea is when you have loose and water poop (stool) often. Diarrhea can make you feel weak and cause you to get dehydrated. Dehydration can make you tired and thirsty, make you have a dry mouth, and make it so you pee (urinate) less often. Diarrhea often lasts 2-3 days. However, it can last longer if it is a sign of something more serious. It is important to treat your diarrhea as told by your doctor.  Follow these instructions at home:  Eating and drinking    Follow these recommendations as told by your doctor:  · Take an oral rehydration solution (ORS). This is a drink that is sold at pharmacies and stores.  · Drink clear fluids, such as:  ? Water.  ? Ice chips.  ? Diluted fruit juice.  ? Low-calorie sports drinks.  · Eat bland, easy-to-digest foods in small amounts as you are able. These foods include:  ? Bananas.  ? Applesauce.  ? Rice.  ? Low-fat (lean) meats.  ? Toast.  ? Crackers.  · Avoid drinking fluids that have a lot of sugar or caffeine in them.  · Avoid alcohol.  · Avoid spicy or fatty foods.    General instructions    · Drink enough fluid to keep your pee (urine) clear or pale yellow.  · Wash your hands often. If you cannot use soap and water, use hand .  · Make sure that all people in your home wash their hands well and often.  · Take over-the-counter and prescription medicines only as told by your doctor.  · Rest at home while you get better.  · Watch your condition for any changes.  · Take a warm bath to help with any burning or pain from having diarrhea.  · Keep all follow-up visits as told by your doctor. This is important.  Contact a doctor if:  · You have a fever.  · Your diarrhea gets worse.  · You have new symptoms.  · You cannot keep fluids down.  · You feel light-headed or dizzy.  · You have a headache.  · You have muscle cramps.  Get help right away if:  · You have chest pain.  · You feel very weak or you pass out (faint).  · You have bloody or black poop or  poop that look like tar.  · You have very bad pain, cramping, or bloating in your belly (abdomen).  · You have trouble breathing or you are breathing very quickly.  · Your heart is beating very quickly.  · Your skin feels cold and clammy.  · You feel confused.  · You have signs of dehydration, such as:  ? Dark pee, hardly any pee, or no pee.  ? Cracked lips.  ? Dry mouth.  ? Sunken eyes.  ? Sleepiness.  ? Weakness.  This information is not intended to replace advice given to you by your health care provider. Make sure you discuss any questions you have with your health care provider.  Document Released: 06/05/2009 Document Revised: 07/07/2017 Document Reviewed: 08/23/2016  Elsevier Interactive Patient Education © 2018 Elsevier Inc.

## 2019-03-01 ENCOUNTER — OFFICE VISIT (OUTPATIENT)
Dept: GASTROENTEROLOGY | Facility: CLINIC | Age: 74
End: 2019-03-01

## 2019-03-01 VITALS
SYSTOLIC BLOOD PRESSURE: 138 MMHG | DIASTOLIC BLOOD PRESSURE: 82 MMHG | BODY MASS INDEX: 18.03 KG/M2 | TEMPERATURE: 97.8 F | HEIGHT: 62 IN | WEIGHT: 98 LBS

## 2019-03-01 DIAGNOSIS — R63.4 WEIGHT LOSS, ABNORMAL: ICD-10-CM

## 2019-03-01 DIAGNOSIS — J43.9 PULMONARY EMPHYSEMA, UNSPECIFIED EMPHYSEMA TYPE (HCC): ICD-10-CM

## 2019-03-01 DIAGNOSIS — R19.7 DIARRHEA, UNSPECIFIED TYPE: Primary | ICD-10-CM

## 2019-03-01 PROCEDURE — 99204 OFFICE O/P NEW MOD 45 MIN: CPT | Performed by: INTERNAL MEDICINE

## 2019-03-01 NOTE — PATIENT INSTRUCTIONS
Liquid imodium for diarrhea    At least 6 small meals daily    For any additional questions, concerns or changes to your condition after today's office visit please contact the office at [phone].

## 2019-03-04 ENCOUNTER — TELEPHONE (OUTPATIENT)
Dept: FAMILY MEDICINE CLINIC | Facility: CLINIC | Age: 74
End: 2019-03-04

## 2019-03-04 NOTE — TELEPHONE ENCOUNTER
----- Message from YEN Tang sent at 3/4/2019 10:19 AM EST -----  Can you call her and see if she is eating 5-6 small meals a day as instructed by Dr Olvera the gastroenterologist we sent her to.

## 2019-03-31 DIAGNOSIS — N32.81 OAB (OVERACTIVE BLADDER): ICD-10-CM

## 2019-04-01 RX ORDER — SOLIFENACIN SUCCINATE 5 MG/1
TABLET, FILM COATED ORAL
Qty: 30 TABLET | Refills: 5 | Status: SHIPPED | OUTPATIENT
Start: 2019-04-01 | End: 2019-04-08

## 2019-04-05 ENCOUNTER — TELEPHONE (OUTPATIENT)
Dept: FAMILY MEDICINE CLINIC | Facility: CLINIC | Age: 74
End: 2019-04-05

## 2019-04-22 ENCOUNTER — TELEPHONE (OUTPATIENT)
Dept: FAMILY MEDICINE CLINIC | Facility: CLINIC | Age: 74
End: 2019-04-22

## 2019-04-22 RX ORDER — SUMATRIPTAN 50 MG/1
TABLET, FILM COATED ORAL
Qty: 9 TABLET | Refills: 2 | Status: SHIPPED | OUTPATIENT
Start: 2019-04-22 | End: 2019-04-22 | Stop reason: SDUPTHER

## 2019-04-22 RX ORDER — SUMATRIPTAN 50 MG/1
TABLET, FILM COATED ORAL
Qty: 9 TABLET | Refills: 2 | Status: SHIPPED | OUTPATIENT
Start: 2019-04-22 | End: 2020-01-01 | Stop reason: SDUPTHER

## 2019-04-22 NOTE — TELEPHONE ENCOUNTER
Spouse left a VM stating patient had a fall this morning. Please call and have them come in today to see Matrin

## 2019-06-06 RX ORDER — SERTRALINE HYDROCHLORIDE 100 MG/1
TABLET, FILM COATED ORAL
Qty: 180 TABLET | Refills: 0 | Status: SHIPPED | OUTPATIENT
Start: 2019-06-06 | End: 2019-01-01 | Stop reason: SDUPTHER

## 2019-06-06 RX ORDER — SERTRALINE HYDROCHLORIDE 100 MG/1
TABLET, FILM COATED ORAL
Qty: 60 TABLET | Refills: 0 | Status: SHIPPED | OUTPATIENT
Start: 2019-06-06 | End: 2019-06-06 | Stop reason: SDUPTHER

## 2019-06-17 ENCOUNTER — APPOINTMENT (OUTPATIENT)
Dept: GENERAL RADIOLOGY | Facility: HOSPITAL | Age: 74
End: 2019-06-17

## 2019-06-17 ENCOUNTER — HOSPITAL ENCOUNTER (EMERGENCY)
Facility: HOSPITAL | Age: 74
Discharge: HOME | End: 2019-06-17
Attending: EMERGENCY MEDICINE | Admitting: EMERGENCY MEDICINE

## 2019-06-17 ENCOUNTER — APPOINTMENT (OUTPATIENT)
Dept: CT IMAGING | Facility: HOSPITAL | Age: 74
End: 2019-06-17

## 2019-06-17 VITALS
OXYGEN SATURATION: 90 % | SYSTOLIC BLOOD PRESSURE: 131 MMHG | HEIGHT: 62 IN | DIASTOLIC BLOOD PRESSURE: 85 MMHG | TEMPERATURE: 98.3 F | BODY MASS INDEX: 17.48 KG/M2 | RESPIRATION RATE: 16 BRPM | WEIGHT: 95 LBS | HEART RATE: 109 BPM

## 2019-06-17 DIAGNOSIS — J43.9 PULMONARY EMPHYSEMA, UNSPECIFIED EMPHYSEMA TYPE (HCC): ICD-10-CM

## 2019-06-17 DIAGNOSIS — Z91.89 AT RISK FOR POLYPHARMACY: ICD-10-CM

## 2019-06-17 DIAGNOSIS — R41.82 ALTERED MENTAL STATUS, UNSPECIFIED ALTERED MENTAL STATUS TYPE: ICD-10-CM

## 2019-06-17 DIAGNOSIS — R53.1 WEAKNESS: ICD-10-CM

## 2019-06-17 DIAGNOSIS — R19.7 DIARRHEA, UNSPECIFIED TYPE: Primary | ICD-10-CM

## 2019-06-17 LAB
ALBUMIN SERPL-MCNC: 4 G/DL (ref 3.5–5.2)
ALBUMIN/GLOB SERPL: 1.1 G/DL
ALP SERPL-CCNC: 103 U/L (ref 39–117)
ALT SERPL W P-5'-P-CCNC: 10 U/L (ref 1–33)
AMPHET+METHAMPHET UR QL: NEGATIVE
ANION GAP SERPL CALCULATED.3IONS-SCNC: 11.4 MMOL/L
ARTERIAL PATENCY WRIST A: POSITIVE
AST SERPL-CCNC: 16 U/L (ref 1–32)
ATMOSPHERIC PRESS: 747.5 MMHG
BACTERIA UR QL AUTO: ABNORMAL /HPF
BARBITURATES UR QL SCN: NEGATIVE
BASE EXCESS BLDA CALC-SCNC: 7.9 MMOL/L (ref 0–2)
BASOPHILS # BLD AUTO: 0.03 10*3/MM3 (ref 0–0.2)
BASOPHILS NFR BLD AUTO: 0.3 % (ref 0–1.5)
BDY SITE: ABNORMAL
BENZODIAZ UR QL SCN: NEGATIVE
BILIRUB SERPL-MCNC: 0.2 MG/DL (ref 0.2–1.2)
BILIRUB UR QL STRIP: NEGATIVE
BUN BLD-MCNC: 11 MG/DL (ref 8–23)
BUN/CREAT SERPL: 15.9 (ref 7–25)
CALCIUM SPEC-SCNC: 10.6 MG/DL (ref 8.6–10.5)
CANNABINOIDS SERPL QL: NEGATIVE
CHLORIDE SERPL-SCNC: 100 MMOL/L (ref 98–107)
CLARITY UR: CLEAR
CO2 SERPL-SCNC: 30.6 MMOL/L (ref 22–29)
COCAINE UR QL: NEGATIVE
COLOR UR: YELLOW
CREAT BLD-MCNC: 0.69 MG/DL (ref 0.57–1)
DEPRECATED RDW RBC AUTO: 52.6 FL (ref 37–54)
EOSINOPHIL # BLD AUTO: 0.02 10*3/MM3 (ref 0–0.4)
EOSINOPHIL NFR BLD AUTO: 0.2 % (ref 0.3–6.2)
ERYTHROCYTE [DISTWIDTH] IN BLOOD BY AUTOMATED COUNT: 13.8 % (ref 12.3–15.4)
ETHANOL BLD-MCNC: <10 MG/DL (ref 0–10)
ETHANOL UR QL: <0.01 %
GAS FLOW AIRWAY: 3 LPM
GFR SERPL CREATININE-BSD FRML MDRD: 84 ML/MIN/1.73
GLOBULIN UR ELPH-MCNC: 3.6 GM/DL
GLUCOSE BLD-MCNC: 147 MG/DL (ref 65–99)
GLUCOSE UR STRIP-MCNC: NEGATIVE MG/DL
HCO3 BLDA-SCNC: 32.9 MMOL/L (ref 22–28)
HCT VFR BLD AUTO: 32.8 % (ref 34–46.6)
HGB BLD-MCNC: 9.6 G/DL (ref 12–15.9)
HGB UR QL STRIP.AUTO: NEGATIVE
HOLD SPECIMEN: NORMAL
HOLD SPECIMEN: NORMAL
HYALINE CASTS UR QL AUTO: ABNORMAL /LPF
IMM GRANULOCYTES # BLD AUTO: 0.05 10*3/MM3 (ref 0–0.05)
IMM GRANULOCYTES NFR BLD AUTO: 0.5 % (ref 0–0.5)
KETONES UR QL STRIP: NEGATIVE
LEUKOCYTE ESTERASE UR QL STRIP.AUTO: NEGATIVE
LYMPHOCYTES # BLD AUTO: 1.63 10*3/MM3 (ref 0.7–3.1)
LYMPHOCYTES NFR BLD AUTO: 16.8 % (ref 19.6–45.3)
MCH RBC QN AUTO: 30.4 PG (ref 26.6–33)
MCHC RBC AUTO-ENTMCNC: 29.3 G/DL (ref 31.5–35.7)
MCV RBC AUTO: 103.8 FL (ref 79–97)
METHADONE UR QL SCN: NEGATIVE
MODALITY: ABNORMAL
MONOCYTES # BLD AUTO: 0.92 10*3/MM3 (ref 0.1–0.9)
MONOCYTES NFR BLD AUTO: 9.5 % (ref 5–12)
NEUTROPHILS # BLD AUTO: 7.08 10*3/MM3 (ref 1.7–7)
NEUTROPHILS NFR BLD AUTO: 72.7 % (ref 42.7–76)
NITRITE UR QL STRIP: POSITIVE
NRBC BLD AUTO-RTO: 0.1 /100 WBC (ref 0–0.2)
OPIATES UR QL: NEGATIVE
OXYCODONE UR QL SCN: NEGATIVE
PCO2 BLDA: 48.4 MM HG (ref 35–45)
PH BLDA: 7.44 PH UNITS (ref 7.35–7.45)
PH UR STRIP.AUTO: 5.5 [PH] (ref 5–8)
PLATELET # BLD AUTO: 456 10*3/MM3 (ref 140–450)
PMV BLD AUTO: 9.3 FL (ref 6–12)
PO2 BLDA: 66.4 MM HG (ref 80–100)
POTASSIUM BLD-SCNC: 3.8 MMOL/L (ref 3.5–5.2)
PROT SERPL-MCNC: 7.6 G/DL (ref 6–8.5)
PROT UR QL STRIP: NEGATIVE
RBC # BLD AUTO: 3.16 10*6/MM3 (ref 3.77–5.28)
RBC # UR: ABNORMAL /HPF
REF LAB TEST METHOD: ABNORMAL
SAO2 % BLDCOA: 93.2 % (ref 92–99)
SODIUM BLD-SCNC: 142 MMOL/L (ref 136–145)
SP GR UR STRIP: 1.02 (ref 1–1.03)
SQUAMOUS #/AREA URNS HPF: ABNORMAL /HPF
TOTAL RATE: 16 BREATHS/MINUTE
TROPONIN T SERPL-MCNC: <0.01 NG/ML (ref 0–0.03)
UROBILINOGEN UR QL STRIP: ABNORMAL
WBC NRBC COR # BLD: 9.73 10*3/MM3 (ref 3.4–10.8)
WBC UR QL AUTO: ABNORMAL /HPF
WHOLE BLOOD HOLD SPECIMEN: NORMAL
WHOLE BLOOD HOLD SPECIMEN: NORMAL

## 2019-06-17 PROCEDURE — P9612 CATHETERIZE FOR URINE SPEC: HCPCS

## 2019-06-17 PROCEDURE — 80307 DRUG TEST PRSMV CHEM ANLYZR: CPT | Performed by: NURSE PRACTITIONER

## 2019-06-17 PROCEDURE — 99284 EMERGENCY DEPT VISIT MOD MDM: CPT

## 2019-06-17 PROCEDURE — 93005 ELECTROCARDIOGRAM TRACING: CPT | Performed by: NURSE PRACTITIONER

## 2019-06-17 PROCEDURE — 81001 URINALYSIS AUTO W/SCOPE: CPT | Performed by: NURSE PRACTITIONER

## 2019-06-17 PROCEDURE — 93010 ELECTROCARDIOGRAM REPORT: CPT | Performed by: INTERNAL MEDICINE

## 2019-06-17 PROCEDURE — 85025 COMPLETE CBC W/AUTO DIFF WBC: CPT | Performed by: NURSE PRACTITIONER

## 2019-06-17 PROCEDURE — 71046 X-RAY EXAM CHEST 2 VIEWS: CPT

## 2019-06-17 PROCEDURE — 94799 UNLISTED PULMONARY SVC/PX: CPT

## 2019-06-17 PROCEDURE — 36600 WITHDRAWAL OF ARTERIAL BLOOD: CPT

## 2019-06-17 PROCEDURE — 70450 CT HEAD/BRAIN W/O DYE: CPT

## 2019-06-17 PROCEDURE — 82803 BLOOD GASES ANY COMBINATION: CPT

## 2019-06-17 PROCEDURE — 80053 COMPREHEN METABOLIC PANEL: CPT | Performed by: NURSE PRACTITIONER

## 2019-06-17 PROCEDURE — 84484 ASSAY OF TROPONIN QUANT: CPT | Performed by: NURSE PRACTITIONER

## 2019-06-17 RX ORDER — SODIUM CHLORIDE 0.9 % (FLUSH) 0.9 %
10 SYRINGE (ML) INJECTION AS NEEDED
Status: DISCONTINUED | OUTPATIENT
Start: 2019-06-17 | End: 2019-06-17 | Stop reason: HOSPADM

## 2019-06-17 RX ORDER — IPRATROPIUM BROMIDE AND ALBUTEROL SULFATE 2.5; .5 MG/3ML; MG/3ML
3 SOLUTION RESPIRATORY (INHALATION) ONCE
Status: COMPLETED | OUTPATIENT
Start: 2019-06-17 | End: 2019-06-17

## 2019-06-17 RX ORDER — ALBUTEROL SULFATE 90 UG/1
2 AEROSOL, METERED RESPIRATORY (INHALATION) EVERY 4 HOURS PRN
Qty: 1 INHALER | Refills: 0 | Status: SHIPPED | OUTPATIENT
Start: 2019-06-17

## 2019-06-17 RX ADMIN — IPRATROPIUM BROMIDE AND ALBUTEROL SULFATE 3 ML: 2.5; .5 SOLUTION RESPIRATORY (INHALATION) at 12:22

## 2019-06-24 ENCOUNTER — TELEPHONE (OUTPATIENT)
Dept: FAMILY MEDICINE CLINIC | Facility: CLINIC | Age: 74
End: 2019-06-24

## 2019-06-25 ENCOUNTER — HOSPITAL ENCOUNTER (INPATIENT)
Facility: HOSPITAL | Age: 74
LOS: 5 days | Discharge: HOME HEALTH | End: 2019-06-30
Attending: EMERGENCY MEDICINE | Admitting: INTERNAL MEDICINE

## 2019-06-25 ENCOUNTER — APPOINTMENT (OUTPATIENT)
Dept: GENERAL RADIOLOGY | Facility: HOSPITAL | Age: 74
End: 2019-06-25

## 2019-06-25 DIAGNOSIS — J96.01 ACUTE RESPIRATORY FAILURE WITH HYPOXIA (HCC): Primary | ICD-10-CM

## 2019-06-25 DIAGNOSIS — J44.1 COPD WITH ACUTE EXACERBATION (HCC): ICD-10-CM

## 2019-06-25 LAB
ADV 40+41 DNA STL QL NAA+NON-PROBE: NOT DETECTED
ALBUMIN SERPL-MCNC: 4.2 G/DL (ref 3.5–5.2)
ALBUMIN SERPL-MCNC: 4.4 G/DL (ref 3.5–5.2)
ALBUMIN/GLOB SERPL: 1.3 G/DL
ALBUMIN/GLOB SERPL: 1.3 G/DL
ALP SERPL-CCNC: 106 U/L (ref 39–117)
ALP SERPL-CCNC: 116 U/L (ref 39–117)
ALT SERPL W P-5'-P-CCNC: 7 U/L (ref 1–33)
ALT SERPL W P-5'-P-CCNC: 7 U/L (ref 1–33)
ANION GAP SERPL CALCULATED.3IONS-SCNC: 10.5 MMOL/L
ANION GAP SERPL CALCULATED.3IONS-SCNC: 15.2 MMOL/L
AST SERPL-CCNC: 12 U/L (ref 1–32)
AST SERPL-CCNC: 14 U/L (ref 1–32)
ASTRO TYP 1-8 RNA STL QL NAA+NON-PROBE: NOT DETECTED
BASOPHILS # BLD AUTO: 0.02 10*3/MM3 (ref 0–0.2)
BASOPHILS # BLD AUTO: 0.04 10*3/MM3 (ref 0–0.2)
BASOPHILS NFR BLD AUTO: 0.3 % (ref 0–1.5)
BASOPHILS NFR BLD AUTO: 0.5 % (ref 0–1.5)
BILIRUB SERPL-MCNC: 0.2 MG/DL (ref 0.2–1.2)
BILIRUB SERPL-MCNC: 0.2 MG/DL (ref 0.2–1.2)
BUN BLD-MCNC: 10 MG/DL (ref 8–23)
BUN BLD-MCNC: 11 MG/DL (ref 8–23)
BUN/CREAT SERPL: 12.6 (ref 7–25)
BUN/CREAT SERPL: 12.7 (ref 7–25)
C CAYETANENSIS DNA STL QL NAA+NON-PROBE: NOT DETECTED
CALCIUM SPEC-SCNC: 10 MG/DL (ref 8.6–10.5)
CALCIUM SPEC-SCNC: 10.1 MG/DL (ref 8.6–10.5)
CAMPY SP DNA.DIARRHEA STL QL NAA+PROBE: NOT DETECTED
CHLORIDE SERPL-SCNC: 102 MMOL/L (ref 98–107)
CHLORIDE SERPL-SCNC: 96 MMOL/L (ref 98–107)
CO2 SERPL-SCNC: 25.8 MMOL/L (ref 22–29)
CO2 SERPL-SCNC: 30.5 MMOL/L (ref 22–29)
CREAT BLD-MCNC: 0.79 MG/DL (ref 0.57–1)
CREAT BLD-MCNC: 0.87 MG/DL (ref 0.57–1)
CRP SERPL-MCNC: 0.18 MG/DL (ref 0–0.5)
CRYPTOSP STL CULT: NOT DETECTED
D DIMER PPP FEU-MCNC: 0.57 MCGFEU/ML (ref 0–0.49)
DEPRECATED RDW RBC AUTO: 48 FL (ref 37–54)
DEPRECATED RDW RBC AUTO: 48.2 FL (ref 37–54)
E COLI DNA SPEC QL NAA+PROBE: NOT DETECTED
E HISTOLYT AG STL-ACNC: NOT DETECTED
EAEC PAA PLAS AGGR+AATA ST NAA+NON-PRB: NOT DETECTED
EC STX1 + STX2 GENES STL NAA+PROBE: NOT DETECTED
EOSINOPHIL # BLD AUTO: 0 10*3/MM3 (ref 0–0.4)
EOSINOPHIL # BLD AUTO: 0.05 10*3/MM3 (ref 0–0.4)
EOSINOPHIL NFR BLD AUTO: 0 % (ref 0.3–6.2)
EOSINOPHIL NFR BLD AUTO: 0.6 % (ref 0.3–6.2)
EPEC EAE GENE STL QL NAA+NON-PROBE: NOT DETECTED
ERYTHROCYTE [DISTWIDTH] IN BLOOD BY AUTOMATED COUNT: 12.9 % (ref 12.3–15.4)
ERYTHROCYTE [DISTWIDTH] IN BLOOD BY AUTOMATED COUNT: 13.1 % (ref 12.3–15.4)
ETEC LTA+ST1A+ST1B TOX ST NAA+NON-PROBE: NOT DETECTED
G LAMBLIA DNA SPEC QL NAA+PROBE: NOT DETECTED
GFR SERPL CREATININE-BSD FRML MDRD: 64 ML/MIN/1.73
GFR SERPL CREATININE-BSD FRML MDRD: 72 ML/MIN/1.73
GLOBULIN UR ELPH-MCNC: 3.2 GM/DL
GLOBULIN UR ELPH-MCNC: 3.5 GM/DL
GLUCOSE BLD-MCNC: 129 MG/DL (ref 65–99)
GLUCOSE BLD-MCNC: 282 MG/DL (ref 65–99)
GLUCOSE BLDC GLUCOMTR-MCNC: 114 MG/DL (ref 70–130)
GLUCOSE BLDC GLUCOMTR-MCNC: 192 MG/DL (ref 70–130)
GLUCOSE BLDC GLUCOMTR-MCNC: 202 MG/DL (ref 70–130)
GLUCOSE BLDC GLUCOMTR-MCNC: 232 MG/DL (ref 70–130)
GLUCOSE BLDC GLUCOMTR-MCNC: 262 MG/DL (ref 70–130)
HCT VFR BLD AUTO: 34.1 % (ref 34–46.6)
HCT VFR BLD AUTO: 37.7 % (ref 34–46.6)
HGB BLD-MCNC: 10.2 G/DL (ref 12–15.9)
HGB BLD-MCNC: 11.1 G/DL (ref 12–15.9)
IMM GRANULOCYTES # BLD AUTO: 0.03 10*3/MM3 (ref 0–0.05)
IMM GRANULOCYTES # BLD AUTO: 0.03 10*3/MM3 (ref 0–0.05)
IMM GRANULOCYTES NFR BLD AUTO: 0.4 % (ref 0–0.5)
IMM GRANULOCYTES NFR BLD AUTO: 0.5 % (ref 0–0.5)
LYMPHOCYTES # BLD AUTO: 0.19 10*3/MM3 (ref 0.7–3.1)
LYMPHOCYTES # BLD AUTO: 2.18 10*3/MM3 (ref 0.7–3.1)
LYMPHOCYTES NFR BLD AUTO: 27.1 % (ref 19.6–45.3)
LYMPHOCYTES NFR BLD AUTO: 3.2 % (ref 19.6–45.3)
MAGNESIUM SERPL-MCNC: 1.8 MG/DL (ref 1.6–2.4)
MCH RBC QN AUTO: 30.1 PG (ref 26.6–33)
MCH RBC QN AUTO: 30.2 PG (ref 26.6–33)
MCHC RBC AUTO-ENTMCNC: 29.4 G/DL (ref 31.5–35.7)
MCHC RBC AUTO-ENTMCNC: 29.9 G/DL (ref 31.5–35.7)
MCV RBC AUTO: 100.6 FL (ref 79–97)
MCV RBC AUTO: 102.7 FL (ref 79–97)
MONOCYTES # BLD AUTO: 0.03 10*3/MM3 (ref 0.1–0.9)
MONOCYTES # BLD AUTO: 0.66 10*3/MM3 (ref 0.1–0.9)
MONOCYTES NFR BLD AUTO: 0.5 % (ref 5–12)
MONOCYTES NFR BLD AUTO: 8.2 % (ref 5–12)
NEUTROPHILS # BLD AUTO: 5.09 10*3/MM3 (ref 1.7–7)
NEUTROPHILS # BLD AUTO: 5.76 10*3/MM3 (ref 1.7–7)
NEUTROPHILS NFR BLD AUTO: 63.2 % (ref 42.7–76)
NEUTROPHILS NFR BLD AUTO: 95.5 % (ref 42.7–76)
NOROVIRUS GI+II RNA STL QL NAA+NON-PROBE: NOT DETECTED
NRBC BLD AUTO-RTO: 0 /100 WBC (ref 0–0.2)
NRBC BLD AUTO-RTO: 0 /100 WBC (ref 0–0.2)
NT-PROBNP SERPL-MCNC: 94.7 PG/ML (ref 5–900)
P SHIGELLOIDES DNA STL QL NAA+NON-PROBE: NOT DETECTED
PLATELET # BLD AUTO: 444 10*3/MM3 (ref 140–450)
PLATELET # BLD AUTO: 543 10*3/MM3 (ref 140–450)
PMV BLD AUTO: 9.4 FL (ref 6–12)
PMV BLD AUTO: 9.4 FL (ref 6–12)
POTASSIUM BLD-SCNC: 3.3 MMOL/L (ref 3.5–5.2)
POTASSIUM BLD-SCNC: 3.4 MMOL/L (ref 3.5–5.2)
PROT SERPL-MCNC: 7.4 G/DL (ref 6–8.5)
PROT SERPL-MCNC: 7.9 G/DL (ref 6–8.5)
RBC # BLD AUTO: 3.39 10*6/MM3 (ref 3.77–5.28)
RBC # BLD AUTO: 3.67 10*6/MM3 (ref 3.77–5.28)
RV RNA STL NAA+PROBE: NOT DETECTED
SALMONELLA DNA SPEC QL NAA+PROBE: NOT DETECTED
SAPO I+II+IV+V RNA STL QL NAA+NON-PROBE: NOT DETECTED
SHIGELLA SP+EIEC IPAH STL QL NAA+PROBE: NOT DETECTED
SODIUM BLD-SCNC: 137 MMOL/L (ref 136–145)
SODIUM BLD-SCNC: 143 MMOL/L (ref 136–145)
THEOPHYLLINE SERPL-MCNC: 11.4 MCG/ML (ref 10–20)
THEOPHYLLINE SERPL-MCNC: 16 MCG/ML (ref 10–20)
TROPONIN T SERPL-MCNC: <0.01 NG/ML (ref 0–0.03)
V CHOLERAE DNA SPEC QL NAA+PROBE: NOT DETECTED
VIBRIO DNA SPEC NAA+PROBE: NOT DETECTED
WBC NRBC COR # BLD: 6.03 10*3/MM3 (ref 3.4–10.8)
WBC NRBC COR # BLD: 8.05 10*3/MM3 (ref 3.4–10.8)
YERSINIA STL CULT: NOT DETECTED

## 2019-06-25 PROCEDURE — 93010 ELECTROCARDIOGRAM REPORT: CPT | Performed by: INTERNAL MEDICINE

## 2019-06-25 PROCEDURE — 80053 COMPREHEN METABOLIC PANEL: CPT | Performed by: EMERGENCY MEDICINE

## 2019-06-25 PROCEDURE — 87507 IADNA-DNA/RNA PROBE TQ 12-25: CPT | Performed by: INTERNAL MEDICINE

## 2019-06-25 PROCEDURE — 82705 FATS/LIPIDS FECES QUAL: CPT | Performed by: INTERNAL MEDICINE

## 2019-06-25 PROCEDURE — 97110 THERAPEUTIC EXERCISES: CPT

## 2019-06-25 PROCEDURE — 83735 ASSAY OF MAGNESIUM: CPT | Performed by: INTERNAL MEDICINE

## 2019-06-25 PROCEDURE — 93005 ELECTROCARDIOGRAM TRACING: CPT | Performed by: INTERNAL MEDICINE

## 2019-06-25 PROCEDURE — 83516 IMMUNOASSAY NONANTIBODY: CPT | Performed by: INTERNAL MEDICINE

## 2019-06-25 PROCEDURE — 86255 FLUORESCENT ANTIBODY SCREEN: CPT | Performed by: INTERNAL MEDICINE

## 2019-06-25 PROCEDURE — 97161 PT EVAL LOW COMPLEX 20 MIN: CPT

## 2019-06-25 PROCEDURE — 94799 UNLISTED PULMONARY SVC/PX: CPT

## 2019-06-25 PROCEDURE — 25010000002 METHYLPREDNISOLONE PER 40 MG: Performed by: INTERNAL MEDICINE

## 2019-06-25 PROCEDURE — 86140 C-REACTIVE PROTEIN: CPT | Performed by: INTERNAL MEDICINE

## 2019-06-25 PROCEDURE — 99291 CRITICAL CARE FIRST HOUR: CPT

## 2019-06-25 PROCEDURE — 85025 COMPLETE CBC W/AUTO DIFF WBC: CPT | Performed by: INTERNAL MEDICINE

## 2019-06-25 PROCEDURE — 99221 1ST HOSP IP/OBS SF/LOW 40: CPT | Performed by: INTERNAL MEDICINE

## 2019-06-25 PROCEDURE — 63710000001 INSULIN LISPRO (HUMAN) PER 5 UNITS: Performed by: INTERNAL MEDICINE

## 2019-06-25 PROCEDURE — 80198 ASSAY OF THEOPHYLLINE: CPT | Performed by: INTERNAL MEDICINE

## 2019-06-25 PROCEDURE — 97162 PT EVAL MOD COMPLEX 30 MIN: CPT

## 2019-06-25 PROCEDURE — 83880 ASSAY OF NATRIURETIC PEPTIDE: CPT | Performed by: EMERGENCY MEDICINE

## 2019-06-25 PROCEDURE — 94660 CPAP INITIATION&MGMT: CPT

## 2019-06-25 PROCEDURE — 82962 GLUCOSE BLOOD TEST: CPT

## 2019-06-25 PROCEDURE — 36415 COLL VENOUS BLD VENIPUNCTURE: CPT

## 2019-06-25 PROCEDURE — 94640 AIRWAY INHALATION TREATMENT: CPT

## 2019-06-25 PROCEDURE — 25010000002 ENOXAPARIN PER 10 MG: Performed by: INTERNAL MEDICINE

## 2019-06-25 PROCEDURE — 85379 FIBRIN DEGRADATION QUANT: CPT | Performed by: INTERNAL MEDICINE

## 2019-06-25 PROCEDURE — 25010000002 METHYLPREDNISOLONE PER 125 MG: Performed by: EMERGENCY MEDICINE

## 2019-06-25 PROCEDURE — 85025 COMPLETE CBC W/AUTO DIFF WBC: CPT | Performed by: EMERGENCY MEDICINE

## 2019-06-25 PROCEDURE — 71045 X-RAY EXAM CHEST 1 VIEW: CPT

## 2019-06-25 PROCEDURE — 93005 ELECTROCARDIOGRAM TRACING: CPT | Performed by: EMERGENCY MEDICINE

## 2019-06-25 PROCEDURE — 80053 COMPREHEN METABOLIC PANEL: CPT | Performed by: INTERNAL MEDICINE

## 2019-06-25 PROCEDURE — 82784 ASSAY IGA/IGD/IGG/IGM EACH: CPT | Performed by: INTERNAL MEDICINE

## 2019-06-25 PROCEDURE — 89160 EXAM FECES FOR MEAT FIBERS: CPT | Performed by: INTERNAL MEDICINE

## 2019-06-25 PROCEDURE — 84484 ASSAY OF TROPONIN QUANT: CPT | Performed by: EMERGENCY MEDICINE

## 2019-06-25 RX ORDER — MONTELUKAST SODIUM 10 MG/1
10 TABLET ORAL NIGHTLY
Status: DISCONTINUED | OUTPATIENT
Start: 2019-06-25 | End: 2019-06-30 | Stop reason: HOSPADM

## 2019-06-25 RX ORDER — THEOPHYLLINE 400 MG/1
200 TABLET, EXTENDED RELEASE ORAL
Status: DISCONTINUED | OUTPATIENT
Start: 2019-06-25 | End: 2019-06-30 | Stop reason: HOSPADM

## 2019-06-25 RX ORDER — FERROUS SULFATE 325(65) MG
325 TABLET ORAL 2 TIMES DAILY WITH MEALS
Status: DISCONTINUED | OUTPATIENT
Start: 2019-06-25 | End: 2019-06-30 | Stop reason: HOSPADM

## 2019-06-25 RX ORDER — POTASSIUM CHLORIDE 7.45 MG/ML
10 INJECTION INTRAVENOUS
Status: DISCONTINUED | OUTPATIENT
Start: 2019-06-25 | End: 2019-06-30 | Stop reason: HOSPADM

## 2019-06-25 RX ORDER — OXYBUTYNIN CHLORIDE 5 MG/1
2.5 TABLET ORAL 2 TIMES DAILY
Status: DISCONTINUED | OUTPATIENT
Start: 2019-06-25 | End: 2019-06-30 | Stop reason: HOSPADM

## 2019-06-25 RX ORDER — METHYLPREDNISOLONE SODIUM SUCCINATE 125 MG/2ML
125 INJECTION, POWDER, LYOPHILIZED, FOR SOLUTION INTRAMUSCULAR; INTRAVENOUS ONCE
Status: COMPLETED | OUTPATIENT
Start: 2019-06-25 | End: 2019-06-25

## 2019-06-25 RX ORDER — LORAZEPAM 0.5 MG/1
0.5 TABLET ORAL EVERY 4 HOURS PRN
Status: DISCONTINUED | OUTPATIENT
Start: 2019-06-25 | End: 2019-06-30 | Stop reason: HOSPADM

## 2019-06-25 RX ORDER — BACLOFEN 10 MG/1
20 TABLET ORAL 3 TIMES DAILY
Status: DISCONTINUED | OUTPATIENT
Start: 2019-06-25 | End: 2019-06-30 | Stop reason: HOSPADM

## 2019-06-25 RX ORDER — POTASSIUM CHLORIDE 1.5 G/1.77G
40 POWDER, FOR SOLUTION ORAL AS NEEDED
Status: DISCONTINUED | OUTPATIENT
Start: 2019-06-25 | End: 2019-06-30 | Stop reason: HOSPADM

## 2019-06-25 RX ORDER — IPRATROPIUM BROMIDE AND ALBUTEROL SULFATE 2.5; .5 MG/3ML; MG/3ML
3 SOLUTION RESPIRATORY (INHALATION) ONCE
Status: COMPLETED | OUTPATIENT
Start: 2019-06-25 | End: 2019-06-25

## 2019-06-25 RX ORDER — ZOLPIDEM TARTRATE 5 MG/1
5 TABLET ORAL NIGHTLY PRN
Status: DISCONTINUED | OUTPATIENT
Start: 2019-06-25 | End: 2019-06-27

## 2019-06-25 RX ORDER — SODIUM CHLORIDE 0.9 % (FLUSH) 0.9 %
3-10 SYRINGE (ML) INJECTION AS NEEDED
Status: DISCONTINUED | OUTPATIENT
Start: 2019-06-25 | End: 2019-06-30 | Stop reason: HOSPADM

## 2019-06-25 RX ORDER — IPRATROPIUM BROMIDE AND ALBUTEROL SULFATE 2.5; .5 MG/3ML; MG/3ML
3 SOLUTION RESPIRATORY (INHALATION)
Status: DISCONTINUED | OUTPATIENT
Start: 2019-06-25 | End: 2019-06-30 | Stop reason: HOSPADM

## 2019-06-25 RX ORDER — ONDANSETRON 2 MG/ML
4 INJECTION INTRAMUSCULAR; INTRAVENOUS EVERY 6 HOURS PRN
Status: DISCONTINUED | OUTPATIENT
Start: 2019-06-25 | End: 2019-06-30 | Stop reason: HOSPADM

## 2019-06-25 RX ORDER — ACETAMINOPHEN 650 MG/1
650 SUPPOSITORY RECTAL EVERY 4 HOURS PRN
Status: DISCONTINUED | OUTPATIENT
Start: 2019-06-25 | End: 2019-06-30 | Stop reason: HOSPADM

## 2019-06-25 RX ORDER — METHYLPREDNISOLONE SODIUM SUCCINATE 40 MG/ML
20 INJECTION, POWDER, LYOPHILIZED, FOR SOLUTION INTRAMUSCULAR; INTRAVENOUS EVERY 12 HOURS
Status: DISCONTINUED | OUTPATIENT
Start: 2019-06-25 | End: 2019-06-26

## 2019-06-25 RX ORDER — SERTRALINE HYDROCHLORIDE 100 MG/1
100 TABLET, FILM COATED ORAL 2 TIMES DAILY
Status: DISCONTINUED | OUTPATIENT
Start: 2019-06-25 | End: 2019-06-30 | Stop reason: HOSPADM

## 2019-06-25 RX ORDER — PANTOPRAZOLE SODIUM 40 MG/1
40 TABLET, DELAYED RELEASE ORAL EVERY MORNING
Status: DISCONTINUED | OUTPATIENT
Start: 2019-06-25 | End: 2019-06-30 | Stop reason: HOSPADM

## 2019-06-25 RX ORDER — SENNA AND DOCUSATE SODIUM 50; 8.6 MG/1; MG/1
2 TABLET, FILM COATED ORAL NIGHTLY
Status: DISCONTINUED | OUTPATIENT
Start: 2019-06-25 | End: 2019-06-25

## 2019-06-25 RX ORDER — SODIUM CHLORIDE 0.9 % (FLUSH) 0.9 %
3 SYRINGE (ML) INJECTION EVERY 12 HOURS SCHEDULED
Status: DISCONTINUED | OUTPATIENT
Start: 2019-06-25 | End: 2019-06-30 | Stop reason: HOSPADM

## 2019-06-25 RX ORDER — METHYLPREDNISOLONE SODIUM SUCCINATE 40 MG/ML
40 INJECTION, POWDER, LYOPHILIZED, FOR SOLUTION INTRAMUSCULAR; INTRAVENOUS EVERY 8 HOURS
Status: DISCONTINUED | OUTPATIENT
Start: 2019-06-25 | End: 2019-06-25

## 2019-06-25 RX ORDER — POTASSIUM CHLORIDE 750 MG/1
40 CAPSULE, EXTENDED RELEASE ORAL AS NEEDED
Status: DISCONTINUED | OUTPATIENT
Start: 2019-06-25 | End: 2019-06-30 | Stop reason: HOSPADM

## 2019-06-25 RX ORDER — SODIUM CHLORIDE 0.9 % (FLUSH) 0.9 %
10 SYRINGE (ML) INJECTION AS NEEDED
Status: DISCONTINUED | OUTPATIENT
Start: 2019-06-25 | End: 2019-06-30 | Stop reason: HOSPADM

## 2019-06-25 RX ORDER — NICOTINE POLACRILEX 4 MG
15 LOZENGE BUCCAL
Status: DISCONTINUED | OUTPATIENT
Start: 2019-06-25 | End: 2019-06-30 | Stop reason: HOSPADM

## 2019-06-25 RX ORDER — ACETAMINOPHEN 325 MG/1
650 TABLET ORAL EVERY 4 HOURS PRN
Status: DISCONTINUED | OUTPATIENT
Start: 2019-06-25 | End: 2019-06-30 | Stop reason: HOSPADM

## 2019-06-25 RX ORDER — LORAZEPAM 0.5 MG/1
0.5 TABLET ORAL EVERY 6 HOURS PRN
Status: DISCONTINUED | OUTPATIENT
Start: 2019-06-25 | End: 2019-06-25

## 2019-06-25 RX ORDER — GUAIFENESIN 600 MG/1
1200 TABLET, EXTENDED RELEASE ORAL EVERY 12 HOURS SCHEDULED
Status: DISCONTINUED | OUTPATIENT
Start: 2019-06-25 | End: 2019-06-30 | Stop reason: HOSPADM

## 2019-06-25 RX ORDER — DEXTROSE MONOHYDRATE 25 G/50ML
25 INJECTION, SOLUTION INTRAVENOUS
Status: DISCONTINUED | OUTPATIENT
Start: 2019-06-25 | End: 2019-06-30 | Stop reason: HOSPADM

## 2019-06-25 RX ORDER — ONDANSETRON 4 MG/1
4 TABLET, FILM COATED ORAL EVERY 6 HOURS PRN
Status: DISCONTINUED | OUTPATIENT
Start: 2019-06-25 | End: 2019-06-30 | Stop reason: HOSPADM

## 2019-06-25 RX ORDER — BENZONATATE 100 MG/1
200 CAPSULE ORAL 3 TIMES DAILY PRN
Status: DISCONTINUED | OUTPATIENT
Start: 2019-06-25 | End: 2019-06-30 | Stop reason: HOSPADM

## 2019-06-25 RX ADMIN — INSULIN LISPRO 3 UNITS: 100 INJECTION, SOLUTION INTRAVENOUS; SUBCUTANEOUS at 08:21

## 2019-06-25 RX ADMIN — METHYLPREDNISOLONE SODIUM SUCCINATE 20 MG: 40 INJECTION, POWDER, FOR SOLUTION INTRAMUSCULAR; INTRAVENOUS at 08:23

## 2019-06-25 RX ADMIN — IPRATROPIUM BROMIDE AND ALBUTEROL SULFATE 3 ML: 2.5; .5 SOLUTION RESPIRATORY (INHALATION) at 02:14

## 2019-06-25 RX ADMIN — OXYBUTYNIN CHLORIDE 2.5 MG: 5 TABLET ORAL at 08:22

## 2019-06-25 RX ADMIN — LORAZEPAM 0.5 MG: 0.5 TABLET ORAL at 10:55

## 2019-06-25 RX ADMIN — IPRATROPIUM BROMIDE AND ALBUTEROL SULFATE 3 ML: 2.5; .5 SOLUTION RESPIRATORY (INHALATION) at 11:11

## 2019-06-25 RX ADMIN — SERTRALINE 100 MG: 100 TABLET, FILM COATED ORAL at 20:03

## 2019-06-25 RX ADMIN — FERROUS SULFATE TAB 325 MG (65 MG ELEMENTAL FE) 325 MG: 325 (65 FE) TAB at 17:12

## 2019-06-25 RX ADMIN — GUAIFENESIN 1200 MG: 600 TABLET, EXTENDED RELEASE ORAL at 10:22

## 2019-06-25 RX ADMIN — SERTRALINE 100 MG: 100 TABLET, FILM COATED ORAL at 10:55

## 2019-06-25 RX ADMIN — GUAIFENESIN 1200 MG: 600 TABLET, EXTENDED RELEASE ORAL at 20:01

## 2019-06-25 RX ADMIN — IPRATROPIUM BROMIDE AND ALBUTEROL SULFATE 3 ML: 2.5; .5 SOLUTION RESPIRATORY (INHALATION) at 04:47

## 2019-06-25 RX ADMIN — MONTELUKAST SODIUM 10 MG: 10 TABLET, FILM COATED ORAL at 20:01

## 2019-06-25 RX ADMIN — IPRATROPIUM BROMIDE AND ALBUTEROL SULFATE 3 ML: 2.5; .5 SOLUTION RESPIRATORY (INHALATION) at 00:44

## 2019-06-25 RX ADMIN — PANTOPRAZOLE SODIUM 40 MG: 40 TABLET, DELAYED RELEASE ORAL at 07:17

## 2019-06-25 RX ADMIN — THEOPHYLLINE 200 MG: 400 TABLET, EXTENDED RELEASE ORAL at 10:55

## 2019-06-25 RX ADMIN — SODIUM CHLORIDE, PRESERVATIVE FREE 3 ML: 5 INJECTION INTRAVENOUS at 10:23

## 2019-06-25 RX ADMIN — INSULIN LISPRO 3 UNITS: 100 INJECTION, SOLUTION INTRAVENOUS; SUBCUTANEOUS at 12:00

## 2019-06-25 RX ADMIN — POTASSIUM CHLORIDE 40 MEQ: 750 CAPSULE, EXTENDED RELEASE ORAL at 08:21

## 2019-06-25 RX ADMIN — ENOXAPARIN SODIUM 30 MG: 30 INJECTION SUBCUTANEOUS at 05:59

## 2019-06-25 RX ADMIN — IPRATROPIUM BROMIDE AND ALBUTEROL SULFATE 3 ML: 2.5; .5 SOLUTION RESPIRATORY (INHALATION) at 18:49

## 2019-06-25 RX ADMIN — BACLOFEN 20 MG: 10 TABLET ORAL at 20:01

## 2019-06-25 RX ADMIN — INSULIN LISPRO 5 UNITS: 100 INJECTION, SOLUTION INTRAVENOUS; SUBCUTANEOUS at 18:29

## 2019-06-25 RX ADMIN — ACETAMINOPHEN 650 MG: 325 TABLET, FILM COATED ORAL at 11:48

## 2019-06-25 RX ADMIN — METHYLPREDNISOLONE SODIUM SUCCINATE 125 MG: 125 INJECTION, POWDER, FOR SOLUTION INTRAMUSCULAR; INTRAVENOUS at 00:26

## 2019-06-25 RX ADMIN — IPRATROPIUM BROMIDE AND ALBUTEROL SULFATE 3 ML: 2.5; .5 SOLUTION RESPIRATORY (INHALATION) at 15:31

## 2019-06-25 RX ADMIN — FERROUS SULFATE TAB 325 MG (65 MG ELEMENTAL FE) 325 MG: 325 (65 FE) TAB at 08:22

## 2019-06-25 RX ADMIN — METHYLPREDNISOLONE SODIUM SUCCINATE 20 MG: 40 INJECTION, POWDER, FOR SOLUTION INTRAMUSCULAR; INTRAVENOUS at 19:59

## 2019-06-25 RX ADMIN — BACLOFEN 20 MG: 10 TABLET ORAL at 16:47

## 2019-06-25 RX ADMIN — SODIUM CHLORIDE, PRESERVATIVE FREE 3 ML: 5 INJECTION INTRAVENOUS at 20:03

## 2019-06-25 RX ADMIN — OXYBUTYNIN CHLORIDE 2.5 MG: 5 TABLET ORAL at 20:01

## 2019-06-25 RX ADMIN — BACLOFEN 20 MG: 10 TABLET ORAL at 08:22

## 2019-06-25 RX ADMIN — ZOLPIDEM TARTRATE 5 MG: 5 TABLET ORAL at 22:04

## 2019-06-25 NOTE — TELEPHONE ENCOUNTER
Pt has been sick with diarrhea and shortness of breath, reports her breathing gets better after she calms down from going to the bathroom. Has COPD and has upped her 02 via nasal cannula today. Has weight loss and is wanting to come to the office tomorrow she does not want to go back to the er. I spoke with her  who is very attentive and if she decides she needs to go to the er he will take care of it. We will see her in the office in the morning.

## 2019-06-26 LAB
DEPRECATED RDW RBC AUTO: 52.8 FL (ref 37–54)
ENDOMYSIUM IGA SER QL: NEGATIVE
ERYTHROCYTE [DISTWIDTH] IN BLOOD BY AUTOMATED COUNT: 14.2 % (ref 12.3–15.4)
GLIADIN PEPTIDE IGA SER-ACNC: 4 UNITS (ref 0–19)
GLIADIN PEPTIDE IGG SER-ACNC: 2 UNITS (ref 0–19)
GLUCOSE BLDC GLUCOMTR-MCNC: 145 MG/DL (ref 70–130)
GLUCOSE BLDC GLUCOMTR-MCNC: 146 MG/DL (ref 70–130)
GLUCOSE BLDC GLUCOMTR-MCNC: 177 MG/DL (ref 70–130)
GLUCOSE BLDC GLUCOMTR-MCNC: 217 MG/DL (ref 70–130)
HCT VFR BLD AUTO: 32.7 % (ref 34–46.6)
HGB BLD-MCNC: 9.7 G/DL (ref 12–15.9)
IGA SERPL-MCNC: 421 MG/DL (ref 64–422)
MCH RBC QN AUTO: 30.8 PG (ref 26.6–33)
MCHC RBC AUTO-ENTMCNC: 29.7 G/DL (ref 31.5–35.7)
MCV RBC AUTO: 103.8 FL (ref 79–97)
PLATELET # BLD AUTO: 429 10*3/MM3 (ref 140–450)
PMV BLD AUTO: 9.1 FL (ref 6–12)
RBC # BLD AUTO: 3.15 10*6/MM3 (ref 3.77–5.28)
TTG IGA SER-ACNC: <2 U/ML (ref 0–3)
TTG IGG SER-ACNC: <2 U/ML (ref 0–5)
WBC NRBC COR # BLD: 12.91 10*3/MM3 (ref 3.4–10.8)

## 2019-06-26 PROCEDURE — 97110 THERAPEUTIC EXERCISES: CPT

## 2019-06-26 PROCEDURE — 63710000001 PREDNISONE PER 1 MG: Performed by: INTERNAL MEDICINE

## 2019-06-26 PROCEDURE — 82962 GLUCOSE BLOOD TEST: CPT

## 2019-06-26 PROCEDURE — 99232 SBSQ HOSP IP/OBS MODERATE 35: CPT | Performed by: INTERNAL MEDICINE

## 2019-06-26 PROCEDURE — 94799 UNLISTED PULMONARY SVC/PX: CPT

## 2019-06-26 PROCEDURE — 85027 COMPLETE CBC AUTOMATED: CPT | Performed by: INTERNAL MEDICINE

## 2019-06-26 PROCEDURE — 25010000002 ENOXAPARIN PER 10 MG: Performed by: INTERNAL MEDICINE

## 2019-06-26 PROCEDURE — 63710000001 INSULIN LISPRO (HUMAN) PER 5 UNITS: Performed by: INTERNAL MEDICINE

## 2019-06-26 RX ORDER — PREDNISONE 20 MG/1
20 TABLET ORAL
Status: DISCONTINUED | OUTPATIENT
Start: 2019-06-26 | End: 2019-06-27

## 2019-06-26 RX ADMIN — FERROUS SULFATE TAB 325 MG (65 MG ELEMENTAL FE) 325 MG: 325 (65 FE) TAB at 08:03

## 2019-06-26 RX ADMIN — IPRATROPIUM BROMIDE AND ALBUTEROL SULFATE 3 ML: 2.5; .5 SOLUTION RESPIRATORY (INHALATION) at 08:00

## 2019-06-26 RX ADMIN — SODIUM CHLORIDE, PRESERVATIVE FREE 3 ML: 5 INJECTION INTRAVENOUS at 20:05

## 2019-06-26 RX ADMIN — FERROUS SULFATE TAB 325 MG (65 MG ELEMENTAL FE) 325 MG: 325 (65 FE) TAB at 17:00

## 2019-06-26 RX ADMIN — PREDNISONE 20 MG: 20 TABLET ORAL at 09:58

## 2019-06-26 RX ADMIN — OXYBUTYNIN CHLORIDE 2.5 MG: 5 TABLET ORAL at 08:04

## 2019-06-26 RX ADMIN — PANTOPRAZOLE SODIUM 40 MG: 40 TABLET, DELAYED RELEASE ORAL at 06:55

## 2019-06-26 RX ADMIN — IPRATROPIUM BROMIDE AND ALBUTEROL SULFATE 3 ML: 2.5; .5 SOLUTION RESPIRATORY (INHALATION) at 19:51

## 2019-06-26 RX ADMIN — OXYBUTYNIN CHLORIDE 2.5 MG: 5 TABLET ORAL at 20:03

## 2019-06-26 RX ADMIN — SERTRALINE 100 MG: 100 TABLET, FILM COATED ORAL at 08:03

## 2019-06-26 RX ADMIN — BACLOFEN 20 MG: 10 TABLET ORAL at 17:00

## 2019-06-26 RX ADMIN — THEOPHYLLINE 200 MG: 400 TABLET, EXTENDED RELEASE ORAL at 12:26

## 2019-06-26 RX ADMIN — IPRATROPIUM BROMIDE AND ALBUTEROL SULFATE 3 ML: 2.5; .5 SOLUTION RESPIRATORY (INHALATION) at 14:57

## 2019-06-26 RX ADMIN — IPRATROPIUM BROMIDE AND ALBUTEROL SULFATE 3 ML: 2.5; .5 SOLUTION RESPIRATORY (INHALATION) at 10:56

## 2019-06-26 RX ADMIN — INSULIN LISPRO 3 UNITS: 100 INJECTION, SOLUTION INTRAVENOUS; SUBCUTANEOUS at 12:26

## 2019-06-26 RX ADMIN — SERTRALINE 100 MG: 100 TABLET, FILM COATED ORAL at 20:02

## 2019-06-26 RX ADMIN — MONTELUKAST SODIUM 10 MG: 10 TABLET, FILM COATED ORAL at 20:02

## 2019-06-26 RX ADMIN — BACLOFEN 20 MG: 10 TABLET ORAL at 08:03

## 2019-06-26 RX ADMIN — BACLOFEN 20 MG: 10 TABLET ORAL at 20:02

## 2019-06-26 RX ADMIN — GUAIFENESIN 1200 MG: 600 TABLET, EXTENDED RELEASE ORAL at 08:03

## 2019-06-26 RX ADMIN — SODIUM CHLORIDE, PRESERVATIVE FREE 3 ML: 5 INJECTION INTRAVENOUS at 12:29

## 2019-06-26 RX ADMIN — ENOXAPARIN SODIUM 30 MG: 30 INJECTION SUBCUTANEOUS at 03:47

## 2019-06-26 RX ADMIN — GUAIFENESIN 1200 MG: 600 TABLET, EXTENDED RELEASE ORAL at 20:02

## 2019-06-26 RX ADMIN — INSULIN LISPRO 5 UNITS: 100 INJECTION, SOLUTION INTRAVENOUS; SUBCUTANEOUS at 17:00

## 2019-06-27 ENCOUNTER — APPOINTMENT (OUTPATIENT)
Dept: GENERAL RADIOLOGY | Facility: HOSPITAL | Age: 74
End: 2019-06-27

## 2019-06-27 LAB
ANION GAP SERPL CALCULATED.3IONS-SCNC: 11 MMOL/L (ref 5–15)
ARTERIAL PATENCY WRIST A: POSITIVE
ATMOSPHERIC PRESS: 753.5 MMHG
BASE EXCESS BLDA CALC-SCNC: 2.7 MMOL/L (ref 0–2)
BDY SITE: ABNORMAL
BUN BLD-MCNC: 13 MG/DL (ref 8–23)
BUN/CREAT SERPL: 20.6 (ref 7–25)
CALCIUM SPEC-SCNC: 9.2 MG/DL (ref 8.6–10.5)
CHLORIDE SERPL-SCNC: 100 MMOL/L (ref 98–107)
CO2 SERPL-SCNC: 28 MMOL/L (ref 22–29)
CREAT BLD-MCNC: 0.63 MG/DL (ref 0.57–1)
FAT STL QL: NORMAL
GAS FLOW AIRWAY: 4 LPM
GFR SERPL CREATININE-BSD FRML MDRD: 93 ML/MIN/1.73
GLUCOSE BLD-MCNC: 128 MG/DL (ref 65–99)
GLUCOSE BLDC GLUCOMTR-MCNC: 124 MG/DL (ref 70–130)
GLUCOSE BLDC GLUCOMTR-MCNC: 127 MG/DL (ref 70–130)
GLUCOSE BLDC GLUCOMTR-MCNC: 141 MG/DL (ref 70–130)
GLUCOSE BLDC GLUCOMTR-MCNC: 148 MG/DL (ref 70–130)
GLUCOSE BLDC GLUCOMTR-MCNC: 161 MG/DL (ref 70–130)
HCO3 BLDA-SCNC: 27.8 MMOL/L (ref 22–28)
MAGNESIUM SERPL-MCNC: 2 MG/DL (ref 1.6–2.4)
MEAT FIBERS STL QL MICRO: NORMAL
MODALITY: ABNORMAL
NEUTRAL FAT STL QL: NORMAL
PCO2 BLDA: 44.1 MM HG (ref 35–45)
PH BLDA: 7.41 PH UNITS (ref 7.35–7.45)
PO2 BLDA: 36.7 MM HG (ref 80–100)
POTASSIUM BLD-SCNC: 3.4 MMOL/L (ref 3.5–5.2)
SAO2 % BLDCOA: 70.2 % (ref 92–99)
SODIUM BLD-SCNC: 139 MMOL/L (ref 136–145)
THEOPHYLLINE SERPL-MCNC: 17.6 MCG/ML (ref 10–20)
TOTAL RATE: 30 BREATHS/MINUTE

## 2019-06-27 PROCEDURE — 82962 GLUCOSE BLOOD TEST: CPT

## 2019-06-27 PROCEDURE — 25010000002 MORPHINE PER 10 MG: Performed by: INTERNAL MEDICINE

## 2019-06-27 PROCEDURE — 25010000002 METHYLPREDNISOLONE PER 40 MG: Performed by: INTERNAL MEDICINE

## 2019-06-27 PROCEDURE — 94799 UNLISTED PULMONARY SVC/PX: CPT

## 2019-06-27 PROCEDURE — 25010000002 LORAZEPAM PER 2 MG: Performed by: INTERNAL MEDICINE

## 2019-06-27 PROCEDURE — 71045 X-RAY EXAM CHEST 1 VIEW: CPT

## 2019-06-27 PROCEDURE — 82803 BLOOD GASES ANY COMBINATION: CPT

## 2019-06-27 PROCEDURE — 25010000002 ONDANSETRON PER 1 MG: Performed by: INTERNAL MEDICINE

## 2019-06-27 PROCEDURE — 80198 ASSAY OF THEOPHYLLINE: CPT | Performed by: INTERNAL MEDICINE

## 2019-06-27 PROCEDURE — 25010000002 ENOXAPARIN PER 10 MG: Performed by: INTERNAL MEDICINE

## 2019-06-27 PROCEDURE — 36600 WITHDRAWAL OF ARTERIAL BLOOD: CPT

## 2019-06-27 PROCEDURE — 99232 SBSQ HOSP IP/OBS MODERATE 35: CPT | Performed by: INTERNAL MEDICINE

## 2019-06-27 PROCEDURE — 80048 BASIC METABOLIC PNL TOTAL CA: CPT | Performed by: INTERNAL MEDICINE

## 2019-06-27 PROCEDURE — 83735 ASSAY OF MAGNESIUM: CPT | Performed by: INTERNAL MEDICINE

## 2019-06-27 RX ORDER — BUDESONIDE 0.5 MG/2ML
0.5 INHALANT ORAL
Status: DISCONTINUED | OUTPATIENT
Start: 2019-06-27 | End: 2019-06-30 | Stop reason: HOSPADM

## 2019-06-27 RX ORDER — ARFORMOTEROL TARTRATE 15 UG/2ML
15 SOLUTION RESPIRATORY (INHALATION)
Status: DISCONTINUED | OUTPATIENT
Start: 2019-06-27 | End: 2019-06-30 | Stop reason: HOSPADM

## 2019-06-27 RX ORDER — MORPHINE SULFATE 2 MG/ML
2 INJECTION, SOLUTION INTRAMUSCULAR; INTRAVENOUS
Status: DISCONTINUED | OUTPATIENT
Start: 2019-06-27 | End: 2019-06-30 | Stop reason: HOSPADM

## 2019-06-27 RX ORDER — PREDNISONE 20 MG/1
40 TABLET ORAL
Status: DISCONTINUED | OUTPATIENT
Start: 2019-06-27 | End: 2019-06-27

## 2019-06-27 RX ORDER — LORAZEPAM 2 MG/ML
0.5 INJECTION INTRAMUSCULAR
Status: DISCONTINUED | OUTPATIENT
Start: 2019-06-27 | End: 2019-06-30 | Stop reason: HOSPADM

## 2019-06-27 RX ORDER — OLANZAPINE 10 MG/1
5 INJECTION, POWDER, LYOPHILIZED, FOR SOLUTION INTRAMUSCULAR EVERY 8 HOURS PRN
Status: DISCONTINUED | OUTPATIENT
Start: 2019-06-27 | End: 2019-06-28

## 2019-06-27 RX ORDER — METHYLPREDNISOLONE SODIUM SUCCINATE 40 MG/ML
20 INJECTION, POWDER, LYOPHILIZED, FOR SOLUTION INTRAMUSCULAR; INTRAVENOUS EVERY 12 HOURS
Status: DISCONTINUED | OUTPATIENT
Start: 2019-06-27 | End: 2019-06-28

## 2019-06-27 RX ADMIN — ONDANSETRON 4 MG: 2 INJECTION INTRAMUSCULAR; INTRAVENOUS at 20:29

## 2019-06-27 RX ADMIN — MONTELUKAST SODIUM 10 MG: 10 TABLET, FILM COATED ORAL at 20:17

## 2019-06-27 RX ADMIN — SODIUM CHLORIDE, PRESERVATIVE FREE 3 ML: 5 INJECTION INTRAVENOUS at 13:03

## 2019-06-27 RX ADMIN — LORAZEPAM 0.5 MG: 0.5 TABLET ORAL at 03:54

## 2019-06-27 RX ADMIN — ARFORMOTEROL TARTRATE 15 MCG: 15 SOLUTION RESPIRATORY (INHALATION) at 21:14

## 2019-06-27 RX ADMIN — BACLOFEN 20 MG: 10 TABLET ORAL at 20:16

## 2019-06-27 RX ADMIN — METHYLPREDNISOLONE SODIUM SUCCINATE 20 MG: 40 INJECTION, POWDER, FOR SOLUTION INTRAMUSCULAR; INTRAVENOUS at 23:36

## 2019-06-27 RX ADMIN — LORAZEPAM 0.5 MG: 2 INJECTION INTRAMUSCULAR; INTRAVENOUS at 07:15

## 2019-06-27 RX ADMIN — POTASSIUM CHLORIDE 40 MEQ: 750 CAPSULE, EXTENDED RELEASE ORAL at 20:16

## 2019-06-27 RX ADMIN — ENOXAPARIN SODIUM 30 MG: 30 INJECTION SUBCUTANEOUS at 03:52

## 2019-06-27 RX ADMIN — OXYBUTYNIN CHLORIDE 2.5 MG: 5 TABLET ORAL at 20:17

## 2019-06-27 RX ADMIN — MORPHINE SULFATE 2 MG: 2 INJECTION, SOLUTION INTRAMUSCULAR; INTRAVENOUS at 17:47

## 2019-06-27 RX ADMIN — LORAZEPAM 0.5 MG: 2 INJECTION INTRAMUSCULAR; INTRAVENOUS at 09:29

## 2019-06-27 RX ADMIN — GUAIFENESIN 1200 MG: 600 TABLET, EXTENDED RELEASE ORAL at 20:16

## 2019-06-27 RX ADMIN — IPRATROPIUM BROMIDE AND ALBUTEROL SULFATE 3 ML: 2.5; .5 SOLUTION RESPIRATORY (INHALATION) at 05:00

## 2019-06-27 RX ADMIN — ZOLPIDEM TARTRATE 5 MG: 5 TABLET ORAL at 02:03

## 2019-06-27 RX ADMIN — METHYLPREDNISOLONE SODIUM SUCCINATE 20 MG: 40 INJECTION, POWDER, FOR SOLUTION INTRAMUSCULAR; INTRAVENOUS at 13:02

## 2019-06-27 RX ADMIN — SERTRALINE 100 MG: 100 TABLET, FILM COATED ORAL at 20:17

## 2019-06-27 RX ADMIN — BUDESONIDE 0.5 MG: 0.5 INHALANT RESPIRATORY (INHALATION) at 21:14

## 2019-06-28 LAB
ANION GAP SERPL CALCULATED.3IONS-SCNC: 10.2 MMOL/L (ref 5–15)
BUN BLD-MCNC: 12 MG/DL (ref 8–23)
BUN/CREAT SERPL: 21.8 (ref 7–25)
CALCIUM SPEC-SCNC: 8.5 MG/DL (ref 8.6–10.5)
CHLORIDE SERPL-SCNC: 106 MMOL/L (ref 98–107)
CO2 SERPL-SCNC: 26.8 MMOL/L (ref 22–29)
CREAT BLD-MCNC: 0.55 MG/DL (ref 0.57–1)
DEPRECATED RDW RBC AUTO: 51.2 FL (ref 37–54)
ERYTHROCYTE [DISTWIDTH] IN BLOOD BY AUTOMATED COUNT: 13.3 % (ref 12.3–15.4)
GFR SERPL CREATININE-BSD FRML MDRD: 109 ML/MIN/1.73
GLUCOSE BLD-MCNC: 133 MG/DL (ref 65–99)
GLUCOSE BLDC GLUCOMTR-MCNC: 157 MG/DL (ref 70–130)
GLUCOSE BLDC GLUCOMTR-MCNC: 191 MG/DL (ref 70–130)
GLUCOSE BLDC GLUCOMTR-MCNC: 192 MG/DL (ref 70–130)
GLUCOSE BLDC GLUCOMTR-MCNC: 242 MG/DL (ref 70–130)
HCT VFR BLD AUTO: 32.9 % (ref 34–46.6)
HGB BLD-MCNC: 9.2 G/DL (ref 12–15.9)
HYPOCHROMIA BLD QL: ABNORMAL
LYMPHOCYTES # BLD MANUAL: 0.61 10*3/MM3 (ref 0.7–3.1)
LYMPHOCYTES NFR BLD MANUAL: 2 % (ref 5–12)
LYMPHOCYTES NFR BLD MANUAL: 9 % (ref 19.6–45.3)
MCH RBC QN AUTO: 29.5 PG (ref 26.6–33)
MCHC RBC AUTO-ENTMCNC: 28 G/DL (ref 31.5–35.7)
MCV RBC AUTO: 105.4 FL (ref 79–97)
MONOCYTES # BLD AUTO: 0.14 10*3/MM3 (ref 0.1–0.9)
NEUTROPHILS # BLD AUTO: 6.01 10*3/MM3 (ref 1.7–7)
NEUTROPHILS NFR BLD MANUAL: 89 % (ref 42.7–76)
PLAT MORPH BLD: NORMAL
PLATELET # BLD AUTO: 378 10*3/MM3 (ref 140–450)
PMV BLD AUTO: 9.7 FL (ref 6–12)
POTASSIUM BLD-SCNC: 4.3 MMOL/L (ref 3.5–5.2)
RBC # BLD AUTO: 3.12 10*6/MM3 (ref 3.77–5.28)
SODIUM BLD-SCNC: 143 MMOL/L (ref 136–145)
WBC MORPH BLD: NORMAL
WBC NRBC COR # BLD: 6.75 10*3/MM3 (ref 3.4–10.8)

## 2019-06-28 PROCEDURE — 63710000001 INSULIN LISPRO (HUMAN) PER 5 UNITS: Performed by: INTERNAL MEDICINE

## 2019-06-28 PROCEDURE — 25010000002 ENOXAPARIN PER 10 MG: Performed by: INTERNAL MEDICINE

## 2019-06-28 PROCEDURE — 82962 GLUCOSE BLOOD TEST: CPT

## 2019-06-28 PROCEDURE — 80048 BASIC METABOLIC PNL TOTAL CA: CPT | Performed by: INTERNAL MEDICINE

## 2019-06-28 PROCEDURE — 85007 BL SMEAR W/DIFF WBC COUNT: CPT | Performed by: INTERNAL MEDICINE

## 2019-06-28 PROCEDURE — 63710000001 PREDNISONE PER 1 MG: Performed by: INTERNAL MEDICINE

## 2019-06-28 PROCEDURE — 94799 UNLISTED PULMONARY SVC/PX: CPT

## 2019-06-28 PROCEDURE — 85025 COMPLETE CBC W/AUTO DIFF WBC: CPT | Performed by: INTERNAL MEDICINE

## 2019-06-28 PROCEDURE — 97110 THERAPEUTIC EXERCISES: CPT

## 2019-06-28 PROCEDURE — 99232 SBSQ HOSP IP/OBS MODERATE 35: CPT | Performed by: INTERNAL MEDICINE

## 2019-06-28 RX ORDER — PREDNISONE 20 MG/1
20 TABLET ORAL
Status: DISCONTINUED | OUTPATIENT
Start: 2019-06-28 | End: 2019-06-30 | Stop reason: HOSPADM

## 2019-06-28 RX ORDER — PREDNISONE 20 MG/1
40 TABLET ORAL
Status: DISCONTINUED | OUTPATIENT
Start: 2019-06-28 | End: 2019-06-28

## 2019-06-28 RX ADMIN — THEOPHYLLINE 200 MG: 400 TABLET, EXTENDED RELEASE ORAL at 08:10

## 2019-06-28 RX ADMIN — ARFORMOTEROL TARTRATE 15 MCG: 15 SOLUTION RESPIRATORY (INHALATION) at 08:06

## 2019-06-28 RX ADMIN — SERTRALINE 100 MG: 100 TABLET, FILM COATED ORAL at 21:10

## 2019-06-28 RX ADMIN — INSULIN LISPRO 5 UNITS: 100 INJECTION, SOLUTION INTRAVENOUS; SUBCUTANEOUS at 21:14

## 2019-06-28 RX ADMIN — BACLOFEN 20 MG: 10 TABLET ORAL at 15:46

## 2019-06-28 RX ADMIN — INSULIN LISPRO 3 UNITS: 100 INJECTION, SOLUTION INTRAVENOUS; SUBCUTANEOUS at 12:25

## 2019-06-28 RX ADMIN — SODIUM CHLORIDE, PRESERVATIVE FREE 3 ML: 5 INJECTION INTRAVENOUS at 08:12

## 2019-06-28 RX ADMIN — IPRATROPIUM BROMIDE AND ALBUTEROL SULFATE 3 ML: 2.5; .5 SOLUTION RESPIRATORY (INHALATION) at 21:18

## 2019-06-28 RX ADMIN — PANTOPRAZOLE SODIUM 40 MG: 40 TABLET, DELAYED RELEASE ORAL at 06:50

## 2019-06-28 RX ADMIN — SERTRALINE 100 MG: 100 TABLET, FILM COATED ORAL at 08:10

## 2019-06-28 RX ADMIN — INSULIN LISPRO 3 UNITS: 100 INJECTION, SOLUTION INTRAVENOUS; SUBCUTANEOUS at 17:45

## 2019-06-28 RX ADMIN — BACLOFEN 20 MG: 10 TABLET ORAL at 21:10

## 2019-06-28 RX ADMIN — GUAIFENESIN 1200 MG: 600 TABLET, EXTENDED RELEASE ORAL at 21:10

## 2019-06-28 RX ADMIN — IPRATROPIUM BROMIDE AND ALBUTEROL SULFATE 3 ML: 2.5; .5 SOLUTION RESPIRATORY (INHALATION) at 16:04

## 2019-06-28 RX ADMIN — BUDESONIDE 0.5 MG: 0.5 INHALANT RESPIRATORY (INHALATION) at 21:18

## 2019-06-28 RX ADMIN — GUAIFENESIN 1200 MG: 600 TABLET, EXTENDED RELEASE ORAL at 08:10

## 2019-06-28 RX ADMIN — PREDNISONE 20 MG: 20 TABLET ORAL at 12:25

## 2019-06-28 RX ADMIN — SODIUM CHLORIDE, PRESERVATIVE FREE 3 ML: 5 INJECTION INTRAVENOUS at 21:00

## 2019-06-28 RX ADMIN — FERROUS SULFATE TAB 325 MG (65 MG ELEMENTAL FE) 325 MG: 325 (65 FE) TAB at 17:45

## 2019-06-28 RX ADMIN — ARFORMOTEROL TARTRATE 15 MCG: 15 SOLUTION RESPIRATORY (INHALATION) at 21:19

## 2019-06-28 RX ADMIN — ENOXAPARIN SODIUM 30 MG: 30 INJECTION SUBCUTANEOUS at 03:43

## 2019-06-28 RX ADMIN — OXYBUTYNIN CHLORIDE 2.5 MG: 5 TABLET ORAL at 08:10

## 2019-06-28 RX ADMIN — FERROUS SULFATE TAB 325 MG (65 MG ELEMENTAL FE) 325 MG: 325 (65 FE) TAB at 08:10

## 2019-06-28 RX ADMIN — MONTELUKAST SODIUM 10 MG: 10 TABLET, FILM COATED ORAL at 21:10

## 2019-06-28 RX ADMIN — IPRATROPIUM BROMIDE AND ALBUTEROL SULFATE 3 ML: 2.5; .5 SOLUTION RESPIRATORY (INHALATION) at 10:42

## 2019-06-28 RX ADMIN — BUDESONIDE 0.5 MG: 0.5 INHALANT RESPIRATORY (INHALATION) at 08:06

## 2019-06-28 RX ADMIN — BACLOFEN 20 MG: 10 TABLET ORAL at 08:10

## 2019-06-28 RX ADMIN — OXYBUTYNIN CHLORIDE 2.5 MG: 5 TABLET ORAL at 21:10

## 2019-06-29 LAB
GLUCOSE BLDC GLUCOMTR-MCNC: 105 MG/DL (ref 70–130)
GLUCOSE BLDC GLUCOMTR-MCNC: 248 MG/DL (ref 70–130)
GLUCOSE BLDC GLUCOMTR-MCNC: 252 MG/DL (ref 70–130)
GLUCOSE BLDC GLUCOMTR-MCNC: 271 MG/DL (ref 70–130)

## 2019-06-29 PROCEDURE — 94799 UNLISTED PULMONARY SVC/PX: CPT

## 2019-06-29 PROCEDURE — 25010000002 ENOXAPARIN PER 10 MG: Performed by: INTERNAL MEDICINE

## 2019-06-29 PROCEDURE — 63710000001 PREDNISONE PER 1 MG: Performed by: INTERNAL MEDICINE

## 2019-06-29 PROCEDURE — 97110 THERAPEUTIC EXERCISES: CPT

## 2019-06-29 PROCEDURE — 63710000001 INSULIN LISPRO (HUMAN) PER 5 UNITS: Performed by: INTERNAL MEDICINE

## 2019-06-29 PROCEDURE — 25010000002 MORPHINE PER 10 MG: Performed by: INTERNAL MEDICINE

## 2019-06-29 PROCEDURE — 82962 GLUCOSE BLOOD TEST: CPT

## 2019-06-29 RX ORDER — ZOLPIDEM TARTRATE 5 MG/1
5 TABLET ORAL NIGHTLY PRN
Status: DISCONTINUED | OUTPATIENT
Start: 2019-06-29 | End: 2019-06-30 | Stop reason: HOSPADM

## 2019-06-29 RX ADMIN — INSULIN LISPRO 5 UNITS: 100 INJECTION, SOLUTION INTRAVENOUS; SUBCUTANEOUS at 21:34

## 2019-06-29 RX ADMIN — ZOLPIDEM TARTRATE 5 MG: 5 TABLET ORAL at 23:23

## 2019-06-29 RX ADMIN — LORAZEPAM 0.5 MG: 0.5 TABLET ORAL at 01:43

## 2019-06-29 RX ADMIN — BUDESONIDE 0.5 MG: 0.5 INHALANT RESPIRATORY (INHALATION) at 06:46

## 2019-06-29 RX ADMIN — OXYBUTYNIN CHLORIDE 2.5 MG: 5 TABLET ORAL at 21:35

## 2019-06-29 RX ADMIN — FERROUS SULFATE TAB 325 MG (65 MG ELEMENTAL FE) 325 MG: 325 (65 FE) TAB at 08:47

## 2019-06-29 RX ADMIN — MONTELUKAST SODIUM 10 MG: 10 TABLET, FILM COATED ORAL at 21:35

## 2019-06-29 RX ADMIN — BUDESONIDE 0.5 MG: 0.5 INHALANT RESPIRATORY (INHALATION) at 19:04

## 2019-06-29 RX ADMIN — IPRATROPIUM BROMIDE AND ALBUTEROL SULFATE 3 ML: 2.5; .5 SOLUTION RESPIRATORY (INHALATION) at 05:22

## 2019-06-29 RX ADMIN — GUAIFENESIN 1200 MG: 600 TABLET, EXTENDED RELEASE ORAL at 08:47

## 2019-06-29 RX ADMIN — MORPHINE SULFATE 2 MG: 2 INJECTION, SOLUTION INTRAMUSCULAR; INTRAVENOUS at 03:31

## 2019-06-29 RX ADMIN — PANTOPRAZOLE SODIUM 40 MG: 40 TABLET, DELAYED RELEASE ORAL at 08:47

## 2019-06-29 RX ADMIN — BACLOFEN 20 MG: 10 TABLET ORAL at 08:47

## 2019-06-29 RX ADMIN — SODIUM CHLORIDE, PRESERVATIVE FREE 3 ML: 5 INJECTION INTRAVENOUS at 21:37

## 2019-06-29 RX ADMIN — IPRATROPIUM BROMIDE AND ALBUTEROL SULFATE 3 ML: 2.5; .5 SOLUTION RESPIRATORY (INHALATION) at 10:56

## 2019-06-29 RX ADMIN — ARFORMOTEROL TARTRATE 15 MCG: 15 SOLUTION RESPIRATORY (INHALATION) at 19:04

## 2019-06-29 RX ADMIN — ENOXAPARIN SODIUM 30 MG: 30 INJECTION SUBCUTANEOUS at 03:31

## 2019-06-29 RX ADMIN — BACLOFEN 20 MG: 10 TABLET ORAL at 21:35

## 2019-06-29 RX ADMIN — BACLOFEN 20 MG: 10 TABLET ORAL at 17:04

## 2019-06-29 RX ADMIN — INSULIN LISPRO 8 UNITS: 100 INJECTION, SOLUTION INTRAVENOUS; SUBCUTANEOUS at 13:06

## 2019-06-29 RX ADMIN — SODIUM CHLORIDE, PRESERVATIVE FREE 3 ML: 5 INJECTION INTRAVENOUS at 15:31

## 2019-06-29 RX ADMIN — PREDNISONE 20 MG: 20 TABLET ORAL at 08:47

## 2019-06-29 RX ADMIN — FERROUS SULFATE TAB 325 MG (65 MG ELEMENTAL FE) 325 MG: 325 (65 FE) TAB at 17:04

## 2019-06-29 RX ADMIN — ARFORMOTEROL TARTRATE 15 MCG: 15 SOLUTION RESPIRATORY (INHALATION) at 06:47

## 2019-06-29 RX ADMIN — THEOPHYLLINE 200 MG: 400 TABLET, EXTENDED RELEASE ORAL at 08:48

## 2019-06-29 RX ADMIN — GUAIFENESIN 1200 MG: 600 TABLET, EXTENDED RELEASE ORAL at 21:35

## 2019-06-29 RX ADMIN — SERTRALINE 100 MG: 100 TABLET, FILM COATED ORAL at 21:35

## 2019-06-29 RX ADMIN — IPRATROPIUM BROMIDE AND ALBUTEROL SULFATE 3 ML: 2.5; .5 SOLUTION RESPIRATORY (INHALATION) at 14:54

## 2019-06-29 RX ADMIN — SERTRALINE 100 MG: 100 TABLET, FILM COATED ORAL at 08:47

## 2019-06-29 RX ADMIN — OXYBUTYNIN CHLORIDE 2.5 MG: 5 TABLET ORAL at 08:47

## 2019-06-29 RX ADMIN — INSULIN LISPRO 8 UNITS: 100 INJECTION, SOLUTION INTRAVENOUS; SUBCUTANEOUS at 17:04

## 2019-06-30 VITALS
RESPIRATION RATE: 20 BRPM | HEART RATE: 111 BPM | WEIGHT: 97 LBS | BODY MASS INDEX: 17.85 KG/M2 | SYSTOLIC BLOOD PRESSURE: 117 MMHG | TEMPERATURE: 97.6 F | HEIGHT: 62 IN | OXYGEN SATURATION: 93 % | DIASTOLIC BLOOD PRESSURE: 60 MMHG

## 2019-06-30 PROBLEM — J96.01 ACUTE RESPIRATORY FAILURE WITH HYPOXIA (HCC): Status: RESOLVED | Noted: 2019-06-25 | Resolved: 2019-06-30

## 2019-06-30 LAB — GLUCOSE BLDC GLUCOMTR-MCNC: 118 MG/DL (ref 70–130)

## 2019-06-30 PROCEDURE — 25010000002 MORPHINE PER 10 MG: Performed by: INTERNAL MEDICINE

## 2019-06-30 PROCEDURE — 94799 UNLISTED PULMONARY SVC/PX: CPT

## 2019-06-30 PROCEDURE — 25010000002 ENOXAPARIN PER 10 MG: Performed by: INTERNAL MEDICINE

## 2019-06-30 PROCEDURE — 82962 GLUCOSE BLOOD TEST: CPT

## 2019-06-30 PROCEDURE — 63710000001 PREDNISONE PER 1 MG: Performed by: INTERNAL MEDICINE

## 2019-06-30 RX ORDER — LORAZEPAM 0.5 MG/1
0.5 TABLET ORAL EVERY 4 HOURS PRN
Qty: 30 TABLET | Refills: 0 | Status: SHIPPED | OUTPATIENT
Start: 2019-06-30 | End: 2019-07-05

## 2019-06-30 RX ORDER — PREDNISONE 20 MG/1
20 TABLET ORAL
Qty: 3 TABLET | Refills: 0 | Status: SHIPPED | OUTPATIENT
Start: 2019-06-30 | End: 2019-07-03

## 2019-06-30 RX ADMIN — MORPHINE SULFATE 2 MG: 2 INJECTION, SOLUTION INTRAMUSCULAR; INTRAVENOUS at 04:00

## 2019-06-30 RX ADMIN — IPRATROPIUM BROMIDE AND ALBUTEROL SULFATE 3 ML: 2.5; .5 SOLUTION RESPIRATORY (INHALATION) at 09:02

## 2019-06-30 RX ADMIN — SERTRALINE 100 MG: 100 TABLET, FILM COATED ORAL at 08:47

## 2019-06-30 RX ADMIN — OXYBUTYNIN CHLORIDE 2.5 MG: 5 TABLET ORAL at 08:44

## 2019-06-30 RX ADMIN — PREDNISONE 20 MG: 20 TABLET ORAL at 08:47

## 2019-06-30 RX ADMIN — THEOPHYLLINE 200 MG: 400 TABLET, EXTENDED RELEASE ORAL at 08:47

## 2019-06-30 RX ADMIN — BACLOFEN 20 MG: 10 TABLET ORAL at 08:47

## 2019-06-30 RX ADMIN — ENOXAPARIN SODIUM 30 MG: 30 INJECTION SUBCUTANEOUS at 04:07

## 2019-06-30 RX ADMIN — FERROUS SULFATE TAB 325 MG (65 MG ELEMENTAL FE) 325 MG: 325 (65 FE) TAB at 08:47

## 2019-06-30 RX ADMIN — GUAIFENESIN 1200 MG: 600 TABLET, EXTENDED RELEASE ORAL at 08:44

## 2019-06-30 RX ADMIN — ARFORMOTEROL TARTRATE 15 MCG: 15 SOLUTION RESPIRATORY (INHALATION) at 07:04

## 2019-06-30 RX ADMIN — IPRATROPIUM BROMIDE AND ALBUTEROL SULFATE 3 ML: 2.5; .5 SOLUTION RESPIRATORY (INHALATION) at 11:14

## 2019-06-30 RX ADMIN — LORAZEPAM 0.5 MG: 0.5 TABLET ORAL at 06:34

## 2019-06-30 RX ADMIN — BUDESONIDE 0.5 MG: 0.5 INHALANT RESPIRATORY (INHALATION) at 07:04

## 2019-06-30 RX ADMIN — MORPHINE SULFATE 2 MG: 2 INJECTION, SOLUTION INTRAMUSCULAR; INTRAVENOUS at 01:49

## 2019-06-30 RX ADMIN — IPRATROPIUM BROMIDE AND ALBUTEROL SULFATE 3 ML: 2.5; .5 SOLUTION RESPIRATORY (INHALATION) at 03:34

## 2019-06-30 RX ADMIN — PANTOPRAZOLE SODIUM 40 MG: 40 TABLET, DELAYED RELEASE ORAL at 08:45

## 2019-07-01 ENCOUNTER — READMISSION MANAGEMENT (OUTPATIENT)
Dept: CALL CENTER | Facility: HOSPITAL | Age: 74
End: 2019-07-01

## 2019-07-01 NOTE — PROGRESS NOTES
Case Management Discharge Note    Final Note: home with [facility]    Destination      No service has been selected for the patient.      Durable Medical Equipment      No service has been selected for the patient.      Dialysis/Infusion      No service has been selected for the patient.      Home Medical Care      Service Provider Request Status Selected Services Address Phone Number Fax Number    [facility] [address] [address] [phone]      Therapy      No service has been selected for the patient.      Community Resources      No service has been selected for the patient.        Transportation Services  Private: Car    Final Discharge Disposition Code: 06 - home with home health care

## 2019-07-01 NOTE — OUTREACH NOTE
Prep Survey      Responses   Facility patient discharged from?  [place]   Is patient eligible?  Yes   Discharge diagnosis  acute exacerbation COPD   Does the patient have one of the following disease processes/diagnoses(primary or secondary)?  COPD/Pneumonia   Does the patient have Home health ordered?  Yes   What is the Home health agency?   [facility]    Is there a DME ordered?  No   Prep survey completed?  Yes          Mali Bailey RN

## 2019-07-02 ENCOUNTER — READMISSION MANAGEMENT (OUTPATIENT)
Dept: CALL CENTER | Facility: HOSPITAL | Age: 74
End: 2019-07-02

## 2019-07-02 RX ORDER — MIRABEGRON 25 MG/1
TABLET, FILM COATED, EXTENDED RELEASE ORAL
Qty: 30 TABLET | Refills: 2 | Status: SHIPPED | OUTPATIENT
Start: 2019-07-02 | End: 2019-07-02 | Stop reason: SDUPTHER

## 2019-07-02 NOTE — TELEPHONE ENCOUNTER
Linda Gonzáles, from Home Care, called to have a verbal for Siobhan Prakash, to have a Nurse visiting her at home. Verbal auth.  Also needs a Dx for Physical therapy for the pt.   Her phone is Linda GUARDADO  [phone]

## 2019-07-02 NOTE — OUTREACH NOTE
COPD/PN Week 1 Survey      Responses   Facility patient discharged from?  [place]   Does the patient have one of the following disease processes/diagnoses(primary or secondary)?  COPD/Pneumonia   Is there a successful TCM telephone encounter documented?  No   Was the primary reason for admission:  COPD exacerbation   Week 1 attempt successful?  No   Unsuccessful attempts  Attempt 1   Rescheduled  Rescheduled-pt requested   Revoke  Phone number issues          Estella Slater RN

## 2019-07-08 ENCOUNTER — OFFICE VISIT (OUTPATIENT)
Dept: FAMILY MEDICINE CLINIC | Facility: CLINIC | Age: 74
End: 2019-07-08

## 2019-07-08 VITALS — SYSTOLIC BLOOD PRESSURE: 108 MMHG | DIASTOLIC BLOOD PRESSURE: 76 MMHG | HEIGHT: 62 IN | BODY MASS INDEX: 17.69 KG/M2

## 2019-07-08 DIAGNOSIS — R26.9 GAIT DISORDER: ICD-10-CM

## 2019-07-08 DIAGNOSIS — Z09 HOSPITAL DISCHARGE FOLLOW-UP: ICD-10-CM

## 2019-07-08 DIAGNOSIS — R63.4 WEIGHT LOSS, ABNORMAL: ICD-10-CM

## 2019-07-08 DIAGNOSIS — J43.9 PULMONARY EMPHYSEMA, UNSPECIFIED EMPHYSEMA TYPE (HCC): Primary | ICD-10-CM

## 2019-07-08 DIAGNOSIS — R06.02 SHORTNESS OF BREATH: Primary | ICD-10-CM

## 2019-07-08 DIAGNOSIS — J43.1 PANLOBULAR EMPHYSEMA (HCC): ICD-10-CM

## 2019-07-08 DIAGNOSIS — F41.8 ANXIETY ABOUT HEALTH: ICD-10-CM

## 2019-07-08 PROCEDURE — 99214 OFFICE O/P EST MOD 30 MIN: CPT | Performed by: NURSE PRACTITIONER

## 2019-07-08 NOTE — PROGRESS NOTES
"Siobhan Prakash is a 74 y.o. female.      Assessment/Plan   Problem List Items Addressed This Visit        Respiratory    Pulmonary emphysema (CMS/HCC) - Primary      Other Visit Diagnoses     Hospital discharge follow-up        Anxiety about health                 No Follow-up on file.  There are no Patient Instructions on file for this visit.    Chief Complaint   Patient presents with   • Hospital Follow Up     COPD     Social History     Tobacco Use   • Smoking status: Former Smoker     Packs/day: 1.00     Years: 15.00     Pack years: 15.00     Start date: 1963     Last attempt to quit:      Years since quittin.5   • Smokeless tobacco: Never Used   • Tobacco comment: Ultra light menthol brand   Substance Use Topics   • Alcohol use: Yes     Comment: Approx. 1 or 2 a month   • Drug use: No       History of Present Illness   Pt presents for follow up from the hospital. H/O copd and had a severe exacerbation, is followed by Dr Campbell stewart. She is doing better. Reports she had a panic attack that was out of control. Was placed on xanax at the hospital and she thinks it is helping.  Her weight is monitored because she is very thin, she has gained a couple of lbs. And is taking in at least 2000 calories a day  The following portions of the patient's history were reviewed and updated as appropriate:PMHroutine: Social history , Past Medical History, Allergies, Current Medications, Active Problem List and Health Maintenance    Review of Systems   Constitutional: Positive for activity change and appetite change.   Respiratory: Positive for cough and shortness of breath.    Neurological: Negative for dizziness and light-headedness.   Psychiatric/Behavioral: The patient is nervous/anxious.        Objective   Vitals:    19 1517   BP: 108/76   Height: 157.7 cm (62.09\")     Body mass index is 17.69 kg/m².  Physical Exam   Constitutional: She appears well-developed and well-nourished. No distress.   HENT: "   Head: Normocephalic and atraumatic.   Right Ear: External ear normal.   Left Ear: External ear normal.   Eyes: EOM are normal.   Neck: Neck supple. No thyromegaly present.   Cardiovascular: Normal rate, regular rhythm and normal heart sounds.   Pulmonary/Chest: Breath sounds normal.   She is on 02 at 3 lts via nasal cannula and struggles to breath all day.   Musculoskeletal: Normal range of motion.   Neurological: She is alert.   Skin: Skin is warm.   Nursing note and vitals reviewed.    Reviewed Data:  Admission on 06/25/2019, Discharged on 06/30/2019   No results displayed because visit has over 200 results.      Admission on 06/17/2019, Discharged on 06/17/2019   Component Date Value Ref Range Status   • Extra Tube 06/17/2019 hold for add-on   Final    Auto resulted   • Extra Tube 06/17/2019 Hold for add-ons.   Final    Auto resulted.   • Extra Tube 06/17/2019 hold for add-on   Final    Auto resulted   • Extra Tube 06/17/2019 Hold for add-ons.   Final    Auto resulted.   • Glucose 06/17/2019 147* 65 - 99 mg/dL Final   • BUN 06/17/2019 11  8 - 23 mg/dL Final   • Creatinine 06/17/2019 0.69  0.57 - 1.00 mg/dL Final   • Sodium 06/17/2019 142  136 - 145 mmol/L Final   • Potassium 06/17/2019 3.8  3.5 - 5.2 mmol/L Final   • Chloride 06/17/2019 100  98 - 107 mmol/L Final   • CO2 06/17/2019 30.6* 22.0 - 29.0 mmol/L Final   • Calcium 06/17/2019 10.6* 8.6 - 10.5 mg/dL Final   • Total Protein 06/17/2019 7.6  6.0 - 8.5 g/dL Final   • Albumin 06/17/2019 4.00  3.50 - 5.20 g/dL Final   • ALT (SGPT) 06/17/2019 10  1 - 33 U/L Final   • AST (SGOT) 06/17/2019 16  1 - 32 U/L Final   • Alkaline Phosphatase 06/17/2019 103  39 - 117 U/L Final   • Total Bilirubin 06/17/2019 0.2  0.2 - 1.2 mg/dL Final   • eGFR Non African Amer 06/17/2019 84  >60 mL/min/1.73 Final   • Globulin 06/17/2019 3.6  gm/dL Final   • A/G Ratio 06/17/2019 1.1  g/dL Final   • BUN/Creatinine Ratio 06/17/2019 15.9  7.0 - 25.0 Final   • Anion Gap 06/17/2019 11.4   mmol/L Final   • Color, UA 06/17/2019 Yellow  Yellow, Straw Final   • Appearance, UA 06/17/2019 Clear  Clear Final   • pH, UA 06/17/2019 5.5  5.0 - 8.0 Final   • Specific Gravity, UA 06/17/2019 1.021  1.005 - 1.030 Final   • Glucose, UA 06/17/2019 Negative  Negative Final   • Ketones, UA 06/17/2019 Negative  Negative Final   • Bilirubin, UA 06/17/2019 Negative  Negative Final   • Blood, UA 06/17/2019 Negative  Negative Final   • Protein, UA 06/17/2019 Negative  Negative Final   • Leuk Esterase, UA 06/17/2019 Negative  Negative Final   • Nitrite, UA 06/17/2019 Positive* Negative Final   • Urobilinogen, UA 06/17/2019 0.2 E.U./dL  0.2 - 1.0 E.U./dL Final   • Troponin T 06/17/2019 <0.010  0.000 - 0.030 ng/mL Final   • Ethanol 06/17/2019 <10  0 - 10 mg/dL Final   • Ethanol % 06/17/2019 <0.010  % Final   • Amphet/Methamphet, Screen 06/17/2019 Negative  Negative Final   • Barbiturates Screen, Urine 06/17/2019 Negative  Negative Final   • Benzodiazepine Screen, Urine 06/17/2019 Negative  Negative Final   • Cocaine Screen, Urine 06/17/2019 Negative  Negative Final   • Opiate Screen 06/17/2019 Negative  Negative Final   • THC, Screen, Urine 06/17/2019 Negative  Negative Final   • Methadone Screen, Urine 06/17/2019 Negative  Negative Final   • Oxycodone Screen, Urine 06/17/2019 Negative  Negative Final   • WBC 06/17/2019 9.73  3.40 - 10.80 10*3/mm3 Final   • RBC 06/17/2019 3.16* 3.77 - 5.28 10*6/mm3 Final   • Hemoglobin 06/17/2019 9.6* 12.0 - 15.9 g/dL Final   • Hematocrit 06/17/2019 32.8* 34.0 - 46.6 % Final   • MCV 06/17/2019 103.8* 79.0 - 97.0 fL Final   • MCH 06/17/2019 30.4  26.6 - 33.0 pg Final   • MCHC 06/17/2019 29.3* 31.5 - 35.7 g/dL Final   • RDW 06/17/2019 13.8  12.3 - 15.4 % Final   • RDW-SD 06/17/2019 52.6  37.0 - 54.0 fl Final   • MPV 06/17/2019 9.3  6.0 - 12.0 fL Final   • Platelets 06/17/2019 456* 140 - 450 10*3/mm3 Final   • Neutrophil % 06/17/2019 72.7  42.7 - 76.0 % Final   • Lymphocyte % 06/17/2019 16.8*  19.6 - 45.3 % Final   • Monocyte % 06/17/2019 9.5  5.0 - 12.0 % Final   • Eosinophil % 06/17/2019 0.2* 0.3 - 6.2 % Final   • Basophil % 06/17/2019 0.3  0.0 - 1.5 % Final   • Immature Grans % 06/17/2019 0.5  0.0 - 0.5 % Final   • Neutrophils, Absolute 06/17/2019 7.08* 1.70 - 7.00 10*3/mm3 Final   • Lymphocytes, Absolute 06/17/2019 1.63  0.70 - 3.10 10*3/mm3 Final   • Monocytes, Absolute 06/17/2019 0.92* 0.10 - 0.90 10*3/mm3 Final   • Eosinophils, Absolute 06/17/2019 0.02  0.00 - 0.40 10*3/mm3 Final   • Basophils, Absolute 06/17/2019 0.03  0.00 - 0.20 10*3/mm3 Final   • Immature Grans, Absolute 06/17/2019 0.05  0.00 - 0.05 10*3/mm3 Final   • nRBC 06/17/2019 0.1  0.0 - 0.2 /100 WBC Final   • Site 06/17/2019 Arterial: right radial   Final   • Sergio's Test 06/17/2019 Positive   Final   • pH, Arterial 06/17/2019 7.440  7.350 - 7.450 pH units Final   • pCO2, Arterial 06/17/2019 48.4* 35.0 - 45.0 mm Hg Final   • pO2, Arterial 06/17/2019 66.4* 80.0 - 100.0 mm Hg Final   • HCO3, Arterial 06/17/2019 32.9* 22.0 - 28.0 mmol/L Final   • Base Excess, Arterial 06/17/2019 7.9* 0.0 - 2.0 mmol/L Final   • O2 Saturation Calculated 06/17/2019 93.2  92.0 - 99.0 % Final   • Barometric Pressure for Blood Gas 06/17/2019 747.5  mmHg Final   • Modality 06/17/2019 Cannula   Final   • Flow Rate 06/17/2019 3  lpm Final   • Rate 06/17/2019 16  Breaths/minute Final   • RBC, UA 06/17/2019 0-2  None Seen, 0-2 /HPF Final   • WBC, UA 06/17/2019 0-2  None Seen, 0-2 /HPF Final   • Bacteria, UA 06/17/2019 4+* None Seen /HPF Final   • Squamous Epithelial Cells, UA 06/17/2019 0-2  None Seen, 0-2 /HPF Final   • Hyaline Casts, UA 06/17/2019 0-2  None Seen /LPF Final   • Methodology 06/17/2019 Automated Microscopy   Final     Pt was placed on xanax by her pulmonologist in the hospital for panic secondary to her copd. I think this has been helpful and I encouraged her to use it twice daily to help her rest. She is worried about dying due to her health issues.  I will send a note to Dr Hightower about what we discussed about her medicine and anxiety.

## 2019-07-10 ENCOUNTER — OFFICE VISIT (OUTPATIENT)
Dept: GASTROENTEROLOGY | Facility: CLINIC | Age: 74
End: 2019-07-10

## 2019-07-10 VITALS
SYSTOLIC BLOOD PRESSURE: 116 MMHG | TEMPERATURE: 97.8 F | DIASTOLIC BLOOD PRESSURE: 68 MMHG | BODY MASS INDEX: 17.74 KG/M2 | HEIGHT: 62 IN

## 2019-07-10 DIAGNOSIS — R15.2 FECAL URGENCY: ICD-10-CM

## 2019-07-10 DIAGNOSIS — J43.8 OTHER EMPHYSEMA (HCC): ICD-10-CM

## 2019-07-10 DIAGNOSIS — R63.4 WEIGHT LOSS, ABNORMAL: Primary | ICD-10-CM

## 2019-07-10 PROCEDURE — 99214 OFFICE O/P EST MOD 30 MIN: CPT | Performed by: INTERNAL MEDICINE

## 2019-07-10 RX ORDER — AZITHROMYCIN 500 MG/1
500 TABLET, FILM COATED ORAL DAILY
COMMUNITY
Start: 2019-07-07

## 2019-07-10 RX ORDER — LORAZEPAM 0.5 MG/1
TABLET ORAL
Refills: 0 | COMMUNITY
Start: 2019-07-05 | End: 2020-01-01

## 2019-07-10 NOTE — PATIENT INSTRUCTIONS
Chronic Obstructive Pulmonary Disease  Chronic obstructive pulmonary disease (COPD) is a long-term (chronic) condition that affects the lungs. COPD is a general term that can be used to describe many different lung problems that cause lung swelling (inflammation) and limit airflow, including chronic bronchitis and emphysema. If you have COPD, your lung function will probably never return to normal. In most cases, it gets worse over time. However, there are steps you can take to slow the progression of the disease and improve your quality of life.  What are the causes?  This condition may be caused by:  · Smoking. This is the most common cause.  · Certain genes passed down through families.    What increases the risk?  The following factors may make you more likely to develop this condition:  · Secondhand smoke from cigarettes, pipes, or cigars.  · Exposure to chemicals and other irritants such as fumes and dust in the work environment.  · Chronic lung conditions or infections.    What are the signs or symptoms?  Symptoms of this condition include:  · Shortness of breath, especially during physical activity.  · Chronic cough with a large amount of thick mucus. Sometimes the cough may not have any mucus (dry cough).  · Wheezing.  · Rapid breaths.  · Gray or bluish discoloration (cyanosis) of the skin, especially in your fingers, toes, or lips.  · Feeling tired (fatigue).  · Weight loss.  · Chest tightness.  · Frequent infections.  · Episodes when breathing symptoms become much worse (exacerbations).  · Swelling in the ankles, feet, or legs. This may occur in later stages of the disease.    How is this diagnosed?  This condition is diagnosed based on:  · Your medical history.  · A physical exam.    You may also have tests, including:  · Lung (pulmonary) function tests. This may include a spirometry test, which measures your ability to exhale properly.  · Chest X-ray.  · CT scan.  · Blood tests.    How is this  treated?  This condition may be treated with:  · Medicines. These may include inhaled rescue medicines to treat acute exacerbations as well as long-term, or maintenance, medicines to prevent flare-ups of COPD.  ? Bronchodilators help treat COPD by dilating the airways to allow increased airflow and make your breathing more comfortable.  ? Steroids can reduce airway inflammation and help prevent exacerbations.  · Smoking cessation. If you smoke, your health care provider may ask you to quit, and may also recommend therapy or replacement products to help you quit.  · Pulmonary rehabilitation. This may involve working with a team of health care providers and specialists, such as respiratory, occupational, and physical therapists.  · Exercise and physical activity. These are beneficial for nearly all people with COPD.  · Nutrition therapy to gain weight, if you are underweight.  · Oxygen. Supplemental oxygen therapy is only helpful if you have a low oxygen level in your blood (hypoxemia).  · Lung surgery or transplant.  · Palliative care. This is to help people with COPD feel comfortable when treatment is no longer working.    Follow these instructions at home:  Medicines  · Take over-the-counter and prescription medicines (inhaled or pills) only as told by your health care provider.  · Talk to your health care provider before taking any cough or allergy medicines. You may need to avoid certain medicines that dry out your airways.  Lifestyle  · If you are a smoker, the most important thing that you can do is to stop smoking. Do not use any products that contain nicotine or tobacco, such as cigarettes and e-cigarettes. If you need help quitting, ask your health care provider. Continuing to smoke will cause the disease to progress faster.  · Avoid exposure to things that irritate your lungs, such as smoke, chemicals, and fumes.  · Stay active, but balance activity with periods of rest. Exercise and physical activity will  help you maintain your ability to do things you want to do.  · Learn and use relaxation techniques to manage stress and to control your breathing.  · Get the right amount of sleep and get quality sleep. Most adults need 7 or more hours per night.  · Eat healthy foods. Eating smaller, more frequent meals and resting before meals may help you maintain your strength.  Controlled breathing  Learn and use controlled breathing techniques as directed by your health care provider. Controlled breathing techniques include:  · Pursed lip breathing. Start by breathing in (inhaling) through your nose for 1 second. Then, purse your lips as if you were going to whistle and breathe out (exhale) through the pursed lips for 2 seconds.  · Diaphragmatic breathing. Start by putting one hand on your abdomen just above your waist. Inhale slowly through your nose. The hand on your abdomen should move out. Then purse your lips and exhale slowly. You should be able to feel the hand on your abdomen moving in as you exhale.    Controlled coughing  Learn and use controlled coughing to clear mucus from your lungs. Controlled coughing is a series of short, progressive coughs. The steps of controlled coughing are:  1. Lean your head slightly forward.  2. Breathe in deeply using diaphragmatic breathing.  3. Try to hold your breath for 3 seconds.  4. Keep your mouth slightly open while coughing twice.  5. Spit any mucus out into a tissue.  6. Rest and repeat the steps once or twice as needed.    General instructions  · Make sure you receive all the vaccines that your health care provider recommends, especially the pneumococcal and influenza vaccines. Preventing infection and hospitalization is very important when you have COPD.  · Use oxygen therapy and pulmonary rehabilitation if directed to by your health care provider. If you require home oxygen therapy, ask your health care provider whether you should purchase a pulse oximeter to measure your  oxygen level at home.  · Work with your health care provider to develop a COPD action plan. This will help you know what steps to take if your condition gets worse.  · Keep other chronic health conditions under control as told by your health care provider.  · Avoid extreme temperature and humidity changes.  · Avoid contact with people who have an illness that spreads from person to person (is contagious), such as viral infections or pneumonia.  · Keep all follow-up visits as told by your health care provider. This is important.  Contact a health care provider if:  · You are coughing up more mucus than usual.  · There is a change in the color or thickness of your mucus.  · Your breathing is more labored than usual.  · Your breathing is faster than usual.  · You have difficulty sleeping.  · You need to use your rescue medicines or inhalers more often than expected.  · You have trouble doing routine activities such as getting dressed or walking around the house.  Get help right away if:  · You have shortness of breath while you are resting.  · You have shortness of breath that prevents you from:  ? Being able to talk.  ? Performing your usual physical activities.  · You have chest pain lasting longer than 5 minutes.  · Your skin color is more blue (cyanotic) than usual.  · You measure low oxygen saturations for longer than 5 minutes with a pulse oximeter.  · You have a fever.  · You feel too tired to breathe normally.  Summary  · Chronic obstructive pulmonary disease (COPD) is a long-term (chronic) condition that affects the lungs.  · Your lung function will probably never return to normal. In most cases, it gets worse over time. However, there are steps you can take to slow the progression of the disease and improve your quality of life.  · Treatment for COPD may include taking medicines, quitting smoking, pulmonary rehabilitation, and changes to diet and exercise. As the disease progresses, you may need oxygen  therapy, a lung transplant, or palliative care.  · To help manage your condition, do not smoke, avoid exposure to things that irritate your lungs, stay up to date on all vaccines, and follow your health care provider's instructions for taking medicines.  This information is not intended to replace advice given to you by your health care provider. Make sure you discuss any questions you have with your health care provider.  Document Released: 09/27/2006 Document Revised: 06/13/2018 Document Reviewed: 01/22/2018  [employer] Interactive Patient Education © 2019 [employer]

## 2019-07-10 NOTE — PATIENT INSTRUCTIONS
1 tablet of imodium (or one full liquid dose) every morning when you wake up.    Continue 2000 calories per day    For any additional questions, concerns or changes to your condition after today's office visit please contact the office at [phone].

## 2019-07-10 NOTE — PROGRESS NOTES
"Chief Complaint   Patient presents with   • Diarrhea     Subjective     HPI  Siobhan Prakash is a 74 y.o. female who presents for follow up of diarrhea.  She was seen last in the spring but her diarrhea had \"resolved\" and was only described as one stool a day.    She was recently hospitalized for severe COPD.  Apparently she was having diarrhea prior to that exacerbation then had absolutely none during her hospital stay.    Of note, she has severe COPD and is oxygen dependent.    Reports \"diarrhea\" again described as on stool a day. It seems the issue is that it is loose and urgent.  She is taking imodium but later in the day, after she has the BM.  She denies any nocturnal stools.  She denies ever having much more than one stool a day    She is getting 2000cals a day.  Drinking mighty shakes.      Past Medical History:   Diagnosis Date   • Allergic    • Anxiety    • Arthritis 10/17   • Asthma    • Cataract    • Cholelithiasis    • COPD (chronic obstructive pulmonary disease) (CMS/[facility])    • Depression    • GERD (gastroesophageal reflux disease)    • HL (hearing loss)    • Hyperlipidemia    • Irritable bowel syndrome    • Low back pain    • Migraine    • Osteopenia    • Osteoporosis    • Pneumonia    • Tremor    • Urinary tract infection        Social History     Socioeconomic History   • Marital status:      Spouse name: Vlad   • Number of children: 2   • Years of education: Not on file   • Highest education level: Not on file   Occupational History   • Occupation: retired   Tobacco Use   • Smoking status: Former Smoker     Packs/day: 1.00     Years: 15.00     Pack years: 15.00     Start date: 1963     Last attempt to quit:      Years since quittin.5   • Smokeless tobacco: Never Used   • Tobacco comment: Ultra light menthol brand   Substance and Sexual Activity   • Alcohol use: Yes     Comment: Approx. 1 or 2 a month   • Drug use: No   • Sexual activity: Not " Currently     Partners: Male     Birth control/protection: Post-menopausal         Current Outpatient Medications:   •  albuterol sulfate  (90 Base) MCG/ACT inhaler, Inhale 2 puffs Every 4 (Four) Hours As Needed for Wheezing., Disp: 1 inhaler, Rfl: 0  •  arformoterol (BROVANA) 15 MCG/2ML nebulizer solution, Inhale 15 mcg., Disp: , Rfl:   •  azithromycin (ZITHROMAX) 500 MG tablet, , Disp: , Rfl:   •  baclofen (LIORESAL) 20 MG tablet, TAKE 1 TABLET BY MOUTH THREE TIMES DAILY, Disp: 270 tablet, Rfl: 3  •  benzonatate (TESSALON PERLES) 100 MG capsule, Take 2 capsules by mouth 3 (Three) Times a Day As Needed (cough)., Disp: 30 capsule, Rfl: 0  •  BIOTIN PO, Take 1 tablet by mouth Daily., Disp: , Rfl:   •  budesonide (PULMICORT) 0.5 MG/2ML nebulizer solution, 0.5 mg., Disp: , Rfl:   •  Cholecalciferol (VITAMIN D3) 5000 UNITS capsule capsule, Take 5,000 Units by mouth Daily., Disp: , Rfl:   •  ferrous sulfate 325 (65 FE) MG tablet, Take 325 mg by mouth 2 (Two) Times a Day With Meals., Disp: , Rfl:   •  guaiFENesin (MUCINEX) 600 MG 12 hr tablet, Take 2 tablets by mouth Every 12 (Twelve) Hours., Disp: 60 tablet, Rfl: 11  •  lansoprazole (PREVACID) 15 MG capsule, Take 1 capsule by mouth Daily., Disp: 90 capsule, Rfl: 0  •  LORazepam (ATIVAN) 0.5 MG tablet, TK ONE T PO Q 4 TO 6 H PRA, Disp: , Rfl: 0  •  meloxicam (MOBIC) 15 MG tablet, Take 1 tablet by mouth Daily., Disp: 90 tablet, Rfl: 1  •  Mirabegron ER (MYRBETRIQ) 25 MG tablet sustained-release 24 hour 24 hr tablet, Take 1 tablet by mouth Daily., Disp: 30 tablet, Rfl: 2  •  montelukast (SINGULAIR) 10 MG tablet, Take 10 mg by mouth Every Night., Disp: , Rfl:   •  O2 (OXYGEN), Inhale 1 (One) Time., Disp: , Rfl:   •  roflumilast (DALIRESP) 500 MCG tablet tablet, Take 500 mcg by mouth Daily., Disp: , Rfl:   •  sertraline (ZOLOFT) 100 MG tablet, TAKE 1 TABLET BY MOUTH TWICE DAILY, Disp: 180 tablet, Rfl: 0  •  SUMAtriptan (IMITREX) 50 MG tablet, Take one tablet at onset  of headache. May repeat dose one time in 2 hours if headache not relieved., Disp: 9 tablet, Rfl: 2  •  theophylline (UNIPHYL) 400 MG 24 hr tablet, , Disp: , Rfl:   •  Tiotropium Bromide Monohydrate (SPIRIVA RESPIMAT) 2.5 MCG/ACT aerosol solution, Inhale 2 puffs Daily., Disp: , Rfl:   •  zolpidem (AMBIEN) 10 MG tablet, Take 1 tablet by mouth At Night As Needed for Sleep., Disp: 30 tablet, Rfl: 0    Review of Systems   Constitutional: Negative for activity change, appetite change, chills and fever.   HENT: Negative for trouble swallowing.    Respiratory: Positive for shortness of breath.    Cardiovascular: Negative.  Negative for chest pain.   Gastrointestinal: Positive for diarrhea. Negative for abdominal distention, abdominal pain, anal bleeding, constipation, nausea and vomiting.   Genitourinary: Negative for dysuria, frequency and hematuria.       Objective   Vitals:    07/10/19 1527   BP: 116/68   Temp: 97.8 °F (36.6 °C)         07/10/19  1527   Weight: (unable to weigh, pt in wheelchair)     Body mass index is 17.74 kg/m².      Physical Exam   Constitutional: She is oriented to person, place, and time. No distress.   Cachectic, significantly frail appearing   HENT:   Head: Normocephalic and atraumatic.   Right Ear: External ear normal.   Left Ear: External ear normal.   Nose: Nose normal.   Mouth/Throat: Oropharynx is clear and moist.   Eyes: Conjunctivae and EOM are normal. Right eye exhibits no discharge. Left eye exhibits no discharge. No scleral icterus.   Neck: Normal range of motion. Neck supple. No thyromegaly present.   No supraclavicular adenopathy   Cardiovascular: Normal rate, regular rhythm, normal heart sounds and intact distal pulses. Exam reveals no gallop.   No murmur heard.  No lower extremity edema   Pulmonary/Chest: No respiratory distress. She has no wheezes.   Diminished breath sounds bilaterally, on nasal cannula   Abdominal: Soft. Normal appearance and bowel sounds are normal. She exhibits  no distension and no mass. There is no hepatosplenomegaly. There is no tenderness. There is no rigidity, no rebound and no guarding. No hernia.   Genitourinary:   Genitourinary Comments: Rectal exam deferred   Musculoskeletal: Normal range of motion. She exhibits no edema or tenderness.   No atrophy of upper or lower extremities.  Normal digits and nails of both hands.   Lymphadenopathy:     She has no cervical adenopathy.   Neurological: She is alert and oriented to person, place, and time. She displays no atrophy. Coordination normal.   Skin: Skin is warm and dry. No rash noted. She is not diaphoretic. No erythema.   Psychiatric: She has a normal mood and affect. Her behavior is normal. Judgment and thought content normal.   Vitals reviewed.      WBC   Date Value Ref Range Status   06/28/2019 6.75 3.40 - 10.80 10*3/mm3 Final   09/12/2018 7.2 3.4 - 10.8 x10E3/uL Final     RBC   Date Value Ref Range Status   06/28/2019 3.12 (L) 3.77 - 5.28 10*6/mm3 Final   09/12/2018 4.23 3.77 - 5.28 x10E6/uL Final     Hemoglobin   Date Value Ref Range Status   06/28/2019 9.2 (L) 12.0 - 15.9 g/dL Final     Hematocrit   Date Value Ref Range Status   06/28/2019 32.9 (L) 34.0 - 46.6 % Final     MCV   Date Value Ref Range Status   06/28/2019 105.4 (H) 79.0 - 97.0 fL Final     MCH   Date Value Ref Range Status   06/28/2019 29.5 26.6 - 33.0 pg Final     MCHC   Date Value Ref Range Status   06/28/2019 28.0 (L) 31.5 - 35.7 g/dL Final     RDW   Date Value Ref Range Status   06/28/2019 13.3 12.3 - 15.4 % Final     RDW-SD   Date Value Ref Range Status   06/28/2019 51.2 37.0 - 54.0 fl Final     MPV   Date Value Ref Range Status   06/28/2019 9.7 6.0 - 12.0 fL Final     Platelets   Date Value Ref Range Status   06/28/2019 378 140 - 450 10*3/mm3 Final     Neutrophil %   Date Value Ref Range Status   06/25/2019 95.5 (H) 42.7 - 76.0 % Final     Lymphocyte %   Date Value Ref Range Status   06/25/2019 3.2 (L) 19.6 - 45.3 % Final     Monocyte %   Date  Value Ref Range Status   06/25/2019 0.5 (L) 5.0 - 12.0 % Final     Eosinophil %   Date Value Ref Range Status   06/25/2019 0.0 (L) 0.3 - 6.2 % Final     Basophil %   Date Value Ref Range Status   06/25/2019 0.3 0.0 - 1.5 % Final     Immature Grans %   Date Value Ref Range Status   06/25/2019 0.5 0.0 - 0.5 % Final     Neutrophils Absolute   Date Value Ref Range Status   06/28/2019 6.01 1.70 - 7.00 10*3/mm3 Final     Neutrophils, Absolute   Date Value Ref Range Status   06/25/2019 5.76 1.70 - 7.00 10*3/mm3 Final     Lymphocytes, Absolute   Date Value Ref Range Status   06/25/2019 0.19 (L) 0.70 - 3.10 10*3/mm3 Final     Monocytes, Absolute   Date Value Ref Range Status   06/25/2019 0.03 (L) 0.10 - 0.90 10*3/mm3 Final     Eosinophils, Absolute   Date Value Ref Range Status   06/25/2019 0.00 0.00 - 0.40 10*3/mm3 Final     Basophils, Absolute   Date Value Ref Range Status   06/25/2019 0.02 0.00 - 0.20 10*3/mm3 Final     Immature Grans, Absolute   Date Value Ref Range Status   06/25/2019 0.03 0.00 - 0.05 10*3/mm3 Final     nRBC   Date Value Ref Range Status   06/25/2019 0.0 0.0 - 0.2 /100 WBC Final       Lab Results   Component Value Date    GLUCOSE 133 (H) 06/28/2019    BUN 12 06/28/2019    CREATININE 0.55 (L) 06/28/2019    EGFRIFNONA 109 06/28/2019    EGFRIFAFRI 99 09/12/2018    BCR 21.8 06/28/2019    CO2 26.8 06/28/2019    CALCIUM 8.5 (L) 06/28/2019    PROTENTOTREF 7.1 09/12/2018    ALBUMIN 4.20 06/25/2019    LABIL2 1.4 09/12/2018    AST 12 06/25/2019    ALT 7 06/25/2019         Imaging Results (last 7 days)     ** No results found for the last 168 hours. **            Assessment/Plan    Fecal urgency: She is calling this diarrhea but she is really not having excessive stools.  It is more the urgency of the episodes and her limited mobility from her disease that is causing more of the issues.  Unfortunately, she has been taking Imodium but only later in the day after she has had the urgent stool    Weight loss: This is  not related to her bowel movements.  It is related to her end-stage lung disease    Severe lung disease: This prohibits any endoscopic evaluation    Plan  I have advised that she start taking 1 dose of Imodium--preferably liquid or a tablet crushed and taken in applesauce or pudding--every morning before she actually has the loose stools.  She can titrate this up or down depending    Advised continuing efforts for 2000 kcals per day    She is simply not a candidate for any endoscopy for further evaluation of her symptoms    She is welcome to follow-up with me as needed  Siobhan was seen today for diarrhea.    Diagnoses and all orders for this visit:    Weight loss, abnormal    Fecal urgency    Other emphysema (CMS/HCC)        Dictated utilizing Dragon dictation

## 2019-07-30 ENCOUNTER — OFFICE VISIT (OUTPATIENT)
Dept: FAMILY MEDICINE CLINIC | Facility: CLINIC | Age: 74
End: 2019-07-30

## 2019-07-30 VITALS
HEART RATE: 119 BPM | BODY MASS INDEX: 17.3 KG/M2 | WEIGHT: 94 LBS | SYSTOLIC BLOOD PRESSURE: 130 MMHG | DIASTOLIC BLOOD PRESSURE: 70 MMHG | RESPIRATION RATE: 24 BRPM | HEIGHT: 62 IN | OXYGEN SATURATION: 90 %

## 2019-07-30 DIAGNOSIS — J43.8 OTHER EMPHYSEMA (HCC): ICD-10-CM

## 2019-07-30 DIAGNOSIS — F41.8 MIXED ANXIETY DEPRESSIVE DISORDER: Primary | ICD-10-CM
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
## 2019-07-30 PROBLEM — M13.872: Status: ACTIVE | Noted: 2019-07-30

## 2019-07-30 PROBLEM — K58.9 IRRITABLE BOWEL SYNDROME (IBS): Status: ACTIVE | Noted: 2019-07-30

## 2019-07-30 PROBLEM — Z96.649 HISTORY OF HIP REPLACEMENT: Status: ACTIVE | Noted: 2019-07-30

## 2019-07-30 PROCEDURE — 99214 OFFICE O/P EST MOD 30 MIN: CPT | Performed by: NURSE PRACTITIONER

## 2019-07-30 RX ORDER — PREDNISONE 20 MG/1
20 TABLET ORAL DAILY
Qty: 3 TABLET | Refills: 0 | Status: SHIPPED | OUTPATIENT
Start: 2019-07-30 | End: 2020-01-01 | Stop reason: HOSPADM

## 2019-07-30 RX ORDER — TRIAMCINOLONE ACETONIDE 1 MG/G
CREAM TOPICAL 2 TIMES DAILY
Qty: 60 G | Refills: 0 | Status: SHIPPED | OUTPATIENT
Start: 2019-07-30 | End: 2019-08-09

## 2019-07-30 RX ORDER — LOPERAMIDE HYDROCHLORIDE 2 MG/1
2 CAPSULE ORAL 4 TIMES DAILY PRN
COMMUNITY

## 2019-07-30 NOTE — PROGRESS NOTES
Subjective   Siobhan Prakash is a 74 y.o. female.     History of Present Illness   Pt is here for bug bites. Her  noticed past Saturday. She states  told her Saturday had only one lite bite, and day after had 4. States it itches. Not painful    Had a panic attack this morning and thinks she might be having another one. Is taking prn xanax as prescribed by Dr. Hightower her pulmonologist.  Chronic emphysema    The following portions of the patient's history were reviewed and updated as appropriate: allergies, current medications, past family history, past medical history, past social history, past surgical history and problem list.    Review of Systems   Constitutional: Negative for activity change, appetite change, fatigue and fever.   Respiratory: Positive for cough and shortness of breath.    Cardiovascular: Negative for chest pain.   Neurological: Negative for dizziness and headaches.       Objective   Physical Exam   Constitutional: She appears distressed.   Very thin body habitus   HENT:   Head: Normocephalic and atraumatic.   Eyes: EOM are normal.   Cardiovascular: Normal rate and regular rhythm.   Pulmonary/Chest: She is in respiratory distress. She has wheezes.   Musculoskeletal: Normal range of motion.   Skin: Skin is warm.   Area of redness but no warmth to left buttock   Nursing note and vitals reviewed.      Assessment/Plan   Siobhan was seen today for insect bite.    Diagnoses and all orders for this visit:    Mixed anxiety depressive disorder    Other emphysema (CMS/HCC)    Insect bite, initial encounter    Other orders  -     triamcinolone (KENALOG) 0.1 % cream; Apply  topically to the appropriate area as directed 2 (Two) Times a Day for 10 days.  -     predniSONE (DELTASONE) 20 MG tablet; Take 1 tablet by mouth Daily.

## 2019-07-31 NOTE — PATIENT INSTRUCTIONS
Insect Bite, Adult    An insect bite can make your skin red, itchy, and swollen. Some insects can spread disease to people with a bite. However, most insect bites do not lead to disease, and most are not serious.  Follow these instructions at home:  Bite area care  · Do not scratch the bite area.  · Keep the bite area clean and dry.  · Wash the bite area every day with soap and water as told by your doctor.  · Check the bite area every day for signs of infection. Check for:  ? More redness, swelling, or pain.  ? Fluid or blood.  ? Warmth.  ? Pus.  Managing pain, itching, and swelling    · You may put any of these on the bite area as told by your doctor:  ? A baking soda paste.  ? Cortisone cream.  ? Calamine lotion.  · If directed, put ice on the bite area.  ? Put ice in a plastic bag.  ? Place a towel between your skin and the bag.  ? Leave the ice on for 20 minutes, 2-3 times a day.  Medicines  · Take medicines or put medicines on your skin only as told by your doctor.  · If you were prescribed an antibiotic medicine, use it as told by your doctor. Do not stop using the antibiotic even if your condition improves.  General instructions  · Keep all follow-up visits as told by your doctor. This is important.  How is this prevented?  To help you have a lower risk of insect bites:  · When you are outside, wear clothing that covers your arms and legs.  · Use insect repellent. The best insect repellents have:  ? An active ingredient of DEET, picaridin, oil of lemon eucalyptus (OLE), or [id number].  ? Higher amounts of DEET or another active ingredient than other repellents have.  · If your home windows do not have screens, think about putting some in.  Contact a doctor if:  · You have more redness, swelling, or pain in the bite area.  · You have fluid, blood, or pus coming from the bite area.  · The bite area feels warm.  · You have a fever.  Get help right away if:  · You have joint pain.  · You have a rash.  · You have  shortness of breath.  · You feel more tired or sleepy than you normally do.  · You have neck pain.  · You have a headache.  · You feel weaker than you normally do.  · You have chest pain.  · You have pain in your belly.  · You feel sick to your stomach (nauseous) or you throw up (vomit).  Summary  · An insect bite can make your skin red, itchy, and swollen.  · Do not scratch the bite area, and keep it clean and dry.  · Ice can help with pain and itching from the bite.  This information is not intended to replace advice given to you by your health care provider. Make sure you discuss any questions you have with your health care provider.  Document Released: 12/15/2001 Document Revised: 07/20/2017 Document Reviewed: 05/04/2016  [employer] Interactive Patient Education © 2019 [employer]

## 2019-08-05 RX ORDER — ZOLPIDEM TARTRATE 10 MG/1
10 TABLET ORAL NIGHTLY PRN
Qty: 30 TABLET | Refills: 0 | Status: SHIPPED | OUTPATIENT
Start: 2019-08-05 | End: 2020-01-01

## 2019-09-17 ENCOUNTER — OFFICE VISIT (OUTPATIENT)
Dept: FAMILY MEDICINE CLINIC | Facility: CLINIC | Age: 74
End: 2019-09-17

## 2019-09-17 VITALS
OXYGEN SATURATION: 89 % | SYSTOLIC BLOOD PRESSURE: 110 MMHG | DIASTOLIC BLOOD PRESSURE: 70 MMHG | RESPIRATION RATE: 26 BRPM | HEART RATE: 101 BPM

## 2019-09-17 DIAGNOSIS — L02.01 ABSCESS OF FOREHEAD: Primary | ICD-10-CM

## 2019-09-17 PROCEDURE — 99213 OFFICE O/P EST LOW 20 MIN: CPT | Performed by: NURSE PRACTITIONER

## 2019-09-17 RX ORDER — VALACYCLOVIR HYDROCHLORIDE 500 MG/1
500 TABLET, FILM COATED ORAL 3 TIMES DAILY
Qty: 21 TABLET | Refills: 0 | Status: SHIPPED | OUTPATIENT
Start: 2019-09-17 | End: 2019-09-17

## 2019-09-17 RX ORDER — VALACYCLOVIR HYDROCHLORIDE 500 MG/1
500 TABLET, FILM COATED ORAL 2 TIMES DAILY
Qty: 6 TABLET | Refills: 0 | Status: SHIPPED | OUTPATIENT
Start: 2019-09-17 | End: 2020-01-01

## 2019-09-17 RX ORDER — MUPIROCIN CALCIUM 20 MG/G
CREAM TOPICAL 3 TIMES DAILY
Qty: 30 G | Refills: 0 | Status: SHIPPED | OUTPATIENT
Start: 2019-09-17 | End: 2020-01-01

## 2019-09-17 NOTE — PROGRESS NOTES
Subjective   Siobhan Prakash is a 74 y.o. female.     History of Present Illness   Pt is here for rash  . Sx include  Pain, itch, redness. Pt states Sx have been present for 3 days Started out as a pimple and it popped and the pain and itching started. She saw her ophthalmologist yesterday and he didn't say anything about it.                            The following portions of the patient's history were reviewed and updated as appropriate: allergies, current medications, past family history, past medical history, past social history, past surgical history and problem list.    Review of Systems   Constitutional: Negative for activity change and appetite change.   Skin: Positive for wound.       Objective   Physical Exam   Constitutional: She is oriented to person, place, and time. She appears well-developed and well-nourished. No distress.   HENT:   Head: Normocephalic and atraumatic.   Right Ear: External ear normal.   Left Ear: External ear normal.   Eyes: EOM are normal.   Pulmonary/Chest: Breath sounds normal. She is in respiratory distress.   Neurological: She is alert and oriented to person, place, and time.   Skin: Skin is warm.   Pt has some erythema and pain with an abscess to the middle of her fore head with a scab over it.   Nursing note and vitals reviewed.      Assessment/Plan   Siobhan was seen today for rash.    Diagnoses and all orders for this visit:    Abscess of forehead    Other orders  -     Discontinue: valACYclovir (VALTREX) 500 MG tablet; Take 1 tablet by mouth 3 (Three) Times a Day.  -     mupirocin (BACTROBAN) 2 % cream; Apply  topically to the appropriate area as directed 3 (Three) Times a Day.  -     valACYclovir (VALTREX) 500 MG tablet; Take 1 tablet by mouth 2 (Two) Times a Day.

## 2019-09-17 NOTE — PATIENT INSTRUCTIONS
Cellulitis, Adult    Cellulitis is a skin infection. The infected area is usually red and tender. This condition occurs most often in the arms and lower legs. The infection can travel to the muscles, blood, and underlying tissue and become serious. It is very important to get treated for this condition.  What are the causes?  Cellulitis is caused by bacteria. The bacteria enter through a break in the skin, such as a cut, burn, insect bite, open sore, or crack.  What increases the risk?  This condition is more likely to occur in people who:  · Have a weak defense system (immune system).  · Have open wounds on the skin such as cuts, burns, bites, and scrapes. Bacteria can enter the body through these open wounds.  · Are older.  · Have diabetes.  · Have a type of long-lasting (chronic) liver disease (cirrhosis) or kidney disease.  · Use IV drugs.  What are the signs or symptoms?  Symptoms of this condition include:  · Redness, streaking, or spotting on the skin.  · Swollen area of the skin.  · Tenderness or pain when an area of the skin is touched.  · Warm skin.  · Fever.  · Chills.  · Blisters.  How is this diagnosed?  This condition is diagnosed based on a medical history and physical exam. You may also have tests, including:  · Blood tests.  · Lab tests.  · Imaging tests.  How is this treated?  Treatment for this condition may include:  · Medicines, such as antibiotic medicines or antihistamines.  · Supportive care, such as rest and application of cold or warm cloths (cold or warm compresses) to the skin.  · Hospital care, if the condition is severe.  The infection usually gets better within 1-2 days of treatment.  Follow these instructions at home:  · Take over-the-counter and prescription medicines only as told by your health care provider.  · If you were prescribed an antibiotic medicine, take it as told by your health care provider. Do not stop taking the antibiotic even if you start to feel better.  · Drink  enough fluid to keep your urine clear or pale yellow.  · Do not touch or rub the infected area.  · Raise (elevate) the infected area above the level of your heart while you are sitting or lying down.  · Apply warm or cold compresses to the affected area as told by your health care provider.  · Keep all follow-up visits as told by your health care provider. This is important. These visits let your health care provider make sure a more serious infection is not developing.  Contact a health care provider if:  · You have a fever.  · Your symptoms do not improve within 1-2 days of starting treatment.  · Your bone or joint underneath the infected area becomes painful after the skin has healed.  · Your infection returns in the same area or another area.  · You notice a swollen bump in the infected area.  · You develop new symptoms.  · You have a general ill feeling (malaise) with muscle aches and pains.  Get help right away if:  · Your symptoms get worse.  · You feel very sleepy.  · You develop vomiting or diarrhea that persists.  · You notice red streaks coming from the infected area.  · Your red area gets larger or turns dark in color.  This information is not intended to replace advice given to you by your health care provider. Make sure you discuss any questions you have with your health care provider.  Document Released: 09/27/2006 Document Revised: 04/27/2017 Document Reviewed: 10/26/2016  [employer] Interactive Patient Education © 2019 [employer]

## 2019-11-20 ENCOUNTER — OFFICE VISIT (OUTPATIENT)
Dept: FAMILY MEDICINE CLINIC | Facility: CLINIC | Age: 74
End: 2019-11-20

## 2019-11-20 VITALS
BODY MASS INDEX: 17.3 KG/M2 | HEIGHT: 62 IN | DIASTOLIC BLOOD PRESSURE: 80 MMHG | HEART RATE: 112 BPM | OXYGEN SATURATION: 80 % | SYSTOLIC BLOOD PRESSURE: 130 MMHG | WEIGHT: 94 LBS

## 2019-11-20 DIAGNOSIS — Z00.00 MEDICARE ANNUAL WELLNESS VISIT, SUBSEQUENT: Primary | ICD-10-CM

## 2019-11-20 PROCEDURE — G0439 PPPS, SUBSEQ VISIT: HCPCS | Performed by: NURSE PRACTITIONER

## 2019-11-20 PROCEDURE — 96160 PT-FOCUSED HLTH RISK ASSMT: CPT | Performed by: NURSE PRACTITIONER

## 2019-11-20 NOTE — PROGRESS NOTES
Subjective   Siobhan Prakash is a 74 y.o. female.     History of Present Illness     The following portions of the patient's history were reviewed and updated as appropriate: allergies, current medications, past family history, past medical history, past social history, past surgical history and problem list.    Review of Systems    Objective   Physical Exam      Assessment/Plan   {Assess/[email or web address]:[id number]}

## 2019-11-21 NOTE — PATIENT INSTRUCTIONS
Preventive Care 65 Years and Older, Female  Preventive care refers to lifestyle choices and visits with your health care provider that can promote health and wellness.  What does preventive care include?  · A yearly physical exam. This is also called an annual well check.  · Dental exams once or twice a year.  · Routine eye exams. Ask your health care provider how often you should have your eyes checked.  · Personal lifestyle choices, including:  ? Daily care of your teeth and gums.  ? Regular physical activity.  ? Eating a healthy diet.  ? Avoiding tobacco and drug use.  ? Limiting alcohol use.  ? Practicing safe sex.  ? Taking low-dose aspirin every day.  ? Taking vitamin and mineral supplements as recommended by your health care provider.  What happens during an annual well check?  The services and screenings done by your health care provider during your annual well check will depend on your age, overall health, lifestyle risk factors, and family history of disease.  Counseling  Your health care provider may ask you questions about your:  · Alcohol use.  · Tobacco use.  · Drug use.  · Emotional well-being.  · Home and relationship well-being.  · Sexual activity.  · Eating habits.  · History of falls.  · Memory and ability to understand (cognition).  · Work and work environment.  · Reproductive health.    Screening  You may have the following tests or measurements:  · Height, weight, and BMI.  · Blood pressure.  · Lipid and cholesterol levels. These may be checked every 5 years, or more frequently if you are over 50 years old.  · Skin check.  · Lung cancer screening. You may have this screening every year starting at age 55 if you have a 30-pack-year history of smoking and currently smoke or have quit within the past 15 years.  · Colorectal cancer screening. All adults should have this screening starting at age 50 and continuing until age 75. You will have tests every 1-10 years, depending on your results and the  type of screening test. People at increased risk should start screening at an earlier age. Screening tests may include:  ? Guaiac-based fecal occult blood testing.  ? Fecal immunochemical test (FIT).  ? Stool DNA test.  ? Virtual colonoscopy.  ? Sigmoidoscopy. During this test, a flexible tube with a tiny camera (sigmoidoscope) is used to examine your rectum and lower colon. The sigmoidoscope is inserted through your anus into your rectum and lower colon.  ? Colonoscopy. During this test, a long, thin, flexible tube with a tiny camera (colonoscope) is used to examine your entire colon and rectum.  · Hepatitis C blood test.  · Hepatitis B blood test.  · Sexually transmitted disease (STD) testing.  · Diabetes screening. This is done by checking your blood sugar (glucose) after you have not eaten for a while (fasting). You may have this done every 1-3 years.  · Bone density scan. This is done to screen for osteoporosis. You may have this done starting at age 65.  · Mammogram. This may be done every 1-2 years. Talk to your health care provider about how often you should have regular mammograms.  Talk with your health care provider about your test results, treatment options, and if necessary, the need for more tests.  Vaccines  Your health care provider may recommend certain vaccines, such as:  · Influenza vaccine. This is recommended every year.  · Tetanus, diphtheria, and acellular pertussis (Tdap, Td) vaccine. You may need a Td booster every 10 years.  · Varicella vaccine. You may need this if you have not been vaccinated.  · Zoster vaccine. You may need this after age 60.  · Measles, mumps, and rubella (MMR) vaccine. You may need at least one dose of MMR if you were born in 1957 or later. You may also need a second dose.  · Pneumococcal 13-valent conjugate (PCV13) vaccine. One dose is recommended after age 65.  · Pneumococcal polysaccharide (PPSV23) vaccine. One dose is recommended after age 65.  · Meningococcal  vaccine. You may need this if you have certain conditions.  · Hepatitis A vaccine. You may need this if you have certain conditions or if you travel or work in places where you may be exposed to hepatitis A.  · Hepatitis B vaccine. You may need this if you have certain conditions or if you travel or work in places where you may be exposed to hepatitis B.  · Haemophilus influenzae type b (Hib) vaccine. You may need this if you have certain conditions.  Talk to your health care provider about which screenings and vaccines you need and how often you need them.  This information is not intended to replace advice given to you by your health care provider. Make sure you discuss any questions you have with your health care provider.  Document Released: 01/13/2017 Document Revised: 02/07/2019 Document Reviewed: 10/18/2016  Elsevier Interactive Patient Education © 2019 Elsevier Inc.

## 2019-11-21 NOTE — PROGRESS NOTES
The ABCs of the Annual Wellness Visit  Subsequent Medicare Wellness Visit    Chief Complaint   Patient presents with   • Annual Exam       Subjective   History of Present Illness:  Siobhan Prakash is a 74 y.o. female who presents for a Subsequent Medicare Wellness Visit.    HEALTH RISK ASSESSMENT    Recent Hospitalizations:  Recently treated at the following:  [facility]    Current Medical Providers:  Patient Care Team:  Lyudmila Granados APRN as PCP - General (Family Medicine)  Carl Perez MD as Consulting Physician (Orthopedic Surgery)    Smoking Status:  Social History     Tobacco Use   Smoking Status Former Smoker   • Packs/day: 1.00   • Years: 15.00   • Pack years: 15.00   • Start date: 1963   • Last attempt to quit:    • Years since quittin.9   Smokeless Tobacco Never Used   Tobacco Comment    Ultra light menthol brand       Alcohol Consumption:  Social History     Substance and Sexual Activity   Alcohol Use Yes    Comment: Approx. 1 or 2 a month       Depression Screen:   PHQ-2/PHQ-9 Depression Screening 2019   Little interest or pleasure in doing things 0   Feeling down, depressed, or hopeless 0   Total Score 0       Fall Risk Screen:  STEADI Fall Risk Assessment was completed, and patient is at MODERATE risk for falls. Assessment completed on:2019    Health Habits and Functional and Cognitive Screening:  Functional & Cognitive Status 2019   Do you have difficulty preparing food and eating? No   Do you have difficulty bathing yourself, getting dressed or grooming yourself? No   Do you have difficulty using the toilet? No   Do you have difficulty moving around from place to place? No   Do you have trouble with steps or getting out of a bed or a chair? Yes   Current Diet Unhealthy Diet   Dental Exam Not up to date   Eye Exam Up to date   Exercise (times per week) 0 times per week   Current Exercise Activities Include None   Do you need help using the phone?  No    Are you deaf or do you have serious difficulty hearing?  No   Do you need help with transportation? No   Do you need help shopping? No   Do you need help preparing meals?  No   Do you need help with housework?  Yes   Do you need help with laundry? Yes   Do you need help taking your medications? No   Do you need help managing money? No   Do you ever drive or ride in a car without wearing a seat belt? No   Have you felt unusual stress, anger or loneliness in the last month? No   Who do you live with? Spouse   If you need help, do you have trouble finding someone available to you? No   Have you been bothered in the last four weeks by sexual problems? No   Do you have difficulty concentrating, remembering or making decisions? No         Does the patient have evidence of cognitive impairment? No    Asprin use counseling:Does not need ASA (and currently is not on it)    Age-appropriate Screening Schedule:  Refer to the list below for future screening recommendations based on patient's age, sex and/or medical conditions. Orders for these recommended tests are listed in the plan section. The patient has been provided with a written plan.    Health Maintenance   Topic Date Due   • TDAP/TD VACCINES (1 - Tdap) 01/18/1966   • ZOSTER VACCINE (1 of 2) 01/18/1997   • LIPID PANEL  11/13/2018   • INFLUENZA VACCINE  08/01/2019   • MAMMOGRAM  10/04/2020   • DXA SCAN  10/04/2020   • COLONOSCOPY  01/01/2021   • PNEUMOCOCCAL VACCINES (65+ LOW/MEDIUM RISK)  Completed          The following portions of the patient's history were reviewed and updated as appropriate: allergies, current medications, past family history, past medical history, past social history, past surgical history and problem list.    Outpatient Medications Prior to Visit   Medication Sig Dispense Refill   • albuterol sulfate  (90 Base) MCG/ACT inhaler Inhale 2 puffs Every 4 (Four) Hours As Needed for Wheezing. 1 inhaler 0   • arformoterol (BROVANA) 15 MCG/2ML  nebulizer solution Inhale 15 mcg.     • azithromycin (ZITHROMAX) 500 MG tablet      • baclofen (LIORESAL) 20 MG tablet TAKE 1 TABLET BY MOUTH THREE TIMES DAILY 270 tablet 3   • benzonatate (TESSALON PERLES) 100 MG capsule Take 2 capsules by mouth 3 (Three) Times a Day As Needed (cough). 30 capsule 0   • BIOTIN PO Take 1 tablet by mouth Daily.     • budesonide (PULMICORT) 0.5 MG/2ML nebulizer solution 0.5 mg.     • Cholecalciferol (VITAMIN D3) 5000 UNITS capsule capsule Take 5,000 Units by mouth Daily.     • denosumab (PROLIA) 60 MG/ML solution prefilled syringe syringe Inject 60 mg under the skin into the appropriate area as directed.     • ferrous sulfate 325 (65 FE) MG tablet Take 325 mg by mouth 2 (Two) Times a Day With Meals.     • guaiFENesin (MUCINEX) 600 MG 12 hr tablet Take 2 tablets by mouth Every 12 (Twelve) Hours. 60 tablet 11   • lansoprazole (PREVACID) 15 MG capsule Take 1 capsule by mouth Daily. 90 capsule 0   • loperamide (IMODIUM) 2 MG capsule Take 2 mg by mouth 4 (Four) Times a Day As Needed for Diarrhea.     • LORazepam (ATIVAN) 0.5 MG tablet TK ONE T PO Q 4 TO 6 H PRA  0   • meloxicam (MOBIC) 15 MG tablet Take 1 tablet by mouth Daily. 90 tablet 1   • Mirabegron ER (MYRBETRIQ) 25 MG tablet sustained-release 24 hour 24 hr tablet Take 1 tablet by mouth Daily. 30 tablet 2   • montelukast (SINGULAIR) 10 MG tablet Take 10 mg by mouth Every Night.     • mupirocin (BACTROBAN) 2 % cream Apply  topically to the appropriate area as directed 3 (Three) Times a Day. 30 g 0   • O2 (OXYGEN) Inhale 1 (One) Time.     • predniSONE (DELTASONE) 20 MG tablet Take 1 tablet by mouth Daily. 3 tablet 0   • roflumilast (DALIRESP) 500 MCG tablet tablet Take 500 mcg by mouth Daily.     • sertraline (ZOLOFT) 100 MG tablet TAKE 1 TABLET BY MOUTH TWICE DAILY 180 tablet 0   • SUMAtriptan (IMITREX) 50 MG tablet Take one tablet at onset of headache. May repeat dose one time in 2 hours if headache not relieved. 9 tablet 2   •  "theophylline (UNIPHYL) 400 MG 24 hr tablet      • Tiotropium Bromide Monohydrate (SPIRIVA RESPIMAT) 2.5 MCG/ACT aerosol solution Inhale 2 puffs Daily.     • valACYclovir (VALTREX) 500 MG tablet Take 1 tablet by mouth 2 (Two) Times a Day. 6 tablet 0   • zolpidem (AMBIEN) 10 MG tablet Take 1 tablet by mouth At Night As Needed for Sleep. 30 tablet 0     No facility-administered medications prior to visit.        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  Compared to one year ago, the patient feels her mental health is worse.    Reviewed chart for potential of high risk medication in the elderly: yes  Reviewed chart for potential of harmful drug interactions in the elderly:yes    Objective         Vitals:    11/20/19 0825   BP: 130/80   Pulse: 112   SpO2: (!) 80%   Weight: 42.6 kg (94 lb)   Height: 157.5 cm (62\")       Body mass index is 17.19 kg/m².  Discussed the patient's BMI with her. The BMI pt is below the average is cacectic secondary to continous 02 via NC and  for her pulmonary emphazema.    Physical Exam   Constitutional: She is oriented to person, place, and time. She appears well-developed and well-nourished. She appears distressed.   HENT:   Head: Normocephalic and " atraumatic.   Right Ear: External ear normal.   Left Ear: External ear normal.   Mouth/Throat: Oropharynx is clear and moist.   Eyes: EOM are normal.   Cardiovascular: Normal rate and regular rhythm.   Pulmonary/Chest: She is in respiratory distress.   Musculoskeletal: Normal range of motion.   Neurological: She is alert and oriented to person, place, and time.   Skin: Skin is warm.   Psychiatric: She has a normal mood and affect.   Nursing note and vitals reviewed.        02 sats were repeated several times and the best we could get was 88, she reports that her nose is stuffy and this impairs the 02 delivery, she does not think she is any more short of breath than normal    Assessment/Plan   Medicare Risks and Personalized Health Plan  CMS Preventative Services Quick Reference  Advance Directive Discussion    The above risks/problems have been discussed with the patient.  Pertinent information has been shared with the patient in the After Visit Summary.  Follow up plans and orders are seen below in the Assessment/Plan Section.    There are no diagnoses linked to this encounter.  Follow Up:  No Follow-up on file.     An After Visit Summary and PPPS were given to the patient.     Diet discussed and she has continued with trying to try to increase her caloric intake daily secondary to her respiratory effort.

## 2020-01-01 ENCOUNTER — HOSPITAL ENCOUNTER (OUTPATIENT)
Facility: HOSPITAL | Age: 75
Setting detail: OBSERVATION
Discharge: NURSING, REHAB OR LONG-TERM CARE | End: 2020-11-14
Attending: EMERGENCY MEDICINE | Admitting: HOSPITALIST

## 2020-01-01 ENCOUNTER — TELEPHONE (OUTPATIENT)
Dept: FAMILY MEDICINE CLINIC | Facility: CLINIC | Age: 75
End: 2020-01-01

## 2020-01-01 ENCOUNTER — EPISODE CHANGES (OUTPATIENT)
Dept: CASE MANAGEMENT | Facility: OTHER | Age: 75
End: 2020-01-01

## 2020-01-01 ENCOUNTER — APPOINTMENT (OUTPATIENT)
Dept: GENERAL RADIOLOGY | Facility: HOSPITAL | Age: 75
End: 2020-01-01

## 2020-01-01 ENCOUNTER — APPOINTMENT (OUTPATIENT)
Dept: CARDIOLOGY | Facility: HOSPITAL | Age: 75
End: 2020-01-01

## 2020-01-01 ENCOUNTER — APPOINTMENT (OUTPATIENT)
Dept: NUCLEAR MEDICINE | Facility: HOSPITAL | Age: 75
End: 2020-01-01

## 2020-01-01 ENCOUNTER — APPOINTMENT (OUTPATIENT)
Dept: CT IMAGING | Facility: HOSPITAL | Age: 75
End: 2020-01-01

## 2020-01-01 ENCOUNTER — HOSPITAL ENCOUNTER (EMERGENCY)
Facility: HOSPITAL | Age: 75
Discharge: HOME | End: 2020-10-25
Attending: EMERGENCY MEDICINE | Admitting: EMERGENCY MEDICINE

## 2020-01-01 ENCOUNTER — TRANSITIONAL CARE MANAGEMENT TELEPHONE ENCOUNTER (OUTPATIENT)
Dept: CALL CENTER | Facility: HOSPITAL | Age: 75
End: 2020-01-01

## 2020-01-01 ENCOUNTER — TELEMEDICINE (OUTPATIENT)
Dept: FAMILY MEDICINE CLINIC | Facility: CLINIC | Age: 75
End: 2020-01-01

## 2020-01-01 ENCOUNTER — APPOINTMENT (OUTPATIENT)
Dept: MRI IMAGING | Facility: HOSPITAL | Age: 75
End: 2020-01-01

## 2020-01-01 ENCOUNTER — HOSPITAL ENCOUNTER (INPATIENT)
Facility: HOSPITAL | Age: 75
LOS: 4 days | End: 2020-11-27
Attending: EMERGENCY MEDICINE | Admitting: INTERNAL MEDICINE

## 2020-01-01 ENCOUNTER — HOSPITAL ENCOUNTER (INPATIENT)
Facility: HOSPITAL | Age: 75
LOS: 6 days | Discharge: HOME HEALTH | End: 2020-11-11
Attending: EMERGENCY MEDICINE | Admitting: INTERNAL MEDICINE

## 2020-01-01 ENCOUNTER — READMISSION MANAGEMENT (OUTPATIENT)
Dept: CALL CENTER | Facility: HOSPITAL | Age: 75
End: 2020-01-01

## 2020-01-01 ENCOUNTER — OFFICE VISIT (OUTPATIENT)
Dept: FAMILY MEDICINE CLINIC | Facility: CLINIC | Age: 75
End: 2020-01-01

## 2020-01-01 VITALS
HEART RATE: 99 BPM | BODY MASS INDEX: 20.42 KG/M2 | DIASTOLIC BLOOD PRESSURE: 73 MMHG | RESPIRATION RATE: 24 BRPM | SYSTOLIC BLOOD PRESSURE: 146 MMHG | WEIGHT: 104 LBS | TEMPERATURE: 97.8 F | HEIGHT: 60 IN | OXYGEN SATURATION: 96 %

## 2020-01-01 VITALS — TEMPERATURE: 98 F | HEIGHT: 62 IN | WEIGHT: 98.77 LBS | BODY MASS INDEX: 18.18 KG/M2

## 2020-01-01 VITALS
RESPIRATION RATE: 16 BRPM | TEMPERATURE: 95.7 F | DIASTOLIC BLOOD PRESSURE: 84 MMHG | WEIGHT: 101 LBS | HEART RATE: 111 BPM | HEIGHT: 62 IN | OXYGEN SATURATION: 92 % | BODY MASS INDEX: 18.58 KG/M2 | SYSTOLIC BLOOD PRESSURE: 136 MMHG

## 2020-01-01 VITALS
OXYGEN SATURATION: 97 % | TEMPERATURE: 98 F | BODY MASS INDEX: 19.14 KG/M2 | DIASTOLIC BLOOD PRESSURE: 75 MMHG | SYSTOLIC BLOOD PRESSURE: 143 MMHG | HEART RATE: 92 BPM | RESPIRATION RATE: 18 BRPM | WEIGHT: 104 LBS | HEIGHT: 62 IN

## 2020-01-01 VITALS
OXYGEN SATURATION: 92 % | RESPIRATION RATE: 16 BRPM | HEART RATE: 142 BPM | SYSTOLIC BLOOD PRESSURE: 130 MMHG | TEMPERATURE: 98 F | DIASTOLIC BLOOD PRESSURE: 92 MMHG | HEIGHT: 62 IN | BODY MASS INDEX: 17.19 KG/M2

## 2020-01-01 DIAGNOSIS — R52 INTRACTABLE PAIN: ICD-10-CM

## 2020-01-01 DIAGNOSIS — J44.9 CHRONIC OBSTRUCTIVE PULMONARY DISEASE, UNSPECIFIED COPD TYPE (HCC): ICD-10-CM

## 2020-01-01 DIAGNOSIS — G89.29 OTHER CHRONIC PAIN: ICD-10-CM

## 2020-01-01 DIAGNOSIS — Z74.09 IMMOBILITY: ICD-10-CM

## 2020-01-01 DIAGNOSIS — F51.01 PRIMARY INSOMNIA: Primary | ICD-10-CM

## 2020-01-01 DIAGNOSIS — S22.070A CLOSED WEDGE COMPRESSION FRACTURE OF T9 VERTEBRA, INITIAL ENCOUNTER (HCC): ICD-10-CM

## 2020-01-01 DIAGNOSIS — S22.070S COMPRESSION FRACTURE OF T9 VERTEBRA, SEQUELA: Primary | ICD-10-CM

## 2020-01-01 DIAGNOSIS — I26.99 OTHER ACUTE PULMONARY EMBOLISM WITHOUT ACUTE COR PULMONALE (HCC): ICD-10-CM

## 2020-01-01 DIAGNOSIS — M62.838 MUSCLE SPASM: ICD-10-CM

## 2020-01-01 DIAGNOSIS — S32.040S CLOSED COMPRESSION FRACTURE OF L4 VERTEBRA, SEQUELA: ICD-10-CM

## 2020-01-01 DIAGNOSIS — R07.89 CHEST WALL PAIN: Primary | ICD-10-CM

## 2020-01-01 DIAGNOSIS — D64.9 SYMPTOMATIC ANEMIA: ICD-10-CM

## 2020-01-01 DIAGNOSIS — R10.13 EPIGASTRIC PAIN: ICD-10-CM

## 2020-01-01 DIAGNOSIS — J96.11 CHRONIC RESPIRATORY FAILURE WITH HYPOXIA AND HYPERCAPNIA (HCC): Chronic | ICD-10-CM

## 2020-01-01 DIAGNOSIS — K59.89: ICD-10-CM

## 2020-01-01 DIAGNOSIS — R93.89 ABNORMAL CT SCAN: ICD-10-CM

## 2020-01-01 DIAGNOSIS — J43.9 PULMONARY EMPHYSEMA, UNSPECIFIED EMPHYSEMA TYPE (HCC): Primary | ICD-10-CM

## 2020-01-01 DIAGNOSIS — I47.1 MULTIFOCAL ATRIAL TACHYCARDIA (HCC): ICD-10-CM

## 2020-01-01 DIAGNOSIS — F41.8 MIXED ANXIETY DEPRESSIVE DISORDER: ICD-10-CM

## 2020-01-01 DIAGNOSIS — G89.29 OTHER CHRONIC PAIN: Primary | ICD-10-CM

## 2020-01-01 DIAGNOSIS — M54.9 MID BACK PAIN ON RIGHT SIDE: Primary | ICD-10-CM

## 2020-01-01 DIAGNOSIS — F51.01 PRIMARY INSOMNIA: ICD-10-CM

## 2020-01-01 DIAGNOSIS — R00.0 SINUS TACHYCARDIA: ICD-10-CM

## 2020-01-01 DIAGNOSIS — J44.9 END STAGE COPD (HCC): ICD-10-CM

## 2020-01-01 DIAGNOSIS — J96.12 CHRONIC RESPIRATORY FAILURE WITH HYPOXIA AND HYPERCAPNIA (HCC): Chronic | ICD-10-CM

## 2020-01-01 DIAGNOSIS — R07.9 RIGHT-SIDED CHEST PAIN: Primary | ICD-10-CM

## 2020-01-01 DIAGNOSIS — M81.0 OSTEOPOROSIS WITHOUT CURRENT PATHOLOGICAL FRACTURE, UNSPECIFIED OSTEOPOROSIS TYPE: ICD-10-CM

## 2020-01-01 DIAGNOSIS — R19.7 DIARRHEA, UNSPECIFIED TYPE: ICD-10-CM

## 2020-01-01 DIAGNOSIS — M54.6 ACUTE MIDLINE THORACIC BACK PAIN: ICD-10-CM

## 2020-01-01 DIAGNOSIS — R53.81 PHYSICAL DECONDITIONING: ICD-10-CM

## 2020-01-01 DIAGNOSIS — K92.2 ACUTE UPPER GI BLEED: Primary | ICD-10-CM

## 2020-01-01 DIAGNOSIS — J18.9 MULTIFOCAL PNEUMONIA: ICD-10-CM

## 2020-01-01 LAB
ABO GROUP BLD: NORMAL
ALBUMIN SERPL-MCNC: 3.3 G/DL (ref 3.5–5.2)
ALBUMIN SERPL-MCNC: 3.5 G/DL (ref 3.5–5.2)
ALBUMIN SERPL-MCNC: 3.9 G/DL (ref 3.5–5.2)
ALBUMIN SERPL-MCNC: 4 G/DL (ref 3.5–5.2)
ALBUMIN SERPL-MCNC: 4.3 G/DL (ref 3.5–5.2)
ALBUMIN/GLOB SERPL: 1.1 G/DL
ALBUMIN/GLOB SERPL: 1.3 G/DL
ALBUMIN/GLOB SERPL: 1.5 G/DL
ALP SERPL-CCNC: 109 U/L (ref 39–117)
ALP SERPL-CCNC: 120 U/L (ref 39–117)
ALP SERPL-CCNC: 145 U/L (ref 39–117)
ALP SERPL-CCNC: 146 U/L (ref 39–117)
ALP SERPL-CCNC: 150 U/L (ref 39–117)
ALT SERPL W P-5'-P-CCNC: 10 U/L (ref 1–33)
ALT SERPL W P-5'-P-CCNC: 12 U/L (ref 1–33)
ALT SERPL W P-5'-P-CCNC: 17 U/L (ref 1–33)
ALT SERPL W P-5'-P-CCNC: 24 U/L (ref 1–33)
ALT SERPL W P-5'-P-CCNC: 9 U/L (ref 1–33)
ANION GAP SERPL CALCULATED.3IONS-SCNC: 1.7 MMOL/L (ref 5–15)
ANION GAP SERPL CALCULATED.3IONS-SCNC: 10.1 MMOL/L (ref 5–15)
ANION GAP SERPL CALCULATED.3IONS-SCNC: 10.9 MMOL/L (ref 5–15)
ANION GAP SERPL CALCULATED.3IONS-SCNC: 3.6 MMOL/L (ref 5–15)
ANION GAP SERPL CALCULATED.3IONS-SCNC: 4.9 MMOL/L (ref 5–15)
ANION GAP SERPL CALCULATED.3IONS-SCNC: 6.1 MMOL/L (ref 5–15)
ANION GAP SERPL CALCULATED.3IONS-SCNC: 7 MMOL/L (ref 5–15)
ANION GAP SERPL CALCULATED.3IONS-SCNC: 7.1 MMOL/L (ref 5–15)
ANION GAP SERPL CALCULATED.3IONS-SCNC: 7.4 MMOL/L (ref 5–15)
ANION GAP SERPL CALCULATED.3IONS-SCNC: 7.5 MMOL/L (ref 5–15)
ANION GAP SERPL CALCULATED.3IONS-SCNC: 9.5 MMOL/L (ref 5–15)
AORTIC DIMENSIONLESS INDEX: 0.7 (DI)
APTT PPP: 29.4 SECONDS (ref 22.7–35.4)
ARTERIAL PATENCY WRIST A: POSITIVE
ARTERIAL PATENCY WRIST A: POSITIVE
AST SERPL-CCNC: 12 U/L (ref 1–32)
AST SERPL-CCNC: 16 U/L (ref 1–32)
AST SERPL-CCNC: 26 U/L (ref 1–32)
AST SERPL-CCNC: 26 U/L (ref 1–32)
AST SERPL-CCNC: 27 U/L (ref 1–32)
ATMOSPHERIC PRESS: 743.5 MMHG
ATMOSPHERIC PRESS: 759.5 MMHG
B PARAPERT DNA SPEC QL NAA+PROBE: NOT DETECTED
B PERT DNA SPEC QL NAA+PROBE: NOT DETECTED
BASE EXCESS BLDA CALC-SCNC: 8.1 MMOL/L (ref 0–2)
BASE EXCESS BLDA CALC-SCNC: 9.7 MMOL/L (ref 0–2)
BASOPHILS # BLD AUTO: 0.02 10*3/MM3 (ref 0–0.2)
BASOPHILS # BLD AUTO: 0.02 10*3/MM3 (ref 0–0.2)
BASOPHILS # BLD AUTO: 0.04 10*3/MM3 (ref 0–0.2)
BASOPHILS NFR BLD AUTO: 0.1 % (ref 0–1.5)
BASOPHILS NFR BLD AUTO: 0.4 % (ref 0–1.5)
BASOPHILS NFR BLD AUTO: 0.5 % (ref 0–1.5)
BDY SITE: ABNORMAL
BDY SITE: ABNORMAL
BH BB BLOOD EXPIRATION DATE: NORMAL
BH BB BLOOD TYPE BARCODE: 6200
BH BB DISPENSE STATUS: NORMAL
BH BB PRODUCT CODE: NORMAL
BH BB UNIT NUMBER: NORMAL
BH CV ECHO MEAS - ACS: 1.3 CM
BH CV ECHO MEAS - AO MAX PG (FULL): 3.4 MMHG
BH CV ECHO MEAS - AO MAX PG: 6 MMHG
BH CV ECHO MEAS - AO MEAN PG (FULL): 3 MMHG
BH CV ECHO MEAS - AO MEAN PG: 4 MMHG
BH CV ECHO MEAS - AO ROOT AREA (BSA CORRECTED): 2.2
BH CV ECHO MEAS - AO ROOT AREA: 7.5 CM^2
BH CV ECHO MEAS - AO ROOT DIAM: 3.1 CM
BH CV ECHO MEAS - AO V2 MAX: 122 CM/SEC
BH CV ECHO MEAS - AO V2 MEAN: 88.5 CM/SEC
BH CV ECHO MEAS - AO V2 VTI: 18.8 CM
BH CV ECHO MEAS - AVA(I,A): 2.2 CM^2
BH CV ECHO MEAS - AVA(I,D): 2.2 CM^2
BH CV ECHO MEAS - AVA(V,A): 2.1 CM^2
BH CV ECHO MEAS - AVA(V,D): 2.1 CM^2
BH CV ECHO MEAS - BSA(HAYCOCK): 1.4 M^2
BH CV ECHO MEAS - BSA: 1.4 M^2
BH CV ECHO MEAS - BZI_BMI: 18.8 KILOGRAMS/M^2
BH CV ECHO MEAS - BZI_METRIC_HEIGHT: 157.5 CM
BH CV ECHO MEAS - BZI_METRIC_WEIGHT: 46.7 KG
BH CV ECHO MEAS - EDV(CUBED): 50.7 ML
BH CV ECHO MEAS - EDV(MOD-SP2): 46 ML
BH CV ECHO MEAS - EDV(MOD-SP4): 69 ML
BH CV ECHO MEAS - EDV(TEICH): 58.1 ML
BH CV ECHO MEAS - EF(CUBED): 65.3 %
BH CV ECHO MEAS - EF(MOD-BP): 54.8 %
BH CV ECHO MEAS - EF(MOD-SP2): 47.8 %
BH CV ECHO MEAS - EF(MOD-SP4): 56.5 %
BH CV ECHO MEAS - EF(TEICH): 57.7 %
BH CV ECHO MEAS - ESV(CUBED): 17.6 ML
BH CV ECHO MEAS - ESV(MOD-SP2): 24 ML
BH CV ECHO MEAS - ESV(MOD-SP4): 30 ML
BH CV ECHO MEAS - ESV(TEICH): 24.6 ML
BH CV ECHO MEAS - FS: 29.7 %
BH CV ECHO MEAS - IVS/LVPW: 1.3
BH CV ECHO MEAS - IVSD: 1.4 CM
BH CV ECHO MEAS - LAT PEAK E' VEL: 7.6 CM/SEC
BH CV ECHO MEAS - LV DIASTOLIC VOL/BSA (35-75): 47.9 ML/M^2
BH CV ECHO MEAS - LV MASS(C)D: 156.7 GRAMS
BH CV ECHO MEAS - LV MASS(C)DI: 108.7 GRAMS/M^2
BH CV ECHO MEAS - LV MAX PG: 2.6 MMHG
BH CV ECHO MEAS - LV MEAN PG: 1 MMHG
BH CV ECHO MEAS - LV SYSTOLIC VOL/BSA (12-30): 20.8 ML/M^2
BH CV ECHO MEAS - LV V1 MAX: 80.3 CM/SEC
BH CV ECHO MEAS - LV V1 MEAN: 44.3 CM/SEC
BH CV ECHO MEAS - LV V1 VTI: 13.1 CM
BH CV ECHO MEAS - LVIDD: 3.7 CM
BH CV ECHO MEAS - LVIDS: 2.6 CM
BH CV ECHO MEAS - LVLD AP2: 6.6 CM
BH CV ECHO MEAS - LVLD AP4: 7.2 CM
BH CV ECHO MEAS - LVLS AP2: 6 CM
BH CV ECHO MEAS - LVLS AP4: 5.9 CM
BH CV ECHO MEAS - LVOT AREA (M): 3.1 CM^2
BH CV ECHO MEAS - LVOT AREA: 3.1 CM^2
BH CV ECHO MEAS - LVOT DIAM: 2 CM
BH CV ECHO MEAS - LVPWD: 1.1 CM
BH CV ECHO MEAS - MED PEAK E' VEL: 7.4 CM/SEC
BH CV ECHO MEAS - MV DEC SLOPE: 1270 CM/SEC^2
BH CV ECHO MEAS - MV DEC TIME: 90 SEC
BH CV ECHO MEAS - MV E MAX VEL: 105 CM/SEC
BH CV ECHO MEAS - MV MAX PG: 7.8 MMHG
BH CV ECHO MEAS - MV MEAN PG: 4 MMHG
BH CV ECHO MEAS - MV P1/2T MAX VEL: 146 CM/SEC
BH CV ECHO MEAS - MV P1/2T: 33.7 MSEC
BH CV ECHO MEAS - MV V2 MAX: 140 CM/SEC
BH CV ECHO MEAS - MV V2 MEAN: 89.8 CM/SEC
BH CV ECHO MEAS - MV V2 VTI: 15.8 CM
BH CV ECHO MEAS - MVA P1/2T LCG: 1.5 CM^2
BH CV ECHO MEAS - MVA(P1/2T): 6.5 CM^2
BH CV ECHO MEAS - MVA(VTI): 2.6 CM^2
BH CV ECHO MEAS - PA ACC TIME: 0.11 SEC
BH CV ECHO MEAS - PA MAX PG (FULL): 1 MMHG
BH CV ECHO MEAS - PA MAX PG: 1.8 MMHG
BH CV ECHO MEAS - PA PR(ACCEL): 31.3 MMHG
BH CV ECHO MEAS - PA V2 MAX: 66.9 CM/SEC
BH CV ECHO MEAS - PVA(V,A): 2.3 CM^2
BH CV ECHO MEAS - PVA(V,D): 2.3 CM^2
BH CV ECHO MEAS - QP/QS: 0.46
BH CV ECHO MEAS - RAP SYSTOLE: 3 MMHG
BH CV ECHO MEAS - RV MAX PG: 0.79 MMHG
BH CV ECHO MEAS - RV MEAN PG: 0 MMHG
BH CV ECHO MEAS - RV V1 MAX: 44.4 CM/SEC
BH CV ECHO MEAS - RV V1 MEAN: 29.4 CM/SEC
BH CV ECHO MEAS - RV V1 VTI: 5.5 CM
BH CV ECHO MEAS - RVOT AREA: 3.5 CM^2
BH CV ECHO MEAS - RVOT DIAM: 2.1 CM
BH CV ECHO MEAS - SI(AO): 98.4 ML/M^2
BH CV ECHO MEAS - SI(CUBED): 22.9 ML/M^2
BH CV ECHO MEAS - SI(LVOT): 28.5 ML/M^2
BH CV ECHO MEAS - SI(MOD-SP2): 15.3 ML/M^2
BH CV ECHO MEAS - SI(MOD-SP4): 27.1 ML/M^2
BH CV ECHO MEAS - SI(TEICH): 23.2 ML/M^2
BH CV ECHO MEAS - SV(AO): 141.9 ML
BH CV ECHO MEAS - SV(CUBED): 33.1 ML
BH CV ECHO MEAS - SV(LVOT): 41.2 ML
BH CV ECHO MEAS - SV(MOD-SP2): 22 ML
BH CV ECHO MEAS - SV(MOD-SP4): 39 ML
BH CV ECHO MEAS - SV(RVOT): 19 ML
BH CV ECHO MEAS - SV(TEICH): 33.5 ML
BH CV ECHO MEAS - TAPSE (>1.6): 1.6 CM
BH CV ECHO MEASUREMENTS AVERAGE E/E' RATIO: 14
BH CV LOWER VASCULAR LEFT COMMON FEMORAL AUGMENT: NORMAL
BH CV LOWER VASCULAR LEFT COMMON FEMORAL AUGMENT: NORMAL
BH CV LOWER VASCULAR LEFT COMMON FEMORAL COMPETENT: NORMAL
BH CV LOWER VASCULAR LEFT COMMON FEMORAL COMPETENT: NORMAL
BH CV LOWER VASCULAR LEFT COMMON FEMORAL COMPRESS: NORMAL
BH CV LOWER VASCULAR LEFT COMMON FEMORAL COMPRESS: NORMAL
BH CV LOWER VASCULAR LEFT COMMON FEMORAL PHASIC: NORMAL
BH CV LOWER VASCULAR LEFT COMMON FEMORAL PHASIC: NORMAL
BH CV LOWER VASCULAR LEFT COMMON FEMORAL SPONT: NORMAL
BH CV LOWER VASCULAR LEFT COMMON FEMORAL SPONT: NORMAL
BH CV LOWER VASCULAR LEFT DISTAL FEMORAL COMPRESS: NORMAL
BH CV LOWER VASCULAR LEFT DISTAL FEMORAL COMPRESS: NORMAL
BH CV LOWER VASCULAR LEFT GASTRONEMIUS COMPRESS: NORMAL
BH CV LOWER VASCULAR LEFT GASTRONEMIUS COMPRESS: NORMAL
BH CV LOWER VASCULAR LEFT GREATER SAPH AK COMPRESS: NORMAL
BH CV LOWER VASCULAR LEFT GREATER SAPH AK COMPRESS: NORMAL
BH CV LOWER VASCULAR LEFT GREATER SAPH BK COMPRESS: NORMAL
BH CV LOWER VASCULAR LEFT GREATER SAPH BK COMPRESS: NORMAL
BH CV LOWER VASCULAR LEFT LESSER SAPH COMPRESS: NORMAL
BH CV LOWER VASCULAR LEFT LESSER SAPH COMPRESS: NORMAL
BH CV LOWER VASCULAR LEFT MID FEMORAL AUGMENT: NORMAL
BH CV LOWER VASCULAR LEFT MID FEMORAL AUGMENT: NORMAL
BH CV LOWER VASCULAR LEFT MID FEMORAL COMPETENT: NORMAL
BH CV LOWER VASCULAR LEFT MID FEMORAL COMPETENT: NORMAL
BH CV LOWER VASCULAR LEFT MID FEMORAL COMPRESS: NORMAL
BH CV LOWER VASCULAR LEFT MID FEMORAL COMPRESS: NORMAL
BH CV LOWER VASCULAR LEFT MID FEMORAL PHASIC: NORMAL
BH CV LOWER VASCULAR LEFT MID FEMORAL PHASIC: NORMAL
BH CV LOWER VASCULAR LEFT MID FEMORAL SPONT: NORMAL
BH CV LOWER VASCULAR LEFT MID FEMORAL SPONT: NORMAL
BH CV LOWER VASCULAR LEFT PERONEAL COMPRESS: NORMAL
BH CV LOWER VASCULAR LEFT PERONEAL COMPRESS: NORMAL
BH CV LOWER VASCULAR LEFT POPLITEAL AUGMENT: NORMAL
BH CV LOWER VASCULAR LEFT POPLITEAL AUGMENT: NORMAL
BH CV LOWER VASCULAR LEFT POPLITEAL COMPETENT: NORMAL
BH CV LOWER VASCULAR LEFT POPLITEAL COMPETENT: NORMAL
BH CV LOWER VASCULAR LEFT POPLITEAL COMPRESS: NORMAL
BH CV LOWER VASCULAR LEFT POPLITEAL COMPRESS: NORMAL
BH CV LOWER VASCULAR LEFT POPLITEAL PHASIC: NORMAL
BH CV LOWER VASCULAR LEFT POPLITEAL PHASIC: NORMAL
BH CV LOWER VASCULAR LEFT POPLITEAL SPONT: NORMAL
BH CV LOWER VASCULAR LEFT POPLITEAL SPONT: NORMAL
BH CV LOWER VASCULAR LEFT POSTERIOR TIBIAL COMPRESS: NORMAL
BH CV LOWER VASCULAR LEFT POSTERIOR TIBIAL COMPRESS: NORMAL
BH CV LOWER VASCULAR LEFT PROFUNDA FEMORAL COMPRESS: NORMAL
BH CV LOWER VASCULAR LEFT PROFUNDA FEMORAL COMPRESS: NORMAL
BH CV LOWER VASCULAR LEFT PROXIMAL FEMORAL COMPRESS: NORMAL
BH CV LOWER VASCULAR LEFT PROXIMAL FEMORAL COMPRESS: NORMAL
BH CV LOWER VASCULAR LEFT SAPHENOFEMORAL JUNCTION COMPRESS: NORMAL
BH CV LOWER VASCULAR LEFT SAPHENOFEMORAL JUNCTION COMPRESS: NORMAL
BH CV LOWER VASCULAR RIGHT COMMON FEMORAL AUGMENT: NORMAL
BH CV LOWER VASCULAR RIGHT COMMON FEMORAL AUGMENT: NORMAL
BH CV LOWER VASCULAR RIGHT COMMON FEMORAL COMPETENT: NORMAL
BH CV LOWER VASCULAR RIGHT COMMON FEMORAL COMPETENT: NORMAL
BH CV LOWER VASCULAR RIGHT COMMON FEMORAL COMPRESS: NORMAL
BH CV LOWER VASCULAR RIGHT COMMON FEMORAL COMPRESS: NORMAL
BH CV LOWER VASCULAR RIGHT COMMON FEMORAL PHASIC: NORMAL
BH CV LOWER VASCULAR RIGHT COMMON FEMORAL PHASIC: NORMAL
BH CV LOWER VASCULAR RIGHT COMMON FEMORAL SPONT: NORMAL
BH CV LOWER VASCULAR RIGHT COMMON FEMORAL SPONT: NORMAL
BH CV LOWER VASCULAR RIGHT DISTAL FEMORAL COMPRESS: NORMAL
BH CV LOWER VASCULAR RIGHT DISTAL FEMORAL COMPRESS: NORMAL
BH CV LOWER VASCULAR RIGHT GASTRONEMIUS COMPRESS: NORMAL
BH CV LOWER VASCULAR RIGHT GASTRONEMIUS COMPRESS: NORMAL
BH CV LOWER VASCULAR RIGHT GREATER SAPH AK COMPRESS: NORMAL
BH CV LOWER VASCULAR RIGHT GREATER SAPH AK COMPRESS: NORMAL
BH CV LOWER VASCULAR RIGHT GREATER SAPH BK COMPRESS: NORMAL
BH CV LOWER VASCULAR RIGHT GREATER SAPH BK COMPRESS: NORMAL
BH CV LOWER VASCULAR RIGHT LESSER SAPH COMPRESS: NORMAL
BH CV LOWER VASCULAR RIGHT LESSER SAPH COMPRESS: NORMAL
BH CV LOWER VASCULAR RIGHT MID FEMORAL AUGMENT: NORMAL
BH CV LOWER VASCULAR RIGHT MID FEMORAL AUGMENT: NORMAL
BH CV LOWER VASCULAR RIGHT MID FEMORAL COMPETENT: NORMAL
BH CV LOWER VASCULAR RIGHT MID FEMORAL COMPETENT: NORMAL
BH CV LOWER VASCULAR RIGHT MID FEMORAL COMPRESS: NORMAL
BH CV LOWER VASCULAR RIGHT MID FEMORAL COMPRESS: NORMAL
BH CV LOWER VASCULAR RIGHT MID FEMORAL PHASIC: NORMAL
BH CV LOWER VASCULAR RIGHT MID FEMORAL PHASIC: NORMAL
BH CV LOWER VASCULAR RIGHT MID FEMORAL SPONT: NORMAL
BH CV LOWER VASCULAR RIGHT MID FEMORAL SPONT: NORMAL
BH CV LOWER VASCULAR RIGHT PERONEAL COMPRESS: NORMAL
BH CV LOWER VASCULAR RIGHT PERONEAL COMPRESS: NORMAL
BH CV LOWER VASCULAR RIGHT POPLITEAL AUGMENT: NORMAL
BH CV LOWER VASCULAR RIGHT POPLITEAL AUGMENT: NORMAL
BH CV LOWER VASCULAR RIGHT POPLITEAL COMPETENT: NORMAL
BH CV LOWER VASCULAR RIGHT POPLITEAL COMPETENT: NORMAL
BH CV LOWER VASCULAR RIGHT POPLITEAL COMPRESS: NORMAL
BH CV LOWER VASCULAR RIGHT POPLITEAL COMPRESS: NORMAL
BH CV LOWER VASCULAR RIGHT POPLITEAL PHASIC: NORMAL
BH CV LOWER VASCULAR RIGHT POPLITEAL PHASIC: NORMAL
BH CV LOWER VASCULAR RIGHT POPLITEAL SPONT: NORMAL
BH CV LOWER VASCULAR RIGHT POPLITEAL SPONT: NORMAL
BH CV LOWER VASCULAR RIGHT POSTERIOR TIBIAL COMPRESS: NORMAL
BH CV LOWER VASCULAR RIGHT POSTERIOR TIBIAL COMPRESS: NORMAL
BH CV LOWER VASCULAR RIGHT PROFUNDA FEMORAL COMPRESS: NORMAL
BH CV LOWER VASCULAR RIGHT PROFUNDA FEMORAL COMPRESS: NORMAL
BH CV LOWER VASCULAR RIGHT PROXIMAL FEMORAL COMPRESS: NORMAL
BH CV LOWER VASCULAR RIGHT PROXIMAL FEMORAL COMPRESS: NORMAL
BH CV LOWER VASCULAR RIGHT SAPHENOFEMORAL JUNCTION COMPRESS: NORMAL
BH CV LOWER VASCULAR RIGHT SAPHENOFEMORAL JUNCTION COMPRESS: NORMAL
BH CV VAS BP RIGHT ARM: NORMAL MMHG
BH CV XLRA - RV BASE: 2.9 CM
BH CV XLRA - RV LENGTH: 6.3 CM
BH CV XLRA - RV MID: 1 CM
BH CV XLRA - TDI S': 15.6 CM/SEC
BILIRUB SERPL-MCNC: 0.2 MG/DL (ref 0–1.2)
BILIRUB SERPL-MCNC: <0.2 MG/DL (ref 0–1.2)
BILIRUB UR QL STRIP: NEGATIVE
BLD GP AB SCN SERPL QL: NEGATIVE
BUN SERPL-MCNC: 10 MG/DL (ref 8–23)
BUN SERPL-MCNC: 10 MG/DL (ref 8–23)
BUN SERPL-MCNC: 11 MG/DL (ref 8–23)
BUN SERPL-MCNC: 12 MG/DL (ref 8–23)
BUN SERPL-MCNC: 12 MG/DL (ref 8–23)
BUN SERPL-MCNC: 13 MG/DL (ref 8–23)
BUN SERPL-MCNC: 14 MG/DL (ref 8–23)
BUN SERPL-MCNC: 17 MG/DL (ref 8–23)
BUN SERPL-MCNC: 18 MG/DL (ref 8–23)
BUN/CREAT SERPL: 14.7 (ref 7–25)
BUN/CREAT SERPL: 15.9 (ref 7–25)
BUN/CREAT SERPL: 16.7 (ref 7–25)
BUN/CREAT SERPL: 18.6 (ref 7–25)
BUN/CREAT SERPL: 18.8 (ref 7–25)
BUN/CREAT SERPL: 19.3 (ref 7–25)
BUN/CREAT SERPL: 21.4 (ref 7–25)
BUN/CREAT SERPL: 22.6 (ref 7–25)
BUN/CREAT SERPL: 23.6 (ref 7–25)
BUN/CREAT SERPL: 25 (ref 7–25)
BUN/CREAT SERPL: 27 (ref 7–25)
C PNEUM DNA NPH QL NAA+NON-PROBE: NOT DETECTED
CALCIUM SPEC-SCNC: 10.1 MG/DL (ref 8.6–10.5)
CALCIUM SPEC-SCNC: 10.2 MG/DL (ref 8.6–10.5)
CALCIUM SPEC-SCNC: 10.5 MG/DL (ref 8.6–10.5)
CALCIUM SPEC-SCNC: 8.3 MG/DL (ref 8.6–10.5)
CALCIUM SPEC-SCNC: 8.5 MG/DL (ref 8.6–10.5)
CALCIUM SPEC-SCNC: 8.7 MG/DL (ref 8.6–10.5)
CALCIUM SPEC-SCNC: 9.1 MG/DL (ref 8.6–10.5)
CALCIUM SPEC-SCNC: 9.3 MG/DL (ref 8.6–10.5)
CALCIUM SPEC-SCNC: 9.6 MG/DL (ref 8.6–10.5)
CALCIUM SPEC-SCNC: 9.8 MG/DL (ref 8.6–10.5)
CALCIUM SPEC-SCNC: 9.8 MG/DL (ref 8.6–10.5)
CHLORIDE SERPL-SCNC: 101 MMOL/L (ref 98–107)
CHLORIDE SERPL-SCNC: 101 MMOL/L (ref 98–107)
CHLORIDE SERPL-SCNC: 102 MMOL/L (ref 98–107)
CHLORIDE SERPL-SCNC: 104 MMOL/L (ref 98–107)
CHLORIDE SERPL-SCNC: 94 MMOL/L (ref 98–107)
CHLORIDE SERPL-SCNC: 95 MMOL/L (ref 98–107)
CHLORIDE SERPL-SCNC: 97 MMOL/L (ref 98–107)
CHLORIDE SERPL-SCNC: 98 MMOL/L (ref 98–107)
CHLORIDE SERPL-SCNC: 98 MMOL/L (ref 98–107)
CLARITY UR: CLEAR
CO2 SERPL-SCNC: 31.1 MMOL/L (ref 22–29)
CO2 SERPL-SCNC: 32.5 MMOL/L (ref 22–29)
CO2 SERPL-SCNC: 32.5 MMOL/L (ref 22–29)
CO2 SERPL-SCNC: 32.9 MMOL/L (ref 22–29)
CO2 SERPL-SCNC: 33.6 MMOL/L (ref 22–29)
CO2 SERPL-SCNC: 34.9 MMOL/L (ref 22–29)
CO2 SERPL-SCNC: 35.3 MMOL/L (ref 22–29)
CO2 SERPL-SCNC: 35.4 MMOL/L (ref 22–29)
CO2 SERPL-SCNC: 35.9 MMOL/L (ref 22–29)
CO2 SERPL-SCNC: 37 MMOL/L (ref 22–29)
CO2 SERPL-SCNC: 37.1 MMOL/L (ref 22–29)
COLOR UR: YELLOW
CREAT SERPL-MCNC: 0.55 MG/DL (ref 0.57–1)
CREAT SERPL-MCNC: 0.56 MG/DL (ref 0.57–1)
CREAT SERPL-MCNC: 0.57 MG/DL (ref 0.57–1)
CREAT SERPL-MCNC: 0.59 MG/DL (ref 0.57–1)
CREAT SERPL-MCNC: 0.6 MG/DL (ref 0.57–1)
CREAT SERPL-MCNC: 0.62 MG/DL (ref 0.57–1)
CREAT SERPL-MCNC: 0.63 MG/DL (ref 0.57–1)
CREAT SERPL-MCNC: 0.63 MG/DL (ref 0.57–1)
CREAT SERPL-MCNC: 0.64 MG/DL (ref 0.57–1)
CREAT SERPL-MCNC: 0.72 MG/DL (ref 0.57–1)
CREAT SERPL-MCNC: 0.75 MG/DL (ref 0.57–1)
CROSSMATCH INTERPRETATION: NORMAL
D DIMER PPP FEU-MCNC: 3.67 MCGFEU/ML (ref 0–0.49)
D-LACTATE SERPL-SCNC: 0.9 MMOL/L (ref 0.5–2)
DEPRECATED RDW RBC AUTO: 40.3 FL (ref 37–54)
DEPRECATED RDW RBC AUTO: 41.1 FL (ref 37–54)
DEPRECATED RDW RBC AUTO: 41.3 FL (ref 37–54)
DEPRECATED RDW RBC AUTO: 41.6 FL (ref 37–54)
DEPRECATED RDW RBC AUTO: 41.9 FL (ref 37–54)
DEPRECATED RDW RBC AUTO: 42.3 FL (ref 37–54)
DEPRECATED RDW RBC AUTO: 42.4 FL (ref 37–54)
DEPRECATED RDW RBC AUTO: 48.1 FL (ref 37–54)
DEPRECATED RDW RBC AUTO: 50.8 FL (ref 37–54)
EOSINOPHIL # BLD AUTO: 0.01 10*3/MM3 (ref 0–0.4)
EOSINOPHIL # BLD AUTO: 0.05 10*3/MM3 (ref 0–0.4)
EOSINOPHIL # BLD AUTO: 0.07 10*3/MM3 (ref 0–0.4)
EOSINOPHIL # BLD AUTO: 0.12 10*3/MM3 (ref 0–0.4)
EOSINOPHIL # BLD AUTO: 0.12 10*3/MM3 (ref 0–0.4)
EOSINOPHIL NFR BLD AUTO: 0.1 % (ref 0.3–6.2)
EOSINOPHIL NFR BLD AUTO: 0.5 % (ref 0.3–6.2)
EOSINOPHIL NFR BLD AUTO: 0.7 % (ref 0.3–6.2)
EOSINOPHIL NFR BLD AUTO: 1.4 % (ref 0.3–6.2)
EOSINOPHIL NFR BLD AUTO: 2.2 % (ref 0.3–6.2)
ERYTHROCYTE [DISTWIDTH] IN BLOOD BY AUTOMATED COUNT: 12 % (ref 12.3–15.4)
ERYTHROCYTE [DISTWIDTH] IN BLOOD BY AUTOMATED COUNT: 12.1 % (ref 12.3–15.4)
ERYTHROCYTE [DISTWIDTH] IN BLOOD BY AUTOMATED COUNT: 12.2 % (ref 12.3–15.4)
ERYTHROCYTE [DISTWIDTH] IN BLOOD BY AUTOMATED COUNT: 14.2 % (ref 12.3–15.4)
ERYTHROCYTE [DISTWIDTH] IN BLOOD BY AUTOMATED COUNT: 15.7 % (ref 12.3–15.4)
EXPIRATION DATE: ABNORMAL
FECAL OCCULT BLOOD SCREEN, POC: POSITIVE
FLUAV SUBTYP SPEC NAA+PROBE: NOT DETECTED
FLUBV RNA ISLT QL NAA+PROBE: NOT DETECTED
GAS FLOW AIRWAY: 10 LPM
GAS FLOW AIRWAY: 6 LPM
GFR SERPL CREATININE-BSD FRML MDRD: 100 ML/MIN/1.73
GFR SERPL CREATININE-BSD FRML MDRD: 104 ML/MIN/1.73
GFR SERPL CREATININE-BSD FRML MDRD: 106 ML/MIN/1.73
GFR SERPL CREATININE-BSD FRML MDRD: 108 ML/MIN/1.73
GFR SERPL CREATININE-BSD FRML MDRD: 76 ML/MIN/1.73
GFR SERPL CREATININE-BSD FRML MDRD: 79 ML/MIN/1.73
GFR SERPL CREATININE-BSD FRML MDRD: 91 ML/MIN/1.73
GFR SERPL CREATININE-BSD FRML MDRD: 93 ML/MIN/1.73
GFR SERPL CREATININE-BSD FRML MDRD: 93 ML/MIN/1.73
GFR SERPL CREATININE-BSD FRML MDRD: 94 ML/MIN/1.73
GFR SERPL CREATININE-BSD FRML MDRD: 98 ML/MIN/1.73
GLOBULIN UR ELPH-MCNC: 2.6 GM/DL
GLOBULIN UR ELPH-MCNC: 2.6 GM/DL
GLOBULIN UR ELPH-MCNC: 2.9 GM/DL
GLOBULIN UR ELPH-MCNC: 3 GM/DL
GLOBULIN UR ELPH-MCNC: 3.6 GM/DL
GLUCOSE SERPL-MCNC: 108 MG/DL (ref 65–99)
GLUCOSE SERPL-MCNC: 108 MG/DL (ref 65–99)
GLUCOSE SERPL-MCNC: 125 MG/DL (ref 65–99)
GLUCOSE SERPL-MCNC: 127 MG/DL (ref 65–99)
GLUCOSE SERPL-MCNC: 127 MG/DL (ref 65–99)
GLUCOSE SERPL-MCNC: 129 MG/DL (ref 65–99)
GLUCOSE SERPL-MCNC: 142 MG/DL (ref 65–99)
GLUCOSE SERPL-MCNC: 144 MG/DL (ref 65–99)
GLUCOSE SERPL-MCNC: 146 MG/DL (ref 65–99)
GLUCOSE SERPL-MCNC: 156 MG/DL (ref 65–99)
GLUCOSE SERPL-MCNC: 84 MG/DL (ref 65–99)
GLUCOSE UR STRIP-MCNC: NEGATIVE MG/DL
HADV DNA SPEC NAA+PROBE: NOT DETECTED
HCO3 BLDA-SCNC: 34.1 MMOL/L (ref 22–28)
HCO3 BLDA-SCNC: 37.5 MMOL/L (ref 22–28)
HCOV 229E RNA SPEC QL NAA+PROBE: NOT DETECTED
HCOV HKU1 RNA SPEC QL NAA+PROBE: NOT DETECTED
HCOV NL63 RNA SPEC QL NAA+PROBE: NOT DETECTED
HCOV OC43 RNA SPEC QL NAA+PROBE: NOT DETECTED
HCT VFR BLD AUTO: 21.9 % (ref 34–46.6)
HCT VFR BLD AUTO: 27.2 % (ref 34–46.6)
HCT VFR BLD AUTO: 29.6 % (ref 34–46.6)
HCT VFR BLD AUTO: 29.9 % (ref 34–46.6)
HCT VFR BLD AUTO: 30.2 % (ref 34–46.6)
HCT VFR BLD AUTO: 30.7 % (ref 34–46.6)
HCT VFR BLD AUTO: 31.5 % (ref 34–46.6)
HCT VFR BLD AUTO: 31.6 % (ref 34–46.6)
HCT VFR BLD AUTO: 31.9 % (ref 34–46.6)
HCT VFR BLD AUTO: 33.2 % (ref 34–46.6)
HCT VFR BLD AUTO: 36.3 % (ref 34–46.6)
HCT VFR BLD AUTO: 38.8 % (ref 34–46.6)
HCT VFR BLD AUTO: 41.5 % (ref 34–46.6)
HGB BLD-MCNC: 10.1 G/DL (ref 12–15.9)
HGB BLD-MCNC: 10.4 G/DL (ref 12–15.9)
HGB BLD-MCNC: 10.8 G/DL (ref 12–15.9)
HGB BLD-MCNC: 11.7 G/DL (ref 12–15.9)
HGB BLD-MCNC: 12.7 G/DL (ref 12–15.9)
HGB BLD-MCNC: 13.7 G/DL (ref 12–15.9)
HGB BLD-MCNC: 6.8 G/DL (ref 12–15.9)
HGB BLD-MCNC: 8.8 G/DL (ref 12–15.9)
HGB BLD-MCNC: 9.2 G/DL (ref 12–15.9)
HGB BLD-MCNC: 9.3 G/DL (ref 12–15.9)
HGB BLD-MCNC: 9.7 G/DL (ref 12–15.9)
HGB BLD-MCNC: 9.7 G/DL (ref 12–15.9)
HGB BLD-MCNC: 9.9 G/DL (ref 12–15.9)
HGB UR QL STRIP.AUTO: NEGATIVE
HMPV RNA NPH QL NAA+NON-PROBE: NOT DETECTED
HPIV1 RNA SPEC QL NAA+PROBE: NOT DETECTED
HPIV2 RNA SPEC QL NAA+PROBE: NOT DETECTED
HPIV3 RNA NPH QL NAA+PROBE: NOT DETECTED
HPIV4 P GENE NPH QL NAA+PROBE: NOT DETECTED
IMM GRANULOCYTES # BLD AUTO: 0.04 10*3/MM3 (ref 0–0.05)
IMM GRANULOCYTES # BLD AUTO: 0.05 10*3/MM3 (ref 0–0.05)
IMM GRANULOCYTES # BLD AUTO: 0.07 10*3/MM3 (ref 0–0.05)
IMM GRANULOCYTES # BLD AUTO: 0.07 10*3/MM3 (ref 0–0.05)
IMM GRANULOCYTES # BLD AUTO: 0.14 10*3/MM3 (ref 0–0.05)
IMM GRANULOCYTES NFR BLD AUTO: 0.6 % (ref 0–0.5)
IMM GRANULOCYTES NFR BLD AUTO: 0.7 % (ref 0–0.5)
IMM GRANULOCYTES NFR BLD AUTO: 1 % (ref 0–0.5)
INR PPP: 0.95 (ref 0.9–1.1)
INR PPP: 1.21 (ref 0.9–1.1)
INR PPP: 1.86 (ref 0.9–1.1)
KETONES UR QL STRIP: ABNORMAL
LEFT ATRIUM VOLUME INDEX: 20 ML/M2
LEUKOCYTE ESTERASE UR QL STRIP.AUTO: NEGATIVE
LIPASE SERPL-CCNC: 10 U/L (ref 13–60)
LYMPHOCYTES # BLD AUTO: 0.98 10*3/MM3 (ref 0.7–3.1)
LYMPHOCYTES # BLD AUTO: 1.04 10*3/MM3 (ref 0.7–3.1)
LYMPHOCYTES # BLD AUTO: 1.16 10*3/MM3 (ref 0.7–3.1)
LYMPHOCYTES # BLD AUTO: 1.37 10*3/MM3 (ref 0.7–3.1)
LYMPHOCYTES # BLD AUTO: 1.86 10*3/MM3 (ref 0.7–3.1)
LYMPHOCYTES NFR BLD AUTO: 10.4 % (ref 19.6–45.3)
LYMPHOCYTES NFR BLD AUTO: 15.8 % (ref 19.6–45.3)
LYMPHOCYTES NFR BLD AUTO: 18.1 % (ref 19.6–45.3)
LYMPHOCYTES NFR BLD AUTO: 21.3 % (ref 19.6–45.3)
LYMPHOCYTES NFR BLD AUTO: 7 % (ref 19.6–45.3)
Lab: 148
M PNEUMO IGG SER IA-ACNC: NOT DETECTED
MAGNESIUM SERPL-MCNC: 1.8 MG/DL (ref 1.6–2.4)
MAGNESIUM SERPL-MCNC: 2.1 MG/DL (ref 1.6–2.4)
MAXIMAL PREDICTED HEART RATE: 147 BPM
MCH RBC QN AUTO: 29.1 PG (ref 26.6–33)
MCH RBC QN AUTO: 29.3 PG (ref 26.6–33)
MCH RBC QN AUTO: 30 PG (ref 26.6–33)
MCH RBC QN AUTO: 30.1 PG (ref 26.6–33)
MCH RBC QN AUTO: 30.3 PG (ref 26.6–33)
MCH RBC QN AUTO: 30.3 PG (ref 26.6–33)
MCH RBC QN AUTO: 30.4 PG (ref 26.6–33)
MCH RBC QN AUTO: 30.4 PG (ref 26.6–33)
MCH RBC QN AUTO: 30.9 PG (ref 26.6–33)
MCHC RBC AUTO-ENTMCNC: 31.1 G/DL (ref 31.5–35.7)
MCHC RBC AUTO-ENTMCNC: 31.3 G/DL (ref 31.5–35.7)
MCHC RBC AUTO-ENTMCNC: 32.1 G/DL (ref 31.5–35.7)
MCHC RBC AUTO-ENTMCNC: 32.1 G/DL (ref 31.5–35.7)
MCHC RBC AUTO-ENTMCNC: 32.2 G/DL (ref 31.5–35.7)
MCHC RBC AUTO-ENTMCNC: 32.5 G/DL (ref 31.5–35.7)
MCHC RBC AUTO-ENTMCNC: 32.6 G/DL (ref 31.5–35.7)
MCHC RBC AUTO-ENTMCNC: 32.7 G/DL (ref 31.5–35.7)
MCHC RBC AUTO-ENTMCNC: 33 G/DL (ref 31.5–35.7)
MCV RBC AUTO: 90.7 FL (ref 79–97)
MCV RBC AUTO: 91.2 FL (ref 79–97)
MCV RBC AUTO: 92.6 FL (ref 79–97)
MCV RBC AUTO: 93 FL (ref 79–97)
MCV RBC AUTO: 94.3 FL (ref 79–97)
MCV RBC AUTO: 94.4 FL (ref 79–97)
MCV RBC AUTO: 94.9 FL (ref 79–97)
MCV RBC AUTO: 94.9 FL (ref 79–97)
MCV RBC AUTO: 95.8 FL (ref 79–97)
MODALITY: ABNORMAL
MODALITY: ABNORMAL
MONOCYTES # BLD AUTO: 0.44 10*3/MM3 (ref 0.1–0.9)
MONOCYTES # BLD AUTO: 0.56 10*3/MM3 (ref 0.1–0.9)
MONOCYTES # BLD AUTO: 0.57 10*3/MM3 (ref 0.1–0.9)
MONOCYTES # BLD AUTO: 0.59 10*3/MM3 (ref 0.1–0.9)
MONOCYTES # BLD AUTO: 0.96 10*3/MM3 (ref 0.1–0.9)
MONOCYTES NFR BLD AUTO: 5.4 % (ref 5–12)
MONOCYTES NFR BLD AUTO: 5.7 % (ref 5–12)
MONOCYTES NFR BLD AUTO: 6.8 % (ref 5–12)
MONOCYTES NFR BLD AUTO: 6.9 % (ref 5–12)
MONOCYTES NFR BLD AUTO: 8.1 % (ref 5–12)
MRSA DNA SPEC QL NAA+PROBE: ABNORMAL
NEGATIVE CONTROL: NEGATIVE
NEUTROPHILS NFR BLD AUTO: 11.8 10*3/MM3 (ref 1.7–7)
NEUTROPHILS NFR BLD AUTO: 3.66 10*3/MM3 (ref 1.7–7)
NEUTROPHILS NFR BLD AUTO: 6.5 10*3/MM3 (ref 1.7–7)
NEUTROPHILS NFR BLD AUTO: 67.3 % (ref 42.7–76)
NEUTROPHILS NFR BLD AUTO: 7.7 10*3/MM3 (ref 1.7–7)
NEUTROPHILS NFR BLD AUTO: 74.9 % (ref 42.7–76)
NEUTROPHILS NFR BLD AUTO: 74.9 % (ref 42.7–76)
NEUTROPHILS NFR BLD AUTO: 8.2 10*3/MM3 (ref 1.7–7)
NEUTROPHILS NFR BLD AUTO: 82.1 % (ref 42.7–76)
NEUTROPHILS NFR BLD AUTO: 84.9 % (ref 42.7–76)
NITRITE UR QL STRIP: NEGATIVE
NRBC BLD AUTO-RTO: 0 /100 WBC (ref 0–0.2)
NRBC BLD AUTO-RTO: 0.5 /100 WBC (ref 0–0.2)
NT-PROBNP SERPL-MCNC: 296.9 PG/ML (ref 0–900)
PCO2 BLDA: 56.8 MM HG (ref 35–45)
PCO2 BLDA: 68 MM HG (ref 35–45)
PH BLDA: 7.35 PH UNITS (ref 7.35–7.45)
PH BLDA: 7.39 PH UNITS (ref 7.35–7.45)
PH UR STRIP.AUTO: <=5 [PH] (ref 5–8)
PLATELET # BLD AUTO: 286 10*3/MM3 (ref 140–450)
PLATELET # BLD AUTO: 287 10*3/MM3 (ref 140–450)
PLATELET # BLD AUTO: 292 10*3/MM3 (ref 140–450)
PLATELET # BLD AUTO: 306 10*3/MM3 (ref 140–450)
PLATELET # BLD AUTO: 391 10*3/MM3 (ref 140–450)
PLATELET # BLD AUTO: 402 10*3/MM3 (ref 140–450)
PLATELET # BLD AUTO: 410 10*3/MM3 (ref 140–450)
PLATELET # BLD AUTO: 445 10*3/MM3 (ref 140–450)
PLATELET # BLD AUTO: 462 10*3/MM3 (ref 140–450)
PMV BLD AUTO: 9 FL (ref 6–12)
PMV BLD AUTO: 9 FL (ref 6–12)
PMV BLD AUTO: 9.1 FL (ref 6–12)
PMV BLD AUTO: 9.2 FL (ref 6–12)
PMV BLD AUTO: 9.2 FL (ref 6–12)
PMV BLD AUTO: 9.4 FL (ref 6–12)
PMV BLD AUTO: 9.4 FL (ref 6–12)
PO2 BLDA: 114.7 MM HG (ref 80–100)
PO2 BLDA: 63.7 MM HG (ref 80–100)
POSITIVE CONTROL: POSITIVE
POTASSIUM SERPL-SCNC: 2.9 MMOL/L (ref 3.5–5.2)
POTASSIUM SERPL-SCNC: 2.9 MMOL/L (ref 3.5–5.2)
POTASSIUM SERPL-SCNC: 3.3 MMOL/L (ref 3.5–5.2)
POTASSIUM SERPL-SCNC: 3.7 MMOL/L (ref 3.5–5.2)
POTASSIUM SERPL-SCNC: 3.7 MMOL/L (ref 3.5–5.2)
POTASSIUM SERPL-SCNC: 4 MMOL/L (ref 3.5–5.2)
POTASSIUM SERPL-SCNC: 4 MMOL/L (ref 3.5–5.2)
POTASSIUM SERPL-SCNC: 4.4 MMOL/L (ref 3.5–5.2)
POTASSIUM SERPL-SCNC: 4.7 MMOL/L (ref 3.5–5.2)
PROCALCITONIN SERPL-MCNC: 0.09 NG/ML (ref 0–0.25)
PROT SERPL-MCNC: 5.9 G/DL (ref 6–8.5)
PROT SERPL-MCNC: 6.1 G/DL (ref 6–8.5)
PROT SERPL-MCNC: 6.9 G/DL (ref 6–8.5)
PROT SERPL-MCNC: 7.2 G/DL (ref 6–8.5)
PROT SERPL-MCNC: 7.6 G/DL (ref 6–8.5)
PROT UR QL STRIP: NEGATIVE
PROTHROMBIN TIME: 12.6 SECONDS (ref 11.7–14.2)
PROTHROMBIN TIME: 15.2 SECONDS (ref 11.7–14.2)
PROTHROMBIN TIME: 21.2 SECONDS (ref 11.7–14.2)
QT INTERVAL: 309 MS
QT INTERVAL: 310 MS
QT INTERVAL: 325 MS
QT INTERVAL: 326 MS
QT INTERVAL: 354 MS
RBC # BLD AUTO: 2.32 10*6/MM3 (ref 3.77–5.28)
RBC # BLD AUTO: 3.3 10*6/MM3 (ref 3.77–5.28)
RBC # BLD AUTO: 3.32 10*6/MM3 (ref 3.77–5.28)
RBC # BLD AUTO: 3.33 10*6/MM3 (ref 3.77–5.28)
RBC # BLD AUTO: 3.43 10*6/MM3 (ref 3.77–5.28)
RBC # BLD AUTO: 3.5 10*6/MM3 (ref 3.77–5.28)
RBC # BLD AUTO: 3.85 10*6/MM3 (ref 3.77–5.28)
RBC # BLD AUTO: 4.19 10*6/MM3 (ref 3.77–5.28)
RBC # BLD AUTO: 4.55 10*6/MM3 (ref 3.77–5.28)
RH BLD: POSITIVE
RHINOVIRUS RNA SPEC NAA+PROBE: NOT DETECTED
RSV RNA NPH QL NAA+NON-PROBE: NOT DETECTED
SAO2 % BLDCOA: 89.7 % (ref 92–99)
SAO2 % BLDCOA: 98.3 % (ref 92–99)
SARS-COV-2 RNA NPH QL NAA+NON-PROBE: NOT DETECTED
SET MECH RESP RATE: 28
SODIUM SERPL-SCNC: 138 MMOL/L (ref 136–145)
SODIUM SERPL-SCNC: 139 MMOL/L (ref 136–145)
SODIUM SERPL-SCNC: 140 MMOL/L (ref 136–145)
SODIUM SERPL-SCNC: 142 MMOL/L (ref 136–145)
SODIUM SERPL-SCNC: 143 MMOL/L (ref 136–145)
SODIUM SERPL-SCNC: 144 MMOL/L (ref 136–145)
SODIUM SERPL-SCNC: 144 MMOL/L (ref 136–145)
SP GR UR STRIP: >=1.03 (ref 1–1.03)
STRESS TARGET HR: 125 BPM
T&S EXPIRATION DATE: NORMAL
THEOPHYLLINE SERPL-MCNC: 15.3 MCG/ML (ref 10–20)
TOTAL RATE: 20 BREATHS/MINUTE
TROPONIN T SERPL-MCNC: 0.03 NG/ML (ref 0–0.03)
TROPONIN T SERPL-MCNC: <0.01 NG/ML (ref 0–0.03)
UNIT  ABO: NORMAL
UNIT  RH: NORMAL
UROBILINOGEN UR QL STRIP: ABNORMAL
WBC # BLD AUTO: 10.28 10*3/MM3 (ref 3.4–10.8)
WBC # BLD AUTO: 13.91 10*3/MM3 (ref 3.4–10.8)
WBC # BLD AUTO: 15.42 10*3/MM3 (ref 3.4–10.8)
WBC # BLD AUTO: 5.44 10*3/MM3 (ref 3.4–10.8)
WBC # BLD AUTO: 5.9 10*3/MM3 (ref 3.4–10.8)
WBC # BLD AUTO: 6.92 10*3/MM3 (ref 3.4–10.8)
WBC # BLD AUTO: 8.18 10*3/MM3 (ref 3.4–10.8)
WBC # BLD AUTO: 8.67 10*3/MM3 (ref 3.4–10.8)
WBC # BLD AUTO: 9.99 10*3/MM3 (ref 3.4–10.8)

## 2020-01-01 PROCEDURE — 93970 EXTREMITY STUDY: CPT

## 2020-01-01 PROCEDURE — 85025 COMPLETE CBC W/AUTO DIFF WBC: CPT | Performed by: EMERGENCY MEDICINE

## 2020-01-01 PROCEDURE — 72072 X-RAY EXAM THORAC SPINE 3VWS: CPT

## 2020-01-01 PROCEDURE — 90686 IIV4 VACC NO PRSV 0.5 ML IM: CPT | Performed by: HOSPITALIST

## 2020-01-01 PROCEDURE — 85014 HEMATOCRIT: CPT | Performed by: NURSE PRACTITIONER

## 2020-01-01 PROCEDURE — 83735 ASSAY OF MAGNESIUM: CPT | Performed by: HOSPITALIST

## 2020-01-01 PROCEDURE — 99232 SBSQ HOSP IP/OBS MODERATE 35: CPT | Performed by: INTERNAL MEDICINE

## 2020-01-01 PROCEDURE — 25010000002 HYDROMORPHONE 1 MG/ML SOLUTION: Performed by: INTERNAL MEDICINE

## 2020-01-01 PROCEDURE — 94640 AIRWAY INHALATION TREATMENT: CPT

## 2020-01-01 PROCEDURE — 36415 COLL VENOUS BLD VENIPUNCTURE: CPT | Performed by: NURSE PRACTITIONER

## 2020-01-01 PROCEDURE — 97530 THERAPEUTIC ACTIVITIES: CPT

## 2020-01-01 PROCEDURE — 97110 THERAPEUTIC EXERCISES: CPT

## 2020-01-01 PROCEDURE — 96376 TX/PRO/DX INJ SAME DRUG ADON: CPT

## 2020-01-01 PROCEDURE — 25010000002 ONDANSETRON PER 1 MG: Performed by: NURSE PRACTITIONER

## 2020-01-01 PROCEDURE — 81003 URINALYSIS AUTO W/O SCOPE: CPT | Performed by: EMERGENCY MEDICINE

## 2020-01-01 PROCEDURE — 84484 ASSAY OF TROPONIN QUANT: CPT | Performed by: EMERGENCY MEDICINE

## 2020-01-01 PROCEDURE — 25010000002 ONDANSETRON PER 1 MG: Performed by: INTERNAL MEDICINE

## 2020-01-01 PROCEDURE — G0378 HOSPITAL OBSERVATION PER HR: HCPCS

## 2020-01-01 PROCEDURE — 25010000002 INFLUENZA VAC SPLIT QUAD 0.5 ML SUSPENSION PREFILLED SYRINGE: Performed by: HOSPITALIST

## 2020-01-01 PROCEDURE — 87641 MR-STAPH DNA AMP PROBE: CPT | Performed by: INTERNAL MEDICINE

## 2020-01-01 PROCEDURE — 97166 OT EVAL MOD COMPLEX 45 MIN: CPT

## 2020-01-01 PROCEDURE — 25010000002 MORPHINE PER 10 MG: Performed by: EMERGENCY MEDICINE

## 2020-01-01 PROCEDURE — 85018 HEMOGLOBIN: CPT | Performed by: NURSE PRACTITIONER

## 2020-01-01 PROCEDURE — 97116 GAIT TRAINING THERAPY: CPT

## 2020-01-01 PROCEDURE — 93005 ELECTROCARDIOGRAM TRACING: CPT | Performed by: EMERGENCY MEDICINE

## 2020-01-01 PROCEDURE — 94799 UNLISTED PULMONARY SVC/PX: CPT

## 2020-01-01 PROCEDURE — 86900 BLOOD TYPING SEROLOGIC ABO: CPT | Performed by: EMERGENCY MEDICINE

## 2020-01-01 PROCEDURE — 85027 COMPLETE CBC AUTOMATED: CPT | Performed by: HOSPITALIST

## 2020-01-01 PROCEDURE — 63710000001 PREDNISONE PER 1 MG: Performed by: INTERNAL MEDICINE

## 2020-01-01 PROCEDURE — 25010000002 LORAZEPAM PER 2 MG: Performed by: INTERNAL MEDICINE

## 2020-01-01 PROCEDURE — 25010000002 VANCOMYCIN 10 G RECONSTITUTED SOLUTION: Performed by: INTERNAL MEDICINE

## 2020-01-01 PROCEDURE — 25010000002 ENOXAPARIN PER 10 MG: Performed by: INTERNAL MEDICINE

## 2020-01-01 PROCEDURE — 25010000002 MORPHINE PER 10 MG: Performed by: INTERNAL MEDICINE

## 2020-01-01 PROCEDURE — 84484 ASSAY OF TROPONIN QUANT: CPT | Performed by: PHYSICIAN ASSISTANT

## 2020-01-01 PROCEDURE — 84484 ASSAY OF TROPONIN QUANT: CPT | Performed by: INTERNAL MEDICINE

## 2020-01-01 PROCEDURE — 85027 COMPLETE CBC AUTOMATED: CPT | Performed by: NURSE PRACTITIONER

## 2020-01-01 PROCEDURE — 63710000001 DIPHENHYDRAMINE PER 50 MG: Performed by: HOSPITALIST

## 2020-01-01 PROCEDURE — 86900 BLOOD TYPING SEROLOGIC ABO: CPT

## 2020-01-01 PROCEDURE — 36600 WITHDRAWAL OF ARTERIAL BLOOD: CPT

## 2020-01-01 PROCEDURE — 71275 CT ANGIOGRAPHY CHEST: CPT

## 2020-01-01 PROCEDURE — 93010 ELECTROCARDIOGRAM REPORT: CPT | Performed by: INTERNAL MEDICINE

## 2020-01-01 PROCEDURE — 85610 PROTHROMBIN TIME: CPT | Performed by: EMERGENCY MEDICINE

## 2020-01-01 PROCEDURE — 96375 TX/PRO/DX INJ NEW DRUG ADDON: CPT

## 2020-01-01 PROCEDURE — 25010000002 CEFEPIME PER 500 MG: Performed by: INTERNAL MEDICINE

## 2020-01-01 PROCEDURE — 36415 COLL VENOUS BLD VENIPUNCTURE: CPT | Performed by: INTERNAL MEDICINE

## 2020-01-01 PROCEDURE — 97162 PT EVAL MOD COMPLEX 30 MIN: CPT

## 2020-01-01 PROCEDURE — 99285 EMERGENCY DEPT VISIT HI MDM: CPT

## 2020-01-01 PROCEDURE — 71045 X-RAY EXAM CHEST 1 VIEW: CPT

## 2020-01-01 PROCEDURE — 80048 BASIC METABOLIC PNL TOTAL CA: CPT | Performed by: INTERNAL MEDICINE

## 2020-01-01 PROCEDURE — 85730 THROMBOPLASTIN TIME PARTIAL: CPT | Performed by: EMERGENCY MEDICINE

## 2020-01-01 PROCEDURE — 63710000001 ONDANSETRON PER 8 MG: Performed by: NURSE PRACTITIONER

## 2020-01-01 PROCEDURE — 93005 ELECTROCARDIOGRAM TRACING: CPT | Performed by: PHYSICIAN ASSISTANT

## 2020-01-01 PROCEDURE — 99283 EMERGENCY DEPT VISIT LOW MDM: CPT

## 2020-01-01 PROCEDURE — 99223 1ST HOSP IP/OBS HIGH 75: CPT | Performed by: INTERNAL MEDICINE

## 2020-01-01 PROCEDURE — 80053 COMPREHEN METABOLIC PANEL: CPT | Performed by: PHYSICIAN ASSISTANT

## 2020-01-01 PROCEDURE — 71101 X-RAY EXAM UNILAT RIBS/CHEST: CPT

## 2020-01-01 PROCEDURE — P9612 CATHETERIZE FOR URINE SPEC: HCPCS

## 2020-01-01 PROCEDURE — 82803 BLOOD GASES ANY COMBINATION: CPT

## 2020-01-01 PROCEDURE — 25010000002 VANCOMYCIN PER 500 MG: Performed by: INTERNAL MEDICINE

## 2020-01-01 PROCEDURE — 99212 OFFICE O/P EST SF 10 MIN: CPT | Performed by: NURSE PRACTITIONER

## 2020-01-01 PROCEDURE — 83735 ASSAY OF MAGNESIUM: CPT | Performed by: INTERNAL MEDICINE

## 2020-01-01 PROCEDURE — 25010000002 FUROSEMIDE PER 20 MG: Performed by: HOSPITALIST

## 2020-01-01 PROCEDURE — 25010000002 METHYLPREDNISOLONE PER 125 MG: Performed by: EMERGENCY MEDICINE

## 2020-01-01 PROCEDURE — 25010000003 POTASSIUM CHLORIDE 10 MEQ/100ML SOLUTION: Performed by: EMERGENCY MEDICINE

## 2020-01-01 PROCEDURE — 93005 ELECTROCARDIOGRAM TRACING: CPT | Performed by: INTERNAL MEDICINE

## 2020-01-01 PROCEDURE — 74177 CT ABD & PELVIS W/CONTRAST: CPT

## 2020-01-01 PROCEDURE — 99231 SBSQ HOSP IP/OBS SF/LOW 25: CPT | Performed by: NURSE PRACTITIONER

## 2020-01-01 PROCEDURE — 72110 X-RAY EXAM L-2 SPINE 4/>VWS: CPT

## 2020-01-01 PROCEDURE — 99232 SBSQ HOSP IP/OBS MODERATE 35: CPT | Performed by: NEUROLOGICAL SURGERY

## 2020-01-01 PROCEDURE — 25010000002 ONDANSETRON PER 1 MG: Performed by: EMERGENCY MEDICINE

## 2020-01-01 PROCEDURE — G0008 ADMIN INFLUENZA VIRUS VAC: HCPCS | Performed by: HOSPITALIST

## 2020-01-01 PROCEDURE — 25010000002 MORPHINE PER 10 MG: Performed by: HOSPITALIST

## 2020-01-01 PROCEDURE — 80048 BASIC METABOLIC PNL TOTAL CA: CPT | Performed by: NURSE PRACTITIONER

## 2020-01-01 PROCEDURE — 0 TECHNETIUM ALBUMIN AGGREGATED: Performed by: HOSPITALIST

## 2020-01-01 PROCEDURE — 93306 TTE W/DOPPLER COMPLETE: CPT | Performed by: INTERNAL MEDICINE

## 2020-01-01 PROCEDURE — 99214 OFFICE O/P EST MOD 30 MIN: CPT | Performed by: NURSE PRACTITIONER

## 2020-01-01 PROCEDURE — 80053 COMPREHEN METABOLIC PANEL: CPT | Performed by: HOSPITALIST

## 2020-01-01 PROCEDURE — 85025 COMPLETE CBC W/AUTO DIFF WBC: CPT | Performed by: PHYSICIAN ASSISTANT

## 2020-01-01 PROCEDURE — 83735 ASSAY OF MAGNESIUM: CPT | Performed by: EMERGENCY MEDICINE

## 2020-01-01 PROCEDURE — 36430 TRANSFUSION BLD/BLD COMPNT: CPT

## 2020-01-01 PROCEDURE — 25010000002 CEFTRIAXONE PER 250 MG: Performed by: EMERGENCY MEDICINE

## 2020-01-01 PROCEDURE — 86850 RBC ANTIBODY SCREEN: CPT | Performed by: EMERGENCY MEDICINE

## 2020-01-01 PROCEDURE — 0 IOPAMIDOL PER 1 ML: Performed by: EMERGENCY MEDICINE

## 2020-01-01 PROCEDURE — 86901 BLOOD TYPING SEROLOGIC RH(D): CPT

## 2020-01-01 PROCEDURE — 83880 ASSAY OF NATRIURETIC PEPTIDE: CPT | Performed by: EMERGENCY MEDICINE

## 2020-01-01 PROCEDURE — 25010000002 METHYLPREDNISOLONE PER 40 MG: Performed by: INTERNAL MEDICINE

## 2020-01-01 PROCEDURE — 86920 COMPATIBILITY TEST SPIN: CPT

## 2020-01-01 PROCEDURE — 78582 LUNG VENTILAT&PERFUS IMAGING: CPT

## 2020-01-01 PROCEDURE — 63710000001 ONDANSETRON PER 8 MG: Performed by: INTERNAL MEDICINE

## 2020-01-01 PROCEDURE — 99231 SBSQ HOSP IP/OBS SF/LOW 25: CPT | Performed by: INTERNAL MEDICINE

## 2020-01-01 PROCEDURE — 70450 CT HEAD/BRAIN W/O DYE: CPT

## 2020-01-01 PROCEDURE — 0202U NFCT DS 22 TRGT SARS-COV-2: CPT | Performed by: EMERGENCY MEDICINE

## 2020-01-01 PROCEDURE — 80053 COMPREHEN METABOLIC PANEL: CPT | Performed by: EMERGENCY MEDICINE

## 2020-01-01 PROCEDURE — 80048 BASIC METABOLIC PNL TOTAL CA: CPT | Performed by: HOSPITALIST

## 2020-01-01 PROCEDURE — 87040 BLOOD CULTURE FOR BACTERIA: CPT | Performed by: INTERNAL MEDICINE

## 2020-01-01 PROCEDURE — 97161 PT EVAL LOW COMPLEX 20 MIN: CPT

## 2020-01-01 PROCEDURE — A9558 XE133 XENON 10MCI: HCPCS | Performed by: HOSPITALIST

## 2020-01-01 PROCEDURE — 93306 TTE W/DOPPLER COMPLETE: CPT

## 2020-01-01 PROCEDURE — 99222 1ST HOSP IP/OBS MODERATE 55: CPT | Performed by: INTERNAL MEDICINE

## 2020-01-01 PROCEDURE — 84484 ASSAY OF TROPONIN QUANT: CPT | Performed by: HOSPITALIST

## 2020-01-01 PROCEDURE — 83690 ASSAY OF LIPASE: CPT | Performed by: EMERGENCY MEDICINE

## 2020-01-01 PROCEDURE — 99222 1ST HOSP IP/OBS MODERATE 55: CPT | Performed by: NEUROLOGICAL SURGERY

## 2020-01-01 PROCEDURE — 85025 COMPLETE CBC W/AUTO DIFF WBC: CPT | Performed by: HOSPITALIST

## 2020-01-01 PROCEDURE — 85379 FIBRIN DEGRADATION QUANT: CPT | Performed by: EMERGENCY MEDICINE

## 2020-01-01 PROCEDURE — 0 XENON XE 133: Performed by: HOSPITALIST

## 2020-01-01 PROCEDURE — 86901 BLOOD TYPING SEROLOGIC RH(D): CPT | Performed by: EMERGENCY MEDICINE

## 2020-01-01 PROCEDURE — A9540 TC99M MAA: HCPCS | Performed by: HOSPITALIST

## 2020-01-01 PROCEDURE — P9016 RBC LEUKOCYTES REDUCED: HCPCS

## 2020-01-01 PROCEDURE — 82270 OCCULT BLOOD FECES: CPT | Performed by: EMERGENCY MEDICINE

## 2020-01-01 PROCEDURE — 96374 THER/PROPH/DIAG INJ IV PUSH: CPT

## 2020-01-01 PROCEDURE — 84145 PROCALCITONIN (PCT): CPT | Performed by: INTERNAL MEDICINE

## 2020-01-01 PROCEDURE — 83605 ASSAY OF LACTIC ACID: CPT | Performed by: EMERGENCY MEDICINE

## 2020-01-01 PROCEDURE — 80198 ASSAY OF THEOPHYLLINE: CPT | Performed by: EMERGENCY MEDICINE

## 2020-01-01 RX ORDER — OXYCODONE AND ACETAMINOPHEN 10; 325 MG/1; MG/1
1 TABLET ORAL EVERY 4 HOURS PRN
Status: DISCONTINUED | OUTPATIENT
Start: 2020-01-01 | End: 2020-01-01 | Stop reason: HOSPADM

## 2020-01-01 RX ORDER — LORAZEPAM 0.5 MG/1
0.5 TABLET ORAL EVERY 4 HOURS PRN
Status: DISCONTINUED | OUTPATIENT
Start: 2020-01-01 | End: 2020-01-01 | Stop reason: HOSPADM

## 2020-01-01 RX ORDER — MONTELUKAST SODIUM 10 MG/1
10 TABLET ORAL NIGHTLY
Status: DISCONTINUED | OUTPATIENT
Start: 2020-01-01 | End: 2020-01-01 | Stop reason: HOSPADM

## 2020-01-01 RX ORDER — ONDANSETRON 2 MG/ML
4 INJECTION INTRAMUSCULAR; INTRAVENOUS ONCE
Status: COMPLETED | OUTPATIENT
Start: 2020-01-01 | End: 2020-01-01

## 2020-01-01 RX ORDER — ACETAMINOPHEN 325 MG/1
650 TABLET ORAL EVERY 4 HOURS PRN
Status: DISCONTINUED | OUTPATIENT
Start: 2020-01-01 | End: 2020-01-01 | Stop reason: HOSPADM

## 2020-01-01 RX ORDER — BACLOFEN 10 MG/1
10 TABLET ORAL ONCE
Status: COMPLETED | OUTPATIENT
Start: 2020-01-01 | End: 2020-01-01

## 2020-01-01 RX ORDER — BENZONATATE 100 MG/1
200 CAPSULE ORAL 3 TIMES DAILY PRN
Status: DISCONTINUED | OUTPATIENT
Start: 2020-01-01 | End: 2020-01-01 | Stop reason: HOSPADM

## 2020-01-01 RX ORDER — BISACODYL 10 MG
10 SUPPOSITORY, RECTAL RECTAL DAILY PRN
Status: DISCONTINUED | OUTPATIENT
Start: 2020-01-01 | End: 2020-01-01 | Stop reason: HOSPADM

## 2020-01-01 RX ORDER — POTASSIUM CHLORIDE 7.45 MG/ML
10 INJECTION INTRAVENOUS ONCE
Status: COMPLETED | OUTPATIENT
Start: 2020-01-01 | End: 2020-01-01

## 2020-01-01 RX ORDER — OXYCODONE AND ACETAMINOPHEN 10; 325 MG/1; MG/1
1 TABLET ORAL EVERY 4 HOURS PRN
Qty: 20 TABLET | Refills: 0 | Status: SHIPPED | OUTPATIENT
Start: 2020-01-01 | End: 2020-01-01

## 2020-01-01 RX ORDER — DIPHENHYDRAMINE HCL 25 MG
25 CAPSULE ORAL EVERY 6 HOURS PRN
Status: DISCONTINUED | OUTPATIENT
Start: 2020-01-01 | End: 2020-01-01

## 2020-01-01 RX ORDER — LORAZEPAM 0.5 MG/1
0.5 TABLET ORAL EVERY 6 HOURS PRN
Status: DISCONTINUED | OUTPATIENT
Start: 2020-01-01 | End: 2020-01-01 | Stop reason: HOSPADM

## 2020-01-01 RX ORDER — LIDOCAINE 50 MG/G
1 PATCH TOPICAL EVERY 24 HOURS
Status: DISCONTINUED | OUTPATIENT
Start: 2020-01-01 | End: 2020-01-01 | Stop reason: HOSPADM

## 2020-01-01 RX ORDER — LORAZEPAM 2 MG/ML
2 INJECTION INTRAMUSCULAR
Status: DISCONTINUED | OUTPATIENT
Start: 2020-01-01 | End: 2020-01-01 | Stop reason: HOSPADM

## 2020-01-01 RX ORDER — MORPHINE SULFATE 2 MG/ML
4 INJECTION, SOLUTION INTRAMUSCULAR; INTRAVENOUS
Status: DISCONTINUED | OUTPATIENT
Start: 2020-01-01 | End: 2020-01-01 | Stop reason: HOSPADM

## 2020-01-01 RX ORDER — OXYCODONE AND ACETAMINOPHEN 10; 325 MG/1; MG/1
1 TABLET ORAL EVERY 4 HOURS PRN
Qty: 20 TABLET | Refills: 0 | Status: ON HOLD | OUTPATIENT
Start: 2020-01-01 | End: 2020-01-01 | Stop reason: SDUPTHER

## 2020-01-01 RX ORDER — VANCOMYCIN HYDROCHLORIDE 1 G/200ML
20 INJECTION, SOLUTION INTRAVENOUS ONCE
Status: COMPLETED | OUTPATIENT
Start: 2020-01-01 | End: 2020-01-01

## 2020-01-01 RX ORDER — ZOLPIDEM TARTRATE 10 MG/1
10 TABLET ORAL NIGHTLY PRN
Qty: 30 TABLET | Refills: 5 | Status: SHIPPED | OUTPATIENT
Start: 2020-01-01 | End: 2020-01-01 | Stop reason: SDUPTHER

## 2020-01-01 RX ORDER — PSEUDOEPHEDRINE HYDROCHLORIDE 60 MG/1
60 TABLET, FILM COATED ORAL EVERY 6 HOURS PRN
Status: DISCONTINUED | OUTPATIENT
Start: 2020-01-01 | End: 2020-01-01 | Stop reason: HOSPADM

## 2020-01-01 RX ORDER — OXYCODONE HYDROCHLORIDE AND ACETAMINOPHEN 5; 325 MG/1; MG/1
1 TABLET ORAL EVERY 4 HOURS PRN
Status: DISCONTINUED | OUTPATIENT
Start: 2020-01-01 | End: 2020-01-01

## 2020-01-01 RX ORDER — DOCUSATE SODIUM 100 MG/1
100 CAPSULE, LIQUID FILLED ORAL 2 TIMES DAILY PRN
Status: DISCONTINUED | OUTPATIENT
Start: 2020-01-01 | End: 2020-01-01 | Stop reason: HOSPADM

## 2020-01-01 RX ORDER — LOPERAMIDE HYDROCHLORIDE 2 MG/1
2 CAPSULE ORAL 4 TIMES DAILY PRN
Status: DISCONTINUED | OUTPATIENT
Start: 2020-01-01 | End: 2020-01-01 | Stop reason: HOSPADM

## 2020-01-01 RX ORDER — ZOLPIDEM TARTRATE 10 MG/1
10 TABLET ORAL NIGHTLY PRN
Qty: 30 TABLET | Refills: 5 | Status: SHIPPED | OUTPATIENT
Start: 2020-01-01

## 2020-01-01 RX ORDER — DICYCLOMINE HCL 20 MG
20 TABLET ORAL EVERY 6 HOURS
Qty: 20 TABLET | Refills: 0 | Status: SHIPPED | OUTPATIENT
Start: 2020-01-01 | End: 2020-01-01 | Stop reason: SDUPTHER

## 2020-01-01 RX ORDER — ARFORMOTEROL TARTRATE 15 UG/2ML
15 SOLUTION RESPIRATORY (INHALATION)
Status: DISCONTINUED | OUTPATIENT
Start: 2020-01-01 | End: 2020-01-01 | Stop reason: HOSPADM

## 2020-01-01 RX ORDER — MELATONIN
1000 DAILY
Status: DISCONTINUED | OUTPATIENT
Start: 2020-01-01 | End: 2020-01-01 | Stop reason: HOSPADM

## 2020-01-01 RX ORDER — ZOLPIDEM TARTRATE 10 MG/1
TABLET ORAL
Qty: 30 TABLET | OUTPATIENT
Start: 2020-01-01

## 2020-01-01 RX ORDER — METHYLPREDNISOLONE SODIUM SUCCINATE 125 MG/2ML
125 INJECTION, POWDER, LYOPHILIZED, FOR SOLUTION INTRAMUSCULAR; INTRAVENOUS ONCE
Status: COMPLETED | OUTPATIENT
Start: 2020-01-01 | End: 2020-01-01

## 2020-01-01 RX ORDER — AZITHROMYCIN 250 MG/1
250 TABLET, FILM COATED ORAL
Status: DISCONTINUED | OUTPATIENT
Start: 2020-01-01 | End: 2020-01-01 | Stop reason: HOSPADM

## 2020-01-01 RX ORDER — BUDESONIDE 0.5 MG/2ML
0.5 INHALANT ORAL
Status: DISCONTINUED | OUTPATIENT
Start: 2020-01-01 | End: 2020-01-01 | Stop reason: HOSPADM

## 2020-01-01 RX ORDER — TRAMADOL HYDROCHLORIDE 50 MG/1
50 TABLET ORAL EVERY 6 HOURS PRN
Qty: 30 TABLET | Refills: 1 | Status: SHIPPED | OUTPATIENT
Start: 2020-01-01 | End: 2020-01-01 | Stop reason: SDUPTHER

## 2020-01-01 RX ORDER — LORAZEPAM 2 MG/ML
1 INJECTION INTRAMUSCULAR
Status: DISCONTINUED | OUTPATIENT
Start: 2020-01-01 | End: 2020-01-01 | Stop reason: HOSPADM

## 2020-01-01 RX ORDER — DIPHENOXYLATE HYDROCHLORIDE AND ATROPINE SULFATE 2.5; .025 MG/1; MG/1
1 TABLET ORAL
Status: DISCONTINUED | OUTPATIENT
Start: 2020-01-01 | End: 2020-01-01 | Stop reason: HOSPADM

## 2020-01-01 RX ORDER — HYDROXYZINE HYDROCHLORIDE 25 MG/1
25 TABLET, FILM COATED ORAL 3 TIMES DAILY PRN
Status: DISCONTINUED | OUTPATIENT
Start: 2020-01-01 | End: 2020-01-01 | Stop reason: HOSPADM

## 2020-01-01 RX ORDER — TRAMADOL HYDROCHLORIDE 50 MG/1
50 TABLET ORAL ONCE
Status: COMPLETED | OUTPATIENT
Start: 2020-01-01 | End: 2020-01-01

## 2020-01-01 RX ORDER — MIRTAZAPINE 15 MG/1
15 TABLET, FILM COATED ORAL NIGHTLY
Status: DISCONTINUED | OUTPATIENT
Start: 2020-01-01 | End: 2020-01-01

## 2020-01-01 RX ORDER — TRAMADOL HYDROCHLORIDE 50 MG/1
50 TABLET ORAL EVERY 6 HOURS PRN
Qty: 30 TABLET | Refills: 1 | Status: CANCELLED | OUTPATIENT
Start: 2020-01-01

## 2020-01-01 RX ORDER — ARFORMOTEROL TARTRATE 15 UG/2ML
15 SOLUTION RESPIRATORY (INHALATION)
Status: DISCONTINUED | OUTPATIENT
Start: 2020-01-01 | End: 2020-01-01

## 2020-01-01 RX ORDER — MIRTAZAPINE 15 MG/1
15 TABLET, FILM COATED ORAL NIGHTLY
Status: DISCONTINUED | OUTPATIENT
Start: 2020-01-01 | End: 2020-01-01 | Stop reason: HOSPADM

## 2020-01-01 RX ORDER — POTASSIUM CHLORIDE 750 MG/1
40 TABLET, FILM COATED, EXTENDED RELEASE ORAL AS NEEDED
Status: DISCONTINUED | OUTPATIENT
Start: 2020-01-01 | End: 2020-01-01

## 2020-01-01 RX ORDER — HYDROMORPHONE HYDROCHLORIDE 1 MG/ML
0.5 INJECTION, SOLUTION INTRAMUSCULAR; INTRAVENOUS; SUBCUTANEOUS
Status: DISCONTINUED | OUTPATIENT
Start: 2020-01-01 | End: 2020-01-01 | Stop reason: HOSPADM

## 2020-01-01 RX ORDER — DILTIAZEM HYDROCHLORIDE 180 MG/1
180 CAPSULE, COATED, EXTENDED RELEASE ORAL
Status: DISCONTINUED | OUTPATIENT
Start: 2020-01-01 | End: 2020-01-01 | Stop reason: HOSPADM

## 2020-01-01 RX ORDER — TRAMADOL HYDROCHLORIDE 50 MG/1
50 TABLET ORAL EVERY 6 HOURS PRN
Qty: 60 TABLET | Refills: 1 | Status: SHIPPED | OUTPATIENT
Start: 2020-01-01 | End: 2020-01-01 | Stop reason: HOSPADM

## 2020-01-01 RX ORDER — BACLOFEN 10 MG/1
10 TABLET ORAL 3 TIMES DAILY PRN
Status: DISCONTINUED | OUTPATIENT
Start: 2020-01-01 | End: 2020-01-01 | Stop reason: HOSPADM

## 2020-01-01 RX ORDER — ONDANSETRON 4 MG/1
4 TABLET, FILM COATED ORAL EVERY 6 HOURS PRN
Status: DISCONTINUED | OUTPATIENT
Start: 2020-01-01 | End: 2020-01-01 | Stop reason: HOSPADM

## 2020-01-01 RX ORDER — MIRABEGRON 25 MG/1
TABLET, FILM COATED, EXTENDED RELEASE ORAL
Qty: 30 TABLET | Refills: 2 | Status: SHIPPED | OUTPATIENT
Start: 2020-01-01 | End: 2020-01-01

## 2020-01-01 RX ORDER — METAXALONE 800 MG/1
800 TABLET ORAL 3 TIMES DAILY
Status: ON HOLD | COMMUNITY
Start: 2020-01-01 | End: 2020-01-01

## 2020-01-01 RX ORDER — MIRABEGRON 25 MG/1
TABLET, FILM COATED, EXTENDED RELEASE ORAL
Qty: 30 TABLET | Refills: 2 | Status: SHIPPED | OUTPATIENT
Start: 2020-01-01 | End: 2020-01-01 | Stop reason: SDUPTHER

## 2020-01-01 RX ORDER — ALUMINA, MAGNESIA, AND SIMETHICONE 2400; 2400; 240 MG/30ML; MG/30ML; MG/30ML
15 SUSPENSION ORAL EVERY 6 HOURS PRN
Status: DISCONTINUED | OUTPATIENT
Start: 2020-01-01 | End: 2020-01-01 | Stop reason: HOSPADM

## 2020-01-01 RX ORDER — GUAIFENESIN 600 MG/1
1200 TABLET, EXTENDED RELEASE ORAL EVERY 12 HOURS SCHEDULED
Status: DISCONTINUED | OUTPATIENT
Start: 2020-01-01 | End: 2020-01-01

## 2020-01-01 RX ORDER — MORPHINE SULFATE 20 MG/ML
5 SOLUTION ORAL
Status: DISCONTINUED | OUTPATIENT
Start: 2020-01-01 | End: 2020-01-01 | Stop reason: HOSPADM

## 2020-01-01 RX ORDER — LORAZEPAM 2 MG/ML
0.5 INJECTION INTRAMUSCULAR
Status: DISCONTINUED | OUTPATIENT
Start: 2020-01-01 | End: 2020-01-01 | Stop reason: HOSPADM

## 2020-01-01 RX ORDER — PANTOPRAZOLE SODIUM 40 MG/10ML
80 INJECTION, POWDER, LYOPHILIZED, FOR SOLUTION INTRAVENOUS ONCE
Status: COMPLETED | OUTPATIENT
Start: 2020-01-01 | End: 2020-01-01

## 2020-01-01 RX ORDER — FLUOXETINE HYDROCHLORIDE 20 MG/1
20 CAPSULE ORAL DAILY
Status: DISCONTINUED | OUTPATIENT
Start: 2020-01-01 | End: 2020-01-01 | Stop reason: HOSPADM

## 2020-01-01 RX ORDER — MORPHINE SULFATE 2 MG/ML
2 INJECTION, SOLUTION INTRAMUSCULAR; INTRAVENOUS
Status: DISCONTINUED | OUTPATIENT
Start: 2020-01-01 | End: 2020-01-01 | Stop reason: HOSPADM

## 2020-01-01 RX ORDER — HYDROXYZINE HYDROCHLORIDE 25 MG/1
25 TABLET, FILM COATED ORAL EVERY 6 HOURS PRN
COMMUNITY

## 2020-01-01 RX ORDER — MORPHINE SULFATE 2 MG/ML
4 INJECTION, SOLUTION INTRAMUSCULAR; INTRAVENOUS ONCE
Status: COMPLETED | OUTPATIENT
Start: 2020-01-01 | End: 2020-01-01

## 2020-01-01 RX ORDER — FERROUS SULFATE 325(65) MG
325 TABLET ORAL 2 TIMES DAILY WITH MEALS
Status: DISCONTINUED | OUTPATIENT
Start: 2020-01-01 | End: 2020-01-01 | Stop reason: HOSPADM

## 2020-01-01 RX ORDER — AZITHROMYCIN 250 MG/1
500 TABLET, FILM COATED ORAL
Status: DISCONTINUED | OUTPATIENT
Start: 2020-01-01 | End: 2020-01-01 | Stop reason: HOSPADM

## 2020-01-01 RX ORDER — METAXALONE 800 MG/1
800 TABLET ORAL 3 TIMES DAILY PRN
COMMUNITY
End: 2020-01-01 | Stop reason: HOSPADM

## 2020-01-01 RX ORDER — SODIUM CHLORIDE 0.9 % (FLUSH) 0.9 %
10 SYRINGE (ML) INJECTION EVERY 12 HOURS SCHEDULED
Status: DISCONTINUED | OUTPATIENT
Start: 2020-01-01 | End: 2020-01-01 | Stop reason: HOSPADM

## 2020-01-01 RX ORDER — LORAZEPAM 0.5 MG/1
0.5 TABLET ORAL EVERY 6 HOURS PRN
Qty: 12 TABLET | Refills: 0 | Status: SHIPPED | OUTPATIENT
Start: 2020-01-01

## 2020-01-01 RX ORDER — SERTRALINE HYDROCHLORIDE 100 MG/1
TABLET, FILM COATED ORAL
Qty: 180 TABLET | Refills: 0 | Status: SHIPPED | OUTPATIENT
Start: 2020-01-01 | End: 2020-01-01

## 2020-01-01 RX ORDER — ACETAMINOPHEN 650 MG/1
650 SUPPOSITORY RECTAL EVERY 4 HOURS PRN
Status: DISCONTINUED | OUTPATIENT
Start: 2020-01-01 | End: 2020-01-01 | Stop reason: HOSPADM

## 2020-01-01 RX ORDER — ALBUTEROL SULFATE 2.5 MG/3ML
2.5 SOLUTION RESPIRATORY (INHALATION) EVERY 6 HOURS PRN
Status: DISCONTINUED | OUTPATIENT
Start: 2020-01-01 | End: 2020-01-01 | Stop reason: HOSPADM

## 2020-01-01 RX ORDER — MIRTAZAPINE 15 MG/1
15 TABLET, FILM COATED ORAL NIGHTLY
Qty: 30 TABLET | Refills: 1 | Status: SHIPPED | OUTPATIENT
Start: 2020-01-01

## 2020-01-01 RX ORDER — OXYCODONE AND ACETAMINOPHEN 10; 325 MG/1; MG/1
1 TABLET ORAL EVERY 4 HOURS PRN
COMMUNITY

## 2020-01-01 RX ORDER — ONDANSETRON 2 MG/ML
4 INJECTION INTRAMUSCULAR; INTRAVENOUS EVERY 6 HOURS PRN
Status: DISCONTINUED | OUTPATIENT
Start: 2020-01-01 | End: 2020-01-01 | Stop reason: HOSPADM

## 2020-01-01 RX ORDER — FLUOXETINE HYDROCHLORIDE 20 MG/1
20 CAPSULE ORAL DAILY
Status: DISCONTINUED | OUTPATIENT
Start: 2020-01-01 | End: 2020-01-01

## 2020-01-01 RX ORDER — PANTOPRAZOLE SODIUM 40 MG/1
40 TABLET, DELAYED RELEASE ORAL EVERY MORNING
Status: DISCONTINUED | OUTPATIENT
Start: 2020-01-01 | End: 2020-01-01 | Stop reason: HOSPADM

## 2020-01-01 RX ORDER — PANTOPRAZOLE SODIUM 40 MG/1
40 TABLET, DELAYED RELEASE ORAL EVERY MORNING
Status: DISCONTINUED | OUTPATIENT
Start: 2020-01-01 | End: 2020-01-01

## 2020-01-01 RX ORDER — LORAZEPAM 2 MG/ML
1 CONCENTRATE ORAL
Status: DISCONTINUED | OUTPATIENT
Start: 2020-01-01 | End: 2020-01-01 | Stop reason: HOSPADM

## 2020-01-01 RX ORDER — FUROSEMIDE 10 MG/ML
40 INJECTION INTRAMUSCULAR; INTRAVENOUS ONCE
Status: COMPLETED | OUTPATIENT
Start: 2020-01-01 | End: 2020-01-01

## 2020-01-01 RX ORDER — PSEUDOEPHEDRINE HYDROCHLORIDE 60 MG/1
60 TABLET, FILM COATED ORAL EVERY 6 HOURS PRN
Qty: 60 TABLET | Refills: 3 | Status: SHIPPED | OUTPATIENT
Start: 2020-01-01

## 2020-01-01 RX ORDER — HYDROXYZINE HYDROCHLORIDE 25 MG/1
25 TABLET, FILM COATED ORAL 3 TIMES DAILY PRN
Qty: 25 TABLET | Refills: 0 | Status: SHIPPED | OUTPATIENT
Start: 2020-01-01 | End: 2020-01-01

## 2020-01-01 RX ORDER — BACLOFEN 10 MG/1
10 TABLET ORAL 3 TIMES DAILY PRN
Qty: 15 TABLET | Refills: 0 | Status: SHIPPED | OUTPATIENT
Start: 2020-01-01

## 2020-01-01 RX ORDER — DICYCLOMINE HYDROCHLORIDE 10 MG/1
10 CAPSULE ORAL 3 TIMES DAILY PRN
Status: DISCONTINUED | OUTPATIENT
Start: 2020-01-01 | End: 2020-01-01 | Stop reason: HOSPADM

## 2020-01-01 RX ORDER — THEOPHYLLINE 400 MG/1
400 TABLET, EXTENDED RELEASE ORAL DAILY
Status: DISCONTINUED | OUTPATIENT
Start: 2020-01-01 | End: 2020-01-01

## 2020-01-01 RX ORDER — NITROGLYCERIN 0.4 MG/1
0.4 TABLET SUBLINGUAL
Status: DISCONTINUED | OUTPATIENT
Start: 2020-01-01 | End: 2020-01-01

## 2020-01-01 RX ORDER — MONTELUKAST SODIUM 10 MG/1
10 TABLET ORAL NIGHTLY
Status: DISCONTINUED | OUTPATIENT
Start: 2020-01-01 | End: 2020-01-01

## 2020-01-01 RX ORDER — LORAZEPAM 0.5 MG/1
0.5 TABLET ORAL EVERY 4 HOURS PRN
Status: ON HOLD | COMMUNITY
End: 2020-01-01 | Stop reason: SDUPTHER

## 2020-01-01 RX ORDER — POTASSIUM CHLORIDE 750 MG/1
40 TABLET, FILM COATED, EXTENDED RELEASE ORAL AS NEEDED
Status: DISCONTINUED | OUTPATIENT
Start: 2020-01-01 | End: 2020-01-01 | Stop reason: HOSPADM

## 2020-01-01 RX ORDER — TRAMADOL HYDROCHLORIDE 50 MG/1
50 TABLET ORAL EVERY 6 HOURS PRN
Qty: 30 TABLET | Refills: 0 | Status: SHIPPED | OUTPATIENT
Start: 2020-01-01 | End: 2020-01-01 | Stop reason: SDUPTHER

## 2020-01-01 RX ORDER — MORPHINE SULFATE 2 MG/ML
2 INJECTION, SOLUTION INTRAMUSCULAR; INTRAVENOUS ONCE AS NEEDED
Status: COMPLETED | OUTPATIENT
Start: 2020-01-01 | End: 2020-01-01

## 2020-01-01 RX ORDER — SUMATRIPTAN 50 MG/1
TABLET, FILM COATED ORAL
Qty: 9 TABLET | Refills: 2 | Status: SHIPPED | OUTPATIENT
Start: 2020-01-01 | End: 2020-01-01

## 2020-01-01 RX ORDER — ZOLPIDEM TARTRATE 5 MG/1
5 TABLET ORAL NIGHTLY PRN
Status: DISCONTINUED | OUTPATIENT
Start: 2020-01-01 | End: 2020-01-01 | Stop reason: HOSPADM

## 2020-01-01 RX ORDER — LORAZEPAM 2 MG/ML
0.5 CONCENTRATE ORAL
Status: DISCONTINUED | OUTPATIENT
Start: 2020-01-01 | End: 2020-01-01 | Stop reason: HOSPADM

## 2020-01-01 RX ORDER — THEOPHYLLINE 400 MG/1
400 TABLET, EXTENDED RELEASE ORAL
Status: DISCONTINUED | OUTPATIENT
Start: 2020-01-01 | End: 2020-01-01 | Stop reason: HOSPADM

## 2020-01-01 RX ORDER — LOPERAMIDE HYDROCHLORIDE 2 MG/1
2 CAPSULE ORAL 2 TIMES DAILY
Status: DISCONTINUED | OUTPATIENT
Start: 2020-01-01 | End: 2020-01-01 | Stop reason: HOSPADM

## 2020-01-01 RX ORDER — SUMATRIPTAN 50 MG/1
50 TABLET, FILM COATED ORAL
COMMUNITY
End: 2020-01-01 | Stop reason: HOSPADM

## 2020-01-01 RX ORDER — ALBUTEROL SULFATE 90 UG/1
2 AEROSOL, METERED RESPIRATORY (INHALATION) EVERY 4 HOURS PRN
Status: DISCONTINUED | OUTPATIENT
Start: 2020-01-01 | End: 2020-01-01 | Stop reason: CLARIF

## 2020-01-01 RX ORDER — BACLOFEN 20 MG/1
TABLET ORAL
Qty: 270 TABLET | Refills: 0 | Status: SHIPPED | OUTPATIENT
Start: 2020-01-01 | End: 2020-01-01

## 2020-01-01 RX ORDER — FLUOXETINE HYDROCHLORIDE 20 MG/1
20 CAPSULE ORAL DAILY
Qty: 30 CAPSULE | Refills: 0 | Status: SHIPPED | OUTPATIENT
Start: 2020-01-01 | End: 2020-01-01 | Stop reason: SDUPTHER

## 2020-01-01 RX ORDER — GUAIFENESIN 600 MG/1
1200 TABLET, EXTENDED RELEASE ORAL EVERY 12 HOURS SCHEDULED
Status: DISCONTINUED | OUTPATIENT
Start: 2020-01-01 | End: 2020-01-01 | Stop reason: HOSPADM

## 2020-01-01 RX ORDER — ACETAMINOPHEN 160 MG/5ML
650 SOLUTION ORAL EVERY 4 HOURS PRN
Status: DISCONTINUED | OUTPATIENT
Start: 2020-01-01 | End: 2020-01-01 | Stop reason: HOSPADM

## 2020-01-01 RX ORDER — POTASSIUM CHLORIDE 1.5 G/1.77G
40 POWDER, FOR SOLUTION ORAL AS NEEDED
Status: DISCONTINUED | OUTPATIENT
Start: 2020-01-01 | End: 2020-01-01

## 2020-01-01 RX ORDER — FLUOXETINE HYDROCHLORIDE 20 MG/1
20 CAPSULE ORAL DAILY
Qty: 90 CAPSULE | Refills: 2 | Status: SHIPPED | OUTPATIENT
Start: 2020-01-01 | End: 2020-01-01 | Stop reason: SDUPTHER

## 2020-01-01 RX ORDER — MELOXICAM 15 MG/1
15 TABLET ORAL DAILY
Status: DISCONTINUED | OUTPATIENT
Start: 2020-01-01 | End: 2020-01-01

## 2020-01-01 RX ORDER — METHOCARBAMOL 500 MG/1
500 TABLET, FILM COATED ORAL 3 TIMES DAILY
Qty: 15 TABLET | Refills: 0 | Status: SHIPPED | OUTPATIENT
Start: 2020-01-01 | End: 2020-01-01 | Stop reason: ALTCHOICE

## 2020-01-01 RX ORDER — METHYLPREDNISOLONE SODIUM SUCCINATE 40 MG/ML
20 INJECTION, POWDER, LYOPHILIZED, FOR SOLUTION INTRAMUSCULAR; INTRAVENOUS EVERY 12 HOURS
Status: DISCONTINUED | OUTPATIENT
Start: 2020-01-01 | End: 2020-01-01

## 2020-01-01 RX ORDER — PANTOPRAZOLE SODIUM 40 MG/1
40 TABLET, DELAYED RELEASE ORAL DAILY
COMMUNITY

## 2020-01-01 RX ORDER — BISACODYL 5 MG/1
5 TABLET, DELAYED RELEASE ORAL DAILY PRN
Status: DISCONTINUED | OUTPATIENT
Start: 2020-01-01 | End: 2020-01-01 | Stop reason: HOSPADM

## 2020-01-01 RX ORDER — ACETAMINOPHEN 325 MG/1
650 TABLET ORAL EVERY 4 HOURS PRN
Start: 2020-01-01

## 2020-01-01 RX ORDER — ARFORMOTEROL TARTRATE 15 UG/2ML
15 SOLUTION RESPIRATORY (INHALATION)
COMMUNITY

## 2020-01-01 RX ORDER — BUDESONIDE 0.5 MG/2ML
0.5 INHALANT ORAL 2 TIMES DAILY
Status: DISCONTINUED | OUTPATIENT
Start: 2020-01-01 | End: 2020-01-01

## 2020-01-01 RX ORDER — TRAMADOL HYDROCHLORIDE 50 MG/1
50 TABLET ORAL EVERY 6 HOURS PRN
Status: DISCONTINUED | OUTPATIENT
Start: 2020-01-01 | End: 2020-01-01 | Stop reason: HOSPADM

## 2020-01-01 RX ORDER — APIXABAN 5 MG (74)
10 KIT ORAL EVERY 12 HOURS SCHEDULED
Qty: 74 TABLET | Refills: 0 | Status: SHIPPED | OUTPATIENT
Start: 2020-01-01 | End: 2020-01-01

## 2020-01-01 RX ORDER — DICYCLOMINE HCL 20 MG
20 TABLET ORAL EVERY 6 HOURS
Qty: 90 TABLET | Refills: 1 | Status: SHIPPED | OUTPATIENT
Start: 2020-01-01

## 2020-01-01 RX ORDER — FAMOTIDINE 20 MG/1
20 TABLET, FILM COATED ORAL
Status: DISCONTINUED | OUTPATIENT
Start: 2020-01-01 | End: 2020-01-01 | Stop reason: HOSPADM

## 2020-01-01 RX ORDER — MORPHINE SULFATE 2 MG/ML
2 INJECTION, SOLUTION INTRAMUSCULAR; INTRAVENOUS ONCE
Status: COMPLETED | OUTPATIENT
Start: 2020-01-01 | End: 2020-01-01

## 2020-01-01 RX ORDER — POLYETHYLENE GLYCOL 3350 17 G/17G
17 POWDER, FOR SOLUTION ORAL DAILY
Status: DISCONTINUED | OUTPATIENT
Start: 2020-01-01 | End: 2020-01-01 | Stop reason: HOSPADM

## 2020-01-01 RX ORDER — NITROGLYCERIN 0.4 MG/1
0.4 TABLET SUBLINGUAL
Status: DISCONTINUED | OUTPATIENT
Start: 2020-01-01 | End: 2020-01-01 | Stop reason: HOSPADM

## 2020-01-01 RX ORDER — DICYCLOMINE HCL 20 MG
20 TABLET ORAL EVERY 6 HOURS
Qty: 20 TABLET | Refills: 1 | Status: SHIPPED | OUTPATIENT
Start: 2020-01-01 | End: 2020-01-01 | Stop reason: SDUPTHER

## 2020-01-01 RX ORDER — SODIUM CHLORIDE 0.9 % (FLUSH) 0.9 %
10 SYRINGE (ML) INJECTION AS NEEDED
Status: DISCONTINUED | OUTPATIENT
Start: 2020-01-01 | End: 2020-01-01 | Stop reason: HOSPADM

## 2020-01-01 RX ORDER — DICYCLOMINE HYDROCHLORIDE 10 MG/1
20 CAPSULE ORAL 4 TIMES DAILY PRN
Status: DISCONTINUED | OUTPATIENT
Start: 2020-01-01 | End: 2020-01-01 | Stop reason: HOSPADM

## 2020-01-01 RX ORDER — LIDOCAINE 50 MG/G
1 PATCH TOPICAL EVERY 24 HOURS
Qty: 30 PATCH | Refills: 0 | Status: SHIPPED | OUTPATIENT
Start: 2020-01-01

## 2020-01-01 RX ORDER — POTASSIUM CHLORIDE 7.45 MG/ML
10 INJECTION INTRAVENOUS
Status: DISCONTINUED | OUTPATIENT
Start: 2020-01-01 | End: 2020-01-01

## 2020-01-01 RX ORDER — ALBUTEROL SULFATE 2.5 MG/3ML
2.5 SOLUTION RESPIRATORY (INHALATION) EVERY 4 HOURS PRN
Status: DISCONTINUED | OUTPATIENT
Start: 2020-01-01 | End: 2020-01-01 | Stop reason: HOSPADM

## 2020-01-01 RX ORDER — ROFLUMILAST 500 UG/1
500 TABLET ORAL DAILY
Status: DISCONTINUED | OUTPATIENT
Start: 2020-01-01 | End: 2020-01-01 | Stop reason: HOSPADM

## 2020-01-01 RX ORDER — TRAMADOL HYDROCHLORIDE 50 MG/1
TABLET ORAL
Qty: 30 TABLET | Refills: 1 | Status: SHIPPED | OUTPATIENT
Start: 2020-01-01 | End: 2020-01-01 | Stop reason: SDUPTHER

## 2020-01-01 RX ORDER — OMEGA-3S/DHA/EPA/FISH OIL/D3 300MG-1000
800 CAPSULE ORAL DAILY
Status: DISCONTINUED | OUTPATIENT
Start: 2020-01-01 | End: 2020-01-01 | Stop reason: HOSPADM

## 2020-01-01 RX ORDER — FORMOTEROL FUMARATE DIHYDRATE 20 UG/2ML
20 SOLUTION RESPIRATORY (INHALATION) 2 TIMES DAILY
COMMUNITY
Start: 2020-01-01 | End: 2020-01-01 | Stop reason: HOSPADM

## 2020-01-01 RX ORDER — UREA 10 %
3 LOTION (ML) TOPICAL NIGHTLY PRN
Status: DISCONTINUED | OUTPATIENT
Start: 2020-01-01 | End: 2020-01-01 | Stop reason: HOSPADM

## 2020-01-01 RX ORDER — DILTIAZEM HYDROCHLORIDE 180 MG/1
180 CAPSULE, COATED, EXTENDED RELEASE ORAL
Status: DISCONTINUED | OUTPATIENT
Start: 2020-01-01 | End: 2020-01-01

## 2020-01-01 RX ORDER — LIDOCAINE 50 MG/G
1 PATCH TOPICAL ONCE
Status: DISCONTINUED | OUTPATIENT
Start: 2020-01-01 | End: 2020-01-01 | Stop reason: HOSPADM

## 2020-01-01 RX ORDER — MORPHINE SULFATE 20 MG/ML
10 SOLUTION ORAL
Status: DISCONTINUED | OUTPATIENT
Start: 2020-01-01 | End: 2020-01-01 | Stop reason: HOSPADM

## 2020-01-01 RX ORDER — DILTIAZEM HYDROCHLORIDE 180 MG/1
180 CAPSULE, COATED, EXTENDED RELEASE ORAL
Qty: 30 CAPSULE | Refills: 0 | Status: SHIPPED | OUTPATIENT
Start: 2020-01-01

## 2020-01-01 RX ORDER — CEFTRIAXONE SODIUM 1 G/50ML
1 INJECTION, SOLUTION INTRAVENOUS ONCE
Status: COMPLETED | OUTPATIENT
Start: 2020-01-01 | End: 2020-01-01

## 2020-01-01 RX ORDER — FLUOXETINE HYDROCHLORIDE 20 MG/1
20 CAPSULE ORAL DAILY
Qty: 90 CAPSULE | Refills: 2 | Status: SHIPPED | OUTPATIENT
Start: 2020-01-01 | End: 2020-01-01

## 2020-01-01 RX ORDER — TRAMADOL HYDROCHLORIDE 50 MG/1
50 TABLET ORAL EVERY 6 HOURS PRN
Qty: 30 TABLET | Refills: 1 | Status: SHIPPED | OUTPATIENT
Start: 2020-01-01 | End: 2020-01-01

## 2020-01-01 RX ORDER — LORAZEPAM 2 MG/ML
2 CONCENTRATE ORAL
Status: DISCONTINUED | OUTPATIENT
Start: 2020-01-01 | End: 2020-01-01 | Stop reason: HOSPADM

## 2020-01-01 RX ORDER — FLUOXETINE HYDROCHLORIDE 20 MG/1
20 CAPSULE ORAL DAILY
COMMUNITY

## 2020-01-01 RX ORDER — PREDNISONE 20 MG/1
20 TABLET ORAL DAILY
Status: DISCONTINUED | OUTPATIENT
Start: 2020-01-01 | End: 2020-01-01

## 2020-01-01 RX ORDER — POTASSIUM CHLORIDE 1.5 G/1.77G
40 POWDER, FOR SOLUTION ORAL AS NEEDED
Status: DISCONTINUED | OUTPATIENT
Start: 2020-01-01 | End: 2020-01-01 | Stop reason: HOSPADM

## 2020-01-01 RX ADMIN — OXYCODONE HYDROCHLORIDE AND ACETAMINOPHEN 1 TABLET: 10; 325 TABLET ORAL at 23:33

## 2020-01-01 RX ADMIN — APIXABAN 5 MG: 5 TABLET, FILM COATED ORAL at 08:49

## 2020-01-01 RX ADMIN — ONDANSETRON 4 MG: 2 INJECTION INTRAMUSCULAR; INTRAVENOUS at 08:54

## 2020-01-01 RX ADMIN — TIOTROPIUM BROMIDE INHALATION SPRAY 2 PUFF: 3.12 SPRAY, METERED RESPIRATORY (INHALATION) at 08:41

## 2020-01-01 RX ADMIN — MORPHINE SULFATE 2 MG: 2 INJECTION, SOLUTION INTRAMUSCULAR; INTRAVENOUS at 18:52

## 2020-01-01 RX ADMIN — MORPHINE SULFATE 2 MG: 2 INJECTION, SOLUTION INTRAMUSCULAR; INTRAVENOUS at 10:50

## 2020-01-01 RX ADMIN — MORPHINE SULFATE 4 MG: 2 INJECTION, SOLUTION INTRAMUSCULAR; INTRAVENOUS at 08:13

## 2020-01-01 RX ADMIN — ENOXAPARIN SODIUM 50 MG: 60 INJECTION SUBCUTANEOUS at 10:47

## 2020-01-01 RX ADMIN — ALUMINUM HYDROXIDE, MAGNESIUM HYDROXIDE, AND DIMETHICONE 15 ML: 400; 400; 40 SUSPENSION ORAL at 20:44

## 2020-01-01 RX ADMIN — GUAIFENESIN 1200 MG: 600 TABLET, EXTENDED RELEASE ORAL at 08:56

## 2020-01-01 RX ADMIN — ZOLPIDEM TARTRATE 5 MG: 5 TABLET ORAL at 23:39

## 2020-01-01 RX ADMIN — LORAZEPAM 0.5 MG: 2 INJECTION INTRAMUSCULAR; INTRAVENOUS at 00:49

## 2020-01-01 RX ADMIN — OXYCODONE HYDROCHLORIDE AND ACETAMINOPHEN 1 TABLET: 10; 325 TABLET ORAL at 11:42

## 2020-01-01 RX ADMIN — SODIUM CHLORIDE, PRESERVATIVE FREE 10 ML: 5 INJECTION INTRAVENOUS at 20:08

## 2020-01-01 RX ADMIN — FLUOXETINE HYDROCHLORIDE 20 MG: 20 CAPSULE ORAL at 08:57

## 2020-01-01 RX ADMIN — DILTIAZEM HYDROCHLORIDE 180 MG: 180 CAPSULE, COATED, EXTENDED RELEASE ORAL at 20:07

## 2020-01-01 RX ADMIN — MORPHINE SULFATE 2 MG: 2 INJECTION, SOLUTION INTRAMUSCULAR; INTRAVENOUS at 09:16

## 2020-01-01 RX ADMIN — LORAZEPAM 0.5 MG: 0.5 TABLET ORAL at 08:03

## 2020-01-01 RX ADMIN — MIRTAZAPINE 15 MG: 15 TABLET, FILM COATED ORAL at 20:52

## 2020-01-01 RX ADMIN — TRAMADOL HYDROCHLORIDE 50 MG: 50 TABLET ORAL at 20:12

## 2020-01-01 RX ADMIN — GLYCOPYRROLATE 0.4 MG: 0.2 INJECTION, SOLUTION INTRAMUSCULAR; INTRAVITREAL at 00:18

## 2020-01-01 RX ADMIN — ENOXAPARIN SODIUM 50 MG: 60 INJECTION SUBCUTANEOUS at 21:28

## 2020-01-01 RX ADMIN — OXYCODONE HYDROCHLORIDE AND ACETAMINOPHEN 1 TABLET: 10; 325 TABLET ORAL at 14:24

## 2020-01-01 RX ADMIN — MIRTAZAPINE 15 MG: 15 TABLET, FILM COATED ORAL at 20:15

## 2020-01-01 RX ADMIN — BUDESONIDE 0.5 MG: 0.5 INHALANT ORAL at 00:36

## 2020-01-01 RX ADMIN — MORPHINE SULFATE 4 MG: 2 INJECTION, SOLUTION INTRAMUSCULAR; INTRAVENOUS at 15:45

## 2020-01-01 RX ADMIN — VANCOMYCIN HYDROCHLORIDE 1000 MG: 1 INJECTION, SOLUTION INTRAVENOUS at 05:19

## 2020-01-01 RX ADMIN — DIPHENHYDRAMINE HYDROCHLORIDE 25 MG: 25 CAPSULE ORAL at 01:53

## 2020-01-01 RX ADMIN — ROFLUMILAST 500 MCG: 500 TABLET ORAL at 09:12

## 2020-01-01 RX ADMIN — SODIUM CHLORIDE, PRESERVATIVE FREE 10 ML: 5 INJECTION INTRAVENOUS at 09:21

## 2020-01-01 RX ADMIN — GLYCOPYRROLATE 0.2 MG: 0.2 INJECTION, SOLUTION INTRAMUSCULAR; INTRAVITREAL at 20:21

## 2020-01-01 RX ADMIN — SODIUM CHLORIDE 8 MG/HR: 900 INJECTION INTRAVENOUS at 19:28

## 2020-01-01 RX ADMIN — PANTOPRAZOLE SODIUM 40 MG: 40 TABLET, DELAYED RELEASE ORAL at 05:10

## 2020-01-01 RX ADMIN — DILTIAZEM HYDROCHLORIDE 180 MG: 180 CAPSULE, COATED, EXTENDED RELEASE ORAL at 08:49

## 2020-01-01 RX ADMIN — LIDOCAINE 1 PATCH: 50 PATCH CUTANEOUS at 02:09

## 2020-01-01 RX ADMIN — FLUOXETINE HYDROCHLORIDE 20 MG: 20 CAPSULE ORAL at 08:49

## 2020-01-01 RX ADMIN — HYDROMORPHONE HYDROCHLORIDE 1 MG: 1 INJECTION, SOLUTION INTRAMUSCULAR; INTRAVENOUS; SUBCUTANEOUS at 04:12

## 2020-01-01 RX ADMIN — MIRTAZAPINE 15 MG: 15 TABLET, FILM COATED ORAL at 20:44

## 2020-01-01 RX ADMIN — LORAZEPAM 1 MG: 2 INJECTION INTRAMUSCULAR; INTRAVENOUS at 08:13

## 2020-01-01 RX ADMIN — OXYCODONE HYDROCHLORIDE AND ACETAMINOPHEN 1 TABLET: 10; 325 TABLET ORAL at 19:42

## 2020-01-01 RX ADMIN — DILTIAZEM HYDROCHLORIDE 180 MG: 180 CAPSULE, COATED, EXTENDED RELEASE ORAL at 09:19

## 2020-01-01 RX ADMIN — FUROSEMIDE 40 MG: 10 INJECTION, SOLUTION INTRAMUSCULAR; INTRAVENOUS at 02:35

## 2020-01-01 RX ADMIN — MIRTAZAPINE 15 MG: 15 TABLET, FILM COATED ORAL at 02:35

## 2020-01-01 RX ADMIN — LORAZEPAM 0.5 MG: 0.5 TABLET ORAL at 06:52

## 2020-01-01 RX ADMIN — THEOPHYLLINE 400 MG: 400 TABLET, EXTENDED RELEASE ORAL at 10:06

## 2020-01-01 RX ADMIN — GUAIFENESIN 1200 MG: 600 TABLET, EXTENDED RELEASE ORAL at 20:07

## 2020-01-01 RX ADMIN — THEOPHYLLINE 400 MG: 400 TABLET, EXTENDED RELEASE ORAL at 10:35

## 2020-01-01 RX ADMIN — Medication 1000 UNITS: at 08:24

## 2020-01-01 RX ADMIN — THEOPHYLLINE 400 MG: 400 TABLET, EXTENDED RELEASE ORAL at 08:24

## 2020-01-01 RX ADMIN — LORAZEPAM 1 MG: 2 INJECTION INTRAMUSCULAR; INTRAVENOUS at 16:07

## 2020-01-01 RX ADMIN — CEFEPIME HYDROCHLORIDE 1 G: 1 INJECTION, POWDER, FOR SOLUTION INTRAMUSCULAR; INTRAVENOUS at 02:00

## 2020-01-01 RX ADMIN — BISACODYL 10 MG: 10 SUPPOSITORY RECTAL at 13:33

## 2020-01-01 RX ADMIN — SODIUM CHLORIDE, PRESERVATIVE FREE 10 ML: 5 INJECTION INTRAVENOUS at 08:24

## 2020-01-01 RX ADMIN — CHOLECALCIFEROL TAB 10 MCG (400 UNIT) 800 UNITS: 10 TAB at 19:55

## 2020-01-01 RX ADMIN — MORPHINE SULFATE 4 MG: 2 INJECTION, SOLUTION INTRAMUSCULAR; INTRAVENOUS at 02:40

## 2020-01-01 RX ADMIN — OXYCODONE HYDROCHLORIDE AND ACETAMINOPHEN 1 TABLET: 10; 325 TABLET ORAL at 20:52

## 2020-01-01 RX ADMIN — TIOTROPIUM BROMIDE INHALATION SPRAY 2 PUFF: 3.12 SPRAY, METERED RESPIRATORY (INHALATION) at 08:42

## 2020-01-01 RX ADMIN — MORPHINE SULFATE 4 MG: 2 INJECTION, SOLUTION INTRAMUSCULAR; INTRAVENOUS at 16:32

## 2020-01-01 RX ADMIN — OXYCODONE HYDROCHLORIDE AND ACETAMINOPHEN 1 TABLET: 10; 325 TABLET ORAL at 16:42

## 2020-01-01 RX ADMIN — ONDANSETRON HYDROCHLORIDE 4 MG: 2 SOLUTION INTRAMUSCULAR; INTRAVENOUS at 00:44

## 2020-01-01 RX ADMIN — GUAIFENESIN 1200 MG: 600 TABLET, EXTENDED RELEASE ORAL at 08:24

## 2020-01-01 RX ADMIN — PREDNISONE 20 MG: 20 TABLET ORAL at 08:24

## 2020-01-01 RX ADMIN — OXYCODONE HYDROCHLORIDE AND ACETAMINOPHEN 1 TABLET: 10; 325 TABLET ORAL at 01:02

## 2020-01-01 RX ADMIN — GLYCOPYRROLATE 0.2 MG: 0.2 INJECTION, SOLUTION INTRAMUSCULAR; INTRAVITREAL at 17:56

## 2020-01-01 RX ADMIN — FERROUS SULFATE TAB 325 MG (65 MG ELEMENTAL FE) 325 MG: 325 (65 FE) TAB at 08:50

## 2020-01-01 RX ADMIN — BUDESONIDE 0.5 MG: 0.5 INHALANT ORAL at 20:07

## 2020-01-01 RX ADMIN — INFLUENZA VIRUS VACCINE 0.5 ML: 15; 15; 15; 15 SUSPENSION INTRAMUSCULAR at 14:51

## 2020-01-01 RX ADMIN — GUAIFENESIN 1200 MG: 600 TABLET, EXTENDED RELEASE ORAL at 20:59

## 2020-01-01 RX ADMIN — TRAMADOL HYDROCHLORIDE 50 MG: 50 TABLET ORAL at 13:11

## 2020-01-01 RX ADMIN — BUDESONIDE 0.5 MG: 0.5 INHALANT ORAL at 09:08

## 2020-01-01 RX ADMIN — HYDROXYZINE HYDROCHLORIDE 25 MG: 25 TABLET ORAL at 09:37

## 2020-01-01 RX ADMIN — ENOXAPARIN SODIUM 50 MG: 60 INJECTION SUBCUTANEOUS at 21:00

## 2020-01-01 RX ADMIN — CHOLECALCIFEROL TAB 10 MCG (400 UNIT) 800 UNITS: 10 TAB at 08:50

## 2020-01-01 RX ADMIN — CEFEPIME HYDROCHLORIDE 2 G: 2 INJECTION, POWDER, FOR SOLUTION INTRAVENOUS at 04:43

## 2020-01-01 RX ADMIN — AZITHROMYCIN DIHYDRATE 500 MG: 250 TABLET, FILM COATED ORAL at 19:55

## 2020-01-01 RX ADMIN — LORAZEPAM 1 MG: 2 INJECTION INTRAMUSCULAR; INTRAVENOUS at 04:11

## 2020-01-01 RX ADMIN — FAMOTIDINE 20 MG: 20 TABLET, FILM COATED ORAL at 09:17

## 2020-01-01 RX ADMIN — LOPERAMIDE HYDROCHLORIDE 2 MG: 2 CAPSULE ORAL at 20:07

## 2020-01-01 RX ADMIN — TIOTROPIUM BROMIDE INHALATION SPRAY 2 PUFF: 3.12 SPRAY, METERED RESPIRATORY (INHALATION) at 08:25

## 2020-01-01 RX ADMIN — CEFTRIAXONE SODIUM 1 G: 1 INJECTION, SOLUTION INTRAVENOUS at 17:17

## 2020-01-01 RX ADMIN — POTASSIUM CHLORIDE 40 MEQ: 750 TABLET, EXTENDED RELEASE ORAL at 13:04

## 2020-01-01 RX ADMIN — PANTOPRAZOLE SODIUM 40 MG: 40 TABLET, DELAYED RELEASE ORAL at 06:19

## 2020-01-01 RX ADMIN — ONDANSETRON HYDROCHLORIDE 4 MG: 4 TABLET, FILM COATED ORAL at 17:51

## 2020-01-01 RX ADMIN — GUAIFENESIN 1200 MG: 600 TABLET, EXTENDED RELEASE ORAL at 20:15

## 2020-01-01 RX ADMIN — LORAZEPAM 1 MG: 2 INJECTION INTRAMUSCULAR; INTRAVENOUS at 20:21

## 2020-01-01 RX ADMIN — ARFORMOTEROL TARTRATE 15 MCG: 15 SOLUTION RESPIRATORY (INHALATION) at 22:04

## 2020-01-01 RX ADMIN — APIXABAN 10 MG: 5 TABLET, FILM COATED ORAL at 08:57

## 2020-01-01 RX ADMIN — MIRTAZAPINE 15 MG: 15 TABLET, FILM COATED ORAL at 20:03

## 2020-01-01 RX ADMIN — OXYCODONE HYDROCHLORIDE AND ACETAMINOPHEN 1 TABLET: 10; 325 TABLET ORAL at 09:37

## 2020-01-01 RX ADMIN — OXYCODONE HYDROCHLORIDE AND ACETAMINOPHEN 1 TABLET: 10; 325 TABLET ORAL at 08:03

## 2020-01-01 RX ADMIN — FERROUS SULFATE TAB 325 MG (65 MG ELEMENTAL FE) 325 MG: 325 (65 FE) TAB at 10:35

## 2020-01-01 RX ADMIN — MIRTAZAPINE 15 MG: 15 TABLET, FILM COATED ORAL at 20:07

## 2020-01-01 RX ADMIN — GUAIFENESIN 1200 MG: 600 TABLET, EXTENDED RELEASE ORAL at 08:48

## 2020-01-01 RX ADMIN — ENOXAPARIN SODIUM 50 MG: 60 INJECTION SUBCUTANEOUS at 14:28

## 2020-01-01 RX ADMIN — THEOPHYLLINE 400 MG: 400 TABLET, EXTENDED RELEASE ORAL at 23:59

## 2020-01-01 RX ADMIN — MONTELUKAST SODIUM 10 MG: 10 TABLET, FILM COATED ORAL at 20:15

## 2020-01-01 RX ADMIN — FLUOXETINE HYDROCHLORIDE 20 MG: 20 CAPSULE ORAL at 09:19

## 2020-01-01 RX ADMIN — ROFLUMILAST 500 MCG: 500 TABLET ORAL at 23:59

## 2020-01-01 RX ADMIN — AZITHROMYCIN DIHYDRATE 250 MG: 250 TABLET, FILM COATED ORAL at 17:28

## 2020-01-01 RX ADMIN — ZOLPIDEM TARTRATE 5 MG: 5 TABLET ORAL at 02:20

## 2020-01-01 RX ADMIN — LORAZEPAM 0.5 MG: 0.5 TABLET ORAL at 13:14

## 2020-01-01 RX ADMIN — OXYCODONE HYDROCHLORIDE AND ACETAMINOPHEN 1 TABLET: 10; 325 TABLET ORAL at 06:18

## 2020-01-01 RX ADMIN — MONTELUKAST SODIUM 10 MG: 10 TABLET, FILM COATED ORAL at 20:52

## 2020-01-01 RX ADMIN — SODIUM CHLORIDE, PRESERVATIVE FREE 10 ML: 5 INJECTION INTRAVENOUS at 20:54

## 2020-01-01 RX ADMIN — LORAZEPAM 0.5 MG: 0.5 TABLET ORAL at 08:48

## 2020-01-01 RX ADMIN — FLUOXETINE HYDROCHLORIDE 20 MG: 20 CAPSULE ORAL at 08:44

## 2020-01-01 RX ADMIN — SODIUM CHLORIDE 8 MG/HR: 900 INJECTION INTRAVENOUS at 14:00

## 2020-01-01 RX ADMIN — GUAIFENESIN 1200 MG: 600 TABLET, EXTENDED RELEASE ORAL at 20:03

## 2020-01-01 RX ADMIN — HYDROMORPHONE HYDROCHLORIDE 1 MG: 1 INJECTION, SOLUTION INTRAMUSCULAR; INTRAVENOUS; SUBCUTANEOUS at 00:18

## 2020-01-01 RX ADMIN — BUDESONIDE 0.5 MG: 0.5 INHALANT ORAL at 10:24

## 2020-01-01 RX ADMIN — ARFORMOTEROL TARTRATE 15 MCG: 15 SOLUTION RESPIRATORY (INHALATION) at 21:09

## 2020-01-01 RX ADMIN — BUDESONIDE 0.5 MG: 0.5 INHALANT ORAL at 20:14

## 2020-01-01 RX ADMIN — TRAMADOL HYDROCHLORIDE 50 MG: 50 TABLET ORAL at 11:38

## 2020-01-01 RX ADMIN — FERROUS SULFATE TAB 325 MG (65 MG ELEMENTAL FE) 325 MG: 325 (65 FE) TAB at 19:56

## 2020-01-01 RX ADMIN — ZOLPIDEM TARTRATE 5 MG: 5 TABLET ORAL at 23:41

## 2020-01-01 RX ADMIN — MONTELUKAST SODIUM 10 MG: 10 TABLET, FILM COATED ORAL at 21:46

## 2020-01-01 RX ADMIN — Medication 1000 UNITS: at 08:49

## 2020-01-01 RX ADMIN — LORAZEPAM 1 MG: 2 INJECTION INTRAMUSCULAR; INTRAVENOUS at 00:18

## 2020-01-01 RX ADMIN — ALUMINUM HYDROXIDE, MAGNESIUM HYDROXIDE, AND DIMETHICONE 15 ML: 400; 400; 40 SUSPENSION ORAL at 14:20

## 2020-01-01 RX ADMIN — LORAZEPAM 0.5 MG: 0.5 TABLET ORAL at 10:08

## 2020-01-01 RX ADMIN — LOPERAMIDE HYDROCHLORIDE 2 MG: 2 CAPSULE ORAL at 16:44

## 2020-01-01 RX ADMIN — LIDOCAINE 1 PATCH: 50 PATCH CUTANEOUS at 20:53

## 2020-01-01 RX ADMIN — PANTOPRAZOLE SODIUM 40 MG: 40 TABLET, DELAYED RELEASE ORAL at 06:00

## 2020-01-01 RX ADMIN — BUDESONIDE 0.5 MG: 0.5 INHALANT ORAL at 08:25

## 2020-01-01 RX ADMIN — APIXABAN 10 MG: 5 TABLET, FILM COATED ORAL at 14:47

## 2020-01-01 RX ADMIN — APIXABAN 5 MG: 5 TABLET, FILM COATED ORAL at 20:03

## 2020-01-01 RX ADMIN — FAMOTIDINE 20 MG: 20 TABLET, FILM COATED ORAL at 08:48

## 2020-01-01 RX ADMIN — AZITHROMYCIN DIHYDRATE 500 MG: 250 TABLET, FILM COATED ORAL at 10:34

## 2020-01-01 RX ADMIN — SODIUM CHLORIDE 8 MG/HR: 900 INJECTION INTRAVENOUS at 15:53

## 2020-01-01 RX ADMIN — GUAIFENESIN 1200 MG: 600 TABLET, EXTENDED RELEASE ORAL at 08:44

## 2020-01-01 RX ADMIN — OXYCODONE HYDROCHLORIDE AND ACETAMINOPHEN 1 TABLET: 10; 325 TABLET ORAL at 05:17

## 2020-01-01 RX ADMIN — THEOPHYLLINE 400 MG: 400 TABLET, EXTENDED RELEASE ORAL at 09:19

## 2020-01-01 RX ADMIN — BUDESONIDE 0.5 MG: 0.5 INHALANT ORAL at 06:39

## 2020-01-01 RX ADMIN — SODIUM CHLORIDE, PRESERVATIVE FREE 10 ML: 5 INJECTION INTRAVENOUS at 08:45

## 2020-01-01 RX ADMIN — ONDANSETRON 4 MG: 2 INJECTION INTRAMUSCULAR; INTRAVENOUS at 19:33

## 2020-01-01 RX ADMIN — MORPHINE SULFATE 4 MG: 2 INJECTION, SOLUTION INTRAMUSCULAR; INTRAVENOUS at 15:48

## 2020-01-01 RX ADMIN — OXYCODONE HYDROCHLORIDE AND ACETAMINOPHEN 1 TABLET: 10; 325 TABLET ORAL at 05:10

## 2020-01-01 RX ADMIN — OXYCODONE HYDROCHLORIDE AND ACETAMINOPHEN 1 TABLET: 10; 325 TABLET ORAL at 18:40

## 2020-01-01 RX ADMIN — OXYCODONE HYDROCHLORIDE AND ACETAMINOPHEN 1 TABLET: 10; 325 TABLET ORAL at 14:26

## 2020-01-01 RX ADMIN — TRAMADOL HYDROCHLORIDE 50 MG: 50 TABLET ORAL at 20:07

## 2020-01-01 RX ADMIN — SODIUM CHLORIDE, PRESERVATIVE FREE 10 ML: 5 INJECTION INTRAVENOUS at 20:07

## 2020-01-01 RX ADMIN — OXYCODONE HYDROCHLORIDE AND ACETAMINOPHEN 1 TABLET: 10; 325 TABLET ORAL at 10:35

## 2020-01-01 RX ADMIN — LOPERAMIDE HYDROCHLORIDE 2 MG: 2 CAPSULE ORAL at 09:19

## 2020-01-01 RX ADMIN — BUDESONIDE 0.5 MG: 0.5 INHALANT ORAL at 22:04

## 2020-01-01 RX ADMIN — FAMOTIDINE 20 MG: 20 TABLET, FILM COATED ORAL at 18:56

## 2020-01-01 RX ADMIN — GUAIFENESIN 1200 MG: 600 TABLET, EXTENDED RELEASE ORAL at 09:12

## 2020-01-01 RX ADMIN — ENOXAPARIN SODIUM 50 MG: 60 INJECTION SUBCUTANEOUS at 20:06

## 2020-01-01 RX ADMIN — MONTELUKAST SODIUM 10 MG: 10 TABLET, FILM COATED ORAL at 20:59

## 2020-01-01 RX ADMIN — TRAMADOL HYDROCHLORIDE 50 MG: 50 TABLET ORAL at 11:04

## 2020-01-01 RX ADMIN — ROFLUMILAST 500 MCG: 500 TABLET ORAL at 09:19

## 2020-01-01 RX ADMIN — APIXABAN 5 MG: 5 TABLET, FILM COATED ORAL at 20:07

## 2020-01-01 RX ADMIN — ENOXAPARIN SODIUM 50 MG: 60 INJECTION SUBCUTANEOUS at 21:45

## 2020-01-01 RX ADMIN — GUAIFENESIN 1200 MG: 600 TABLET, EXTENDED RELEASE ORAL at 08:50

## 2020-01-01 RX ADMIN — DILTIAZEM HYDROCHLORIDE 180 MG: 180 CAPSULE, COATED, EXTENDED RELEASE ORAL at 15:10

## 2020-01-01 RX ADMIN — OXYCODONE HYDROCHLORIDE AND ACETAMINOPHEN 1 TABLET: 10; 325 TABLET ORAL at 19:55

## 2020-01-01 RX ADMIN — ARFORMOTEROL TARTRATE 15 MCG: 15 SOLUTION RESPIRATORY (INHALATION) at 19:15

## 2020-01-01 RX ADMIN — LIDOCAINE 1 PATCH: 50 PATCH TOPICAL at 23:34

## 2020-01-01 RX ADMIN — ARFORMOTEROL TARTRATE 15 MCG: 15 SOLUTION RESPIRATORY (INHALATION) at 06:39

## 2020-01-01 RX ADMIN — BUDESONIDE 0.5 MG: 0.5 INHALANT ORAL at 07:01

## 2020-01-01 RX ADMIN — METHYLPREDNISOLONE SODIUM SUCCINATE 125 MG: 125 INJECTION, POWDER, FOR SOLUTION INTRAMUSCULAR; INTRAVENOUS at 15:52

## 2020-01-01 RX ADMIN — ARFORMOTEROL TARTRATE 15 MCG: 15 SOLUTION RESPIRATORY (INHALATION) at 07:01

## 2020-01-01 RX ADMIN — HYDROXYZINE HYDROCHLORIDE 25 MG: 25 TABLET ORAL at 17:15

## 2020-01-01 RX ADMIN — FLUOXETINE HYDROCHLORIDE 20 MG: 20 CAPSULE ORAL at 08:24

## 2020-01-01 RX ADMIN — DILTIAZEM HYDROCHLORIDE 180 MG: 180 CAPSULE, COATED, EXTENDED RELEASE ORAL at 19:56

## 2020-01-01 RX ADMIN — LORAZEPAM 0.5 MG: 0.5 TABLET ORAL at 10:02

## 2020-01-01 RX ADMIN — BUDESONIDE 0.5 MG: 0.5 INHALANT ORAL at 08:26

## 2020-01-01 RX ADMIN — MONTELUKAST SODIUM 10 MG: 10 TABLET, FILM COATED ORAL at 20:03

## 2020-01-01 RX ADMIN — MORPHINE SULFATE 4 MG: 2 INJECTION, SOLUTION INTRAMUSCULAR; INTRAVENOUS at 11:56

## 2020-01-01 RX ADMIN — TIOTROPIUM BROMIDE INHALATION SPRAY 2 PUFF: 3.12 SPRAY, METERED RESPIRATORY (INHALATION) at 08:10

## 2020-01-01 RX ADMIN — ENOXAPARIN SODIUM 50 MG: 60 INJECTION SUBCUTANEOUS at 09:20

## 2020-01-01 RX ADMIN — OXYCODONE HYDROCHLORIDE AND ACETAMINOPHEN 1 TABLET: 10; 325 TABLET ORAL at 13:28

## 2020-01-01 RX ADMIN — METHYLPREDNISOLONE SODIUM SUCCINATE 20 MG: 40 INJECTION, POWDER, FOR SOLUTION INTRAMUSCULAR; INTRAVENOUS at 11:44

## 2020-01-01 RX ADMIN — ARFORMOTEROL TARTRATE 15 MCG: 15 SOLUTION RESPIRATORY (INHALATION) at 20:18

## 2020-01-01 RX ADMIN — SODIUM CHLORIDE 8 MG/HR: 900 INJECTION INTRAVENOUS at 23:38

## 2020-01-01 RX ADMIN — GUAIFENESIN 1200 MG: 600 TABLET, EXTENDED RELEASE ORAL at 21:45

## 2020-01-01 RX ADMIN — ROFLUMILAST 500 MCG: 500 TABLET ORAL at 08:35

## 2020-01-01 RX ADMIN — TRAMADOL HYDROCHLORIDE 50 MG: 50 TABLET ORAL at 13:02

## 2020-01-01 RX ADMIN — MIRTAZAPINE 15 MG: 15 TABLET, FILM COATED ORAL at 20:59

## 2020-01-01 RX ADMIN — ARFORMOTEROL TARTRATE 15 MCG: 15 SOLUTION RESPIRATORY (INHALATION) at 07:41

## 2020-01-01 RX ADMIN — HYDROMORPHONE HYDROCHLORIDE 1 MG: 1 INJECTION, SOLUTION INTRAMUSCULAR; INTRAVENOUS; SUBCUTANEOUS at 14:14

## 2020-01-01 RX ADMIN — FAMOTIDINE 20 MG: 20 TABLET, FILM COATED ORAL at 17:27

## 2020-01-01 RX ADMIN — MORPHINE SULFATE 2 MG: 2 INJECTION, SOLUTION INTRAMUSCULAR; INTRAVENOUS at 08:58

## 2020-01-01 RX ADMIN — BUDESONIDE 0.5 MG: 0.5 INHALANT ORAL at 08:03

## 2020-01-01 RX ADMIN — LORAZEPAM 0.5 MG: 0.5 TABLET ORAL at 20:43

## 2020-01-01 RX ADMIN — POTASSIUM CHLORIDE 40 MEQ: 750 TABLET, EXTENDED RELEASE ORAL at 17:28

## 2020-01-01 RX ADMIN — OXYCODONE HYDROCHLORIDE AND ACETAMINOPHEN 1 TABLET: 10; 325 TABLET ORAL at 17:28

## 2020-01-01 RX ADMIN — FLUOXETINE HYDROCHLORIDE 20 MG: 20 CAPSULE ORAL at 10:05

## 2020-01-01 RX ADMIN — ARFORMOTEROL TARTRATE 15 MCG: 15 SOLUTION RESPIRATORY (INHALATION) at 19:50

## 2020-01-01 RX ADMIN — SODIUM CHLORIDE, PRESERVATIVE FREE 10 ML: 5 INJECTION INTRAVENOUS at 08:48

## 2020-01-01 RX ADMIN — GUAIFENESIN 1200 MG: 600 TABLET, EXTENDED RELEASE ORAL at 20:06

## 2020-01-01 RX ADMIN — OXYCODONE HYDROCHLORIDE AND ACETAMINOPHEN 1 TABLET: 10; 325 TABLET ORAL at 12:43

## 2020-01-01 RX ADMIN — MORPHINE SULFATE 4 MG: 2 INJECTION, SOLUTION INTRAMUSCULAR; INTRAVENOUS at 19:34

## 2020-01-01 RX ADMIN — OXYCODONE HYDROCHLORIDE AND ACETAMINOPHEN 1 TABLET: 10; 325 TABLET ORAL at 10:04

## 2020-01-01 RX ADMIN — ARFORMOTEROL TARTRATE 15 MCG: 15 SOLUTION RESPIRATORY (INHALATION) at 20:06

## 2020-01-01 RX ADMIN — GUAIFENESIN 1200 MG: 600 TABLET, EXTENDED RELEASE ORAL at 20:44

## 2020-01-01 RX ADMIN — GUAIFENESIN 1200 MG: 600 TABLET, EXTENDED RELEASE ORAL at 08:36

## 2020-01-01 RX ADMIN — FAMOTIDINE 20 MG: 20 TABLET, FILM COATED ORAL at 08:03

## 2020-01-01 RX ADMIN — BUDESONIDE 0.5 MG: 0.5 INHALANT ORAL at 22:22

## 2020-01-01 RX ADMIN — MIRTAZAPINE 15 MG: 15 TABLET, FILM COATED ORAL at 21:45

## 2020-01-01 RX ADMIN — FAMOTIDINE 20 MG: 20 TABLET, FILM COATED ORAL at 08:35

## 2020-01-01 RX ADMIN — ARFORMOTEROL TARTRATE 15 MCG: 15 SOLUTION RESPIRATORY (INHALATION) at 09:08

## 2020-01-01 RX ADMIN — CHOLECALCIFEROL TAB 10 MCG (400 UNIT) 800 UNITS: 10 TAB at 10:35

## 2020-01-01 RX ADMIN — HYDROMORPHONE HYDROCHLORIDE 1 MG: 1 INJECTION, SOLUTION INTRAMUSCULAR; INTRAVENOUS; SUBCUTANEOUS at 17:56

## 2020-01-01 RX ADMIN — SODIUM CHLORIDE, PRESERVATIVE FREE 10 ML: 5 INJECTION INTRAVENOUS at 00:34

## 2020-01-01 RX ADMIN — AZITHROMYCIN DIHYDRATE 250 MG: 250 TABLET, FILM COATED ORAL at 09:20

## 2020-01-01 RX ADMIN — ROFLUMILAST 500 MCG: 500 TABLET ORAL at 08:48

## 2020-01-01 RX ADMIN — HYDROMORPHONE HYDROCHLORIDE 1 MG: 1 INJECTION, SOLUTION INTRAMUSCULAR; INTRAVENOUS; SUBCUTANEOUS at 20:21

## 2020-01-01 RX ADMIN — POLYETHYLENE GLYCOL 3350 17 G: 17 POWDER, FOR SOLUTION ORAL at 08:56

## 2020-01-01 RX ADMIN — TIOTROPIUM BROMIDE INHALATION SPRAY 2 PUFF: 3.12 SPRAY, METERED RESPIRATORY (INHALATION) at 09:16

## 2020-01-01 RX ADMIN — ENOXAPARIN SODIUM 50 MG: 60 INJECTION SUBCUTANEOUS at 20:54

## 2020-01-01 RX ADMIN — ARFORMOTEROL TARTRATE 15 MCG: 15 SOLUTION RESPIRATORY (INHALATION) at 08:26

## 2020-01-01 RX ADMIN — MORPHINE SULFATE 2 MG: 2 INJECTION, SOLUTION INTRAMUSCULAR; INTRAVENOUS at 22:15

## 2020-01-01 RX ADMIN — HYDROXYZINE HYDROCHLORIDE 25 MG: 25 TABLET ORAL at 00:15

## 2020-01-01 RX ADMIN — SODIUM CHLORIDE, PRESERVATIVE FREE 10 ML: 5 INJECTION INTRAVENOUS at 21:01

## 2020-01-01 RX ADMIN — DILTIAZEM HYDROCHLORIDE 180 MG: 180 CAPSULE, COATED, EXTENDED RELEASE ORAL at 09:12

## 2020-01-01 RX ADMIN — OXYCODONE HYDROCHLORIDE AND ACETAMINOPHEN 1 TABLET: 10; 325 TABLET ORAL at 06:26

## 2020-01-01 RX ADMIN — TRAMADOL HYDROCHLORIDE 50 MG: 50 TABLET ORAL at 08:35

## 2020-01-01 RX ADMIN — ENOXAPARIN SODIUM 50 MG: 60 INJECTION SUBCUTANEOUS at 08:35

## 2020-01-01 RX ADMIN — POTASSIUM CHLORIDE 10 MEQ: 7.46 INJECTION, SOLUTION INTRAVENOUS at 17:13

## 2020-01-01 RX ADMIN — TRAMADOL HYDROCHLORIDE 50 MG: 50 TABLET ORAL at 18:56

## 2020-01-01 RX ADMIN — LORAZEPAM 0.5 MG: 0.5 TABLET ORAL at 15:15

## 2020-01-01 RX ADMIN — MORPHINE SULFATE 2 MG: 2 INJECTION, SOLUTION INTRAMUSCULAR; INTRAVENOUS at 08:48

## 2020-01-01 RX ADMIN — THEOPHYLLINE 400 MG: 400 TABLET, EXTENDED RELEASE ORAL at 08:49

## 2020-01-01 RX ADMIN — XENON XE-133 7.6 MILLICURIE: 10 GAS RESPIRATORY (INHALATION) at 07:15

## 2020-01-01 RX ADMIN — FLUOXETINE HYDROCHLORIDE 20 MG: 20 CAPSULE ORAL at 08:36

## 2020-01-01 RX ADMIN — FAMOTIDINE 20 MG: 20 TABLET, FILM COATED ORAL at 18:34

## 2020-01-01 RX ADMIN — IOPAMIDOL 95 ML: 755 INJECTION, SOLUTION INTRAVENOUS at 17:02

## 2020-01-01 RX ADMIN — POTASSIUM CHLORIDE 40 MEQ: 750 TABLET, EXTENDED RELEASE ORAL at 21:45

## 2020-01-01 RX ADMIN — MONTELUKAST SODIUM 10 MG: 10 TABLET, FILM COATED ORAL at 20:06

## 2020-01-01 RX ADMIN — MONTELUKAST SODIUM 10 MG: 10 TABLET, FILM COATED ORAL at 20:07

## 2020-01-01 RX ADMIN — OXYCODONE HYDROCHLORIDE AND ACETAMINOPHEN 1 TABLET: 10; 325 TABLET ORAL at 00:57

## 2020-01-01 RX ADMIN — ROFLUMILAST 500 MCG: 500 TABLET ORAL at 10:04

## 2020-01-01 RX ADMIN — Medication 1000 UNITS: at 09:19

## 2020-01-01 RX ADMIN — AZITHROMYCIN DIHYDRATE 500 MG: 250 TABLET, FILM COATED ORAL at 08:50

## 2020-01-01 RX ADMIN — BACLOFEN 10 MG: 10 TABLET ORAL at 01:26

## 2020-01-01 RX ADMIN — BUDESONIDE 0.5 MG: 0.5 INHALANT ORAL at 23:36

## 2020-01-01 RX ADMIN — TRAMADOL HYDROCHLORIDE 50 MG: 50 TABLET ORAL at 02:20

## 2020-01-01 RX ADMIN — BACLOFEN 10 MG: 10 TABLET ORAL at 11:04

## 2020-01-01 RX ADMIN — ZOLPIDEM TARTRATE 5 MG: 5 TABLET ORAL at 01:46

## 2020-01-01 RX ADMIN — SODIUM CHLORIDE 8 MG/HR: 900 INJECTION INTRAVENOUS at 09:20

## 2020-01-01 RX ADMIN — FERROUS SULFATE TAB 325 MG (65 MG ELEMENTAL FE) 325 MG: 325 (65 FE) TAB at 17:50

## 2020-01-01 RX ADMIN — DIPHENHYDRAMINE HYDROCHLORIDE 25 MG: 25 CAPSULE ORAL at 15:41

## 2020-01-01 RX ADMIN — MORPHINE SULFATE 2 MG: 2 INJECTION, SOLUTION INTRAMUSCULAR; INTRAVENOUS at 00:49

## 2020-01-01 RX ADMIN — KIT FOR THE PREPARATION OF TECHNETIUM TC 99M ALBUMIN AGGREGATED 1 DOSE: 2.5 INJECTION, POWDER, FOR SOLUTION INTRAVENOUS at 07:19

## 2020-01-01 RX ADMIN — TRAMADOL HYDROCHLORIDE 50 MG: 50 TABLET ORAL at 02:09

## 2020-01-01 RX ADMIN — ONDANSETRON 4 MG: 2 INJECTION INTRAMUSCULAR; INTRAVENOUS at 10:02

## 2020-01-01 RX ADMIN — ROFLUMILAST 500 MCG: 500 TABLET ORAL at 08:50

## 2020-01-01 RX ADMIN — DILTIAZEM HYDROCHLORIDE 180 MG: 180 CAPSULE, COATED, EXTENDED RELEASE ORAL at 08:44

## 2020-01-01 RX ADMIN — TIOTROPIUM BROMIDE INHALATION SPRAY 2 PUFF: 3.12 SPRAY, METERED RESPIRATORY (INHALATION) at 06:43

## 2020-01-01 RX ADMIN — BACLOFEN 10 MG: 10 TABLET ORAL at 18:41

## 2020-01-01 RX ADMIN — LORAZEPAM 1 MG: 2 INJECTION INTRAMUSCULAR; INTRAVENOUS at 02:39

## 2020-01-01 RX ADMIN — CEFEPIME HYDROCHLORIDE 1 G: 1 INJECTION, POWDER, FOR SOLUTION INTRAMUSCULAR; INTRAVENOUS at 14:20

## 2020-01-01 RX ADMIN — MONTELUKAST SODIUM 10 MG: 10 TABLET, FILM COATED ORAL at 00:34

## 2020-01-01 RX ADMIN — HYDROXYZINE HYDROCHLORIDE 25 MG: 25 TABLET ORAL at 11:46

## 2020-01-01 RX ADMIN — SODIUM CHLORIDE, PRESERVATIVE FREE 10 ML: 5 INJECTION INTRAVENOUS at 08:50

## 2020-01-01 RX ADMIN — GUAIFENESIN 1200 MG: 600 TABLET, EXTENDED RELEASE ORAL at 02:35

## 2020-01-01 RX ADMIN — GUAIFENESIN 1200 MG: 600 TABLET, EXTENDED RELEASE ORAL at 09:19

## 2020-01-01 RX ADMIN — DILTIAZEM HYDROCHLORIDE 180 MG: 180 CAPSULE, COATED, EXTENDED RELEASE ORAL at 08:57

## 2020-01-01 RX ADMIN — TIOTROPIUM BROMIDE INHALATION SPRAY 2 PUFF: 3.12 SPRAY, METERED RESPIRATORY (INHALATION) at 07:03

## 2020-01-01 RX ADMIN — SODIUM CHLORIDE, PRESERVATIVE FREE 10 ML: 5 INJECTION INTRAVENOUS at 21:47

## 2020-01-01 RX ADMIN — Medication 1000 UNITS: at 08:56

## 2020-01-01 RX ADMIN — LORAZEPAM 0.5 MG: 0.5 TABLET ORAL at 14:24

## 2020-01-01 RX ADMIN — THEOPHYLLINE 400 MG: 400 TABLET, EXTENDED RELEASE ORAL at 08:57

## 2020-01-01 RX ADMIN — ARFORMOTEROL TARTRATE 15 MCG: 15 SOLUTION RESPIRATORY (INHALATION) at 08:03

## 2020-01-01 RX ADMIN — THEOPHYLLINE 400 MG: 400 TABLET, EXTENDED RELEASE ORAL at 09:12

## 2020-01-01 RX ADMIN — ROFLUMILAST 500 MCG: 500 TABLET ORAL at 10:35

## 2020-01-01 RX ADMIN — PANTOPRAZOLE SODIUM 80 MG: 40 INJECTION, POWDER, FOR SOLUTION INTRAVENOUS at 15:47

## 2020-01-01 RX ADMIN — LIDOCAINE 1 PATCH: 50 PATCH CUTANEOUS at 01:26

## 2020-01-01 RX ADMIN — ARFORMOTEROL TARTRATE 15 MCG: 15 SOLUTION RESPIRATORY (INHALATION) at 22:22

## 2020-01-01 RX ADMIN — BUDESONIDE 0.5 MG: 0.5 INHALANT ORAL at 21:09

## 2020-01-01 RX ADMIN — LORAZEPAM 0.5 MG: 0.5 TABLET ORAL at 08:49

## 2020-01-01 RX ADMIN — LORAZEPAM 1 MG: 2 INJECTION INTRAMUSCULAR; INTRAVENOUS at 12:16

## 2020-01-01 RX ADMIN — HYDROXYZINE HYDROCHLORIDE 25 MG: 25 TABLET ORAL at 05:23

## 2020-01-01 RX ADMIN — OXYCODONE HYDROCHLORIDE AND ACETAMINOPHEN 1 TABLET: 10; 325 TABLET ORAL at 18:19

## 2020-01-01 RX ADMIN — TIOTROPIUM BROMIDE INHALATION SPRAY 2 PUFF: 3.12 SPRAY, METERED RESPIRATORY (INHALATION) at 07:43

## 2020-01-01 RX ADMIN — OXYCODONE HYDROCHLORIDE AND ACETAMINOPHEN 1 TABLET: 10; 325 TABLET ORAL at 14:19

## 2020-01-01 RX ADMIN — AZITHROMYCIN DIHYDRATE 250 MG: 250 TABLET, FILM COATED ORAL at 08:56

## 2020-01-01 RX ADMIN — ROFLUMILAST 500 MCG: 500 TABLET ORAL at 08:23

## 2020-01-01 RX ADMIN — ARFORMOTEROL TARTRATE 15 MCG: 15 SOLUTION RESPIRATORY (INHALATION) at 08:25

## 2020-01-01 RX ADMIN — ARFORMOTEROL TARTRATE 15 MCG: 15 SOLUTION RESPIRATORY (INHALATION) at 10:24

## 2020-01-01 RX ADMIN — OXYCODONE HYDROCHLORIDE AND ACETAMINOPHEN 1 TABLET: 10; 325 TABLET ORAL at 22:37

## 2020-01-01 RX ADMIN — BUDESONIDE 0.5 MG: 0.5 INHALANT ORAL at 20:18

## 2020-01-01 RX ADMIN — MORPHINE SULFATE 2 MG: 2 INJECTION, SOLUTION INTRAMUSCULAR; INTRAVENOUS at 21:56

## 2020-01-01 RX ADMIN — HYDROMORPHONE HYDROCHLORIDE 1 MG: 1 INJECTION, SOLUTION INTRAMUSCULAR; INTRAVENOUS; SUBCUTANEOUS at 16:07

## 2020-01-01 RX ADMIN — ROFLUMILAST 500 MCG: 500 TABLET ORAL at 08:44

## 2020-01-01 RX ADMIN — ONDANSETRON 4 MG: 2 INJECTION INTRAMUSCULAR; INTRAVENOUS at 15:47

## 2020-01-01 RX ADMIN — LIDOCAINE 1 PATCH: 50 PATCH CUTANEOUS at 01:30

## 2020-01-01 RX ADMIN — HYDROXYZINE HYDROCHLORIDE 25 MG: 25 TABLET ORAL at 23:24

## 2020-01-01 RX ADMIN — MORPHINE SULFATE 4 MG: 2 INJECTION, SOLUTION INTRAMUSCULAR; INTRAVENOUS at 01:57

## 2020-01-01 RX ADMIN — BUDESONIDE 0.5 MG: 0.5 INHALANT ORAL at 19:50

## 2020-01-01 RX ADMIN — MORPHINE SULFATE 2 MG: 2 INJECTION, SOLUTION INTRAMUSCULAR; INTRAVENOUS at 06:44

## 2020-01-01 RX ADMIN — VANCOMYCIN HYDROCHLORIDE 1250 MG: 10 INJECTION, POWDER, LYOPHILIZED, FOR SOLUTION INTRAVENOUS at 20:50

## 2020-01-01 RX ADMIN — OXYCODONE HYDROCHLORIDE AND ACETAMINOPHEN 1 TABLET: 10; 325 TABLET ORAL at 22:00

## 2020-01-01 RX ADMIN — SODIUM CHLORIDE 8 MG/HR: 900 INJECTION INTRAVENOUS at 02:44

## 2020-01-01 RX ADMIN — FAMOTIDINE 20 MG: 20 TABLET, FILM COATED ORAL at 10:04

## 2020-01-01 RX ADMIN — LIDOCAINE 1 PATCH: 50 PATCH TOPICAL at 02:39

## 2020-01-01 RX ADMIN — ONDANSETRON 4 MG: 2 INJECTION INTRAMUSCULAR; INTRAVENOUS at 02:44

## 2020-01-01 RX ADMIN — FLUOXETINE HYDROCHLORIDE 20 MG: 20 CAPSULE ORAL at 19:56

## 2020-01-01 RX ADMIN — POTASSIUM CHLORIDE 40 MEQ: 750 TABLET, EXTENDED RELEASE ORAL at 08:47

## 2020-01-01 RX ADMIN — TIOTROPIUM BROMIDE INHALATION SPRAY 2 PUFF: 3.12 SPRAY, METERED RESPIRATORY (INHALATION) at 10:24

## 2020-01-01 RX ADMIN — ARFORMOTEROL TARTRATE 15 MCG: 15 SOLUTION RESPIRATORY (INHALATION) at 23:36

## 2020-01-01 RX ADMIN — TRAMADOL HYDROCHLORIDE 50 MG: 50 TABLET ORAL at 21:18

## 2020-01-01 RX ADMIN — OXYCODONE HYDROCHLORIDE AND ACETAMINOPHEN 1 TABLET: 5; 325 TABLET ORAL at 23:20

## 2020-01-01 RX ADMIN — SODIUM CHLORIDE 8 MG/HR: 900 INJECTION INTRAVENOUS at 03:41

## 2020-01-01 RX ADMIN — MORPHINE SULFATE 4 MG: 2 INJECTION, SOLUTION INTRAMUSCULAR; INTRAVENOUS at 12:16

## 2020-01-01 RX ADMIN — ZOLPIDEM TARTRATE 5 MG: 5 TABLET ORAL at 23:24

## 2020-01-01 RX ADMIN — THEOPHYLLINE 400 MG: 400 TABLET, EXTENDED RELEASE ORAL at 08:44

## 2020-01-01 RX ADMIN — SODIUM CHLORIDE 8 MG/HR: 900 INJECTION INTRAVENOUS at 07:21

## 2020-01-01 RX ADMIN — ACETAMINOPHEN 650 MG: 325 TABLET, FILM COATED ORAL at 09:56

## 2020-01-01 RX ADMIN — OXYCODONE HYDROCHLORIDE AND ACETAMINOPHEN 1 TABLET: 10; 325 TABLET ORAL at 21:00

## 2020-01-01 RX ADMIN — ONDANSETRON HYDROCHLORIDE 4 MG: 4 TABLET, FILM COATED ORAL at 08:56

## 2020-01-01 RX ADMIN — GUAIFENESIN 1200 MG: 600 TABLET, EXTENDED RELEASE ORAL at 20:52

## 2020-01-01 RX ADMIN — FAMOTIDINE 20 MG: 20 TABLET, FILM COATED ORAL at 18:41

## 2020-01-01 RX ADMIN — APIXABAN 5 MG: 5 TABLET, FILM COATED ORAL at 10:34

## 2020-01-01 RX ADMIN — MIRTAZAPINE 15 MG: 15 TABLET, FILM COATED ORAL at 20:06

## 2020-01-01 RX ADMIN — DILTIAZEM HYDROCHLORIDE 180 MG: 180 CAPSULE, COATED, EXTENDED RELEASE ORAL at 08:36

## 2020-01-01 RX ADMIN — LORAZEPAM 0.5 MG: 0.5 TABLET ORAL at 20:12

## 2020-01-01 RX ADMIN — OXYCODONE HYDROCHLORIDE AND ACETAMINOPHEN 1 TABLET: 10; 325 TABLET ORAL at 13:33

## 2020-01-01 RX ADMIN — OXYCODONE HYDROCHLORIDE AND ACETAMINOPHEN 1 TABLET: 5; 325 TABLET ORAL at 10:52

## 2020-01-01 RX ADMIN — GUAIFENESIN 1200 MG: 600 TABLET, EXTENDED RELEASE ORAL at 10:34

## 2020-01-01 RX ADMIN — ROFLUMILAST 500 MCG: 500 TABLET ORAL at 12:15

## 2020-01-01 RX ADMIN — LIDOCAINE 1 PATCH: 50 PATCH CUTANEOUS at 21:03

## 2020-01-01 RX ADMIN — TRAMADOL HYDROCHLORIDE 50 MG: 50 TABLET ORAL at 23:04

## 2020-01-01 RX ADMIN — MORPHINE SULFATE 2 MG: 2 INJECTION, SOLUTION INTRAMUSCULAR; INTRAVENOUS at 13:45

## 2020-01-01 RX ADMIN — LORAZEPAM 0.5 MG: 2 INJECTION INTRAMUSCULAR; INTRAVENOUS at 21:56

## 2020-01-01 RX ADMIN — MONTELUKAST SODIUM 10 MG: 10 TABLET, FILM COATED ORAL at 20:44

## 2020-01-01 RX ADMIN — LIDOCAINE 1 PATCH: 50 PATCH CUTANEOUS at 00:43

## 2020-01-01 RX ADMIN — GUAIFENESIN 1200 MG: 600 TABLET, EXTENDED RELEASE ORAL at 10:05

## 2020-01-01 RX ADMIN — SODIUM CHLORIDE, PRESERVATIVE FREE 10 ML: 5 INJECTION INTRAVENOUS at 09:02

## 2020-01-01 RX ADMIN — GLYCOPYRROLATE 0.4 MG: 0.2 INJECTION, SOLUTION INTRAMUSCULAR; INTRAVITREAL at 04:11

## 2020-01-01 RX ADMIN — SODIUM CHLORIDE, PRESERVATIVE FREE 10 ML: 5 INJECTION INTRAVENOUS at 20:16

## 2020-01-01 RX ADMIN — FLUOXETINE HYDROCHLORIDE 20 MG: 20 CAPSULE ORAL at 09:12

## 2020-01-01 RX ADMIN — SODIUM CHLORIDE, PRESERVATIVE FREE 10 ML: 5 INJECTION INTRAVENOUS at 08:36

## 2020-01-01 RX ADMIN — SODIUM CHLORIDE, PRESERVATIVE FREE 10 ML: 5 INJECTION INTRAVENOUS at 10:07

## 2020-01-01 RX ADMIN — MORPHINE SULFATE 4 MG: 2 INJECTION, SOLUTION INTRAMUSCULAR; INTRAVENOUS at 08:24

## 2020-01-01 RX ADMIN — FUROSEMIDE 40 MG: 10 INJECTION, SOLUTION INTRAMUSCULAR; INTRAVENOUS at 07:12

## 2020-01-01 RX ADMIN — OXYCODONE HYDROCHLORIDE AND ACETAMINOPHEN 1 TABLET: 10; 325 TABLET ORAL at 05:40

## 2020-01-01 RX ADMIN — ENOXAPARIN SODIUM 50 MG: 60 INJECTION SUBCUTANEOUS at 10:03

## 2020-01-01 RX ADMIN — MONTELUKAST SODIUM 10 MG: 10 TABLET, FILM COATED ORAL at 02:34

## 2020-01-01 RX ADMIN — ROFLUMILAST 500 MCG: 500 TABLET ORAL at 08:56

## 2020-01-01 RX ADMIN — Medication 1000 UNITS: at 08:36

## 2020-01-01 RX ADMIN — OXYCODONE HYDROCHLORIDE AND ACETAMINOPHEN 1 TABLET: 10; 325 TABLET ORAL at 02:35

## 2020-01-01 RX ADMIN — OXYCODONE HYDROCHLORIDE AND ACETAMINOPHEN 1 TABLET: 10; 325 TABLET ORAL at 18:34

## 2020-01-01 RX ADMIN — Medication 1000 UNITS: at 10:05

## 2020-01-01 RX ADMIN — BUDESONIDE 0.5 MG: 0.5 INHALANT ORAL at 07:42

## 2020-01-01 RX ADMIN — OXYCODONE HYDROCHLORIDE AND ACETAMINOPHEN 1 TABLET: 10; 325 TABLET ORAL at 04:31

## 2020-01-01 RX ADMIN — ZOLPIDEM TARTRATE 5 MG: 5 TABLET ORAL at 01:02

## 2020-01-01 RX ADMIN — MORPHINE SULFATE 4 MG: 2 INJECTION, SOLUTION INTRAMUSCULAR; INTRAVENOUS at 22:11

## 2020-01-01 RX ADMIN — FLUOXETINE HYDROCHLORIDE 20 MG: 20 CAPSULE ORAL at 20:07

## 2020-01-01 RX ADMIN — THEOPHYLLINE 400 MG: 400 TABLET, EXTENDED RELEASE ORAL at 08:35

## 2020-01-01 RX ADMIN — OXYCODONE HYDROCHLORIDE AND ACETAMINOPHEN 1 TABLET: 5; 325 TABLET ORAL at 04:50

## 2020-02-20 NOTE — TELEPHONE ENCOUNTER
I spoke with Pat her  and sent in some Bentyl which may help. Can you call and check on her tomorrow for me please and thankyou

## 2020-02-20 NOTE — TELEPHONE ENCOUNTER
Patient's , Vlad, called stating that the patient is having continuous diarrhea and needs something stronger than 4 loperamide (IMODIUM) capsules a day.     Manisha Wiseman confirmed.     Patient requesting at call back at: [phone]

## 2020-02-25 NOTE — TELEPHONE ENCOUNTER
Last seen 11/20  Last requested 8/5/2019    No appt scheduled    Doing pawel today and will scan in

## 2020-03-09 NOTE — PATIENT INSTRUCTIONS
Insomnia  Insomnia is a sleep disorder that makes it difficult to fall asleep or stay asleep. Insomnia can cause fatigue, low energy, difficulty concentrating, mood swings, and poor performance at work or school.  There are three different ways to classify insomnia:  · Difficulty falling asleep.  · Difficulty staying asleep.  · Waking up too early in the morning.  Any type of insomnia can be long-term (chronic) or short-term (acute). Both are common. Short-term insomnia usually lasts for three months or less. Chronic insomnia occurs at least three times a week for longer than three months.  What are the causes?  Insomnia may be caused by another condition, situation, or substance, such as:  · Anxiety.  · Certain medicines.  · Gastroesophageal reflux disease (GERD) or other gastrointestinal conditions.  · Asthma or other breathing conditions.  · Restless legs syndrome, sleep apnea, or other sleep disorders.  · Chronic pain.  · Menopause.  · Stroke.  · Abuse of alcohol, tobacco, or illegal drugs.  · Mental health conditions, such as depression.  · Caffeine.  · Neurological disorders, such as Alzheimer's disease.  · An overactive thyroid (hyperthyroidism).  Sometimes, the cause of insomnia may not be known.  What increases the risk?  Risk factors for insomnia include:  · Gender. Women are affected more often than men.  · Age. Insomnia is more common as you get older.  · Stress.  · Lack of exercise.  · Irregular work schedule or working night shifts.  · Traveling between different time zones.  · Certain medical and mental health conditions.  What are the signs or symptoms?  If you have insomnia, the main symptom is having trouble falling asleep or having trouble staying asleep. This may lead to other symptoms, such as:  · Feeling fatigued or having low energy.  · Feeling nervous about going to sleep.  · Not feeling rested in the morning.  · Having trouble concentrating.  · Feeling irritable, anxious, or depressed.  How  is this diagnosed?  This condition may be diagnosed based on:  · Your symptoms and medical history. Your health care provider may ask about:  ? Your sleep habits.  ? Any medical conditions you have.  ? Your mental health.  · A physical exam.  How is this treated?  Treatment for insomnia depends on the cause. Treatment may focus on treating an underlying condition that is causing insomnia. Treatment may also include:  · Medicines to help you sleep.  · Counseling or therapy.  · Lifestyle adjustments to help you sleep better.  Follow these instructions at home:  Eating and drinking    · Limit or avoid alcohol, caffeinated beverages, and cigarettes, especially close to bedtime. These can disrupt your sleep.  · Do not eat a large meal or eat spicy foods right before bedtime. This can lead to digestive discomfort that can make it hard for you to sleep.  Sleep habits    · Keep a sleep diary to help you and your health care provider figure out what could be causing your insomnia. Write down:  ? When you sleep.  ? When you wake up during the night.  ? How well you sleep.  ? How rested you feel the next day.  ? Any side effects of medicines you are taking.  ? What you eat and drink.  · Make your bedroom a dark, comfortable place where it is easy to fall asleep.  ? Put up shades or blackout curtains to block light from outside.  ? Use a white noise machine to block noise.  ? Keep the temperature cool.  · Limit screen use before bedtime. This includes:  ? Watching TV.  ? Using your smartphone, tablet, or computer.  · Stick to a routine that includes going to bed and waking up at the same times every day and night. This can help you fall asleep faster. Consider making a quiet activity, such as reading, part of your nighttime routine.  · Try to avoid taking naps during the day so that you sleep better at night.  · Get out of bed if you are still awake after 15 minutes of trying to sleep. Keep the lights down, but try reading or  doing a quiet activity. When you feel sleepy, go back to bed.  General instructions  · Take over-the-counter and prescription medicines only as told by your health care provider.  · Exercise regularly, as told by your health care provider. Avoid exercise starting several hours before bedtime.  · Use relaxation techniques to manage stress. Ask your health care provider to suggest some techniques that may work well for you. These may include:  ? Breathing exercises.  ? Routines to release muscle tension.  ? Visualizing peaceful scenes.  · Make sure that you drive carefully. Avoid driving if you feel very sleepy.  · Keep all follow-up visits as told by your health care provider. This is important.  Contact a health care provider if:  · You are tired throughout the day.  · You have trouble in your daily routine due to sleepiness.  · You continue to have sleep problems, or your sleep problems get worse.  Get help right away if:  · You have serious thoughts about hurting yourself or someone else.  If you ever feel like you may hurt yourself or others, or have thoughts about taking your own life, get help right away. You can go to your nearest emergency department or call:  · Your local emergency services (911 in the [place]  · A suicide crisis helpline, such as the National Suicide Prevention Lifeline at 1-324.157.8111. This is open 24 hours a day.  Summary  · Insomnia is a sleep disorder that makes it difficult to fall asleep or stay asleep.  · Insomnia can be long-term (chronic) or short-term (acute).  · Treatment for insomnia depends on the cause. Treatment may focus on treating an underlying condition that is causing insomnia.  · Keep a sleep diary to help you and your health care provider figure out what could be causing your insomnia.  This information is not intended to replace advice given to you by your health care provider. Make sure you discuss any questions you have with your health care provider.  Document  Released: 12/15/2001 Document Revised: 09/27/2018 Document Reviewed: 09/27/2018  Elsevier Interactive Patient Education © 2020 [employer] Inc.

## 2020-03-10 NOTE — PROGRESS NOTES
Subjective Insomnia and osteoporosis and loose stools  Siobhan Prakash is a 75 y.o. female.     History of Present Illness  Patient is here today to have a refill on her Ambien for her insomnia.  She has been on this for several years and it continues to help her sleep.  She also has a history of osteoporosis for the back and osteopenia of her hips want to refer her out.  She is on Prolia had discontinued secondary to increased calcium levels.  She called the office a couple weeks ago with diarrhea that was unresponsive to Imodium and put her on Bentyl and she did well on it and needs a refill.  The following portions of the patient's history were reviewed and updated as appropriate: allergies, current medications, past family history, past medical history, past social history, past surgical history and problem list.    Review of Systems   Constitutional: Negative for activity change and appetite change.   Respiratory: Positive for shortness of breath.    Gastrointestinal: Positive for diarrhea.   Neurological: Negative for dizziness and light-headedness.   Psychiatric/Behavioral: Positive for sleep disturbance.       Objective   Physical Exam   Constitutional: She appears well-developed and well-nourished. No distress.   HENT:   Head: Normocephalic and atraumatic.   Right Ear: External ear normal.   Left Ear: External ear normal.   Eyes: EOM are normal.   Neck: Neck supple. No thyromegaly present.   Cardiovascular: Normal rate, regular rhythm and normal heart sounds.   Pulmonary/Chest: She is in respiratory distress.   Patient has COPD she is on home O2 she is all stressed.  She is followed by Dr. Hightower the pulmonologist.   Musculoskeletal: Normal range of motion.   Neurological: She is alert.   Skin: Skin is warm.   Nursing note and vitals reviewed.      Assessment/Plan   Siobhan was seen today for annual exam and med refill.    Diagnoses and all orders for this visit:    Primary insomnia  -     Discontinue:  zolpidem (AMBIEN) 10 MG tablet; Take 1 tablet by mouth At Night As Needed for Sleep.  -     zolpidem (AMBIEN) 10 MG tablet; Take 1 tablet by mouth At Night As Needed for Sleep.    Osteoporosis without current pathological fracture, unspecified osteoporosis type  -     Ambulatory Referral to Sports Medicine    Other orders  -     dicyclomine (BENTYL) 20 MG tablet; Take 1 tablet by mouth Every 6 (Six) Hours.       [id number] is her [facility] number is clean

## 2020-05-28 NOTE — TELEPHONE ENCOUNTER
Siobhan texted me yesterday her allergies are terrible she was requesting some ephedrine.  I called it in to her pharmacy which was the [email or web address] at approximately [phone].

## 2020-06-18 NOTE — TELEPHONE ENCOUNTER
Ms. Prakash's  called me today to let me know that she is in pain and had a bad night. She is on home 02 24/7 with history of emphysema. I am going to send over some tramadol and he is going to call me tomorrow to check in.

## 2020-06-19 PROBLEM — J44.9 COPD (CHRONIC OBSTRUCTIVE PULMONARY DISEASE) (HCC): Status: ACTIVE | Noted: 2019-10-01

## 2020-07-14 NOTE — TELEPHONE ENCOUNTER
Spoke with her , Courtney reports she is very very depressed.  She has been taking sertraline for years and he thinks that is not helping her anymore.  She has a lengthy history of pulmonary emphysema and is on home O2.  She has a very fragile patient followed by Dr. Hightower.  She reports she does not think that this is interfering with her breathing or making it worse.  She has started some bouts of diarrhea.  Plan was to discontinue her sertraline and start her on some Prozac.  Advised her  that if she worsened that she was to go to the ER despite her anxiety of leaving the house.

## 2020-07-22 NOTE — TELEPHONE ENCOUNTER
PATIENT CALLED AND STATED THE traMADol (ULTRAM) 50 MG tablet   PRESCRIPTION IS NOT AT THE PHARMACY:   [id number] - [place], [place] - [address] AT [facility] [place], [place] - [phone] [facility] [phone] FX     PATIENT ONLY HAS 1 PILL AND TAKES IT EVERY 4 HOURS    PLEASE ADVICE    PATIENT CALL BACK [phone]

## 2020-08-13 NOTE — ED NOTES
[facility] -   Pulmonology  Consult Note    Patient Name: Sarah Parker  MRN: [id number]  Admission Date: 8/10/2020  Hospital Length of Stay: 2 days  Code Status: Prior  Attending Physician: Pollo Sparrow MD  Primary Care Provider: Briseida Bennett MD   Principal Problem: Rheumatoid lung      Subjective:     HPI:  Sarah Parker is 80 y.o.   Asked to see for abnormal chest CT  Known Severe RA on methotrexate and Actemra which were stopped 01/2020  Recent recurrent staphy infections : has Wound vac  Admitted fro Abn lab workup: SP PRBC transfusion  Chest CT done 08/03/2020, 08/10/2020 and 09/16/2019 reviewed  No cough, No fever, No SOB, No night sweats  Curently on Abx: Vanco, Cefepime , Flagyl  Also seen by Hem Onc concern for blast crisis: Wbcc:49K    Past Medical History:   Diagnosis Date    Acid reflux     Anxiety     Back pain     Bronchitis, chronic obstructive w acute bronchitis 7/29/2016    Cancer     [facility] arms, face- Dr. Lata Tejada    Cataract     2+NS    Chronic myelomonocytic leukemia     Degenerative disc disease     Depression     Depression     Dry mouth     Encounter for blood transfusion     Hernia, hiatal 11/18/2013    Hypertension     Hypothyroid     Hypothyroidism     Iron deficiency anemia     Macrocytic anemia 5/3/2016    Macular degeneration     Migraines     Mixed anxiety and depressive disorder     Multiple fractures of ribs of right side     Osteoporosis     Other hyperlipidemia 10/11/2019    Pneumonia     Pneumonia due to other staphylococcus     Rheumatoid arthritis     Rheumatoid arthritis(714.0)     Rheumatoid arthritis(714.0)     Remicade, MTX.       Past Surgical History:   Procedure Laterality Date    APPENDECTOMY  1985    APPLICATION OF WOUND VACUUM-ASSISTED CLOSURE DEVICE N/A 5/14/2020    Procedure: APPLICATION, WOUND VAC;  Surgeon: Mckinley Shannon MD;  Location: [facility];  Service: General;  Laterality: N/A;    APPLICATION OF WOUND  Pt c/o chest pain since waking up around 0930 this AM. Pt also c/o cough x's 3 weeks. Pt has a history of COPD and is on 4 L O2 via nasal cannula at all times.     Sheryl Joseph RN  01/15/18 [id number]     VACUUM-ASSISTED CLOSURE DEVICE Left 7/25/2020    Procedure: APPLICATION, WOUND VAC;  Surgeon: Berenice Alfaro MD;  Location: [facility] OR;  Service: General;  Laterality: Left;    BREAST BIOPSY      CATARACT EXTRACTION Bilateral 6/11/15    Dr. Booth    CHOLECYSTECTOMY  2013    cryoablasion kidney Left 09/27/2016    DEBRIDEMENT Left 7/25/2020    Procedure: DEBRIDEMENT;  Surgeon: Berenice Alfaro MD;  Location: [facility] OR;  Service: General;  Laterality: Left;    EVACUATION OF HEMATOMA Left 7/25/2020    Procedure: EVACUATION, HEMATOMA;  Surgeon: Berenice Alfaro MD;  Location: [facility] OR;  Service: General;  Laterality: Left;    EXCISION OF SQUAMOUS CELL CARCINOMA Left 7/2/2020    Procedure: EXCISION, CARCINOMA, SQUAMOUS CELL;  Surgeon: Mckinley Shannon MD;  Location: [facility] OR;  Service: General;  Laterality: Left;    feet Bilateral     rheumatoid    FRACTURE SURGERY Right     tibia    HERNIA REPAIR      HYSTERECTOMY  1970    partial    INCISION AND DRAINAGE OF ABSCESS N/A 5/12/2020    Procedure: INCISION AND DRAINAGE, ABSCESS;  Surgeon: Emerson Hathaway MD;  Location: [facility] OR;  Service: General;  Laterality: N/A;    INCISIONAL BIOPSY N/A 5/12/2020    Procedure: INCISIONAL BIOPSY;  Surgeon: Emerson Hathaway MD;  Location: [facility] OR;  Service: General;  Laterality: N/A;    JOINT REPLACEMENT      bilateral knees (2008), hands, wrists, knuckles, toes    LAPAROSCOPIC NISSEN FUNDOPLICATION      TRANSFORAMINAL EPIDURAL INJECTION OF STEROID Left 6/25/2019    Procedure: Left L5/S1 TF AYAAN with local;  Surgeon: Rowdy Felix MD;  Location: [facility];  Service: Pain Management;  Laterality: Left;    WOUND DEBRIDEMENT N/A 5/14/2020    Procedure: DEBRIDEMENT, WOUND;  Surgeon: Mckinley Shannon MD;  Location: [facility] OR;  Service: General;  Laterality: N/A;    WOUND DEBRIDEMENT Left 7/25/2020    Procedure: DEBRIDEMENT, WOUND;  Surgeon: Berenice Alfaro MD;  Location: [facility] OR;  Service: General;  Laterality: Left;       Review  of patient's allergies indicates:   Allergen Reactions    Codeine      Other reaction(s): hyper  Other reaction(s): hyper    Gabapentin Other (See Comments)     Bad dreams       Family History     Problem Relation (Age of Onset)    Asthma Sister, Brother    Cancer Brother, Maternal Grandfather    Cataracts Mother    Chronic back pain Sister, Brother    Diabetes Mellitus Brother    Fibromyalgia Daughter    Heart disease Mother, Father, Maternal Grandmother    Hyperlipidemia Mother    Hypertension Mother, Father, Sister, Brother    Osteoarthritis Mother, Father, Sister, Brother    Thyroid disease Sister, Brother        Tobacco Use    Smoking status: Former Smoker     Packs/day: 0.25     Years: 2.00     Pack years: 0.50     Quit date: 1965     Years since quittin.8    Smokeless tobacco: Never Used   Substance and Sexual Activity    Alcohol use: No    Drug use: No    Sexual activity: Never     Partners: Male         Review of Systems   Constitutional: Positive for fatigue.   Respiratory: Negative for cough, choking, shortness of breath, wheezing and stridor.    Musculoskeletal: Positive for arthralgias.   All other systems reviewed and are negative.    Objective:     Vital Signs (Most Recent):  Temp: 98.1 °F (36.7 °C) (20)  Pulse: 84 (20)  Resp: 18 (20)  BP: (!) 158/72 (20)  SpO2: 100 % (20) Vital Signs (24h Range):  Temp:  [97.2 °F (36.2 °C)-98.8 °F (37.1 °C)] 98.1 °F (36.7 °C)  Pulse:  [77-95] 84  Resp:  [18] 18  SpO2:  [96 %-100 %] 100 %  BP: (128-169)/(61-74) 158/72     Weight: 45.4 kg (100 lb)  Body mass index is 18.29 kg/m².      Intake/Output Summary (Last 24 hours) at 2020  Last data filed at 2020 1800  Gross per 24 hour   Intake 780 ml   Output --   Net 780 ml       Physical Exam  Vitals signs and nursing note reviewed.   Constitutional:       Appearance: Normal appearance. She is ill-appearing.       HENT:      Head:  Normocephalic and atraumatic.      Nose: Nose normal.   Eyes:      Extraocular Movements: Extraocular movements intact.      Pupils: Pupils are equal, round, and reactive to light.   Cardiovascular:      Rate and Rhythm: Normal rate and regular rhythm.   Pulmonary:      Effort: Pulmonary effort is normal.      Breath sounds: Normal breath sounds.   Abdominal:      General: Bowel sounds are normal.   Musculoskeletal: Normal range of motion.   Skin:     General: Skin is warm.   Neurological:      General: No focal deficit present.      Mental Status: She is alert and oriented to person, place, and time.   Psychiatric:         Mood and Affect: Mood normal.         Vents:       Lines/Drains/Airways     Peripheral Intravenous Line                 Peripheral IV - Single Lumen 08/10/20 1530 18 G Right;Other (Comment) Antecubital 2 days                Significant Labs:    CBC/Anemia Profile:  Recent Labs   Lab 08/11/20  1128 08/11/20  2016 08/12/20  0756   WBC 64.86* 49.51* 33.88*   HGB 8.4* 7.9* 7.3*   HCT 25.0* 24.6* 23.3*   PLT 23* 21* 16*   MCV 90 95 95   RDW 16.5* 17.2* 17.3*        Chemistries:  Recent Labs   Lab 08/11/20  2016 08/12/20  0756   CREATININE 0.9 0.9       All pertinent labs within the past 24 hours have been reviewed.    Significant Imaging:   I have reviewed and interpreted all pertinent imaging results/findings within the past 24 hours.     CHEST CT  COMPARISON:  CT chest abdomen pelvis 08/03/2020     FINDINGS:  Base of Neck: Enlarged bilateral axillary lymphadenopathy, unchanged as compared to the recent prior.     Thoracic soft tissues: Normal.     Aorta: Left-sided aortic arch.  Mild moderate aortic atherosclerotic calcification.  No aneurysm.     Heart: Normal in size.  Coronary atherosclerotic disease.  Trace pericardial fluid.     Pulmonary vasculature: Pulmonary arteries distribute normally.     Joanie/Mediastinum: Scattered mediastinal nodes.  There is an enlarged 1.2 cm pretracheal node.  There  are calcified hilar lymph nodes right greater than left.  Appearance is similar to the prior.     Airways: Patent.     Lungs/Pleura: Similar appearance of a large, which shaped area of consolidation within the periphery of the right upper lobe.  Size is similar to the prior.  Previously identified low-density focus internally is not well visualized on this noncontrast examination.  Persistent ground-glass haziness within the right upper lobe pulmonary parenchyma.  Stable, focal 2.9 cm opacity in the left upper lobe.  There are additional small soft tissue calcified nodules identified throughout both lungs.  There is bronchiectasis.  Stable 1.0 cm soft tissue nodule in the right lower lobe.  Stable, spiculated focus within the left upper lobe measuring 1.4 cm.  Interval decrease in size of soft tissue opacities within the superior left lower lobe.  There are numerous pulmonary cysts.  No large pleural effusion.  No pneumothorax.     Esophagus: Large hiatal hernia, esophagus is otherwise within normal limits.     Upper Abdomen: Spleen is enlarged in size.  Postsurgical changes of cholecystectomy.  Abnormal position of the left kidney.  No acute intra-abdominal abnormality.     Bones: Degenerative changes.  No acute or concerning osseous abnormality.  Remote posterior right rib fractures again noted.     Impression:     Peripheral large soft tissue opacity again identified in the right upper lobe.  Central area of hypodensity is not clearly identified as on the prior examination.  Persistent nodular soft tissue opacities identified throughout both lungs, some of which in the left lower lobe have decreased in size.  Findings again may reflect atypical infectious process with metastatic disease or mycobacterial infection additional considerations.     Bronchiectasis, ground-glass haziness and secretions within peripheral airways concerning for infectious/inflammatory small airways disease.     Stable enlarged axillary  and mediastinal lymph nodes        ABG  No results for input(s): PH, PO2, PCO2, HCO3, BE in the last 168 hours.  Assessment/Plan:     Lung mass  Differentials: Inflammatory vs Infectious  Inflammatory: likely Rheumatoid lung related: unfortunately off DMARD due to active infection treatment  Infectious: location, infiltrative pattern , indolent suggest fungal or atypical: increase coverage for antifungals: VORICONAZOLE  Tissue sampling : transbronchial biopsy or CT biopsy though possible will be extremely high risk in her current functional status especially with low platelets.  Close follow up interval imaging in 8 weeks    Thrombocytopenia  16 K  Transfusion per Hem Onc    Chronic myelomonocytic leukemia not having achieved remission  Therapy plan per hematology oncology          Thank you for your consult. I will follow-up with patient. Please contact us if you have any additional questions.     Sonu Varela MD  Pulmonology  [facility] -

## 2020-10-25 NOTE — ED PROVIDER NOTES
EMERGENCY DEPARTMENT ENCOUNTER    Room Number:  02/02  Date of encounter:  10/25/2020  PCP: Lyudmila Granados APRN  Historian: Patient      HPI:  Chief Complaint: Shortness of breath and back pain  A complete HPI/ROS/PMH/PSH/SH/FH are unobtainable due to: Nothing    Context: Siobhan Prakash is a 75 y.o. female who presents to the ED c/o mid right-sided back pain that began after a twisting/turning motion earlier today at her house.  Patient describes the pain as an 8 out of 10 on the pain scale and states it is worse with movement and breathing.  She has a history of COPD and is usually on anywhere from 6 to 8 L at home.  She states she became more congested earlier this evening and was requiring 8 L.  She and her  felt it would be better given her full medical picture that she be evaluated in the emergency department to ensure there is no underlying pathology beyond muscle spasm and strain muscle.      Patient was last seen in this emergency department on 6/25/2019 and admitted to the hospital at that time for acute respiratory failure with hypoxia, COPD with acute exacerbation.  She also has a history of pneumonia, anemia, hypertension, hyperlipidemia, borderline diabetes.    PAST MEDICAL HISTORY  Active Ambulatory Problems     Diagnosis Date Noted   • Weight loss, abnormal 05/13/2016   • Subcutaneous cyst 05/13/2016   • Mixed anxiety depressive disorder 05/13/2016   • Gastroesophageal reflux disease 05/13/2016   • Insomnia 05/13/2016   • Macrocytosis 05/13/2016   • Tremor 05/13/2016   • Pulmonary emphysema (CMS/HCC) 05/13/2016   • Vitamin D deficiency 05/13/2016   • Fatigue 05/13/2016   • Osteoporosis 05/13/2016   • Herpes zoster without complication 02/24/2017   • Migraine 07/26/2017   • Bilateral leg pain 07/26/2017   • Pneumonia of both lungs due to infectious organism 01/15/2018   • Abnormal ECG 02/27/2019   • Diarrhea 03/01/2019   • Fecal urgency 07/10/2019   • Irritable bowel syndrome (IBS)  2019   • History of hip replacement 2019   • 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   • COPD (chronic obstructive pulmonary disease) (CMS/Formerly [facility]) 10/01/2019     Resolved Ambulatory Problems     Diagnosis Date Noted   • Osteopenia 2016   • Anemia 2016   • Postartificial menopausal syndrome 2016   • Hyperlipidemia 2016   • Iron deficiency 2016   • Pruritus 2016   • Urinary tract infection 2016   • Glucose intolerance (impaired glucose tolerance) 2016   • Achilles tendon sprain 2017   • Acute respiratory failure with hypoxia (CMS/Formerly [facility]) 2019     Past Medical History:   Diagnosis Date   • Allergic 1970   • Anxiety    • Arthritis 10/17   • Asthma    • Cataract    • Cholelithiasis    • Depression    • GERD (gastroesophageal reflux disease)    • HL (hearing loss)    • Irritable bowel syndrome    • Low back pain    • Pneumonia          PAST SURGICAL HISTORY  Past Surgical History:   Procedure Laterality Date   • CHOLECYSTECTOMY     • COLONOSCOPY     • EYE SURGERY      bilateral cataracts   • HIP ARTHROPLASTY Right    • JOINT REPLACEMENT     • REDUCTION MAMMAPLASTY     • TONSILLECTOMY           FAMILY HISTORY  Family History   Problem Relation Age of Onset   • Alcohol abuse Father             • Heart attack Father    • Birth defects Brother    • Hearing loss Brother    • Heart disease Son         Aortic stenosis   • Hyperlipidemia Sister         Hbp         SOCIAL HISTORY  Social History     Socioeconomic History   • Marital status:      Spouse name: Vlad   • Number of children: 2   • Years of education: Not on file   • Highest education level: Not on file   Occupational History   • Occupation: retired   Tobacco Use   • Smoking status: Former Smoker     Packs/day: 1.00     Years: 15.00     Pack years: 15.00     Start date: 1963     Quit date:      Years since quittin.8   •  Smokeless tobacco: Never Used   • Tobacco comment: Ultra light menthol brand   Substance and Sexual Activity   • Alcohol use: Yes     Comment: Approx. 1 or 2 a month   • Drug use: No   • Sexual activity: Not Currently     Partners: Male     Birth control/protection: Post-menopausal         ALLERGIES  Sulfa antibiotics, Codeine, and Hydrocodone        REVIEW OF SYSTEMS  Review of Systems   Constitutional: Negative for chills and fever.   HENT: Positive for congestion.    Eyes: Negative.    Respiratory: Positive for shortness of breath. Negative for cough and chest tightness.    Cardiovascular: Negative for chest pain, palpitations and leg swelling.   Gastrointestinal: Negative for abdominal pain, nausea and vomiting.   Genitourinary: Negative.    Musculoskeletal: Positive for back pain.   Skin: Negative.    Neurological: Negative.    Psychiatric/Behavioral: Negative.         All systems reviewed and negative except for those discussed in HPI.       PHYSICAL EXAM    I have reviewed the triage vital signs and nursing notes.    ED Triage Vitals [10/24/20 2158]   Temp Heart Rate Resp BP SpO2   95.7 °F (35.4 °C) 111 16 136/84 92 %      Temp src Heart Rate Source Patient Position BP Location FiO2 (%)   Tympanic Monitor -- -- --       Physical Exam  GENERAL: Barrel chested and chronically ill-appearing  HENT: nares patent, nontraumatic  EYES: no scleral icterus  CV: regular rhythm, regular rate, no obvious murmurs or gallops  RESPIRATORY: normal effort, slight decrease in air movement, no obvious wheezes rales or rhonchi  ABDOMEN: soft, nontender  MUSCULOSKELETAL: no deformity, pain is increased in the area of concern in the right mid back with twisting the trunk and extension and flexion of the trunk.  NEURO: alert and oriented x3, moves all extremities, follows commands  SKIN: warm, dry        LAB RESULTS  Recent Results (from the past 24 hour(s))   Comprehensive Metabolic Panel    Collection Time: 10/24/20 11:00 PM     Specimen: Blood   Result Value Ref Range    Glucose 127 (H) 65 - 99 mg/dL    BUN 11 8 - 23 mg/dL    Creatinine 0.59 0.57 - 1.00 mg/dL    Sodium 143 136 - 145 mmol/L    Potassium 3.7 3.5 - 5.2 mmol/L    Chloride 101 98 - 107 mmol/L    CO2 37.1 (H) 22.0 - 29.0 mmol/L    Calcium 9.8 8.6 - 10.5 mg/dL    Total Protein 6.9 6.0 - 8.5 g/dL    Albumin 3.90 3.50 - 5.20 g/dL    ALT (SGPT) 9 1 - 33 U/L    AST (SGOT) 16 1 - 32 U/L    Alkaline Phosphatase 120 (H) 39 - 117 U/L    Total Bilirubin 0.2 0.0 - 1.2 mg/dL    eGFR Non African Amer 100 >60 mL/min/1.73    Globulin 3.0 gm/dL    A/G Ratio 1.3 g/dL    BUN/Creatinine Ratio 18.6 7.0 - 25.0    Anion Gap 4.9 (L) 5.0 - 15.0 mmol/L   Troponin    Collection Time: 10/24/20 11:00 PM    Specimen: Blood   Result Value Ref Range    Troponin T <0.010 0.000 - 0.030 ng/mL   CBC Auto Differential    Collection Time: 10/24/20 11:00 PM    Specimen: Blood   Result Value Ref Range    WBC 8.67 3.40 - 10.80 10*3/mm3    RBC 4.19 3.77 - 5.28 10*6/mm3    Hemoglobin 12.7 12.0 - 15.9 g/dL    Hematocrit 38.8 34.0 - 46.6 %    MCV 92.6 79.0 - 97.0 fL    MCH 30.3 26.6 - 33.0 pg    MCHC 32.7 31.5 - 35.7 g/dL    RDW 12.2 (L) 12.3 - 15.4 %    RDW-SD 41.6 37.0 - 54.0 fl    MPV 9.0 6.0 - 12.0 fL    Platelets 287 140 - 450 10*3/mm3    Neutrophil % 74.9 42.7 - 76.0 %    Lymphocyte % 15.8 (L) 19.6 - 45.3 %    Monocyte % 6.8 5.0 - 12.0 %    Eosinophil % 1.4 0.3 - 6.2 %    Basophil % 0.5 0.0 - 1.5 %    Immature Grans % 0.6 (H) 0.0 - 0.5 %    Neutrophils, Absolute 6.50 1.70 - 7.00 10*3/mm3    Lymphocytes, Absolute 1.37 0.70 - 3.10 10*3/mm3    Monocytes, Absolute 0.59 0.10 - 0.90 10*3/mm3    Eosinophils, Absolute 0.12 0.00 - 0.40 10*3/mm3    Basophils, Absolute 0.04 0.00 - 0.20 10*3/mm3    Immature Grans, Absolute 0.05 0.00 - 0.05 10*3/mm3    nRBC 0.0 0.0 - 0.2 /100 WBC       Ordered the above labs and independently reviewed the results.        RADIOLOGY  No Radiology Exams Resulted Within Past 24 Hours    I ordered  the above noted radiological studies. Reviewed by me and discussed with radiologist.  See dictation for official radiology interpretation.      PROCEDURES    Procedures      MEDICATIONS GIVEN IN ER    Medications   traMADol (ULTRAM) tablet 50 mg (50 mg Oral Given 10/24/20 2304)   baclofen (LIORESAL) tablet 10 mg (10 mg Oral Given 10/25/20 0126)         PROGRESS, DATA ANALYSIS, CONSULTS, AND MEDICAL DECISION MAKING    All labs have been independently reviewed by me.  All radiology studies have been reviewed by me and discussed with radiologist dictating the report.   EKG's independently viewed and interpreted by me.  Discussion below represents my analysis of pertinent findings related to patient's condition, differential diagnosis, treatment plan and final disposition.    DDx: Muscle spasm, back strain, rib fracture, pneumothorax, pneumonia, ACS.  Chest x-ray is unremarkable, blood work to include troponin and EKG are unremarkable.  Patient's pain is made worse by movement.  She does not appear to be in a COPD exacerbation and appears to be at her baseline per the patient.  Most likely diagnosis given the history and physical exam is musculoskeletal pain.  Will treat conservatively and have the patient follow-up with her PCP for further management of pain as needed.  We will have her to follow-up with Dr. Hightower should she have any further concerns with her COPD.  Return the emergency department should her symptoms worsen or should she develop a fever.    ED Course as of Oct 25 0451   Sat Oct 24, 2020   2344 Troponin T: <0.010 [TD]   2344 Creatinine: 0.59 [TD]   Sun Oct 25, 2020   0006 WBC: 8.67 [RC]   0006 RBC: 4.19 [RC]   0006 Hemoglobin: 12.7 [RC]   0006 Hematocrit: 38.8 [RC]   0006 Chest x-ray shows no acute infiltrate   Platelets: 287 [RC]   0006 Platelets: 287 [RC]   0007 Chest x-ray shows no acute changes when compared to June 17, 2019 as viewed by me    [RC]   0007 EKG          EKG time: 2301  Rhythm/Rate:  Sinus/99  P waves and CT: Probable LAE, Normal inteval  QRS, axis: LAD   ST and T waves: No acute st or t wave cgs     Interpreted Contemporaneously by me, independently viewed  -No acute changes when compared to June 25, 2019      [RC]      ED Course User Index  [RC] Zack Kumar III, PA  [TD] Dio Islas II, MD       Patient was placed in face mask in first look. Patient was wearing facemask when I entered the room and throughout our encounter. I wore full protective equipment throughout this patient encounter including a face mask, and gloves. Hand hygiene was performed before donning protective equipment and after removal when leaving the room.    AS OF 04:51 EDT VITALS:    BP - 136/84  HR - 111  TEMP - 95.7 °F (35.4 °C) (Tympanic)  O2 SATS - 92%        DIAGNOSIS  Final diagnoses:   Mid back pain on right side   Muscle spasm         DISPOSITION  DISCHARGE    Patient discharged in stable condition.    Reviewed implications of results, diagnosis, meds, responsibility to follow up, warning signs and symptoms of possible worsening, potential complications and reasons to return to ER.    Patient/Family voiced understanding of above instructions.    Discussed plan for discharge, as there is no emergent indication for admission. Patient referred to primary care provider for BP management due to today's BP. Pt/family is agreeable and understands need for follow up and repeat testing.  Pt is aware that discharge does not mean that nothing is wrong but it indicates no emergency is present that requires admission and they must continue care with follow-up as given below or physician of their choice.     FOLLOW-UP  Lyudmila Granados, APRN  [address] [facility] [address] [phone]    Schedule an appointment as soon as possible for a visit   For further evaluation and treatment as needed.    Alfei Hightower MD  [address] [facility] [address]  [facility] [id number] [phone]    Schedule an  appointment as soon as possible for a visit   For further evaluation and treatment should you develop shortness of breath off your baseline.         Medication List      New Prescriptions    methocarbamol 500 MG tablet  Commonly known as: ROBAXIN  Take 1 tablet by mouth 3 (Three) Times a Day.           Where to Get Your Medications      You can get these medications from any pharmacy    Bring a paper prescription for each of these medications  · methocarbamol 500 MG tablet                Zack Kumar III, PA  10/25/20 0455

## 2020-10-25 NOTE — ED NOTES
Pt to triage from home via [facility] [id number] for c/o back pain and soa (pt history of copd). Pain started at noon, while trying to walk out of bathroom, turned and pain started.  No numbness/tingling to extremities.  Pt provided with mask in triage.  Triage personnel wore appropriate PPE.     Martine Nova, RN  10/24/20 8585

## 2020-10-25 NOTE — ED PROVIDER NOTES
MD ATTESTATION NOTE    The JED and I have discussed this patient's history, physical exam, and treatment plan.  I have reviewed the documentation and personally had a face to face interaction with the patient. I affirm the documentation and agree with the treatment and plan.  The attached note describes my personal findings.      Siobhan Prakash is a 75 y.o. female who presents to the ED c/o right-sided back pain that started earlier today.  She states that she feels like she pulled her back.  It is worse with certain movements.  It improves with remaining still and using heating pad.  She denies have any precordial chest pain or shortness of breath.  No fever.    Patient states that she wears 6 L of oxygen at all times.  She states that she feels no more short of breath than usual.      On exam:  Focal and reproducible pain to the back that is just inferior to the scapula  Delayed expiratory phase bilaterally  Regular rate and rhythm  2+ radial pulses bilaterally    Labs  Recent Results (from the past 24 hour(s))   Comprehensive Metabolic Panel    Collection Time: 10/24/20 11:00 PM    Specimen: Blood   Result Value Ref Range    Glucose 127 (H) 65 - 99 mg/dL    BUN 11 8 - 23 mg/dL    Creatinine 0.59 0.57 - 1.00 mg/dL    Sodium 143 136 - 145 mmol/L    Potassium 3.7 3.5 - 5.2 mmol/L    Chloride 101 98 - 107 mmol/L    CO2 37.1 (H) 22.0 - 29.0 mmol/L    Calcium 9.8 8.6 - 10.5 mg/dL    Total Protein 6.9 6.0 - 8.5 g/dL    Albumin 3.90 3.50 - 5.20 g/dL    ALT (SGPT) 9 1 - 33 U/L    AST (SGOT) 16 1 - 32 U/L    Alkaline Phosphatase 120 (H) 39 - 117 U/L    Total Bilirubin 0.2 0.0 - 1.2 mg/dL    eGFR Non African Amer 100 >60 mL/min/1.73    Globulin 3.0 gm/dL    A/G Ratio 1.3 g/dL    BUN/Creatinine Ratio 18.6 7.0 - 25.0    Anion Gap 4.9 (L) 5.0 - 15.0 mmol/L   Troponin    Collection Time: 10/24/20 11:00 PM    Specimen: Blood   Result Value Ref Range    Troponin T <0.010 0.000 - 0.030 ng/mL   CBC Auto Differential     Collection Time: 10/24/20 11:00 PM    Specimen: Blood   Result Value Ref Range    WBC 8.67 3.40 - 10.80 10*3/mm3    RBC 4.19 3.77 - 5.28 10*6/mm3    Hemoglobin 12.7 12.0 - 15.9 g/dL    Hematocrit 38.8 34.0 - 46.6 %    MCV 92.6 79.0 - 97.0 fL    MCH 30.3 26.6 - 33.0 pg    MCHC 32.7 31.5 - 35.7 g/dL    RDW 12.2 (L) 12.3 - 15.4 %    RDW-SD 41.6 37.0 - 54.0 fl    MPV 9.0 6.0 - 12.0 fL    Platelets 287 140 - 450 10*3/mm3    Neutrophil % 74.9 42.7 - 76.0 %    Lymphocyte % 15.8 (L) 19.6 - 45.3 %    Monocyte % 6.8 5.0 - 12.0 %    Eosinophil % 1.4 0.3 - 6.2 %    Basophil % 0.5 0.0 - 1.5 %    Immature Grans % 0.6 (H) 0.0 - 0.5 %    Neutrophils, Absolute 6.50 1.70 - 7.00 10*3/mm3    Lymphocytes, Absolute 1.37 0.70 - 3.10 10*3/mm3    Monocytes, Absolute 0.59 0.10 - 0.90 10*3/mm3    Eosinophils, Absolute 0.12 0.00 - 0.40 10*3/mm3    Basophils, Absolute 0.04 0.00 - 0.20 10*3/mm3    Immature Grans, Absolute 0.05 0.00 - 0.05 10*3/mm3    nRBC 0.0 0.0 - 0.2 /100 WBC       Radiology  No Radiology Exams Resulted Within Past 24 Hours    Medical Decision Making:  ED Course as of Oct 25 0240   Sat Oct 24, 2020   2344 Troponin T: <0.010 [TD]   2344 Creatinine: 0.59 [TD]   Sun Oct 25, 2020   0006 WBC: 8.67 [RC]   0006 RBC: 4.19 [RC]   0006 Hemoglobin: 12.7 [RC]   0006 Hematocrit: 38.8 [RC]   0006 Chest x-ray shows no acute infiltrate   Platelets: 287 [RC]   0006 Platelets: 287 [RC]   0007 Chest x-ray shows no acute changes when compared to June 17, 2019 as viewed by me    [RC]   0007 EKG          EKG time: 2301  Rhythm/Rate: Sinus/99  P waves and NV: Probable LAE, Normal inteval  QRS, axis: LAD   ST and T waves: No acute st or t wave cgs     Interpreted Contemporaneously by me, independently viewed  -No acute changes when compared to June 25, 2019      [RC]      ED Course User Index  [RC] Zack Kumar III, PA  [TD] Dio Islas II, MD           PPE: Both the patient and I wore a surgical mask throughout the entire patient  encounter. I wore protective goggles.     Diagnosis  Final diagnoses:   Mid back pain on right side   Muscle spasm        Dio Islas II, MD  10/25/20 8173

## 2020-10-25 NOTE — DISCHARGE INSTRUCTIONS
Continue with your tramadol as previously prescribed for pain.  You may try the low-dose Robaxin to help with muscle spasm and pain if it is not controlled with your tramadol alone.  Apply heat to the associated area of pain.  The careful not to fall asleep on heating pad.    Follow-up with your family physician for further management of your musculoskeletal pain.  Follow-up with Dr. Hightower return the emergency department should you feel you are having worsening shortness of breath.    Return to the ER with any further concerns, should your condition change/worsen, or should you develop a fever.

## 2020-10-25 NOTE — ED NOTES
"Pt states she has had some moderate back pain form a near fall approx 5-6 months ago however today she c/o of \"a completely different pain\".  Pt states it starts in upper right back and goes all the way down to her low back.  \"elyse never ever had anything like this before.  Nothing has helped.  It just came out of nowhere\"Pt denies any numbness or tingling and states the pain is intermittent  Pt noted to have mask on when this RN entered the room.  This RN wore appropriate PPE throughout our encounter. Hand hygiene performed upon entering and exiting room.       Kaley Edwards, RN  10/24/20 9415    "

## 2020-10-28 NOTE — PROGRESS NOTES
Subjective Shortness of breath and back pain  Siobhan Prakash is a 75 y.o. female.     History of Present Illness   This is a video visit  The patient reports that she had such terrible back pain she went to the emergency room on Friday night.  She was also having issues with abdominal cramping.  She reports that they gave her some pain medicine and that all of her results were normal.  She was short of breath prior to going but reports since she has been home it has been better.  She has past medical history of end-stage lung disease and is followed by pulmonology.  She also had some hematochezia a couple of weeks ago.  Her I fob was positive and we discussed sending her to see GI but she did not want to go.  She reports that that is now gone.  The following portions of the patient's history were reviewed and updated as appropriate: allergies, current medications, past family history, past medical history, past social history, past surgical history and problem list.    Review of Systems   Constitutional: Positive for fatigue. Negative for fever.   Respiratory: Positive for cough and shortness of breath.    Gastrointestinal: Negative for nausea, rectal pain, vomiting and indigestion.   Musculoskeletal: Positive for back pain.       Objective   Physical Exam  Vitals signs and nursing note reviewed.   Constitutional:       General: She is in acute distress.      Appearance: She is well-developed. She is not ill-appearing or toxic-appearing.   HENT:      Head: Normocephalic and atraumatic.   Eyes:      Pupils: Pupils are equal, round, and reactive to light.   Pulmonary:      Effort: Pulmonary effort is normal.   Musculoskeletal: Normal range of motion.   Skin:     General: Skin is warm and dry.   Neurological:      Mental Status: She is alert and oriented to person, place, and time.           Assessment/Plan   Diagnoses and all orders for this visit:    1. Pulmonary emphysema, unspecified emphysema type (CMS/[facility])  (Primary)    2. Other chronic pain  -     traMADol (ULTRAM) 50 MG tablet; Take 1 tablet by mouth Every 6 (Six) Hours As Needed for Moderate Pain .  Dispense: 60 tablet; Refill: 1      The tramadol is helping her back pain advised her to take it 3 times a day instead of 2.  I am going to send some communication to her pulmonologist to see if he has any other suggestions to ease the back discomfort.  I also requested that her  come  another I fob test.  This is a video visit I spent approximately 20 minutes with the patient discussing her current complaints her current new medications and the plan of care.  The use of a video visit has been reviewed with the patient and verbal informed consent has been obtained.

## 2020-11-05 PROBLEM — R93.89 ABNORMAL CT OF THE CHEST: Status: ACTIVE | Noted: 2020-01-01

## 2020-11-05 PROBLEM — M54.6 ACUTE RIGHT-SIDED THORACIC BACK PAIN: Status: ACTIVE | Noted: 2020-01-01

## 2020-11-05 PROBLEM — R07.9 RIGHT-SIDED CHEST PAIN: Status: ACTIVE | Noted: 2020-01-01

## 2020-11-05 PROBLEM — J96.11 CHRONIC RESPIRATORY FAILURE WITH HYPOXIA (HCC): Status: ACTIVE | Noted: 2020-01-01

## 2020-11-05 NOTE — ED NOTES
Pt reports abdominal pain for past 8 hours. Pt states pain began while she was stretching and is worsened by mvmt.     Face mask placed on pt at triage.     Mellisa Sandra RN  11/05/20 0075

## 2020-11-05 NOTE — ED PROVIDER NOTES
" EMERGENCY DEPARTMENT ENCOUNTER    Room Number:  31/31  Date of encounter:  11/5/2020  PCP: Lyudmila Granados APRN  Historian: Patient      HPI:  Chief Complaint: Upper abdominal pain      Context: Siobhan Prakash is a 75 y.o. female who presents to the ED c/o of abdominal pain since last night.  The patient states after she got the shower she is going to the bed she turned and felt a pop in her lower chest and upper abdomen and has had constant pain since that time.  The patient has end-stage COPD and is on 6 L of oxygen at all time and states she has severe osteoporosis as well.  The patient denies known Covid exposure, fevers or chills, vomiting or diarrhea, rash or focal neuro deficit.  The patient states she did not fall but just \"turned funny\".      PAST MEDICAL HISTORY  Active Ambulatory Problems     Diagnosis Date Noted   • Weight loss, abnormal 05/13/2016   • Subcutaneous cyst 05/13/2016   • Mixed anxiety depressive disorder 05/13/2016   • Gastroesophageal reflux disease 05/13/2016   • Insomnia 05/13/2016   • Macrocytosis 05/13/2016   • Tremor 05/13/2016   • Pulmonary emphysema (CMS/[facility]) 05/13/2016   • Vitamin D deficiency 05/13/2016   • Fatigue 05/13/2016   • Osteoporosis 05/13/2016   • Herpes zoster without complication 02/24/2017   • Migraine 07/26/2017   • Bilateral leg pain 07/26/2017   • Pneumonia of both lungs due to infectious organism 01/15/2018   • Abnormal ECG 02/27/2019   • Diarrhea 03/01/2019   • Fecal urgency 07/10/2019   • Irritable bowel syndrome (IBS) 07/30/2019   • History of hip replacement 07/30/2019   • 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   • COPD (chronic obstructive pulmonary disease) (CMS/[facility]) 10/01/2019     Resolved Ambulatory Problems     Diagnosis Date Noted   • Osteopenia 04/21/2016   • Anemia 05/13/2016   • Postartificial menopausal syndrome 05/13/2016   • Hyperlipidemia 05/13/2016   • Iron deficiency 05/13/2016   • Pruritus 05/13/2016   • Urinary " tract infection 2016   • Glucose intolerance (impaired glucose tolerance) 2016   • Achilles tendon sprain 2017   • Acute respiratory failure with hypoxia (CMS/HCC) 2019     Past Medical History:   Diagnosis Date   • Allergic 1970   • Anxiety    • Arthritis 10/17   • Asthma    • Cataract    • Cholelithiasis    • Depression    • GERD (gastroesophageal reflux disease)    • HL (hearing loss)    • Irritable bowel syndrome    • Low back pain    • Pneumonia          PAST SURGICAL HISTORY  Past Surgical History:   Procedure Laterality Date   • CHOLECYSTECTOMY     • COLONOSCOPY     • EYE SURGERY      bilateral cataracts   • HIP ARTHROPLASTY Right    • JOINT REPLACEMENT     • REDUCTION MAMMAPLASTY     • TONSILLECTOMY           FAMILY HISTORY  Family History   Problem Relation Age of Onset   • Alcohol abuse Father             • Heart attack Father    • Birth defects Brother    • Hearing loss Brother    • Heart disease Son         Aortic stenosis   • Hyperlipidemia Sister         Hbp         SOCIAL HISTORY  Social History     Socioeconomic History   • Marital status:      Spouse name: Vlad   • Number of children: 2   • Years of education: Not on file   • Highest education level: Not on file   Occupational History   • Occupation: retired   Tobacco Use   • Smoking status: Former Smoker     Packs/day: 1.00     Years: 15.00     Pack years: 15.00     Start date: 1963     Quit date:      Years since quittin.8   • Smokeless tobacco: Never Used   • Tobacco comment: Ultra light menthol brand   Substance and Sexual Activity   • Alcohol use: Yes     Comment: Approx. 1 or 2 a month   • Drug use: No   • Sexual activity: Not Currently     Partners: Male     Birth control/protection: Post-menopausal         ALLERGIES  Sulfa antibiotics, Codeine, and Hydrocodone        REVIEW OF SYSTEMS  Review of Systems         All systems reviewed and negative except for those  discussed in HPI.     PHYSICAL EXAM    I have reviewed the triage vital signs and nursing notes.    ED Triage Vitals [11/05/20 1458]   Temp Heart Rate Resp BP SpO2   -- 120 20 124/76 91 %      Temp src Heart Rate Source Patient Position BP Location FiO2 (%)   -- -- -- -- --       GENERAL: 5-year-old well developed, well nourished in moderate distress  HENT: NCAT, neck supple, trachea midline  EYES: no scleral icterus, PERRL, normal conjunctiva  CV: regular rhythm, tachycardic to 120, no murmur  RESPIRATORY: Moderate respiratory distress with wheezes and decreased air movement bilaterally: The patient does have bilateral lower chest wall tenderness worse on the right than the left.  ABDOMEN: Diffuse upper abdominal tenderness worse on the right than the left with guarding but no rebound and diminished bowel sounds  MUSCULOSKELETAL: no gross deformity, no pedal edema, no calf tenderness  NEURO: alert,  sensory and motor function of extremities grossly intact, speech clear, mental status normal  SKIN: warm, dry, no rash  PSYCH:  Appropriate mood and affect      PPE  Pt does not present with symptoms for COVID19; however, I was wearing a mask and goggles throughout all patient interaction.      Vital signs and nursing notes reviewed.      LAB RESULTS  Recent Results (from the past 24 hour(s))   Comprehensive Metabolic Panel    Collection Time: 11/05/20  3:36 PM    Specimen: Blood   Result Value Ref Range    Glucose 84 65 - 99 mg/dL    BUN 14 8 - 23 mg/dL    Creatinine 0.62 0.57 - 1.00 mg/dL    Sodium 142 136 - 145 mmol/L    Potassium 4.4 3.5 - 5.2 mmol/L    Chloride 102 98 - 107 mmol/L    CO2 32.5 (H) 22.0 - 29.0 mmol/L    Calcium 10.1 8.6 - 10.5 mg/dL    Total Protein 7.6 6.0 - 8.5 g/dL    Albumin 4.00 3.50 - 5.20 g/dL    ALT (SGPT) 12 1 - 33 U/L    AST (SGOT) 26 1 - 32 U/L    Alkaline Phosphatase 145 (H) 39 - 117 U/L    Total Bilirubin 0.2 0.0 - 1.2 mg/dL    eGFR Non African Amer 94 >60 mL/min/1.73    Globulin 3.6  gm/dL    A/G Ratio 1.1 g/dL    BUN/Creatinine Ratio 22.6 7.0 - 25.0    Anion Gap 7.5 5.0 - 15.0 mmol/L   Protime-INR    Collection Time: 11/05/20  3:36 PM    Specimen: Blood   Result Value Ref Range    Protime 12.6 11.7 - 14.2 Seconds    INR 0.95 0.90 - 1.10   Lipase    Collection Time: 11/05/20  3:36 PM    Specimen: Blood   Result Value Ref Range    Lipase 10 (L) 13 - 60 U/L   Troponin    Collection Time: 11/05/20  3:36 PM    Specimen: Blood   Result Value Ref Range    Troponin T <0.010 0.000 - 0.030 ng/mL   CBC Auto Differential    Collection Time: 11/05/20  3:36 PM    Specimen: Blood   Result Value Ref Range    WBC 10.28 3.40 - 10.80 10*3/mm3    RBC 4.55 3.77 - 5.28 10*6/mm3    Hemoglobin 13.7 12.0 - 15.9 g/dL    Hematocrit 41.5 34.0 - 46.6 %    MCV 91.2 79.0 - 97.0 fL    MCH 30.1 26.6 - 33.0 pg    MCHC 33.0 31.5 - 35.7 g/dL    RDW 12.1 (L) 12.3 - 15.4 %    RDW-SD 40.3 37.0 - 54.0 fl    MPV 9.1 6.0 - 12.0 fL    Platelets 410 140 - 450 10*3/mm3    Neutrophil % 74.9 42.7 - 76.0 %    Lymphocyte % 18.1 (L) 19.6 - 45.3 %    Monocyte % 5.4 5.0 - 12.0 %    Eosinophil % 0.5 0.3 - 6.2 %    Basophil % 0.4 0.0 - 1.5 %    Immature Grans % 0.7 (H) 0.0 - 0.5 %    Neutrophils, Absolute 7.70 (H) 1.70 - 7.00 10*3/mm3    Lymphocytes, Absolute 1.86 0.70 - 3.10 10*3/mm3    Monocytes, Absolute 0.56 0.10 - 0.90 10*3/mm3    Eosinophils, Absolute 0.05 0.00 - 0.40 10*3/mm3    Basophils, Absolute 0.04 0.00 - 0.20 10*3/mm3    Immature Grans, Absolute 0.07 (H) 0.00 - 0.05 10*3/mm3    nRBC 0.0 0.0 - 0.2 /100 WBC   Respiratory Panel PCR w/COVID-19(SARS-CoV-2) [facility] In-House, NP Swab in UTM/VTM, 3-4 HR TAT - Swab, Nasopharynx    Collection Time: 11/05/20  3:57 PM    Specimen: Nasopharynx; Swab   Result Value Ref Range    ADENOVIRUS, PCR Not Detected Not Detected    Coronavirus 229E Not Detected Not Detected    Coronavirus HKU1 Not Detected Not Detected    Coronavirus NL63 Not Detected Not Detected    Coronavirus OC43 Not  Detected Not Detected    COVID19 Not Detected Not Detected - Ref. Range    Human Metapneumovirus Not Detected Not Detected    Human Rhinovirus/Enterovirus Not Detected Not Detected    Influenza A PCR Not Detected Not Detected    Influenza B PCR Not Detected Not Detected    Parainfluenza Virus 1 Not Detected Not Detected    Parainfluenza Virus 2 Not Detected Not Detected    Parainfluenza Virus 3 Not Detected Not Detected    Parainfluenza Virus 4 Not Detected Not Detected    RSV, PCR Not Detected Not Detected    Bordetella pertussis pcr Not Detected Not Detected    Bordetella parapertussis PCR Not Detected Not Detected    Chlamydophila pneumoniae PCR Not Detected Not Detected    Mycoplasma pneumo by PCR Not Detected Not Detected   ECG 12 Lead    Collection Time: 11/05/20  4:08 PM   Result Value Ref Range    QT Interval 310 ms   Urinalysis With Microscopic If Indicated (No Culture) - Urine, Catheter    Collection Time: 11/05/20  7:06 PM    Specimen: Urine, Catheter   Result Value Ref Range    Color, UA Yellow Yellow, Straw    Appearance, UA Clear Clear    pH, UA <=5.0 5.0 - 8.0    Specific Gravity, UA >=1.030 1.005 - 1.030    Glucose, UA Negative Negative    Ketones, UA 15 mg/dL (1+) (A) Negative    Bilirubin, UA Negative Negative    Blood, UA Negative Negative    Protein, UA Negative Negative    Leuk Esterase, UA Negative Negative    Nitrite, UA Negative Negative    Urobilinogen, UA 0.2 E.U./dL 0.2 - 1.0 E.U./dL   D-dimer, Quantitative    Collection Time: 11/05/20  7:30 PM    Specimen: Blood   Result Value Ref Range    D-Dimer, Quantitative 3.67 (H) 0.00 - 0.49 MCGFEU/mL       Ordered the above labs and independently reviewed the results.        RADIOLOGY  Ct Abdomen Pelvis With Contrast    Result Date: 11/5/2020  CT ANGIOGRAM CHEST-, CT ABDOMEN PELVIS W CONTRAST-  INDICATIONS: Short of breath, pain  Radiation dose reduction techniques were utilized, including automated exposure control and exposure modulation based  on body size.  TECHNIQUE: CTA chest with three-dimensional reconstructions. Enhanced CT of the abdomen, pelvis  COMPARISON: Chest CT from 08/02/2018, 01/15/2018; CT abdomen pelvis from 11/17/2005  FINDINGS:  Chest CTA:  There does appear to be some nonocclusive thrombus in the right lower lobe branch artery, for example axial image 104, in a region of chronic bronchiectasis; this appearance is new from the prior exam from 2018, of uncertain chronicity (possibly chronic). The RV LV ratio is less than 1. Prominence of caliber of central pulmonary arteries may reflect pulmonary arterial hypertension, appearance is chronic. No aortic dissection.  The heart size is normal with minimal pericardial effusion. Mild to moderate coronary arterial calcification. A few small subcentimeter short axis mediastinal lymph nodes are seen that are not significant by size criteria.  The airways appear clear.  No pleural effusion or pneumothorax.  The lungs show no focal pulmonary consolidation or mass. Extensive emphysematous changes of the lungs are noted, similar to prior exam, and chronic bronchiectasis is seen medially at the right base. A stable right middle lobe nodular density measuring 6 mm on axial image 101 is noted. Scarlike density inferiorly in the right middle lobe is also stable.     Abdomen pelvis CT:  Structures in the pelvis are partly obscured by right hip arthroplasty hardware artifact.  A right adrenal myelolipoma is again demonstrated.  Gallbladder is surgically absent. Mild intrahepatic biliary ductal dilatation. Caliber of the main pancreatic duct, 1 cm, may be normal status post cholecystectomy.  Otherwise unremarkable appearance of the liver, spleen, adrenal glands, pancreas, kidneys, bladder.  The proximal duodenum is abnormally distended up to the midportion of the third segment of the duodenum (where it passes between the superior mesenteric vein and the aorta), that could be result of obstructive effect,  only slightly more prominent from the prior exam, whether mechanical obstructive effect or stricture, endoscopic correlation could be obtained as may be indicated. Much stool in the rectum, correlate clinically to exclude possibility of stool impaction. Colonic diverticula are seen that do not appear focally inflamed. Assessment of the colon for wall thickening is limited by lack of distention.  No free intraperitoneal gas or free fluid.  Scattered small mesenteric and para-aortic lymph nodes are seen that are not significant by size criteria.  Abdominal aorta is not aneurysmal. Aortic and other arterial calcifications are present.  Degenerative changes are seen in the spine. The T9 compression deformity, with 35% loss of height, is new from 06/17/2019, but age-indeterminate. Multiple other spinal compression deformities appear similar to prior exams. Right hip arthroplasty hardware is partly included.        1. There does appear to be some nonocclusive thrombus in the right lower lobe branch artery, for example axial image 104, in a region of chronic bronchiectasis; this appearance is new from the prior exam from 2018, of uncertain chronicity (possibly chronic). The RV LV ratio is less than 1. 2. The proximal duodenum is abnormally distended up to the midportion of the third segment of the duodenum (where it passes between the superior mesenteric vein and the aorta), that could be result of obstructive effect, only slightly more prominent from the prior exam, whether mechanical obstructive effect or stricture, endoscopic correlation could be obtained as may be indicated. Much stool in the rectum, correlate clinically to exclude possibility of stool impaction. Colonic diverticula are seen that do not appear focally inflamed.   3. The T9 compression deformity, with 35% loss of height, is new from 06/17/2019, but age-indeterminate. Multiple other spinal compression deformities appear similar to prior exams.  Discussed  by telephone with Dr. Lin at 1750, 11/05/2020  This report was finalized on 11/5/2020 5:54 PM by Dr. Jase Singleton M.D.      Xr Chest 1 View    Result Date: 11/5/2020  XR CHEST 1 VW-  HISTORY: Female who is 75 years-old,  short of breath  TECHNIQUE: Frontal view of the chest  COMPARISON: 10/24/2020  FINDINGS: Heart size is normal. Pulmonary vasculature is unremarkable. Small likely atelectasis at the lung bases. No focal pulmonary consolidation, pleural effusion, or pneumothorax. Emphysematous changes of the upper lungs. No acute osseous process.      Small likely atelectasis at the bases.. Follow-up as clinical indications persist.  This report was finalized on 11/5/2020 5:08 PM by Dr. Jase Singleton M.D.      Ct Angiogram Chest    Result Date: 11/5/2020  CT ANGIOGRAM CHEST-, CT ABDOMEN PELVIS W CONTRAST-  INDICATIONS: Short of breath, pain  Radiation dose reduction techniques were utilized, including automated exposure control and exposure modulation based on body size.  TECHNIQUE: CTA chest with three-dimensional reconstructions. Enhanced CT of the abdomen, pelvis  COMPARISON: Chest CT from 08/02/2018, 01/15/2018; CT abdomen pelvis from 11/17/2005  FINDINGS:  Chest CTA:  There does appear to be some nonocclusive thrombus in the right lower lobe branch artery, for example axial image 104, in a region of chronic bronchiectasis; this appearance is new from the prior exam from 2018, of uncertain chronicity (possibly chronic). The RV LV ratio is less than 1. Prominence of caliber of central pulmonary arteries may reflect pulmonary arterial hypertension, appearance is chronic. No aortic dissection.  The heart size is normal with minimal pericardial effusion. Mild to moderate coronary arterial calcification. A few small subcentimeter short axis mediastinal lymph nodes are seen that are not significant by size criteria.  The airways appear clear.  No pleural effusion or pneumothorax.  The lungs show no focal  pulmonary consolidation or mass. Extensive emphysematous changes of the lungs are noted, similar to prior exam, and chronic bronchiectasis is seen medially at the right base. A stable right middle lobe nodular density measuring 6 mm on axial image 101 is noted. Scarlike density inferiorly in the right middle lobe is also stable.     Abdomen pelvis CT:  Structures in the pelvis are partly obscured by right hip arthroplasty hardware artifact.  A right adrenal myelolipoma is again demonstrated.  Gallbladder is surgically absent. Mild intrahepatic biliary ductal dilatation. Caliber of the main pancreatic duct, 1 cm, may be normal status post cholecystectomy.  Otherwise unremarkable appearance of the liver, spleen, adrenal glands, pancreas, kidneys, bladder.  The proximal duodenum is abnormally distended up to the midportion of the third segment of the duodenum (where it passes between the superior mesenteric vein and the aorta), that could be result of obstructive effect, only slightly more prominent from the prior exam, whether mechanical obstructive effect or stricture, endoscopic correlation could be obtained as may be indicated. Much stool in the rectum, correlate clinically to exclude possibility of stool impaction. Colonic diverticula are seen that do not appear focally inflamed. Assessment of the colon for wall thickening is limited by lack of distention.  No free intraperitoneal gas or free fluid.  Scattered small mesenteric and para-aortic lymph nodes are seen that are not significant by size criteria.  Abdominal aorta is not aneurysmal. Aortic and other arterial calcifications are present.  Degenerative changes are seen in the spine. The T9 compression deformity, with 35% loss of height, is new from 06/17/2019, but age-indeterminate. Multiple other spinal compression deformities appear similar to prior exams. Right hip arthroplasty hardware is partly included.        1. There does appear to be some  nonocclusive thrombus in the right lower lobe branch artery, for example axial image 104, in a region of chronic bronchiectasis; this appearance is new from the prior exam from 2018, of uncertain chronicity (possibly chronic). The RV LV ratio is less than 1. 2. The proximal duodenum is abnormally distended up to the midportion of the third segment of the duodenum (where it passes between the superior mesenteric vein and the aorta), that could be result of obstructive effect, only slightly more prominent from the prior exam, whether mechanical obstructive effect or stricture, endoscopic correlation could be obtained as may be indicated. Much stool in the rectum, correlate clinically to exclude possibility of stool impaction. Colonic diverticula are seen that do not appear focally inflamed.   3. The T9 compression deformity, with 35% loss of height, is new from 06/17/2019, but age-indeterminate. Multiple other spinal compression deformities appear similar to prior exams.  Discussed by telephone with Dr. Lin at 1750, 11/05/2020  This report was finalized on 11/5/2020 5:54 PM by Dr. Jase Singleton M.D.        I ordered the above noted radiological studies. Independently reviewed by me and discussed with radiologist.  See dictation above for official radiology interpretation.      PROCEDURES    Procedures  EKG    EKG time: 1608  Rhythm/Rate: Sinus tachycardia at 124  No Acute Ischemia  Non-Specific ST-T changes    Unchanged compared to prior on October 24, 2020 except now the rate is faster    Interpreted Contemporaneously by me.  Independently viewed by me        MEDICATIONS GIVEN IN ER    Medications   sodium chloride 0.9 % flush 10 mL (has no administration in time range)   sodium chloride 0.9 % flush 10 mL (has no administration in time range)   bisacodyl (DULCOLAX) EC tablet 5 mg (has no administration in time range)   ondansetron (ZOFRAN) tablet 4 mg (has no administration in time range)     Or   ondansetron  (ZOFRAN) injection 4 mg (has no administration in time range)   nitroglycerin (NITROSTAT) SL tablet 0.4 mg (has no administration in time range)   acetaminophen (TYLENOL) tablet 650 mg (has no administration in time range)     Or   acetaminophen (TYLENOL) 160 MG/5ML solution 650 mg (has no administration in time range)     Or   acetaminophen (TYLENOL) suppository 650 mg (has no administration in time range)   oxyCODONE-acetaminophen (PERCOCET) 5-325 MG per tablet 1 tablet (has no administration in time range)   morphine injection 4 mg (has no administration in time range)   enoxaparin (LOVENOX) syringe 50 mg (50 mg Subcutaneous Given 11/5/20 2128)   morphine injection 4 mg (4 mg Intravenous Given 11/5/20 1548)   ondansetron (ZOFRAN) injection 4 mg (4 mg Intravenous Given 11/5/20 1547)   methylPREDNISolone sodium succinate (SOLU-Medrol) injection 125 mg (125 mg Intravenous Given 11/5/20 1552)   iopamidol (ISOVUE-370) 76 % injection 100 mL (95 mL Intravenous Given by Other 11/5/20 1702)   morphine injection 4 mg (4 mg Intravenous Given 11/5/20 1934)   ondansetron (ZOFRAN) injection 4 mg (4 mg Intravenous Given 11/5/20 1933)         PROGRESS, DATA ANALYSIS, CONSULTS, AND MEDICAL DECISION MAKING    All labs have been independently reviewed by me.  All radiology studies have been reviewed by me and discussed with radiologist dictating report.   EKG's independently reviewed by me.  Discussion below represents my analysis of pertinent findings related to patient's condition, differential diagnosis, treatment plan and final disposition.      ED Course as of Nov 05 2137   Thu Nov 05, 2020   9953 On repeat examination the patient states she feels better but still in pain.  Her sats on her baseline 6 L or 90% her heart rate is 120.  She states that is not uncommon for her.  I will give her a repeat dose of pain medication I will ask pulmonary to review her CT of the chest to see if she truly has a pulmonary embolism or not.     [GP]   1917 I just discussed the case with Dr. Minor from pulmonary.  He reviewed the patient's chest CT and asked me to add a dimer to her test and feel she likely needs Dopplers of her legs prior to anticoagulation.  He thinks with her dilated duodenum and T9 compression fracture at the hospitalist should admit her and consult GI and neurosurgery prior to anticoagulation.    [GP]   1919 On repeat examination I discussed with the patient about her findings and Dr. Minor's recommendation for admission.  The patient understands and agrees.  They actually spoken with her primary care provider Lyudmila Granados who also asked that the patient be admitted for further evaluation and care.    [GP]   1934 I discussed the case with Dr. Sergio Zapata, A will admit the patient to a telemetry bed for observation.    [GP]      ED Course User Index  [GP] Rosendo Lin MD               AS OF 21:37 EST VITALS:    BP - 122/70  HR - (!) 136  TEMP - 98.6 °F (37 °C) (Oral)  02 SATS - (!) 89%        DIAGNOSIS  Final diagnoses:   Right-sided chest pain   Acute midline thoracic back pain   Epigastric pain   Closed wedge compression fracture of T9 vertebra, initial encounter (CMS/HCC)   Dilated duodenum   Abnormal CT scan   End stage COPD (CMS/HCC)   Sinus tachycardia         DISPOSITION  ADMISSION    Discussed treatment plan and reason for admission with pt/family and admitting physician.  Pt/family voiced understanding of the plan for admission for further testing/treatment as needed.        EMR Dragon/Transcription disclaimer:   Much of this encounter note is an electronic transcription/translation of spoken language to printed text.       No scribe was used     Rosendo Lin MD  11/05/20 6412

## 2020-11-06 NOTE — DISCHARGE PLACEMENT REQUEST
"Eli Prakash (75 y.o. Female)     Date of Birth Social Security Number Address Home Phone MRN    1945  [address]  [facility] [phone] [id number]    Gnosticist Marital Status          Advent        Admission Date Admission Type Admitting Provider Attending Provider Department, Room/Bed    11/5/20 Emergency Lenard Zapata MD [place], Matthew Brett, MD [address], [address]    Discharge Date Discharge Disposition Discharge Destination                       Attending Provider: Chidi Bonilla MD    Allergies: Sulfa Antibiotics, Codeine, Hydrocodone    Isolation: None   Infection: None   Code Status: CPR    Ht: 157.5 cm (62\")   Wt: 46.7 kg (103 lb)    Admission Cmt: None   Principal Problem: Acute right-sided thoracic back pain [M54.6]                 Active Insurance as of 11/5/2020     Primary Coverage     Payor Plan Insurance Group Employer/Plan Group    HUMANA MEDICARE REPLACEMENT HUMANA MEDICARE REPLACEMENT [id number]     Payor Plan Address Payor Plan Phone Number Payor Plan Fax Number Effective Dates    [address] [address] [phone]  1/1/2020 - None Entered    [facility] [address]       Subscriber Name Subscriber Birth Date Member ID       ELI PRAKASH 1945 [id number]                 Emergency Contacts      (Rel.) Home Phone Work Phone Mobile Phone    Vlad Prakash (Spouse) [phone] -- [phone]            "

## 2020-11-06 NOTE — PLAN OF CARE
Problem: Adult Inpatient Plan of Care  Goal: Plan of Care Review  Recent Flowsheet Documentation  Taken 11/6/2020 1310 by Yue Castellanos, NIKITA  Plan of Care Reviewed With: patient  Outcome Summary: 74yo frail appearing white female admitted 11/5/20 with R side Thoracic pain longstanding since she had a near fall 5 months ago but is now significantly worse. PMH includes CRF, R chest pain, chronic airway obstruction, emphysema, IBS, JOSE C, L ankle arthritis, htn, pna, osteoporosis. Today, her mobility is significantly compromised due to her pain. She is also limited by generalized weakness and decreased activity tolerance. She req min A to transfer from bed to transport stretcher. She would benefit from skilled PT for ther ex, gt/transfer training, bed mobility, balance, and activity tolerance as appropriate.  Patient was intermittently wearing a face mask during this therapy encounter. Therapist used appropriate personal protective equipment including eye protection, mask, and gloves.  Mask used was standard procedure mask. Appropriate PPE was worn during the entire therapy session. Hand hygiene was completed before and after therapy session. Patient is not in enhanced droplet precautions.

## 2020-11-06 NOTE — CONSULTS
"           Cardiology History & Physical / Consultation      Patient Name: Siobhan Prakash  Age/Sex: 75 y.o. female  : 1945  MRN: [id number]    Date of Admission: 2020  Date of Encounter Visit: 20  Encounter Provider: Montez Hargrove MD  Referring Provider: Lenard Zapata MD  Place of Service: [facility] CARDIOLOGY  Patient Care Team:  Lyudmila Granados APRN as PCP - General (Family Medicine)  Carl Perez MD as Consulting Physician (Orthopedic Surgery)          Subjective:     Chief Complaint: abdominal pain, dyspnea     Reason for consultation: tachycardia, possible PE??    History of Present Illness:  Siobhan Prakash is a 75 y.o. female with history of severe end-stage COPD/emphysema (home oxygen dependent) and previous tobacco abuse. She was seen by Dr. Lee in 2019 for cardiac evaluation needed prior to a lung resection surgery scheduled at the [facility]. An echocardiogram obtained at that time noted normal LVSF with EF 60% and a grade I diastolic dysfunction.     She presented to [facility] ED 20 with reports of abdominal pain after hearing a \"popping\" noise when getting out of the shower. Imaging completed upon arrival to the ED was concerning for PE, as well as noted T9 compression fractures and a dilated duodenum. She was tachycardic in the 120's, which she stated is not uncommon for her. She was started on therapeutic Lovenox and follow up VQ scan and dopplers are pending.     Patient says her heart rate is usually elevated.  She can feel it racing.  Her largest complaint is her back discomfort.    CXR 20  IMPRESSION:  1. No definite active disease is seen in the chest.  2. There are prominent emphysematous changes throughout the lungs, some  coarsened interstitial markings in the left lower lung and left lung  base. This is unchanged since yesterday's chest CT.   3. There is some cortical irregularity of the " right 8th lateral rib  suggesting subtle nondisplaced rib fracture, age-indeterminate,  correlation with physical exam suggested, otherwise no right rib  fracture seen.    CTA 11/5/20     1. There does appear to be some nonocclusive thrombus in the right lower  lobe branch artery, for example axial image 104, in a region of chronic  bronchiectasis; this appearance is new from the prior exam from 2018, of  uncertain chronicity (possibly chronic). The RV LV ratio is less than 1.  2. The proximal duodenum is abnormally distended up to the midportion of  the third segment of the duodenum (where it passes between the superior  mesenteric vein and the aorta), that could be result of obstructive  effect, only slightly more prominent from the prior exam, whether  mechanical obstructive effect or stricture, endoscopic correlation could  be obtained as may be indicated. Much stool in the rectum, correlate  clinically to exclude possibility of stool impaction. Colonic  diverticula are seen that do not appear focally inflamed.  3. The T9 compression deformity, with 35% loss of height, is new from  06/17/2019, but age-indeterminate. Multiple other spinal compression  deformities appear similar to prior exams.    Previous Cardiac Testing:    Echocardiogram 2/27/19    · Calculated right ventricular systolic pressure from tricuspid regurgitation is 23 mmHg.  · Left ventricular wall thickness is consistent with mild concentric hypertrophy.  · Left ventricular systolic function is normal.  · Left ventricular diastolic dysfunction (grade I) consistent with impaired relaxation.  · Calculated EF = 60%.    Past Medical History:  Past Medical History:   Diagnosis Date   • Allergic 1970   • Anxiety    • Arthritis 10/17   • Asthma 2004   • Cataract 1996   • Cholelithiasis 1990   • COPD (chronic obstructive pulmonary disease) (CMS/[facility])    • Depression    • GERD (gastroesophageal reflux disease)    • HL (hearing loss) 2015   • Hyperlipidemia     • Irritable bowel syndrome 1990   • Low back pain    • Migraine    • Osteopenia    • Osteoporosis    • Pneumonia    • Tremor 2017   • Urinary tract infection        Past Surgical History:   Procedure Laterality Date   • CHOLECYSTECTOMY     • COLONOSCOPY  2012   • EYE SURGERY      bilateral cataracts   • HIP ARTHROPLASTY Right    • JOINT REPLACEMENT     • REDUCTION MAMMAPLASTY     • TONSILLECTOMY         Home Medications:   Medications Prior to Admission   Medication Sig Dispense Refill Last Dose   • albuterol sulfate  (90 Base) MCG/ACT inhaler Inhale 2 puffs Every 4 (Four) Hours As Needed for Wheezing. 1 inhaler 0 11/2/2020 at 0900   • arformoterol (BROVANA) 15 MCG/2ML nebulizer solution Inhale 15 mcg.   11/5/2020 at 0900   • azithromycin (ZITHROMAX) 500 MG tablet    11/4/2020 at 2000   • benzonatate (TESSALON PERLES) 100 MG capsule Take 2 capsules by mouth 3 (Three) Times a Day As Needed (cough). 30 capsule 0    • budesonide (PULMICORT) 0.5 MG/2ML nebulizer solution 0.5 mg.   11/5/2020 at 0900   • Cholecalciferol (VITAMIN D3) 5000 UNITS capsule capsule Take 5,000 Units by mouth Daily.   11/4/2020 at 0900   • dicyclomine (Bentyl) 20 MG tablet Take 1 tablet by mouth Every 6 (Six) Hours. (Patient taking differently: Take 20 mg by mouth Every 6 (Six) Hours. prn) 90 tablet 1    • ferrous sulfate 325 (65 FE) MG tablet Take 325 mg by mouth 2 (Two) Times a Day With Meals.      • FLUoxetine (PROzac) 20 MG capsule Take 1 capsule by mouth Daily for 30 days. 90 capsule 2 11/4/2020 at 0900   • guaiFENesin (MUCINEX) 600 MG 12 hr tablet Take 2 tablets by mouth Every 12 (Twelve) Hours. 60 tablet 11 11/5/2020 at 0900   • lansoprazole (PREVACID) 15 MG capsule Take 1 capsule by mouth Daily. 90 capsule 0 11/4/2020 at 0900   • lidocaine (LIDODERM) 5 % Place 1 patch on the skin as directed by provider Daily. Remove & Discard patch within 12 hours or as directed by MD 30 patch 0 11/4/2020 at 0900   • loperamide (IMODIUM) 2 MG  capsule Take 2 mg by mouth 4 (Four) Times a Day As Needed for Diarrhea.   11/5/2020 at 0900   • LORazepam (ATIVAN) 0.5 MG tablet TK ONE T PO Q 4 TO 6 H PRA  0 11/5/2020 at 0900   • meloxicam (MOBIC) 15 MG tablet Take 1 tablet by mouth Daily. 90 tablet 1 11/4/2020 at 0800   • mirtazapine (REMERON) 15 MG tablet Take 1 tablet by mouth Every Night. 30 tablet 1 11/4/2020 at 0900   • montelukast (SINGULAIR) 10 MG tablet Take 10 mg by mouth Every Night.   11/4/2020 at 2100   • O2 (OXYGEN) Inhale 1 (One) Time.   11/5/2020 at 0800   • predniSONE (DELTASONE) 20 MG tablet Take 1 tablet by mouth Daily. 3 tablet 0    • pseudoephedrine (SUDAFED) 60 MG tablet Take 1 tablet by mouth Every 6 (Six) Hours As Needed for Congestion. 60 tablet 3 11/4/2020 at 0800   • roflumilast (DALIRESP) 500 MCG tablet tablet Take 500 mcg by mouth Daily.   11/5/2020 at 0800   • SUMAtriptan (Imitrex) 50 MG tablet Take one tablet at onset of headache. May repeat dose one time in 2 hours if headache not relieved. 9 tablet 2    • theophylline (UNIPHYL) 400 MG 24 hr tablet    11/4/2020 at 0800   • Tiotropium Bromide Monohydrate (SPIRIVA RESPIMAT) 2.5 MCG/ACT aerosol solution Inhale 2 puffs Daily.   11/4/2020 at 0800   • traMADol (ULTRAM) 50 MG tablet Take 1 tablet by mouth Every 6 (Six) Hours As Needed for Moderate Pain . 60 tablet 1 11/4/2020 at 0800   • zolpidem (AMBIEN) 10 MG tablet Take 1 tablet by mouth At Night As Needed for Sleep. 30 tablet 5 11/4/2020 at 2100   • BIOTIN PO Take 1 tablet by mouth Daily.   11/4/2020 at 0900   • mupirocin (BACTROBAN) 2 % cream Apply  topically to the appropriate area as directed 3 (Three) Times a Day. 30 g 0        Allergies:  Allergies   Allergen Reactions   • Sulfa Antibiotics Other (See Comments)     Severe weakness   • Codeine Rash and Other (See Comments)     Chills   • Hydrocodone Rash and Other (See Comments)     Chills       Past Social History:  Social History     Socioeconomic History   • Marital status:       Spouse name: Vlad   • Number of children: 2   • Years of education: Not on file   • Highest education level: Not on file   Occupational History   • Occupation: retired   Tobacco Use   • Smoking status: Former Smoker     Packs/day: 1.00     Years: 15.00     Pack years: 15.00     Start date: 1963     Quit date:      Years since quittin.8   • Smokeless tobacco: Never Used   • Tobacco comment: Ultra light menthol brand   Substance and Sexual Activity   • Alcohol use: Yes     Comment: Approx. 1 or 2 a month   • Drug use: No   • Sexual activity: Not Currently     Partners: Male     Birth control/protection: Post-menopausal       Past Family History: History reviewed. No pertinent family history.   Family History   Problem Relation Age of Onset   • Alcohol abuse Father             • Heart attack Father    • Birth defects Brother    • Hearing loss Brother    • Heart disease Son         Aortic stenosis   • Hyperlipidemia Sister         Hbp       Review of Systems   Respiratory: Positive for shortness of breath.    All other systems reviewed and are negative.          Objective:     Objective:  Temp:  [98.1 °F (36.7 °C)-98.3 °F (36.8 °C)] 98.1 °F (36.7 °C)  Heart Rate:  [112-144] 144  Resp:  [18-24] 18  BP: (103-158)/() 158/93    Intake/Output Summary (Last 24 hours) at 2020 1540  Last data filed at 2020 0809  Gross per 24 hour   Intake 120 ml   Output --   Net 120 ml     Body mass index is 18.84 kg/m².      20  1610 20  2056 20  2248   Weight: 46.3 kg (102 lb) 46.7 kg (103 lb) 46.7 kg (103 lb)           Physical Exam:   Vitals signs reviewed.   Constitutional:       Appearance: Well-developed.   Eyes:      Conjunctiva/sclera: Conjunctivae normal.   HENT:      Head: Normocephalic.   Neck:      Musculoskeletal: Normal range of motion.   Pulmonary:      Breath sounds: Decreased breath sounds present.   Cardiovascular:      Tachycardia present. Regular rhythm.    Abdominal:      General: Bowel sounds are normal.      Palpations: Abdomen is soft.   Musculoskeletal: Normal range of motion.   Skin:     General: Skin is warm and dry.   Neurological:      Mental Status: Alert and oriented to person, place, and time.   Psychiatric:         Behavior: Behavior normal.          Labs:   Lab Review:     Results from last 7 days   Lab Units 11/06/20  0435 11/05/20  1536   SODIUM mmol/L 140 142   POTASSIUM mmol/L 4.4 4.4   CHLORIDE mmol/L 98 102   CO2 mmol/L 32.5* 32.5*   BUN mg/dL 18 14   CREATININE mg/dL 0.72 0.62   GLUCOSE mg/dL 156* 84   CALCIUM mg/dL 10.2 10.1   AST (SGOT) U/L  --  26   ALT (SGPT) U/L  --  12     Results from last 7 days   Lab Units 11/06/20  0435 11/05/20  1536   TROPONIN T ng/mL <0.010 <0.010     Results from last 7 days   Lab Units 11/05/20  1536   WBC 10*3/mm3 10.28   HEMOGLOBIN g/dL 13.7   HEMATOCRIT % 41.5   PLATELETS 10*3/mm3 410     Results from last 7 days   Lab Units 11/05/20  1536   INR  0.95                                 PREVIOUS EKG 10/24/20        EKG 11/5/20          Assessment:       Acute right-sided thoracic back pain    COPD (chronic obstructive pulmonary disease) (CMS/HCC)    Right-sided chest pain    Chronic respiratory failure with hypoxia (CMS/HCC)    Abnormal CT of the chest        Plan:      1.  Tachycardia this is a multifocal atrial tachycardia.  It is probably worsened by her current pain from her back issues/compression fracture.  This is very common in people with significant COPD which clearly she has.  It is very difficult to treat treating underlying issues will usually help the most.  If we can minimize her back discomfort I believe her tachycardias will improve.  2.  COPD  3.  We will follow for now very difficult to treat MAT again treating the underlying issue is the most important thing.    Thank you for allowing me to participate in the care of Siobhan Prakash. Feel free to contact me directly with any further questions  or concerns.    Montez Hargrove MD  [facility]  11/06/20  15:40 EST

## 2020-11-06 NOTE — PROGRESS NOTES
Name: Siobhan Prakash ADMIT: 2020   : 1945  PCP: Lyudmila Granados APRN    MRN: [id number] LOS: 0 days   AGE/SEX: 75 y.o. female  ROOM: [facility]     Subjective   Subjective   Patient not doing well.  Complains of severely uncontrolled pain with any movement.   says this has gotten worse more acutely this afternoon.    This morning got more tachycardic and consulted cardiology who reviewed strips and EKG and thinks this is MAT.    Review of Systems     Objective   Objective   Vital Signs  Temp:  [98.1 °F (36.7 °C)-98.3 °F (36.8 °C)] 98.1 °F (36.7 °C)  Heart Rate:  [112-144] 144  Resp:  [18-24] 18  BP: (103-158)/() 138/85  SpO2:  [88 %-95 %] 93 %  on  Flow (L/min):  [6] 6;   Device (Oxygen Therapy): humidified;nasal cannula  Body mass index is 18.84 kg/m².  Physical Exam  Vitals signs and nursing note reviewed.   Constitutional:       General: She is not in acute distress.     Appearance: She is ill-appearing.      Comments: Keeps eyes closed.  Very frail and barrel chested   HENT:      Nose: Nose normal.   Neck:      Musculoskeletal: Neck supple.   Cardiovascular:      Rate and Rhythm: Tachycardia present.      Heart sounds: No murmur.   Pulmonary:      Breath sounds: No wheezing or rales.      Comments: Markedly decreased breath sounds  Abdominal:      General: Bowel sounds are normal. There is no distension.      Palpations: Abdomen is soft.      Tenderness: There is no abdominal tenderness.   Musculoskeletal:      Right lower leg: No edema.      Left lower leg: No edema.   Skin:     General: Skin is warm and dry.      Findings: No rash.   Neurological:      General: No focal deficit present.      Mental Status: She is alert.         Results Review     I reviewed the patient's new clinical results.  Results from last 7 days   Lab Units 20  1536   WBC 10*3/mm3 10.28   HEMOGLOBIN g/dL 13.7   PLATELETS 10*3/mm3 410     Results from last 7 days   Lab Units 20  8230  11/05/20  1536   SODIUM mmol/L 140 142   POTASSIUM mmol/L 4.4 4.4   CHLORIDE mmol/L 98 102   CO2 mmol/L 32.5* 32.5*   BUN mg/dL 18 14   CREATININE mg/dL 0.72 0.62   GLUCOSE mg/dL 156* 84   Estimated Creatinine Clearance: 46.2 mL/min (by C-G formula based on SCr of 0.72 mg/dL).  Results from last 7 days   Lab Units 11/05/20  1536   ALBUMIN g/dL 4.00   BILIRUBIN mg/dL 0.2   ALK PHOS U/L 145*   AST (SGOT) U/L 26   ALT (SGPT) U/L 12     Results from last 7 days   Lab Units 11/06/20  0435 11/05/20  1536   CALCIUM mg/dL 10.2 10.1   ALBUMIN g/dL  --  4.00       COVID19   Date Value Ref Range Status   11/05/2020 Not Detected Not Detected - Ref. Range Final     No results found for: HGBA1C, POCGLU    XR Spine Thoracic 3 View  Narrative: THREE VIEWS OF THE THORACIC SPINE AND LUMBAR SPINE PLAIN FILM SERIES  COMPLETE 4+ VIEWS 11/05/2020     CLINICAL HISTORY: Patient twisted back while getting out of bed and  heard a pop, has pain in the mid and low back.     COMPARISON: This is correlated to chest CT 08/02/2018.     LUMBAR SPINE PLAIN FILMS: Five views of lumbar spine including AP  bilateral oblique and lateral views of the entire lumbar spine and coned  down lateral view of lumbosacral junction are submitted for  interpretation. There is mild compression deformity involving the  superior body and endplate of L5 with 20-30% loss of anterior and  central, 20% loss of posterior vertebral body height, precise age of  this compression deformity is uncertain but could be acute to subacute.  The L1 through L4 lumbar vertebral body heights are well-maintained.  There is also a compression fracture involving superior body and  endplate of T12 with about 30% loss of anterior and central, 20% loss of  posterior vertebral body height at T12. This T12 compression fracture is  seen on prior chest CT 08/02/2018 and is chronic. There is also minimal  irregularity in the anterior cortex superior body of S3 sacral level,  subtle horizontal  upper sacral fracture at the S3 sacral level is not  excluded. Compression fracture at L5 could be further dated with a  lumbar spine MRI.     THORACIC SPINE PLAIN FILMS: Three views of the thoracic spine including  AP and lateral views of the entire thoracic spine and swimmer's lateral  view of lower cervical and upper thoracic spine are submitted for  interpretation. There are 4 compression fractures in the thoracic  spine,including a moderate compression fracture involving the T7  vertebra with about 40-50% loss of anterior, no loss of posterior  vertebral body height. This compression fracture was present on chest CT  08/02/2018 and is chronic. There are mild compression fractures  involving superior body and endplate of T9 and T10 with about 30% loss  of anterior vertebral body height at both these levels. These are new  since chest CT 08/02/2018, but the precise age of these compression  deformities is uncertain, could be acute to subacute. Reidentified is  old compression fracture at T12.      Impression: 1. There is a moderate old compression fracture at T7 and  mild-to-moderate old compression deformity involving the superior body  and endplate of T12, unchanged since chest CT 08/02/2018.  2. There are mild-to-moderate compression fractures involving the  superior body and endplate of the T9 and T10 thoracic vertebrae that are  new when compared to chest CT 08/02/2018, the precise age is uncertain,  could be further dated with a thoracic spine MRI.     This report was finalized on 11/6/2020 1:30 PM by Dr. Burton Cool M.D.     XR Spine Lumbar Complete 4+VW  Narrative: THREE VIEWS OF THE THORACIC SPINE AND LUMBAR SPINE PLAIN FILM SERIES  COMPLETE 4+ VIEWS 11/05/2020     CLINICAL HISTORY: Patient twisted back while getting out of bed and  heard a pop, has pain in the mid and low back.     COMPARISON: This is correlated to chest CT 08/02/2018.     LUMBAR SPINE PLAIN FILMS: Five views of lumbar spine including  AP  bilateral oblique and lateral views of the entire lumbar spine and coned  down lateral view of lumbosacral junction are submitted for  interpretation. There is mild compression deformity involving the  superior body and endplate of L5 with 20-30% loss of anterior and  central, 20% loss of posterior vertebral body height, precise age of  this compression deformity is uncertain but could be acute to subacute.  The L1 through L4 lumbar vertebral body heights are well-maintained.  There is also a compression fracture involving superior body and  endplate of T12 with about 30% loss of anterior and central, 20% loss of  posterior vertebral body height at T12. This T12 compression fracture is  seen on prior chest CT 08/02/2018 and is chronic. There is also minimal  irregularity in the anterior cortex superior body of S3 sacral level,  subtle horizontal upper sacral fracture at the S3 sacral level is not  excluded. Compression fracture at L5 could be further dated with a  lumbar spine MRI.     THORACIC SPINE PLAIN FILMS: Three views of the thoracic spine including  AP and lateral views of the entire thoracic spine and swimmer's lateral  view of lower cervical and upper thoracic spine are submitted for  interpretation. There are 4 compression fractures in the thoracic  spine,including a moderate compression fracture involving the T7  vertebra with about 40-50% loss of anterior, no loss of posterior  vertebral body height. This compression fracture was present on chest CT  08/02/2018 and is chronic. There are mild compression fractures  involving superior body and endplate of T9 and T10 with about 30% loss  of anterior vertebral body height at both these levels. These are new  since chest CT 08/02/2018, but the precise age of these compression  deformities is uncertain, could be acute to subacute. Reidentified is  old compression fracture at T12.      Impression: 1. There is a moderate old compression fracture at T7  and  mild-to-moderate old compression deformity involving the superior body  and endplate of T12, unchanged since chest CT 08/02/2018.  2. There are mild-to-moderate compression fractures involving the  superior body and endplate of the T9 and T10 thoracic vertebrae that are  new when compared to chest CT 08/02/2018, the precise age is uncertain,  could be further dated with a thoracic spine MRI.     This report was finalized on 11/6/2020 1:30 PM by Dr. Burton Cool M.D.     XR Ribs Right With PA Chest  Narrative: PA CHEST AND RIGHT RIB SERIES 11/06/2020     CLINICAL HISTORY: Complains of low back pain status post twisting back  getting out of bed yesterday, possible right rib fracture.     COMPARISON: This is correlated to yesterday's chest CT 11/05/2020.     FINDINGS: On the PA view of the chest, the cardiomediastinal silhouette  and the pulmonary vasculature are within normal limits. There are  prominent emphysematous changes throughout the lungs. There is mild  coarsened interstitial markings left lower lung and lung base, unchanged  since chest CT yesterday 11/05/2020. No focal dense airspace  consolidation seen. Costophrenic angles are sharp. There is minimal  cortical regularity of the right 8th lateral rib. There is a subtle  fracture at this site, age-indeterminate suspected.     Impression: 1. No definite active disease is seen in the chest.  2. There are prominent emphysematous changes throughout the lungs, some  coarsened interstitial markings in the left lower lung and left lung  base. This is unchanged since yesterday's chest CT.   3. There is some minimal cortical irregularity of the right 8th lateral  rib raising the possibility of a very subtle nondisplaced rib fracture,  age-indeterminate, correlation with physical exam suggested, otherwise  no right rib fracture seen.     This report was finalized on 11/6/2020 1:20 PM by Dr. Burton Cool M.D.     NM Lung Ventilation Perfusion  Narrative: NUCLEAR  MEDICINE VENTILATION AND PERFUSION STUDY     HISTORY: Severe COPD. Recent chest and back discomfort.     COMPARISON: PA chest 11/06/2020, CT angiogram chest 11/05/2020.     TECHNIQUE:  Planar ventilation scan in posterior projection after  inhalation of 7.6 mCi Xe-133 gas with wash-in, rebreathing and wash-out  phases. Followed by perfusion scan with 6 mCi Tc-MAA IV in multiple  projections.      FINDINGS:  There is a matched area of decreased ventilation and  perfusion to the posterior right midlung that when correlated with CT  angiogram chest is most likely related to the severity of bolus  emphysematous disease in this region. Technically, moderate matched  defect without corresponding chest x-ray abnormality is consistent with  intermediate probability. Lungs are hyperexpanded and there is overall  diminished ventilation and perfusion to the right as compared to the  left most likely related to the severity of emphysema. No focal  segmental mismatch defect is evident.     Impression: Severe pulmonary emphysema/COPD limits evaluation. Matched  ventilation/perfusion may be related to the severity of emphysema/COPD  though a moderate matched defect in the right midlung constitutes  intermediate probability ventilation/perfusion scan.     This report was finalized on 11/6/2020 12:43 PM by Dr. Rosendo Ferraro M.D.     Duplex Venous Lower Extremity - Bilateral CAR  · Normal bilateral lower extremity venous duplex scan.       Scheduled Medications  arformoterol, 15 mcg, Nebulization, BID - RT  budesonide, 0.5 mg, Nebulization, BID - RT  cholecalciferol, 1,000 Units, Oral, Daily  enoxaparin, 1 mg/kg, Subcutaneous, Q12H  FLUoxetine, 20 mg, Oral, Daily  guaiFENesin, 1,200 mg, Oral, Q12H  [START ON 11/7/2020] influenza vaccine, 0.5 mL, Intramuscular, Once  lidocaine, 1 patch, Transdermal, Q24H  loperamide, 2 mg, Oral, BID  mirtazapine, 15 mg, Oral, Nightly  montelukast, 10 mg, Oral, Nightly  pantoprazole, 40 mg, Oral,  QAM  roflumilast, 500 mcg, Oral, Daily  sodium chloride, 10 mL, Intravenous, Q12H  theophylline, 400 mg, Oral, Q24H  tiotropium bromide monohydrate, 2 puff, Inhalation, Daily    Infusions   Diet  Diet Regular       Assessment/Plan     Active Hospital Problems    Diagnosis  POA   • **Acute right-sided thoracic back pain [M54.6]  Yes   • Right-sided chest pain [R07.9]  Yes   • Chronic respiratory failure with hypoxia (CMS/[facility]) [J96.11]  Yes   • Abnormal CT of the chest [R93.89]  Yes   • COPD (chronic obstructive pulmonary disease) (CMS/HCC) [J44.9]  Yes      Resolved Hospital Problems   No resolved problems to display.       · Severe right-sided thoracic back pain-I think this is musculoskeletal, may be related to acute compression fractures.  Probably needs an MRI to investigate further but I do not think she would tolerate it.  · We will increase her pain regimen  · We will ask neurosurgery to see her but I do not think she is a good candidate even for kyphoplasty due to her severe COPD.  Certainly would have to have pulmonology weigh in on that.  · Abnormal CT,? PE-VQ scan indeterminate due to her severe COPD.  She is on therapeutic Lovenox for now.  Defer to pulmonology whether to continue  · Dopplers negative  · Severe/end-stage COPD with chronic bronchiectasis -management per Tami.  Continue current respiratory regimen, not wheezing  · Chronic hypoxic respiratory failure-6 L chronic O2 requirement.  Very marginal respiratory status even at baseline  · Tachycardia/MAT-probably treatment of her pain will help the most with this.  Defer to cardiology whether or not to add a BB or CCB  · Disposition-discussed with her  regarding the above plans.  Probably will need to address goals of care if she deteriorates.  Looks like she is a full code at present    Chidi Bonilla MD  [facility]  11/06/20  17:29 EST      I wore protective equipment throughout this patient encounter  including a face mask, gloves and protective eyewear.  Hand hygiene was performed before donning protective equipment and after removal when leaving the room.

## 2020-11-06 NOTE — CONSULTS
[facility]  [phone]  Sid Minor MD      Subjective   LOS: 0 days     Thank you for this consultation.  75-year-old female who turned the wrong way while having a shower last night.  She heard lower side of her rib cage back and upper abdomen.  She has had continued pain.  She therefore came into the emergency room today.  She has a history of osteoporosis.  She had a CT angio of the chest when she came in which showed a small PE in the right lower lobe.  This is on a single image 104.  So far no imaging of the spine.  Some chronic bronchiectasis.  Dilated duodenum is also noted along with T9 compression deformity.  Patient typically uses 6 L of oxygen at home.  Interestingly she does not have pulmonary hypertension on last recorded echo of 2/27/2019.  She does have some medial bronchiectasis on the right side.  Other stable findings on CT also noted.  She uses nebulizer treatments twice a day.  She does not have a vest or a flutter device at home.  He is quite limited in her mobility due to shortness of breath.  She follows with Dr. Gregory Hightower in our office.  She did have some nausea today.  She has not had any emesis.  She has a chronic problem with diarrhea.  Her last admission was over a year ago.  She was tried and failed a noninvasive device on last admission.  She has chronic anxiety which limits her breathing as well.    Siobhan Prakash  reports current alcohol use.,  reports that she quit smoking about 21 years ago. She started smoking about 57 years ago. She has a 15.00 pack-year smoking history. She has never used smokeless tobacco.     Past Hx:  has a past medical history of Allergic (1970), Anxiety, Arthritis (10/17), Asthma (2004), Cataract (1996), Cholelithiasis (1990), COPD (chronic obstructive pulmonary disease) (CMS/[facility]), Depression, GERD (gastroesophageal reflux disease), HL (hearing loss) (2015), Hyperlipidemia, Irritable bowel syndrome (1990), Low back pain, Migraine,  Osteopenia, Osteoporosis, Pneumonia, Tremor (2017), and Urinary tract infection.  Surg Hx:  has a past surgical history that includes Cholecystectomy; Hip Arthroplasty (Right); Reduction mammaplasty; Joint replacement; Tonsillectomy; Colonoscopy (2012); and Eye surgery.  FH: family history includes Alcohol abuse in her father; Birth defects in her brother; Hearing loss in her brother; Heart attack in her father; Heart disease in her son; Hyperlipidemia in her sister.  SH:  reports that she quit smoking about 21 years ago. She started smoking about 57 years ago. She has a 15.00 pack-year smoking history. She has never used smokeless tobacco. She reports current alcohol use. She reports that she does not use drugs.    (Not in a hospital admission)    Allergies   Allergen Reactions   • Sulfa Antibiotics Other (See Comments)     Severe weakness   • Codeine Rash and Other (See Comments)     Chills   • Hydrocodone Rash and Other (See Comments)     Chills       Review of Systems   Constitutional: Negative for chills and fever.   HENT: Positive for congestion. Negative for sore throat.    Respiratory: Positive for cough and shortness of breath.    Cardiovascular: Positive for chest pain. Negative for leg swelling.   Gastrointestinal: Negative for abdominal pain, nausea and vomiting.   Genitourinary: Negative for dysuria and hematuria.   Musculoskeletal: Positive for back pain. Negative for arthralgias.   Skin: Negative for pallor and rash.   Neurological: Negative for seizures and headaches.   Psychiatric/Behavioral: Negative for agitation and confusion.     Vital Signs past 24hrs  BP range: BP: (103-143)/(70-93) 122/70  Pulse range: Heart Rate:  [120-138] 136  Resp rate range: Resp:  [20-24] 24  Temp range: Temp (24hrs), Av.6 °F (37 °C), Min:98.6 °F (37 °C), Max:98.6 °F (37 °C)    Oxygen range: SpO2:  [88 %-95 %] 89 %; Flow (L/min):  [6] 6;   Device (Oxygen Therapy): nasal cannula  46.3 kg (102 lb); Body mass index is  18.66 kg/m².  No intake/output data recorded.    Adult female laying in bed.  In good spirits in no acute distress.  On 6 L oxygen.  She is tachycardic.  Pupils equal and react light.  Oropharynx class II Mallampati airway no posterior pharyngeal discharge.  Nasopharynx without discharge.  JVP not elevated trachea midline thyroid not enlarged.  Lungs reveal diminished breath sounds but equal with no wheezing.  Percussion note resonant chest expansion equal no chest wall deformity.  Some tenderness in the lower rib cage on the right side.  She seems to have a congested cough.  Heart examination S1-S2 present rhythm regular and tachycardic.  No murmurs.  No edema lower extremities.  Abdomen is soft nontender bowel sounds present no liver spleen enlargement.  No peripheral cyanosis clubbing.  Moves all 4 extremities sensorimotor intact.  No cervical, axillary, inguinal adenopathy.    Results Review:    I have reviewed the laboratory and imaging data from current admission. My annotations are as noted in assessment and plan.    Medication Review:  I have reviewed the current MAR. My annotations are as noted in assessment and plan.    Plan   PCCM Problems  Acute PE, suspected  Chronic hypoxia  COPD end-stage  Medial bronchiectasis in the lungs  Baseline tachycardia  Muscular skeletal injury causing pain on the right side  Dilated duodenum  Chronic prednisone 20 mg  Relevant Medical Diagnoses  Osteoporosis  T9 compression deformity  Anxiety disorder  COPD cachexia    Plan of Treatment  Partially occluded vessels seen on a single image 104.  There are no symptoms or signs otherwise of a PE.  Patient has baseline hypoxia and uses 6 L at home.  She has baseline tachycardia.  Her pain seems very musculoskeletal.  However with elevated D-dimer reasonable to anticoagulate.  She has been less mobile since COVID-19 infection.  We will scan her legs.  If a definite DVT is seen then minimum anticoagulation for 3 months.  Otherwise  may need to reevaluate perhaps even with a repeat CTA.    Musculoskeletal injuries suspected.  There is superficial tenderness of the lower rib cage.  I suspect she may have an acute fracture in the spine as well.  Recommend imaging of the spine with CT or MRI.  Will get right-sided rib series in the morning to rule out a fracture.    Bronchiectasis on the medial right aspect of the lungs.  This is chronic and unchanged.  She also has some other chronic appearing infiltrates that are unchanged.  Good pulmonary hygiene as an outpatient would be helpful.    End-stage COPD.  She is previously failed noninvasive ventilation.  Continue bronchodilators.  Systemic steroids not required.    She is on chronic prednisone at 20 mg daily.  I have continued this for her.  Please confirm with patient and pharmacy.    Unclear etiology for dilated duodenum seen on CT abdomen.  Defer to primary team for further evaluation or treatment.    Underlying anxiety disorder.  This may complicate all management.    Electronically signed by Sid Minor MD, 11/05/20, 9:37 PM EST.      Part of this note may be an electronic transcription/translation of spoken language to printed text using the Dragon Dictation System.

## 2020-11-06 NOTE — PROGRESS NOTES
[facility]     Mar/chart reviewed  Follow up COPD, chronic hypoxemic respiratory failure, possible PE on CT angio. Compression fracture  Still with back pain, baseline shortness of breath  Vital Sign Min/Max for last 24 hours  Temp  Min: 98.1 °F (36.7 °C)  Max: 98.6 °F (37 °C)   BP  Min: 103/88  Max: 158/93   Pulse  Min: 112  Max: 138   Resp  Min: 18  Max: 24   SpO2  Min: 88 %  Max: 95 %   Flow (L/min)  Min: 6  Max: 6   Weight  Min: 46.3 kg (102 lb)  Max: 46.7 kg (103 lb)     Appears ill, axox3,   perrl, eomi, normal sclera, no palpable thyroid nodules  mmm, no jvd, trachea midline, neck supple,  chest decreased ae bilaterally, no crackles, no wheezes,   Tachy, regular  soft, nt, nd +bs,  no c/c/ e  Skin warm, dry no rashes    Labs: 11/6: reviewed:  Glucose 156  Bun 18  Cr 0.72  Bicarb 32  D dimer 3.67  V/q read pending      PCCM Problems  Acute PE, suspected  Chronic hypoxia  COPD end-stage  Medial bronchiectasis in the lungs  Baseline tachycardia  Muscular skeletal injury causing pain on the right side  Dilated duodenum  Chronic prednisone 20 mg  Relevant Medical Diagnoses  Osteoporosis  T9 compression deformity  Anxiety disorder  COPD cachexia     Plan of Treatment    Await v/q read, check venous dopplers  Continue bronchodilators, she says she is not on chronic prednisone? But she is not sure, confirm with pharmacy and d/c prednisone if it is not a chronic med.     [facility] office patient    Patient is new to me today

## 2020-11-06 NOTE — H&P
Patient Name:  Siobhan Prakash  YOB: 1945  MRN:  [id number]  Admit Date:  11/5/2020  Patient Care Team:  Lyudmila Granados APRN as PCP - General (Family Medicine)  Carl Perez MD as Consulting Physician (Orthopedic Surgery)  Shria Willson RN as Ambulatory  ([facility])      Subjective   History Present Illness     Chief Complaint   Patient presents with   • Abdominal Pain       Ms. Prakash is a 75 y.o. with a history of severe COPD with chronic respiratory failure on 6 L of chronic oxygen that presents to [facility] complaining of right flank/back pain.  Symptoms started last night after she had gotten out of the shower and while turning felt a pop in her chest/abdomen/back.  Afterwards she had severe pain.  The pain is worse with movement.  It is somewhat of a back/thoracic/right flank pain but she says that at times it had wrapped around her whole body like a belt.  She was unable to ambulate because of this.  EMS brought her to the emergency room.  She was given IV pain medicine with improvement of her symptoms    There she was tachycardic though she states that is not unusual for her.  She had CT scan which could not exclude pulmonary embolism.  Also additional findings as listed below.         History of Present Illness  Review of Systems   Constitutional: Positive for activity change. Negative for fever.   HENT: Negative.    Eyes: Negative.    Respiratory: Positive for shortness of breath (chronic). Negative for wheezing.    Cardiovascular: Positive for chest pain. Negative for palpitations and leg swelling.   Gastrointestinal: Positive for diarrhea. Negative for abdominal pain and vomiting.   Endocrine: Negative.    Genitourinary: Negative.    Musculoskeletal: Positive for back pain. Negative for neck stiffness.   Skin: Negative.    Allergic/Immunologic: Negative.    Neurological: Negative.    Hematological: Negative.    Psychiatric/Behavioral:  Negative.         Personal History     Past Medical History:   Diagnosis Date   • Allergic 1970   • Anxiety    • Arthritis 10/17   • Asthma    • Cataract    • Cholelithiasis    • COPD (chronic obstructive pulmonary disease) (CMS/[facility])    • Depression    • GERD (gastroesophageal reflux disease)    • HL (hearing loss)    • Hyperlipidemia    • Irritable bowel syndrome    • Low back pain    • Migraine    • Osteopenia    • Osteoporosis    • Pneumonia    • Tremor    • Urinary tract infection      Past Surgical History:   Procedure Laterality Date   • CHOLECYSTECTOMY     • COLONOSCOPY     • EYE SURGERY      bilateral cataracts   • HIP ARTHROPLASTY Right    • JOINT REPLACEMENT     • REDUCTION MAMMAPLASTY     • TONSILLECTOMY       Family History   Problem Relation Age of Onset   • Alcohol abuse Father             • Heart attack Father    • Birth defects Brother    • Hearing loss Brother    • Heart disease Son         Aortic stenosis   • Hyperlipidemia Sister         Hbp     Social History     Tobacco Use   • Smoking status: Former Smoker     Packs/day: 1.00     Years: 15.00     Pack years: 15.00     Start date: 1963     Quit date:      Years since quittin.8   • Smokeless tobacco: Never Used   • Tobacco comment: Ultra light menthol brand   Substance Use Topics   • Alcohol use: Yes     Comment: Approx. 1 or 2 a month   • Drug use: No     (Not in a hospital admission)  home meds reviewed and documented in EPIC  Allergies:    Allergies   Allergen Reactions   • Sulfa Antibiotics Other (See Comments)     Severe weakness   • Codeine Rash and Other (See Comments)     Chills   • Hydrocodone Rash and Other (See Comments)     Chills       Objective    Objective     Vital Signs  Temp:  [98.6 °F (37 °C)] 98.6 °F (37 °C)  Heart Rate:  [120-127] 122  Resp:  [20-24] 24  BP: (124-135)/(76-92) 128/83  SpO2:  [91 %-95 %] 91 %  on  Flow (L/min):  [6] 6;   Device (Oxygen Therapy): nasal  cannula  Body mass index is 18.66 kg/m².    Physical Exam  Vitals signs and nursing note reviewed.   Constitutional:       Appearance: She is ill-appearing (chronically, and in some acute pain).   HENT:      Head: Normocephalic and atraumatic.   Eyes:      General: No scleral icterus.     Pupils: Pupils are equal, round, and reactive to light.   Neck:      Musculoskeletal: Normal range of motion and neck supple.   Cardiovascular:      Rate and Rhythm: Regular rhythm. Tachycardia present.      Heart sounds: Normal heart sounds.   Pulmonary:      Effort: Tachypnea (slight) present.      Breath sounds: No wheezing.   Abdominal:      General: Bowel sounds are normal. There is no distension.      Palpations: Abdomen is soft.      Tenderness: There is no abdominal tenderness.   Musculoskeletal:      Comments: Some right flank/thoracic pain on palpation but not too severe   Skin:     General: Skin is warm and dry.      Findings: No rash.   Neurological:      Mental Status: She is alert and oriented to person, place, and time.      Cranial Nerves: No cranial nerve deficit.   Psychiatric:         Behavior: Behavior normal.         Thought Content: Thought content normal.         Results Review:  I reviewed the patient's new clinical results.  I reviewed the patient's new imaging results and agree with the interpretation.  I reviewed the patient's other test results and agree with the interpretation  Discussed with ED provider.    Lab Results (last 24 hours)     Procedure Component Value Units Date/Time    CBC & Differential [[id number]]  (Abnormal) Collected: 11/05/20 1536    Specimen: Blood Updated: 11/05/20 1548    Narrative:      The following orders were created for panel order CBC & Differential.  Procedure                               Abnormality         Status                     ---------                               -----------         ------                     CBC Auto Differential[[id number]]        Abnormal             Final result                 Please view results for these tests on the individual orders.    Comprehensive Metabolic Panel [[id number]]  (Abnormal) Collected: 11/05/20 1536    Specimen: Blood Updated: 11/05/20 1624     Glucose 84 mg/dL      BUN 14 mg/dL      Creatinine 0.62 mg/dL      Sodium 142 mmol/L      Potassium 4.4 mmol/L      Comment: Slight hemolysis detected by analyzer. Results may be affected.        Chloride 102 mmol/L      CO2 32.5 mmol/L      Calcium 10.1 mg/dL      Total Protein 7.6 g/dL      Albumin 4.00 g/dL      ALT (SGPT) 12 U/L      AST (SGOT) 26 U/L      Comment: Slight hemolysis detected by analyzer. Results may be affected.        Alkaline Phosphatase 145 U/L      Total Bilirubin 0.2 mg/dL      eGFR Non African Amer 94 mL/min/1.73      Globulin 3.6 gm/dL      A/G Ratio 1.1 g/dL      BUN/Creatinine Ratio 22.6     Anion Gap 7.5 mmol/L     Narrative:      GFR Normal >60  Chronic Kidney Disease <60  Kidney Failure <15      Protime-INR [[id number]]  (Normal) Collected: 11/05/20 1536    Specimen: Blood Updated: 11/05/20 1601     Protime 12.6 Seconds      INR 0.95    Lipase [[id number]]  (Abnormal) Collected: 11/05/20 1536    Specimen: Blood Updated: 11/05/20 1618     Lipase 10 U/L     Troponin [[id number]]  (Normal) Collected: 11/05/20 1536    Specimen: Blood Updated: 11/05/20 1618     Troponin T <0.010 ng/mL     Narrative:      Troponin T Reference Range:  <= 0.03 ng/mL-   Negative for AMI  >0.03 ng/mL-     Abnormal for myocardial necrosis.  Clinicians would have to utilize clinical acumen, EKG, Troponin and serial changes to determine if it is an Acute Myocardial Infarction or myocardial injury due to an underlying chronic condition.       Results may be falsely decreased if patient taking Biotin.      CBC Auto Differential [[id number]]  (Abnormal) Collected: 11/05/20 1536    Specimen: Blood Updated: 11/05/20 1548     WBC 10.28 10*3/mm3      RBC 4.55 10*6/mm3      Hemoglobin 13.7 g/dL       Hematocrit 41.5 %      MCV 91.2 fL      MCH 30.1 pg      MCHC 33.0 g/dL      RDW 12.1 %      RDW-SD 40.3 fl      MPV 9.1 fL      Platelets 410 10*3/mm3      Neutrophil % 74.9 %      Lymphocyte % 18.1 %      Monocyte % 5.4 %      Eosinophil % 0.5 %      Basophil % 0.4 %      Immature Grans % 0.7 %      Neutrophils, Absolute 7.70 10*3/mm3      Lymphocytes, Absolute 1.86 10*3/mm3      Monocytes, Absolute 0.56 10*3/mm3      Eosinophils, Absolute 0.05 10*3/mm3      Basophils, Absolute 0.04 10*3/mm3      Immature Grans, Absolute 0.07 10*3/mm3      nRBC 0.0 /100 WBC     COVID PRE-OP / PRE-PROCEDURE SCREENING ORDER (NO ISOLATION) - Swab, Nasopharynx [[id number]]  (Normal) Collected: 11/05/20 1557    Specimen: Swab from Nasopharynx Updated: 11/05/20 1726    Narrative:      The following orders were created for panel order COVID PRE-OP / PRE-PROCEDURE SCREENING ORDER (NO ISOLATION) - Swab, Nasopharynx.  Procedure                               Abnormality         Status                     ---------                               -----------         ------                     Respiratory Panel PCR w/...[[id number]]  Normal              Final result                 Please view results for these tests on the individual orders.    Respiratory Panel PCR w/COVID-19(SARS-CoV-2) GUSTAVO/CHUCK/AVILA/PAD/COR/MAD/PARAS In-House, NP Swab in UTM/VTM, 3-4 HR TAT - Swab, Nasopharynx [[id number]]  (Normal) Collected: 11/05/20 1557    Specimen: Swab from Nasopharynx Updated: 11/05/20 1726     ADENOVIRUS, PCR Not Detected     Coronavirus 229E Not Detected     Coronavirus HKU1 Not Detected     Coronavirus NL63 Not Detected     Coronavirus OC43 Not Detected     COVID19 Not Detected     Human Metapneumovirus Not Detected     Human Rhinovirus/Enterovirus Not Detected     Influenza A PCR Not Detected     Influenza B PCR Not Detected     Parainfluenza Virus 1 Not Detected     Parainfluenza Virus 2 Not Detected     Parainfluenza Virus 3 Not Detected      Parainfluenza Virus 4 Not Detected     RSV, PCR Not Detected     Bordetella pertussis pcr Not Detected     Bordetella parapertussis PCR Not Detected     Chlamydophila pneumoniae PCR Not Detected     Mycoplasma pneumo by PCR Not Detected    Narrative:      Fact sheet for providers: https://docs.[employer]/wp-content/uploads/EEB8915-4373-NJ4.1-EUA-Provider-Fact-Sheet-3.pdf    Fact sheet for patients: https://docs.[employer]/wp-content/uploads/VDK0304-6807-KG9.1-EUA-Patient-Fact-Sheet-1.pdf    Urinalysis With Microscopic If Indicated (No Culture) - Urine, Catheter [[id number]]  (Abnormal) Collected: 11/05/20 1906    Specimen: Urine, Catheter Updated: 11/05/20 1920     Color, UA Yellow     Appearance, UA Clear     pH, UA <=5.0     Specific Gravity, UA >=1.030     Glucose, UA Negative     Ketones, UA 15 mg/dL (1+)     Bilirubin, UA Negative     Blood, UA Negative     Protein, UA Negative     Leuk Esterase, UA Negative     Nitrite, UA Negative     Urobilinogen, UA 0.2 E.U./dL    Narrative:      Urine microscopic not indicated.    D-dimer, Quantitative [[id number]] Collected: 11/05/20 1930    Specimen: Blood Updated: 11/05/20 1938          Imaging Results (Last 24 Hours)     Procedure Component Value Units Date/Time    CT Angiogram Chest [[id number]] Collected: 11/05/20 1735     Updated: 11/05/20 1758    Narrative:      CT ANGIOGRAM CHEST-, CT ABDOMEN PELVIS W CONTRAST-     INDICATIONS: Short of breath, pain  Radiation dose reduction techniques  were utilized, including automated exposure control and exposure  modulation based on body size.     TECHNIQUE: CTA chest with three-dimensional reconstructions. Enhanced CT  of the abdomen, pelvis     COMPARISON: Chest CT from 08/02/2018, 01/15/2018; CT abdomen pelvis from  11/17/2005     FINDINGS:     Chest CTA:     There does appear to be some nonocclusive thrombus in the right lower  lobe branch artery, for example axial image 104, in a region of chronic  bronchiectasis;  this appearance is new from the prior exam from 2018, of  uncertain chronicity (possibly chronic). The RV LV ratio is less than 1.  Prominence of caliber of central pulmonary arteries may reflect  pulmonary arterial hypertension, appearance is chronic. No aortic  dissection.     The heart size is normal with minimal pericardial effusion. Mild to  moderate coronary arterial calcification. A few small subcentimeter  short axis mediastinal lymph nodes are seen that are not significant by  size criteria.     The airways appear clear.     No pleural effusion or pneumothorax.     The lungs show no focal pulmonary consolidation or mass. Extensive  emphysematous changes of the lungs are noted, similar to prior exam, and  chronic bronchiectasis is seen medially at the right base. A stable  right middle lobe nodular density measuring 6 mm on axial image 101 is  noted. Scarlike density inferiorly in the right middle lobe is also  stable.              Abdomen pelvis CT:     Structures in the pelvis are partly obscured by right hip arthroplasty  hardware artifact.     A right adrenal myelolipoma is again demonstrated.     Gallbladder is surgically absent. Mild intrahepatic biliary ductal  dilatation. Caliber of the main pancreatic duct, 1 cm, may be normal  status post cholecystectomy.     Otherwise unremarkable appearance of the liver, spleen, adrenal glands,  pancreas, kidneys, bladder.     The proximal duodenum is abnormally distended up to the midportion of  the third segment of the duodenum (where it passes between the superior  mesenteric vein and the aorta), that could be result of obstructive  effect, only slightly more prominent from the prior exam, whether  mechanical obstructive effect or stricture, endoscopic correlation could  be obtained as may be indicated. Much stool in the rectum, correlate  clinically to exclude possibility of stool impaction. Colonic  diverticula are seen that do not appear focally inflamed.  Assessment of  the colon for wall thickening is limited by lack of distention.     No free intraperitoneal gas or free fluid.     Scattered small mesenteric and para-aortic lymph nodes are seen that are  not significant by size criteria.     Abdominal aorta is not aneurysmal. Aortic and other arterial  calcifications are present.     Degenerative changes are seen in the spine. The T9 compression  deformity, with 35% loss of height, is new from 06/17/2019, but  age-indeterminate. Multiple other spinal compression deformities appear  similar to prior exams. Right hip arthroplasty hardware is partly  included.       Impression:            1. There does appear to be some nonocclusive thrombus in the right lower  lobe branch artery, for example axial image 104, in a region of chronic  bronchiectasis; this appearance is new from the prior exam from 2018, of  uncertain chronicity (possibly chronic). The RV LV ratio is less than 1.  2. The proximal duodenum is abnormally distended up to the midportion of  the third segment of the duodenum (where it passes between the superior  mesenteric vein and the aorta), that could be result of obstructive  effect, only slightly more prominent from the prior exam, whether  mechanical obstructive effect or stricture, endoscopic correlation could  be obtained as may be indicated. Much stool in the rectum, correlate  clinically to exclude possibility of stool impaction. Colonic  diverticula are seen that do not appear focally inflamed.        3. The T9 compression deformity, with 35% loss of height, is new from  06/17/2019, but age-indeterminate. Multiple other spinal compression  deformities appear similar to prior exams.     Discussed by telephone with Dr. Lin at 1750, 11/05/2020     This report was finalized on 11/5/2020 5:54 PM by Dr. Jase Singleton M.D.       CT Abdomen Pelvis With Contrast [[id number]] Collected: 11/05/20 1735     Updated: 11/05/20 1758    Narrative:      CT  ANGIOGRAM CHEST-, CT ABDOMEN PELVIS W CONTRAST-     INDICATIONS: Short of breath, pain  Radiation dose reduction techniques  were utilized, including automated exposure control and exposure  modulation based on body size.     TECHNIQUE: CTA chest with three-dimensional reconstructions. Enhanced CT  of the abdomen, pelvis     COMPARISON: Chest CT from 08/02/2018, 01/15/2018; CT abdomen pelvis from  11/17/2005     FINDINGS:     Chest CTA:     There does appear to be some nonocclusive thrombus in the right lower  lobe branch artery, for example axial image 104, in a region of chronic  bronchiectasis; this appearance is new from the prior exam from 2018, of  uncertain chronicity (possibly chronic). The RV LV ratio is less than 1.  Prominence of caliber of central pulmonary arteries may reflect  pulmonary arterial hypertension, appearance is chronic. No aortic  dissection.     The heart size is normal with minimal pericardial effusion. Mild to  moderate coronary arterial calcification. A few small subcentimeter  short axis mediastinal lymph nodes are seen that are not significant by  size criteria.     The airways appear clear.     No pleural effusion or pneumothorax.     The lungs show no focal pulmonary consolidation or mass. Extensive  emphysematous changes of the lungs are noted, similar to prior exam, and  chronic bronchiectasis is seen medially at the right base. A stable  right middle lobe nodular density measuring 6 mm on axial image 101 is  noted. Scarlike density inferiorly in the right middle lobe is also  stable.              Abdomen pelvis CT:     Structures in the pelvis are partly obscured by right hip arthroplasty  hardware artifact.     A right adrenal myelolipoma is again demonstrated.     Gallbladder is surgically absent. Mild intrahepatic biliary ductal  dilatation. Caliber of the main pancreatic duct, 1 cm, may be normal  status post cholecystectomy.     Otherwise unremarkable appearance of the  liver, spleen, adrenal glands,  pancreas, kidneys, bladder.     The proximal duodenum is abnormally distended up to the midportion of  the third segment of the duodenum (where it passes between the superior  mesenteric vein and the aorta), that could be result of obstructive  effect, only slightly more prominent from the prior exam, whether  mechanical obstructive effect or stricture, endoscopic correlation could  be obtained as may be indicated. Much stool in the rectum, correlate  clinically to exclude possibility of stool impaction. Colonic  diverticula are seen that do not appear focally inflamed. Assessment of  the colon for wall thickening is limited by lack of distention.     No free intraperitoneal gas or free fluid.     Scattered small mesenteric and para-aortic lymph nodes are seen that are  not significant by size criteria.     Abdominal aorta is not aneurysmal. Aortic and other arterial  calcifications are present.     Degenerative changes are seen in the spine. The T9 compression  deformity, with 35% loss of height, is new from 06/17/2019, but  age-indeterminate. Multiple other spinal compression deformities appear  similar to prior exams. Right hip arthroplasty hardware is partly  included.       Impression:            1. There does appear to be some nonocclusive thrombus in the right lower  lobe branch artery, for example axial image 104, in a region of chronic  bronchiectasis; this appearance is new from the prior exam from 2018, of  uncertain chronicity (possibly chronic). The RV LV ratio is less than 1.  2. The proximal duodenum is abnormally distended up to the midportion of  the third segment of the duodenum (where it passes between the superior  mesenteric vein and the aorta), that could be result of obstructive  effect, only slightly more prominent from the prior exam, whether  mechanical obstructive effect or stricture, endoscopic correlation could  be obtained as may be indicated. Much stool  in the rectum, correlate  clinically to exclude possibility of stool impaction. Colonic  diverticula are seen that do not appear focally inflamed.        3. The T9 compression deformity, with 35% loss of height, is new from  06/17/2019, but age-indeterminate. Multiple other spinal compression  deformities appear similar to prior exams.     Discussed by telephone with Dr. Lin at 1750, 11/05/2020     This report was finalized on 11/5/2020 5:54 PM by Dr. Jase Singleton M.D.       XR Chest 1 View [[id number]] Collected: 11/05/20 1706     Updated: 11/05/20 1711    Narrative:      XR CHEST 1 VW-     HISTORY: Female who is 75 years-old,  short of breath     TECHNIQUE: Frontal view of the chest     COMPARISON: 10/24/2020     FINDINGS: Heart size is normal. Pulmonary vasculature is unremarkable.  Small likely atelectasis at the lung bases. No focal pulmonary  consolidation, pleural effusion, or pneumothorax. Emphysematous changes  of the upper lungs. No acute osseous process.       Impression:      Small likely atelectasis at the bases.. Follow-up as  clinical indications persist.     This report was finalized on 11/5/2020 5:08 PM by Dr. Jase Singleton M.D.             Results for orders placed in visit on 02/27/19   Adult Transthoracic Echo Complete W/ Cont if Necessary Per Protocol    Narrative · Calculated right ventricular systolic pressure from tricuspid   regurgitation is 23 mmHg.  · Left ventricular wall thickness is consistent with mild concentric   hypertrophy.  · Left ventricular systolic function is normal.  · Left ventricular diastolic dysfunction (grade I) consistent with   impaired relaxation.  · Calculated EF = 60%.          ECG 12 Lead   Final Result   HEART RATE= 124  bpm   RR Interval= 484  ms   AZ Interval= 138  ms   P Horizontal Axis= -7  deg   P Front Axis= -24  deg   QRSD Interval= 92  ms   QT Interval= 310  ms   QRS Axis= -75  deg   T Wave Axis= 79  deg   - ABNORMAL ECG -   Sinus  tachycardia   Consider left ventricular hypertrophy   Inferior infarct, old   Nonspecific T abnormalities, lateral leads   Rate has improved and lateral T wave changes are new   Electronically Signed By: Mary Corbin ([facility]) 05-Nov-2020 16:57:59   Date and Time of Study: 2020-11-05 16:08:59           Assessment/Plan     Active Hospital Problems    Diagnosis POA   • **Acute right-sided thoracic back pain [M54.6] Yes   • Right-sided chest pain [R07.9] Yes   • Chronic respiratory failure with hypoxia (CMS/HCC) [J96.11] Yes   • Abnormal CT of the chest [R93.89] Yes   • COPD (chronic obstructive pulmonary disease) (CMS/HCC) [J44.9] Yes     Etiology of pain not entirely clear.  Differential includes pulmonary embolism, musculoskeletal pain, or pain from thoracic compression fracture, or other etiology    · Regarding the possible PE- she had not been having any increase of dyspnea but certainly is possible.  For now will place on therapeutic Lovenox but will get lower extremity Doppler and VQ scan to further determine if she actually has VTE or not.  The ER physicians had already talked to pulmonary regarding her chronic respiratory failure as well as the PE so we will have them follow her in the hospital to weigh in as well    · Will have as needed agents for pain.  We will get dedicated x-rays of the thoracic and lumbar spine.  With her severe COPD likely would not be any surgical candidate for compression fractures unless there was something pretty significant or pain was not controlled.  She states she would not be able to tolerate MRI due to claustrophobia.  · Abnormal CT including abnormality in the duodenum.  Also discussed this with the patient and son at bedside.  She does not seem to have any symptoms from this currently.  Discussed that she should follow-up with this as an outpatient but again with her severe COPD anesthesia for endoscopy would be more risky.  · Continue most of home medications (when med rec  is completed)  · I discussed the patients findings and my recommendations with patient, family and nursing staff.    VTE Prophylaxis - Pharmacy to dose Lovenox.  Code Status - Full code-she was listed as DNR/DNI in the past but I discussed this with the patient and her son and her current wishes for full code.  With her severe COPD I do think DNR/DNI would be reasonable if she changes her mind back to that but again she wishes for full code currently.       Lenard Zapata MD  [facility]  11/05/20  20:25 EST

## 2020-11-06 NOTE — PROGRESS NOTES
Discharge Planning Assessment  [facility]     Patient Name: Siobhan Prakash  MRN: [id number]  Today's Date: 11/6/2020    Admit Date: 11/5/2020    Discharge Needs Assessment     Row Name 11/06/20 1653       Living Environment    Lives With  spouse    Name(s) of Who Lives With Patient  Vlad    Current Living Arrangements  home/apartment/condo    Primary Care Provided by  spouse/significant other    Provides Primary Care For  no one, unable/limited ability to care for self    Family Caregiver if Needed  spouse    Family Caregiver Names      Quality of Family Relationships  helpful;involved;supportive    Able to Return to Prior Arrangements  yes       Resource/Environmental Concerns    Transportation Concerns  car, none       Transition Planning    Patient/Family Anticipates Transition to  home with help/services    Patient/Family Anticipated Services at Transition  home health care    Transportation Anticipated  family or friend will provide       Discharge Needs Assessment    Readmission Within the Last 30 Days  no previous admission in last 30 days    Current Outpatient/Agency/Support Group  homecare agency    Equipment Currently Used at Home  wheelchair;bath bench;power chair,(recliner lift);walker, standard    Concerns to be Addressed  discharge planning    Outpatient/Agency/Support Group Needs  homecare agency    Discharge Facility/Level of Care Needs  home with home health    Provided Post Acute Provider List?  Yes    Post Acute Provider List  Home Health    Provided Post Acute Provider Quality & Resource List?  Refused    Delivered To  Support Person    Method of Delivery  In person    Current Discharge Risk  chronically ill;dependent with mobility/activities of daily living        Discharge Plan     Row Name 11/06/20 1655       Plan    Plan  Home with  and HH (pending referrals)    Patient/Family in Agreement with Plan  yes    Plan Comments  Facesheet verified, IMM given and CCP role  explained.  Talked to patients  at bedside per patient because she is in too much pain.   would like for her to come back home if possible with HH.  I put in referrals.  He states that she usually does not move very much at home and has a wheelchair , walker , recliner lift and home O2 at home already.  She wears 6 liter at home.  He mentioned maybe getting caregivers to help out at home.  I will give him a paper with all the in home caregivers on it so he can research.  Provider list also given to him.  CCP will continue to follow.        Continued Care and Services - Admitted Since 11/5/2020     Home Medical Care     Service Provider Request Status Selected Services Address Phone Fax    [facility] [address]  Pending - Request Sent N/A [id number] [facility] [address] [id number] [phone]    [facility]  Pending - Request Sent N/A [id number] [facility] [address] [address] [phone]                Demographic Summary     Row Name 11/06/20 1651       General Information    Admission Type  inpatient    Arrived From  home    Reason for Consult  discharge planning    Preferred Language  English     Used During This Interaction  no       Contact Information    Permission Granted to Share Info With  family/designee    Contact Information Comments   Vlad        Functional Status     Row Name 11/06/20 1651       Functional Status    Usual Activity Tolerance  poor    Current Activity Tolerance  poor       Functional Status, IADL    Medications  assistive equipment and person    Meal Preparation  assistive equipment and person    Housekeeping  assistive equipment and person    Laundry  assistive equipment and person    Shopping  assistive equipment and person       Mental Status    General Appearance WDL  WDL       Mental Status Summary    Recent Changes in Mental Status/Cognitive Functioning   unable to assess        Psychosocial     Row Name 11/06/20 1652       Values/Beliefs    Spiritual, Cultural Beliefs, Latter day Practices, Values that Affect Care  no       Behavior WDL    Behavior WDL  ex;interactions    Interactions  guarded       Emotion Mood WDL    Emotion/Mood/Affect WDL  ex;affect    Affect  tearful       Speech WDL    Speech WDL  WDL       Perceptual State WDL    Perceptual State WDL  WDL       Thought Process WDL    Thought Process WDL  WDL       Intellectual Performance WDL    Intellectual Performance WDL  WDL       Coping/Stress    Patient Personal Strengths  strong support system    Sources of Support  spouse    Reaction to Health Status  adjusting;overwhelmed    Understanding of Condition and Treatment  adequate understanding of medical condition       Developmental Stage (Eriksson's)    Developmental Stage  Stage 8 (65 years-death/Late Adulthood) Integrity vs. Despair        Abuse/Neglect     Row Name 11/06/20 3406       Personal Safety    Feels Unsafe at Home or Work/School  no    Feels Threatened by Someone  no    Does Anyone Try to Keep You From Having Contact with Others or Doing Things Outside Your Home?  no    Physical Signs of Abuse Present  no        Legal    No documentation.       Substance Abuse    No documentation.       Patient Forms    No documentation.           Shannon Epley, RN

## 2020-11-06 NOTE — PLAN OF CARE
Goal Outcome Evaluation:  Plan of Care Reviewed With: patient  Progress: no change  Outcome Summary: Pt admitted from the ER, arrived to the unit at 2230. Pt complaning of rt sided thoracic back pain. Pt  continues with PO and IV pain meds that provide relief. Pt continues on 6L oxgen. Pt wears oxygen at home at night. Pt has a history of COPD and pulm emphyesema. Pt has been up to te bedside commode with an assist x1. Voiding function intact. Pt has a productive cough. Pt has a history of Covid according to Pulmonary notes, but test cams back non detected. Pt is resting at this time, will continue to monitor.

## 2020-11-06 NOTE — SIGNIFICANT NOTE
11/06/20 1233   OTHER   Discipline occupational therapist   Rehab Time/Intention   Session Not Performed   (noted pt with new PE today.  Will hold until MD sees.)   Recommendation   OT - Next Appointment 11/07/20

## 2020-11-06 NOTE — THERAPY EVALUATION
Patient Name: Siobhan Prakash  : 1945    MRN: [id number]                              Today's Date: 2020       Admit Date: 2020    Visit Dx:     ICD-10-CM ICD-9-CM   1. Right-sided chest pain  R07.9 786.50   2. Acute midline thoracic back pain  M54.6 724.1   3. Epigastric pain  R10.13 789.06   4. Closed wedge compression fracture of T9 vertebra, initial encounter (CMS/[facility])  S22.070A 805.2   5. Dilated duodenum  K59.89 564.89   6. Abnormal CT scan  R93.89 793.99   7. End stage COPD (CMS/[facility])  J44.9 496   8. 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   • COPD (chronic obstructive pulmonary disease) (CMS/[facility])   • Right-sided chest pain   • Chronic respiratory failure with hypoxia (CMS/HCC)   • Acute right-sided thoracic back pain   • Abnormal CT of the chest     Past Medical History:   Diagnosis Date   • Allergic 1970   • Anxiety    • Arthritis 10/17   • Asthma    • Cataract    • Cholelithiasis    • COPD (chronic obstructive pulmonary disease) (CMS/[facility])    • Depression    • GERD (gastroesophageal reflux disease)    • HL (hearing loss)    • Hyperlipidemia    • Irritable bowel syndrome    • Low back pain    • Migraine    • Osteopenia    • Osteoporosis    • Pneumonia    • Tremor    • Urinary tract infection      Past Surgical History:   Procedure Laterality Date   • CHOLECYSTECTOMY     • COLONOSCOPY      • EYE SURGERY      bilateral cataracts   • HIP ARTHROPLASTY Right    • JOINT REPLACEMENT     • REDUCTION MAMMAPLASTY     • TONSILLECTOMY       General Information     Row Name 11/06/20 1305          Physical Therapy Time and Intention    Document Type  evaluation  -DJ     Mode of Treatment  individual therapy;physical therapy  -DJ     Row Name 11/06/20 1305          General Information    Patient Profile Reviewed  yes  -DJ     Prior Level of Function  independent:;ADL's;all household mobility  -DJ     Existing Precautions/Restrictions  fall  -DJ     Barriers to Rehab  medically complex;previous functional deficit  -DJ     Row Name 11/06/20 1305          Living Environment    Lives With  spouse  -DJ     Row Name 11/06/20 1305          Cognition    Orientation Status (Cognition)  oriented x 3 Pleasant and cooperative although hesitant to participate in PT due to pain  -DJ     Row Name 11/06/20 1305          Safety Issues, Functional Mobility    Impairments Affecting Function (Mobility)  balance;pain  -DJ     Comment, Safety Issues/Impairments (Mobility)  grippy socks  -DJ       User Key  (r) = Recorded By, (t) = Taken By, (c) = Cosigned By    Initials Name Provider Type    DJ Yue Castellanos, PT Physical Therapist        Mobility     Row Name 11/06/20 1306          Bed Mobility    Bed Mobility  supine-sit;sit-supine  -DJ     Supine-Sit [place] (Bed Mobility)  minimum assist (75% patient effort);verbal cues  -DJ     Sit-Supine [place] (Bed Mobility)  minimum assist (75% patient effort);verbal cues  -DJ     Assistive Device (Bed Mobility)  bed rails;head of bed elevated  -DJ     Comment (Bed Mobility)  moves slowly and guarded due to pain  -DJ     Row Name 11/06/20 1306          Transfers    Comment (Transfers)  sit/stand from EOB, few steps to stretcher for transport  -DJ     Row Name 11/06/20 1306          Bed-Chair Transfer    Bed-Chair [place] (Transfers)  not tested  -DJ     Row Name 11/06/20 1306           Sit-Stand Transfer    Sit-Stand [place] (Transfers)  minimum assist (75% patient effort);verbal cues  -DJ     Assistive Device (Sit-Stand Transfers)  -- HHA  -DJ     Row Name 11/06/20 1306          Gait/Stairs (Locomotion)    [place] Level (Gait)  contact guard;minimum assist (75% patient effort);verbal cues;1 person assist  -DJ     Distance in Feet (Gait)  Pt able to take a few steps from bed to stretcher for transport  -DJ     [place] Level (Stairs)  not tested  -DJ     Comment (Gait/Stairs)  Pt able to take a few steps from bed to stretcher for transport  -DJ       User Key  (r) = Recorded By, (t) = Taken By, (c) = Cosigned By    Initials Name Provider Type    Yue Samuels PT Physical Therapist        Obj/Interventions     Row Name 11/06/20 1309          Range of Motion Comprehensive    Comment, General Range of Motion  B UE limited due to thoracic back pain  -DJ     Row Name 11/06/20 1309          Strength Comprehensive (MMT)    Comment, General Manual Muscle Testing (MMT) Assessment  Moves all 4s, at least 3+/5  -DJ     Row Name 11/06/20 1309          Motor Skills    Motor Skills  functional endurance  -DJ     Functional Endurance  pain limits endurance  -DJ     Therapeutic Exercise  -- Pt going for testing - unable to instruct in exs  -DJ     Row Name 11/06/20 1309          Balance    Balance Assessment  sitting static balance;standing static balance  -DJ     Static Sitting Balance  mild impairment  -DJ     Static Standing Balance  mild impairment  -DJ     Balance Interventions  sit to stand;supported  -DJ     Row Name 11/06/20 1309          Sensory Assessment (Somatosensory)    Sensory Assessment (Somatosensory)  not tested  -DJ       User Key  (r) = Recorded By, (t) = Taken By, (c) = Cosigned By    Initials Name Provider Type    Yue Samuels PT Physical Therapist        Goals/Plan     Row Name 11/06/20 1316          Bed Mobility Goal 1 (PT)    Activity/Assistive Device (Bed  Mobility Goal 1, PT)  sit to supine;supine to sit  -DJ     [place] Level/Cues Needed (Bed Mobility Goal 1, PT)  supervision required  -DJ     Time Frame (Bed Mobility Goal 1, PT)  10 days  -DJ     Row Name 11/06/20 1316          Transfer Goal 1 (PT)    Activity/Assistive Device (Transfer Goal 1, PT)  sit-to-stand/stand-to-sit;toilet  -DJ     [place] Level/Cues Needed (Transfer Goal 1, PT)  supervision required  -DJ     Time Frame (Transfer Goal 1, PT)  10 days  -DJ     Strategies/Barriers (Transfers Goal 1, PT)  pain  -DJ     Row Name 11/06/20 1316          Gait Training Goal 1 (PT)    Activity/Assistive Device (Gait Training Goal 1, PT)  gait (walking locomotion);assistive device use;improve balance and speed;increase endurance/gait distance;increase energy conservation;walker, rolling  -DJ     [place] Level (Gait Training Goal 1, PT)  contact guard assist;1 person assist  -DJ     Time Frame (Gait Training Goal 1, PT)  10 days  -DJ     Strategies/Barriers (Gait Training Goal 1, PT)  pain  -DJ     Row Name 11/06/20 1316          Patient Education Goal (PT)    Activity (Patient Education Goal, PT)  HEP  -DJ     [place]/Cues/Accuracy (Memory Goal 2, PT)  demonstrates adequately;verbalizes understanding  -DJ       User Key  (r) = Recorded By, (t) = Taken By, (c) = Cosigned By    Initials Name Provider Type    Yue Samuels, PT Physical Therapist        Clinical Impression     Row Name 11/06/20 1310          Pain    Additional Documentation  Pain Scale: Numbers Pre/Post-Treatment (Group)  -DJ     Row Name 11/06/20 1310          Pain Scale: Numbers Pre/Post-Treatment    Pretreatment Pain Rating  10/10  -DJ     Posttreatment Pain Rating  10/10  -DJ     Pre/Posttreatment Pain Comment  se[place] c/o pain T spine that increases with movement  -DJ     Pain Intervention(s)  Rest;Repositioned  -DJ     Row Name 11/06/20 1310          Plan of Care Review    Plan of Care Reviewed With  patient  -DJ     Outcome  Summary  74yo frail erick rowland female admitted 11/5/20 with R side Thoracic pain longstanding since she had a near fall 5 months ago but is now significantly worse. PMH includes CRF, R chest pain, chronic airway obstruction, emphysema, IBS, JOSE C, L ankle arthritis, htn, pna, osteoporosis. Today, her mobility is significantly compromised due to her pain. She is also limited by generalized weakness and decreased activity tolerance. She req min A to transfer from bed to transport stretcher. She would benefit from skilled PT for ther ex, gt/transfer training, bed mobility, balance, and activity tolerance as appropriate.  -DJ     Row Name 11/06/20 1310          Therapy Assessment/Plan (PT)    Rehab Potential (PT)  fair, will monitor progress closely  -DJ     Criteria for Skilled Interventions Met (PT)  yes;meets criteria;skilled treatment is necessary  -DJ     Row Name 11/06/20 1310          Vital Signs    O2 Delivery Pre Treatment  supplemental O2 6L  -DJ     O2 Delivery Intra Treatment  supplemental O2 6L  -DJ     O2 Delivery Post Treatment  supplemental O2 6L  -DJ     Pre Patient Position  Supine  -DJ     Intra Patient Position  Standing  -DJ     Post Patient Position  Supine  -DJ     Row Name 11/06/20 1310          Positioning and Restraints    Pre-Treatment Position  in bed  -DJ     Post Treatment Position  other  -DJ     Other Position  with other staff transport stretcher  -DJ       User Key  (r) = Recorded By, (t) = Taken By, (c) = Cosigned By    Initials Name Provider Type    Yue Samuels, PT Physical Therapist        Outcome Measures     Row Name 11/06/20 1315          How much help from another person do you currently need...    Turning from your back to your side while in flat bed without using bedrails?  3  -DJ     Moving from lying on back to sitting on the side of a flat bed without bedrails?  3  -DJ     Moving to and from a bed to a chair (including a wheelchair)?  3  -DJ     Standing up from a  chair using your arms (e.g., wheelchair, bedside chair)?  3  -DJ     Climbing 3-5 steps with a railing?  2  -DJ     To walk in hospital room?  2  -DJ     AM-PAC 6 Clicks Score (PT)  16  -DJ     Row Name 11/06/20 1317          Functional Assessment    Outcome Measure Options  AM-PAC 6 Clicks Basic Mobility (PT)  -DJ       User Key  (r) = Recorded By, (t) = Taken By, (c) = Cosigned By    Initials Name Provider Type    DJ Yue Castellanos, NIKITA Physical Therapist        Physical Therapy Education                 Title: PT OT SLP Therapies (In Progress)     Topic: Physical Therapy (In Progress)     Point: Mobility training (In Progress)     Learning Progress Summary           Patient Acceptance, E, NR by  at 11/6/2020 1318                   Point: Home exercise program (Not Started)     Learner Progress:  Not documented in this visit.          Point: Body mechanics (In Progress)     Learning Progress Summary           Patient Acceptance, E, NR by DJ at 11/6/2020 1318                   Point: Precautions (In Progress)     Learning Progress Summary           Patient Acceptance, E, NR by  at 11/6/2020 1318                               User Key     Initials Effective Dates Name Provider Type Discipline     10/25/19 -  Yue Castellanos, PT Physical Therapist PT              PT Recommendation and Plan  Planned Therapy Interventions (PT): balance training, bed mobility training, gait training, home exercise program, strengthening, postural re-education, patient/family education, transfer training  Plan of Care Reviewed With: patient  Outcome Summary: 74yo frail kylein janett female admitted 11/5/20 with R side Thoracic pain longstanding since she had a near fall 5 months ago but is now significantly worse. PMH includes CRF, R chest pain, chronic airway obstruction, emphysema, IBS, JOSE C, L ankle arthritis, htn, pna, osteoporosis. Today, her mobility is significantly compromised due to her pain. She is also limited by generalized  weakness and decreased activity tolerance. She req min A to transfer from bed to transport stretcher. She would benefit from skilled PT for ther ex, gt/transfer training, bed mobility, balance, and activity tolerance as appropriate.     Time Calculation:   PT Charges     Row Name 11/06/20 1319             Time Calculation    Start Time  1044  -DJ      Stop Time  1108  -DJ      Time Calculation (min)  24 min  -DJ      PT Non-Billable Time (min)  15 min  -DJ      PT Received On  11/06/20  -DJ      PT - Next Appointment  11/07/20  -DJ      PT Goal Re-Cert Due Date  11/16/20  -DJ        User Key  (r) = Recorded By, (t) = Taken By, (c) = Cosigned By    Initials Name Provider Type    Yue Samuels, NIKITA Physical Therapist        Therapy Charges for Today     Code Description Service Date Service Provider Modifiers Qty    38985365557 HC PT EVAL MOD COMPLEXITY 2 11/6/2020 Yue Castellanos, PT GP 1    56811734433 HC PT THER PROC EA 15 MIN 11/6/2020 Yue Castellanos, PT GP 2          PT G-Codes  Outcome Measure Options: AM-PAC 6 Clicks Basic Mobility (PT)  AM-PAC 6 Clicks Score (PT): 16    Yue Castellanos PT  11/6/2020

## 2020-11-07 NOTE — PLAN OF CARE
Problem: Adult Inpatient Plan of Care  Goal: Plan of Care Review  Flowsheets  Taken 11/7/2020 1347  Plan of Care Reviewed With: patient  Taken 11/7/2020 1344  Plan of Care Reviewed With: patient  Outcome Summary: Pt. initially declined thearpy due to back pain and not having TLSO yet, however, appeared uncomfortable in bed. Pt. agreed to repositioning in bed to improve posture and reduce lumbar strain. Required verbal cues and CGA for log roll technique and Alesia for STS and to take 2 steps to HOB to improve positoning. Pt. again cued for log roll technique back into bed. Will continue to monitor and progress mobility when TLSO is present.   Patient was wearing a face mask during this therapy encounter. Therapist used appropriate personal protective equipment including eye protection, mask, and gloves.  Mask used was standard procedure mask. Appropriate PPE was worn during the entire therapy session. Hand hygiene was completed before and after therapy session. Patient is not in enhanced droplet precautions.

## 2020-11-07 NOTE — CONSULTS
NEUROSURGERY CONSULT      Siobhan Prakash  1945  [id number]    Referring Provider: Lenard Zapata MD  [address] [place] [address]     Reason for Consultation: Thoracic fracture    Patient Care Team:  Lyudmila Granados APRN as PCP - General (Family Medicine)  Carl Perez MD as Consulting Physician (Orthopedic Surgery)    Chief Complaint: Thoracic pain following fall    Subjective .     History of Present Illness: 75-year-old  female describes a fall 2 nights ago when getting out of the shower and getting into bed where she says she is a good instead of jagged lost her footing and fell felt immediate right-sided thoracic pain.  Says the pain was significant that she presented to the emergency room for evaluation.  She had a thorough evaluation for respiratory issues and a work-up for back pain revealed what appears to be new thoracic fractures.  She denies any lower extremity complaints.    While in the room and during my examination of the patient I wore a mask and eye protection.  I washed my hands before and after this patient encounter.  The patient was also wearing a mask.    Review of Systems  Review of Systems   Constitutional: Positive for activity change.   HENT: Negative.    Eyes: Negative.    Respiratory: Positive for shortness of breath.    Cardiovascular: Negative.    Gastrointestinal: Negative.    Endocrine: Negative.    Genitourinary: Negative.    Musculoskeletal: Positive for back pain.   Skin: Negative.    Allergic/Immunologic: Negative.    Neurological: Negative.  Negative for weakness and numbness.   Hematological: Negative.    Psychiatric/Behavioral: Negative.        History  Past Medical History:   Diagnosis Date   • Allergic 1970   • Anxiety    • Arthritis 10/17   • Asthma 2004   • Cataract 1996   • Cholelithiasis 1990   • COPD (chronic obstructive pulmonary disease) (CMS/[facility])    • Depression    • GERD (gastroesophageal reflux disease)    • HL (hearing  loss)    • Hyperlipidemia    • Irritable bowel syndrome    • Low back pain    • Migraine    • Osteopenia    • Osteoporosis    • Pneumonia    • Tremor    • Urinary tract infection    ,   Past Surgical History:   Procedure Laterality Date   • CHOLECYSTECTOMY     • COLONOSCOPY     • EYE SURGERY      bilateral cataracts   • HIP ARTHROPLASTY Right    • JOINT REPLACEMENT     • REDUCTION MAMMAPLASTY     • TONSILLECTOMY     ,   Family History   Problem Relation Age of Onset   • Alcohol abuse Father             • Heart attack Father    • Birth defects Brother    • Hearing loss Brother    • Heart disease Son         Aortic stenosis   • Hyperlipidemia Sister         Hbp   ,   Social History     Tobacco Use   • Smoking status: Former Smoker     Packs/day: 1.00     Years: 15.00     Pack years: 15.00     Start date: 1963     Quit date:      Years since quittin.8   • Smokeless tobacco: Never Used   • Tobacco comment: Ultra light menthol brand   Substance Use Topics   • Alcohol use: Yes     Comment: Approx. 1 or 2 a month   • Drug use: No     E-cigarette/Vaping     E-cigarette/Vaping Substances     E-cigarette/Vaping Devices       ,   Medications Prior to Admission   Medication Sig Dispense Refill Last Dose   • albuterol sulfate  (90 Base) MCG/ACT inhaler Inhale 2 puffs Every 4 (Four) Hours As Needed for Wheezing. 1 inhaler 0 2020 at 0900   • arformoterol (BROVANA) 15 MCG/2ML nebulizer solution Inhale 15 mcg.   2020 at 0900   • azithromycin (ZITHROMAX) 500 MG tablet    2020 at 2000   • benzonatate (TESSALON PERLES) 100 MG capsule Take 2 capsules by mouth 3 (Three) Times a Day As Needed (cough). 30 capsule 0    • budesonide (PULMICORT) 0.5 MG/2ML nebulizer solution 0.5 mg.   2020 at 0900   • Cholecalciferol (VITAMIN D3) 5000 UNITS capsule capsule Take 5,000 Units by mouth Daily.   2020 at 0900   • dicyclomine (Bentyl) 20 MG tablet Take 1 tablet by mouth Every 6  (Six) Hours. (Patient taking differently: Take 20 mg by mouth Every 6 (Six) Hours. prn) 90 tablet 1    • ferrous sulfate 325 (65 FE) MG tablet Take 325 mg by mouth 2 (Two) Times a Day With Meals.      • FLUoxetine (PROzac) 20 MG capsule Take 1 capsule by mouth Daily for 30 days. 90 capsule 2 11/4/2020 at 0900   • guaiFENesin (MUCINEX) 600 MG 12 hr tablet Take 2 tablets by mouth Every 12 (Twelve) Hours. 60 tablet 11 11/5/2020 at 0900   • lansoprazole (PREVACID) 15 MG capsule Take 1 capsule by mouth Daily. 90 capsule 0 11/4/2020 at 0900   • lidocaine (LIDODERM) 5 % Place 1 patch on the skin as directed by provider Daily. Remove & Discard patch within 12 hours or as directed by MD 30 patch 0 11/4/2020 at 0900   • loperamide (IMODIUM) 2 MG capsule Take 2 mg by mouth 4 (Four) Times a Day As Needed for Diarrhea.   11/5/2020 at 0900   • LORazepam (ATIVAN) 0.5 MG tablet TK ONE T PO Q 4 TO 6 H PRA  0 11/5/2020 at 0900   • meloxicam (MOBIC) 15 MG tablet Take 1 tablet by mouth Daily. 90 tablet 1 11/4/2020 at 0800   • mirtazapine (REMERON) 15 MG tablet Take 1 tablet by mouth Every Night. 30 tablet 1 11/4/2020 at 0900   • montelukast (SINGULAIR) 10 MG tablet Take 10 mg by mouth Every Night.   11/4/2020 at 2100   • O2 (OXYGEN) Inhale 1 (One) Time.   11/5/2020 at 0800   • predniSONE (DELTASONE) 20 MG tablet Take 1 tablet by mouth Daily. 3 tablet 0    • pseudoephedrine (SUDAFED) 60 MG tablet Take 1 tablet by mouth Every 6 (Six) Hours As Needed for Congestion. 60 tablet 3 11/4/2020 at 0800   • roflumilast (DALIRESP) 500 MCG tablet tablet Take 500 mcg by mouth Daily.   11/5/2020 at 0800   • SUMAtriptan (Imitrex) 50 MG tablet Take one tablet at onset of headache. May repeat dose one time in 2 hours if headache not relieved. 9 tablet 2    • theophylline (UNIPHYL) 400 MG 24 hr tablet    11/4/2020 at 0800   • Tiotropium Bromide Monohydrate (SPIRIVA RESPIMAT) 2.5 MCG/ACT aerosol solution Inhale 2 puffs Daily.   11/4/2020 at 0800   •  traMADol (ULTRAM) 50 MG tablet Take 1 tablet by mouth Every 6 (Six) Hours As Needed for Moderate Pain . 60 tablet 1 11/4/2020 at 0800   • zolpidem (AMBIEN) 10 MG tablet Take 1 tablet by mouth At Night As Needed for Sleep. 30 tablet 5 11/4/2020 at 2100   • BIOTIN PO Take 1 tablet by mouth Daily.   11/4/2020 at 0900   • mupirocin (BACTROBAN) 2 % cream Apply  topically to the appropriate area as directed 3 (Three) Times a Day. 30 g 0    , Scheduled Meds:  arformoterol, 15 mcg, Nebulization, BID - RT  budesonide, 0.5 mg, Nebulization, BID - RT  cholecalciferol, 1,000 Units, Oral, Daily  enoxaparin, 1 mg/kg, Subcutaneous, Q12H  famotidine, 20 mg, Oral, BID AC  FLUoxetine, 20 mg, Oral, Daily  guaiFENesin, 1,200 mg, Oral, Q12H  influenza vaccine, 0.5 mL, Intramuscular, Once  lidocaine, 1 patch, Transdermal, Q24H  loperamide, 2 mg, Oral, BID  mirtazapine, 15 mg, Oral, Nightly  montelukast, 10 mg, Oral, Nightly  roflumilast, 500 mcg, Oral, Daily  sodium chloride, 10 mL, Intravenous, Q12H  theophylline, 400 mg, Oral, Q24H  tiotropium bromide monohydrate, 2 puff, Inhalation, Daily    , Continuous Infusions:   , PRN Meds:  •  acetaminophen **OR** acetaminophen **OR** acetaminophen  •  albuterol  •  benzonatate  •  bisacodyl  •  dicyclomine  •  loperamide  •  LORazepam  •  melatonin  •  Morphine  •  nitroglycerin  •  ondansetron **OR** ondansetron  •  oxyCODONE-acetaminophen  •  pseudoephedrine  •  sodium chloride  •  traMADol  •  zolpidem and Allergies:  Sulfa antibiotics, Codeine, and Hydrocodone    Objective     Vital Signs   Temp:  [97.3 °F (36.3 °C)-97.8 °F (36.6 °C)] 97.3 °F (36.3 °C)  Heart Rate:  [] 101  Resp:  [18-24] 20  BP: (138-163)/() 156/83  Body mass index is 18.84 kg/m².    Physical Exam    CONSTITUTIONAL: This 75 year old  female appears thin, frail-appearing and in no acute distress sitting up in bed watching television and eating.  She is wearing a nasal cannula for oxygen    HEAD & FACE:  the head and face are symmetric, normocephalic and atraumatic.    EYES: Inspection of the conjunctivae and lids reveals no swelling, erythema or discharge.  Pupils are round, equal and reactive to light and there is no scleral icterus.    EARS, NOSE, MOUTH & THROAT: On inspection, the ears, nose and oral cavity are within normal limits.    NECK: the neck is supple and symmetric. The trachea is midline with no masses.    PULMONARY: Respiratory effort is normal with no increased work of breathing or signs of respiratory distress.    CARDIOVASCULAR: Pedal pulses are +1/4 bilaterally. Examination of the extremities shows no edema or varicosities.    LYMPHATIC: There is no palpable lymphadenopathy of the neck.    MUSCULOSKELETAL: Gait and station not assessed. The spine has significant kyphosis of the thoracic spine consistent with her age and osteoporosis    SKIN: The skin is warm, dry and intact    NEUROLOGIC:    Cranial Nerves  CN 2 through 12 grossly intact   Normal motor strength noted. Muscle bulk and tone are normal.  Sensory exam is normal to fine touch to confrontational testing bilaterally.  Reflexes diminished but symmetrical in lower extremities  Superficial/Primitive Reflexes: primitive reflexes were absent.  No coordination deficit observed.  Cortical function is intact and without deficits. Speech is normal.    PSYCHIATRIC: oriented to person, place and time. Patient's mood and affect are normal.      Results Review:   I reviewed the patient's new clinical results.    Images:    I personally reviewed the images from the following radiographic studies.    Rib detail films done on 11/6/2020 at [facility] reveal a likely nondisplaced right eighth rib fracture    Plain films of the thoracic spine done on November 5 at [facility] reveal an old T7 fracture which is stable compared to prior films and the potential for new T8 and T10 compression fractures.    Plain films of the lumbar spine done on  November 5, 2020 at [facility] reveal no acute changes    Lab Results (last 24 hours)     ** No results found for the last 24 hours. **          Assessment/Plan       Acute right-sided thoracic back pain    COPD (chronic obstructive pulmonary disease) (CMS/HCC)    Right-sided chest pain    Chronic respiratory failure with hypoxia (CMS/HCC)    Abnormal CT of the chest      It appears from a medical perspective the patient has very limited options from the neurosurgical perspective.  She appears to be getting good pain management medically and I think can be stabilized with the brace although she does have some kyphotic deformity that may make a brace challenging.  I have ordered a brace and will see if an MRI of the thoracic spine can be obtained.  If he cannot we will just have to manage with plain films in the brace.  I have asked physical therapy to assess after the brace arrives to see if she can be mobilized adequately in the brace.    I discussed the patients findings and my recommendations with patient    Dio Herrera MD  11/07/20  09:42 EST

## 2020-11-07 NOTE — PROGRESS NOTES
"Siobhan Prakash  1945 75 y.o.  [id number]      Patient Care Team:  Lydumila Granados APRN as PCP - General (Family Medicine)  Carl Perez MD as Consulting Physician (Orthopedic Surgery)    CC: Severe COPD, tachycardia, back pain    Interval History: Lots of pain      Objective   Vital Signs  Temp:  [97.3 °F (36.3 °C)-97.8 °F (36.6 °C)] 97.3 °F (36.3 °C)  Heart Rate:  [] 101  Resp:  [18-24] 20  BP: (138-163)/() 156/83    Intake/Output Summary (Last 24 hours) at 11/7/2020 1153  Last data filed at 11/7/2020 0921  Gross per 24 hour   Intake 240 ml   Output --   Net 240 ml     Flowsheet Rows      First Filed Value   Admission Height  157.5 cm (62\") Documented at 11/05/2020 1610   Admission Weight  46.3 kg (102 lb) Documented at 11/05/2020 1610          Physical Exam:   General Appearance:    Alert,oriented, in no acute distress   Lungs:     Clear to auscultation,BS are equal    Heart:    Normal S1 and S2, RRR without murmur, gallop or rub   HEENT:    Sclerae are clear, no JVD or adenopathy   Abdomen:     Normal bowel sounds, soft nontender, nondistended, no HSM   Extremities:   Moves all extremities well, no edema, no cyanosis, no             Redness, no rash     Medication Review:      arformoterol, 15 mcg, Nebulization, BID - RT  budesonide, 0.5 mg, Nebulization, BID - RT  cholecalciferol, 1,000 Units, Oral, Daily  enoxaparin, 1 mg/kg, Subcutaneous, Q12H  famotidine, 20 mg, Oral, BID AC  FLUoxetine, 20 mg, Oral, Daily  guaiFENesin, 1,200 mg, Oral, Q12H  influenza vaccine, 0.5 mL, Intramuscular, Once  lidocaine, 1 patch, Transdermal, Q24H  loperamide, 2 mg, Oral, BID  mirtazapine, 15 mg, Oral, Nightly  montelukast, 10 mg, Oral, Nightly  roflumilast, 500 mcg, Oral, Daily  sodium chloride, 10 mL, Intravenous, Q12H  theophylline, 400 mg, Oral, Q24H  tiotropium bromide monohydrate, 2 puff, Inhalation, Daily             I reviewed the patient's new clinical results.  I personally viewed and interpreted " the patient's EKG/Telemetry data    Assessment/Plan  Active Hospital Problems    Diagnosis  POA   • **Acute right-sided thoracic back pain [M54.6]  Yes   • Right-sided chest pain [R07.9]  Yes   • Chronic respiratory failure with hypoxia (CMS/HCC) [J96.11]  Yes   • Abnormal CT of the chest [R93.89]  Yes   • COPD (chronic obstructive pulmonary disease) (CMS/HCC) [J44.9]  Yes      Resolved Hospital Problems   No resolved problems to display.       Really severe COPD with tachycardia some features of it on his twelve-lead looks like multifocal atrial tachycardia.  Usually that gets better when the underlying rhythm is better I am and to try and add a little bit of calcium channel blocker to her regimen.  I have reviewed her study and the CT scan is really poor for evaluating a PE I am not convinced she has had a PE    Shahid Lee MD  11/07/20  11:53 EST

## 2020-11-07 NOTE — THERAPY TREATMENT NOTE
Patient Name: Siobhan Prakash  : 1945    MRN: [id number]                              Today's Date: 2020       Admit Date: 2020    Visit Dx:     ICD-10-CM ICD-9-CM   1. Right-sided chest pain  R07.9 786.50   2. Acute midline thoracic back pain  M54.6 724.1   3. Epigastric pain  R10.13 789.06   4. Closed wedge compression fracture of T9 vertebra, initial encounter (CMS/[facility])  S22.070A 805.2   5. Dilated duodenum  K59.89 564.89   6. Abnormal CT scan  R93.89 793.99   7. End stage COPD (CMS/[facility])  J44.9 496   8. Sinus tachycardia  R00.0 427.89     Patient Active Problem List   Diagnosis   • Weight loss, abnormal   • Subcutaneous cyst   • Mixed anxiety depressive disorder   • Gastroesophageal reflux disease   • Insomnia   • Macrocytosis   • Tremor   • Pulmonary emphysema (CMS/[facility])   • Vitamin D deficiency   • Fatigue   • Osteoporosis   • Herpes zoster without complication   • Migraine   • Bilateral leg pain   • Pneumonia of both lungs due to infectious organism   • Abnormal ECG   • Diarrhea   • Fecal urgency   • Irritable bowel syndrome (IBS)   • History of hip replacement   • 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   • COPD (chronic obstructive pulmonary disease) (CMS/[facility])   • Right-sided chest pain   • Chronic respiratory failure with hypoxia (CMS/HCC)   • Acute right-sided thoracic back pain   • Abnormal CT of the chest     Past Medical History:   Diagnosis Date   • Allergic 1970   • Anxiety    • Arthritis 10/17   • Asthma    • Cataract    • Cholelithiasis    • COPD (chronic obstructive pulmonary disease) (CMS/[facility])    • Depression    • GERD (gastroesophageal reflux disease)    • HL (hearing loss)    • Hyperlipidemia    • Irritable bowel syndrome    • Low back pain    • Migraine    • Osteopenia    • Osteoporosis    • Pneumonia    • Tremor    • Urinary tract infection      Past Surgical History:   Procedure Laterality Date   • CHOLECYSTECTOMY     • COLONOSCOPY      • EYE SURGERY      bilateral cataracts   • HIP ARTHROPLASTY Right    • JOINT REPLACEMENT     • REDUCTION MAMMAPLASTY     • TONSILLECTOMY       General Information     Row Name 11/07/20 1342          Physical Therapy Time and Intention    Document Type  therapy note (daily note)  -     Mode of Treatment  physical therapy;individual therapy  -     Row Name 11/07/20 1342          General Information    Patient Profile Reviewed  yes  -     Existing Precautions/Restrictions  fall  -     Row Name 11/07/20 1342          Cognition    Orientation Status (Cognition)  oriented x 3  -     Row Name 11/07/20 1342          Safety Issues, Functional Mobility    Impairments Affecting Function (Mobility)  balance;pain  -       User Key  (r) = Recorded By, (t) = Taken By, (c) = Cosigned By    Initials Name Provider Type     Luisa Khalil PT Physical Therapist        Mobility     Row Name 11/07/20 1342          Bed Mobility    Bed Mobility  sidelying-sit;sit-sidelying  -     Sidelying-Sit [place] (Bed Mobility)  contact guard;nonverbal cues (demo/gesture);verbal cues  -     Sit-Sidelying [place] (Bed Mobility)  contact guard;nonverbal cues (demo/gesture);verbal cues  -     Assistive Device (Bed Mobility)  bed rails  -     Comment (Bed Mobility)  log roll technique; cued for reaching to bed rails. etc  -     Row Name 11/07/20 1342          Sit-Stand Transfer    Sit-Stand [place] (Transfers)  minimum assist (75% patient effort);verbal cues  -     Row Name 11/07/20 1342          Gait/Stairs (Locomotion)    [place] Level (Gait)  minimum assist (75% patient effort);verbal cues;1 person assist  -     Distance in Feet (Gait)  took 2 steps to HOB to reposition in bed  -       User Key  (r) = Recorded By, (t) = Taken By, (c) = Cosigned By    Initials Name Provider Type     Luisa Khalil PT Physical Therapist        Obj/Interventions     Row Name 11/07/20 1344          Motor Skills     Therapeutic Exercise  other (see comments) declined ther ex due to pain; just recieved morphine and becoming lethargic  -       User Key  (r) = Recorded By, (t) = Taken By, (c) = Cosigned By    Initials Name Provider Type    Luisa Lino, PT Physical Therapist        Goals/Plan    No documentation.       Clinical Impression     Row Name 11/07/20 1344          Pain    Additional Documentation  Pain Scale: FACES Pre/Post-Treatment (Group)  -[facility] Name 11/07/20 1344          Pain Scale: Numbers Pre/Post-Treatment    Pain Intervention(s)  Repositioned  -     Row Name 11/07/20 1344          Pain Scale: FACES Pre/Post-Treatment    Pain: FACES Scale, Pretreatment  6-->hurts even more  -     Posttreatment Pain Rating  6-->hurts even more  -     Row Name 11/07/20 1345          Plan of Care Review    Plan of Care Reviewed With  patient  -     Outcome Summary  Pt. initially declined thearpy due to back pain and not having TLSO yet, however, appeared uncomfortable in bed. Pt. agreed to repositioning in bed to improve posture and reduce lumbar strain. Required verbal cues and CGA for log roll technique and Alesia for STS and to take 2 steps to HOB to improve positoning. Pt. again cued for log roll technique back into bed. Will continue to monitor and progress mobility when TLSO is present.  -     Row Name 11/07/20 1344          Positioning and Restraints    Pre-Treatment Position  in bed  -     Post Treatment Position  bed  -     In Bed  supine;call light within reach;encouraged to call for assist;exit alarm on;with family/caregiver  -       User Key  (r) = Recorded By, (t) = Taken By, (c) = Cosigned By    Initials Name Provider Type    Luisa Lino, PT Physical Therapist        Outcome Measures     Row Name 11/07/20 6183          How much help from another person do you currently need...    Turning from your back to your side while in flat bed without using bedrails?  3  -     Moving from  lying on back to sitting on the side of a flat bed without bedrails?  3  -MH     Moving to and from a bed to a chair (including a wheelchair)?  3  -MH     Standing up from a chair using your arms (e.g., wheelchair, bedside chair)?  3  -MH     Climbing 3-5 steps with a railing?  2  -MH     To walk in hospital room?  2  -MH     AM-PAC 6 Clicks Score (PT)  16  -     Row Name 11/07/20 1347          Functional Assessment    Outcome Measure Options  AM-PAC 6 Clicks Basic Mobility (PT)  -       User Key  (r) = Recorded By, (t) = Taken By, (c) = Cosigned By    Initials Name Provider Type     Luisa Khalil, NIKITA Physical Therapist        Physical Therapy Education                 Title: PT OT SLP Therapies (In Progress)     Topic: Physical Therapy (In Progress)     Point: Mobility training (Done)     Learning Progress Summary           Patient Acceptance, E,TB,D, VU,DU,NR by  at 11/7/2020 1347    Acceptance, E, NR by  at 11/6/2020 1318                   Point: Home exercise program (Not Started)     Learner Progress:  Not documented in this visit.          Point: Body mechanics (Done)     Learning Progress Summary           Patient Acceptance, E,TB,D, VU,DU,NR by  at 11/7/2020 1347    Acceptance, E, NR by  at 11/6/2020 1318                   Point: Precautions (Done)     Learning Progress Summary           Patient Acceptance, E,TB,D, VU,DU,NR by  at 11/7/2020 1347    Acceptance, E, NR by  at 11/6/2020 1318                               User Key     Initials Effective Dates Name Provider Type [facility] 10/25/19 -  Yue Castellanos, PT Physical Therapist PT     05/26/20 -  Luisa Khalil PT Physical Therapist PT              PT Recommendation and Plan     Plan of Care Reviewed With: patient  Outcome Summary: Pt. initially declined thearpy due to back pain and not having TLSO yet, however, appeared uncomfortable in bed. Pt. agreed to repositioning in bed to improve posture and reduce lumbar strain.  Required verbal cues and CGA for log roll technique and Alesia for STS and to take 2 steps to HOB to improve positoning. Pt. again cued for log roll technique back into bed. Will continue to monitor and progress mobility when TLSO is present.     Time Calculation:   PT Charges     Row Name 11/07/20 1348             Time Calculation    Start Time  1307  -      Stop Time  1318  -      Time Calculation (min)  11 min  -      PT Received On  11/07/20  -      PT - Next Appointment  11/08/20  -         Time Calculation- PT    Total Timed Code Minutes- PT  8 minute(s)  -        User Key  (r) = Recorded By, (t) = Taken By, (c) = Cosigned By    Initials Name Provider Type     Luisa Khalil, NIKITA Physical Therapist        Therapy Charges for Today     Code Description Service Date Service Provider Modifiers Qty    11796813763 HC PT THERAPEUTIC ACT EA 15 MIN 11/7/2020 Luisa Khalil PT GP 1          PT G-Codes  Outcome Measure Options: AM-PAC 6 Clicks Basic Mobility (PT)  AM-PAC 6 Clicks Score (PT): 16    Luisa Khalil PT  11/7/2020

## 2020-11-07 NOTE — PLAN OF CARE
Goal Outcome Evaluation:  Plan of Care Reviewed With: patient  Progress: improving  Outcome Summary: medicated with prn pain meds as needed. up to bedside commode several times. remains on 6L/HF. no acute distress noted, cont to monitor.

## 2020-11-07 NOTE — PLAN OF CARE
Goal Outcome Evaluation:  Plan of Care Reviewed With: patient  Progress: improving  Outcome Summary: Pt on 6L high flow (on 6L at home). Doppler of BLE negative today. Echo obtained today. HR tachy- jumps up to 150's but not sustaining. Pt is a full code, and I casually asked if she has talked about her code status. She became tearful and anxious following this conversation.  Turns out she has had 4 close friends/family members who have  in the past few weeks. Pt requested imodium BID- but her CT scan did show constipation. PRN Percocet, morphine, and ultram given. Percocet increased to 10mg. Continue to monitor. She also c/o stomach pain. Pepcid ordered to replace protonix.

## 2020-11-07 NOTE — PROGRESS NOTES
LOS: 0 days   Patient Care Team:  Lyudmila Granados APRN as PCP - General (Family Medicine)  Carl Perez MD as Consulting Physician (Orthopedic Surgery)    Subjective     Patient is known to me I follow her in the office for her severe chronic lung disease and chronic respiratory failure.  I have reviewed this admission records.  Patient reports her breathing is actually been doing pretty good she says her whole problem is pain she says that her  was helping her get back in bed she sort of got twisted around there was a big pop in her back and that is when her pain started.  She said she knew immediately that she had broken something just the way it popped.  She is not having any fevers chills sweats not coughing much no sputum  Review of Systems:          Objective     Vital Signs  Vital Sign Min/Max for last 24 hours  Temp  Min: 97.3 °F (36.3 °C)  Max: 97.8 °F (36.6 °C)   BP  Min: 138/85  Max: 163/101   Pulse  Min: 87  Max: 131   Resp  Min: 18  Max: 24   SpO2  Min: 90 %  Max: 100 %   Flow (L/min)  Min: 5  Max: 6   Weight  Min: 46.7 kg (103 lb)  Max: 46.7 kg (103 lb)        Ventilator/Non-Invasive Ventilation Settings (From admission, onward)    None                       Body mass index is 18.84 kg/m².  I/O last 3 completed shifts:  In: 120 [P.O.:120]  Out: -   I/O this shift:  In: 240 [P.O.:240]  Out: -         Physical Exam:  General Appearance: Well-developed elderly thin white female she is resting in bed she does not appear in any acute distress she is on 5 L nasal cannula O2 she usually uses 6 at home her oxygen saturations are about 92%  Eyes: Conjunctiva are clear and anicteric  ENT: Mucous membranes are moist no erythema or exudates Mallampati type I airway.  Nasal septum midline.  Neck: No adenopathy or thyromegaly trachea is midline I do not appreciate jugular venous distention with her sitting upright.  Lungs: Very decreased but clear no wheezes no rales no rhonchi nonlabored  symmetric expansion  Cardiac: Regular rate rhythm no murmur  Abdomen: Soft no palpable hepatosplenomegaly  : Not examined  Musculoskeletal: Severe thoracic kyphosis  Skin: No jaundice no petechiae she has got thin skin, it is warm and dry  Neuro: She is alert oriented cooperative following commands grossly intact  Extremities/P Vascular: No clubbing no cyanosis no edema palpable radial and dorsalis pedis pulses bilaterally  MSE: She seems to be in pretty good spirits       Labs:  Results from last 7 days   Lab Units 11/06/20  0435 11/05/20  1536   GLUCOSE mg/dL 156* 84   SODIUM mmol/L 140 142   POTASSIUM mmol/L 4.4 4.4   CO2 mmol/L 32.5* 32.5*   CHLORIDE mmol/L 98 102   ANION GAP mmol/L 9.5 7.5   CREATININE mg/dL 0.72 0.62   BUN mg/dL 18 14   BUN / CREAT RATIO  25.0 22.6   CALCIUM mg/dL 10.2 10.1   EGFR IF NONAFRICN AM mL/min/1.73 79 94   ALK PHOS U/L  --  145*   TOTAL PROTEIN g/dL  --  7.6   ALT (SGPT) U/L  --  12   AST (SGOT) U/L  --  26   BILIRUBIN mg/dL  --  0.2   ALBUMIN g/dL  --  4.00   GLOBULIN gm/dL  --  3.6     Estimated Creatinine Clearance: 46.2 mL/min (by C-G formula based on SCr of 0.72 mg/dL).      Results from last 7 days   Lab Units 11/05/20  1536   WBC 10*3/mm3 10.28   RBC 10*6/mm3 4.55   HEMOGLOBIN g/dL 13.7   HEMATOCRIT % 41.5   MCV fL 91.2   MCH pg 30.1   MCHC g/dL 33.0   RDW % 12.1*   RDW-SD fl 40.3   MPV fL 9.1   PLATELETS 10*3/mm3 410   NEUTROPHIL % % 74.9   LYMPHOCYTE % % 18.1*   MONOCYTES % % 5.4   EOSINOPHIL % % 0.5   BASOPHIL % % 0.4   IMM GRAN % % 0.7*   NEUTROS ABS 10*3/mm3 7.70*   LYMPHS ABS 10*3/mm3 1.86   MONOS ABS 10*3/mm3 0.56   EOS ABS 10*3/mm3 0.05   BASOS ABS 10*3/mm3 0.04   IMMATURE GRANS (ABS) 10*3/mm3 0.07*   NRBC /100 WBC 0.0         Results from last 7 days   Lab Units 11/06/20  0435 11/05/20  1536   TROPONIN T ng/mL <0.010 <0.010                 Results from last 7 days   Lab Units 11/05/20  1536   INR  0.95     Microbiology Results (last 10 days)     Procedure Component  Value - Date/Time    COVID PRE-OP / PRE-PROCEDURE SCREENING ORDER (NO ISOLATION) - Swab, Nasopharynx [[id number]]  (Normal) Collected: 11/05/20 1557    Lab Status: Final result Specimen: Swab from Nasopharynx Updated: 11/05/20 1726    Narrative:      The following orders were created for panel order COVID PRE-OP / PRE-PROCEDURE SCREENING ORDER (NO ISOLATION) - Swab, Nasopharynx.  Procedure                               Abnormality         Status                     ---------                               -----------         ------                     Respiratory Panel PCR w/...[[id number]]  Normal              Final result                 Please view results for these tests on the individual orders.    Respiratory Panel PCR w/COVID-19(SARS-CoV-2) [facility] In-House, NP Swab in UTM/VTM, 3-4 HR TAT - Swab, Nasopharynx [[id number]]  (Normal) Collected: 11/05/20 1557    Lab Status: Final result Specimen: Swab from Nasopharynx Updated: 11/05/20 1726     ADENOVIRUS, PCR Not Detected     Coronavirus 229E Not Detected     Coronavirus HKU1 Not Detected     Coronavirus NL63 Not Detected     Coronavirus OC43 Not Detected     COVID19 Not Detected     Human Metapneumovirus Not Detected     Human Rhinovirus/Enterovirus Not Detected     Influenza A PCR Not Detected     Influenza B PCR Not Detected     Parainfluenza Virus 1 Not Detected     Parainfluenza Virus 2 Not Detected     Parainfluenza Virus 3 Not Detected     Parainfluenza Virus 4 Not Detected     RSV, PCR Not Detected     Bordetella pertussis pcr Not Detected     Bordetella parapertussis PCR Not Detected     Chlamydophila pneumoniae PCR Not Detected     Mycoplasma pneumo by PCR Not Detected    Narrative:      Fact sheet for providers: https://docs.Frequency/wp-content/uploads/BLC1008-6014-LO8.1-EUA-Provider-Fact-Sheet-3.pdf    Fact sheet for patients: https://docs.[employer]/wp-content/uploads/XHU1972-4628-VQ8.1-EUA-Patient-Fact-Sheet-1.pdf               arformoterol, 15 mcg, Nebulization, BID - RT  budesonide, 0.5 mg, Nebulization, BID - RT  cholecalciferol, 1,000 Units, Oral, Daily  dilTIAZem CD, 180 mg, Oral, Q24H  enoxaparin, 1 mg/kg, Subcutaneous, Q12H  famotidine, 20 mg, Oral, BID AC  FLUoxetine, 20 mg, Oral, Daily  guaiFENesin, 1,200 mg, Oral, Q12H  influenza vaccine, 0.5 mL, Intramuscular, Once  lidocaine, 1 patch, Transdermal, Q24H  loperamide, 2 mg, Oral, BID  mirtazapine, 15 mg, Oral, Nightly  montelukast, 10 mg, Oral, Nightly  roflumilast, 500 mcg, Oral, Daily  sodium chloride, 10 mL, Intravenous, Q12H  theophylline, 400 mg, Oral, Q24H  tiotropium bromide monohydrate, 2 puff, Inhalation, Daily           Diagnostics:  Xr Ribs Right With Pa Chest    Result Date: 11/6/2020  PA CHEST AND RIGHT RIB SERIES 11/06/2020  CLINICAL HISTORY: Complains of low back pain status post twisting back getting out of bed yesterday, possible right rib fracture.  COMPARISON: This is correlated to yesterday's chest CT 11/05/2020.  FINDINGS: On the PA view of the chest, the cardiomediastinal silhouette and the pulmonary vasculature are within normal limits. There are prominent emphysematous changes throughout the lungs. There is mild coarsened interstitial markings left lower lung and lung base, unchanged since chest CT yesterday 11/05/2020. No focal dense airspace consolidation seen. Costophrenic angles are sharp. There is minimal cortical regularity of the right 8th lateral rib. There is a subtle fracture at this site, age-indeterminate suspected.      1. No definite active disease is seen in the chest. 2. There are prominent emphysematous changes throughout the lungs, some coarsened interstitial markings in the left lower lung and left lung base. This is unchanged since yesterday's chest CT. 3. There is some minimal cortical irregularity of the right 8th lateral rib raising the possibility of a very subtle nondisplaced rib fracture, age-indeterminate, correlation  with physical exam suggested, otherwise no right rib fracture seen.  This report was finalized on 11/6/2020 1:20 PM by Dr. Burton Cool M.D.      Xr Spine Thoracic 3 View    Result Date: 11/6/2020  THREE VIEWS OF THE THORACIC SPINE AND LUMBAR SPINE PLAIN FILM SERIES COMPLETE 4+ VIEWS 11/05/2020  CLINICAL HISTORY: Patient twisted back while getting out of bed and heard a pop, has pain in the mid and low back.  COMPARISON: This is correlated to chest CT 08/02/2018.  LUMBAR SPINE PLAIN FILMS: Five views of lumbar spine including AP bilateral oblique and lateral views of the entire lumbar spine and coned down lateral view of lumbosacral junction are submitted for interpretation. There is mild compression deformity involving the superior body and endplate of L5 with 20-30% loss of anterior and central, 20% loss of posterior vertebral body height, precise age of this compression deformity is uncertain but could be acute to subacute. The L1 through L4 lumbar vertebral body heights are well-maintained. There is also a compression fracture involving superior body and endplate of T12 with about 30% loss of anterior and central, 20% loss of posterior vertebral body height at T12. This T12 compression fracture is seen on prior chest CT 08/02/2018 and is chronic. There is also minimal irregularity in the anterior cortex superior body of S3 sacral level, subtle horizontal upper sacral fracture at the S3 sacral level is not excluded. Compression fracture at L5 could be further dated with a lumbar spine MRI.  THORACIC SPINE PLAIN FILMS: Three views of the thoracic spine including AP and lateral views of the entire thoracic spine and swimmer's lateral view of lower cervical and upper thoracic spine are submitted for interpretation. There are 4 compression fractures in the thoracic spine,including a moderate compression fracture involving the T7 vertebra with about 40-50% loss of anterior, no loss of posterior vertebral body height.  This compression fracture was present on chest CT 08/02/2018 and is chronic. There are mild compression fractures involving superior body and endplate of T9 and T10 with about 30% loss of anterior vertebral body height at both these levels. These are new since chest CT 08/02/2018, but the precise age of these compression deformities is uncertain, could be acute to subacute. Reidentified is old compression fracture at T12.      1. There is a moderate old compression fracture at T7 and mild-to-moderate old compression deformity involving the superior body and endplate of T12, unchanged since chest CT 08/02/2018. 2. There are mild-to-moderate compression fractures involving the superior body and endplate of the T9 and T10 thoracic vertebrae that are new when compared to chest CT 08/02/2018, the precise age is uncertain, could be further dated with a thoracic spine MRI.  This report was finalized on 11/6/2020 1:30 PM by Dr. Burton Cool M.D.      Nm Lung Ventilation Perfusion    Result Date: 11/6/2020  NUCLEAR MEDICINE VENTILATION AND PERFUSION STUDY  HISTORY: Severe COPD. Recent chest and back discomfort.  COMPARISON: PA chest 11/06/2020, CT angiogram chest 11/05/2020.  TECHNIQUE:  Planar ventilation scan in posterior projection after inhalation of 7.6 mCi Xe-133 gas with wash-in, rebreathing and wash-out phases. Followed by perfusion scan with 6 mCi Tc-MAA IV in multiple projections.  FINDINGS:  There is a matched area of decreased ventilation and perfusion to the posterior right midlung that when correlated with CT angiogram chest is most likely related to the severity of bolus emphysematous disease in this region. Technically, moderate matched defect without corresponding chest x-ray abnormality is consistent with intermediate probability. Lungs are hyperexpanded and there is overall diminished ventilation and perfusion to the right as compared to the left most likely related to the severity of emphysema. No focal  segmental mismatch defect is evident.      Severe pulmonary emphysema/COPD limits evaluation. Matched ventilation/perfusion may be related to the severity of emphysema/COPD though a moderate matched defect in the right midlung constitutes intermediate probability ventilation/perfusion scan.  This report was finalized on 11/6/2020 12:43 PM by Dr. Rosendo Ferraro M.D.      Ct Abdomen Pelvis With Contrast    Result Date: 11/5/2020  CT ANGIOGRAM CHEST-, CT ABDOMEN PELVIS W CONTRAST-  INDICATIONS: Short of breath, pain  Radiation dose reduction techniques were utilized, including automated exposure control and exposure modulation based on body size.  TECHNIQUE: CTA chest with three-dimensional reconstructions. Enhanced CT of the abdomen, pelvis  COMPARISON: Chest CT from 08/02/2018, 01/15/2018; CT abdomen pelvis from 11/17/2005  FINDINGS:  Chest CTA:  There does appear to be some nonocclusive thrombus in the right lower lobe branch artery, for example axial image 104, in a region of chronic bronchiectasis; this appearance is new from the prior exam from 2018, of uncertain chronicity (possibly chronic). The RV LV ratio is less than 1. Prominence of caliber of central pulmonary arteries may reflect pulmonary arterial hypertension, appearance is chronic. No aortic dissection.  The heart size is normal with minimal pericardial effusion. Mild to moderate coronary arterial calcification. A few small subcentimeter short axis mediastinal lymph nodes are seen that are not significant by size criteria.  The airways appear clear.  No pleural effusion or pneumothorax.  The lungs show no focal pulmonary consolidation or mass. Extensive emphysematous changes of the lungs are noted, similar to prior exam, and chronic bronchiectasis is seen medially at the right base. A stable right middle lobe nodular density measuring 6 mm on axial image 101 is noted. Scarlike density inferiorly in the right middle lobe is also stable.     Abdomen  pelvis CT:  Structures in the pelvis are partly obscured by right hip arthroplasty hardware artifact.  A right adrenal myelolipoma is again demonstrated.  Gallbladder is surgically absent. Mild intrahepatic biliary ductal dilatation. Caliber of the main pancreatic duct, 1 cm, may be normal status post cholecystectomy.  Otherwise unremarkable appearance of the liver, spleen, adrenal glands, pancreas, kidneys, bladder.  The proximal duodenum is abnormally distended up to the midportion of the third segment of the duodenum (where it passes between the superior mesenteric vein and the aorta), that could be result of obstructive effect, only slightly more prominent from the prior exam, whether mechanical obstructive effect or stricture, endoscopic correlation could be obtained as may be indicated. Much stool in the rectum, correlate clinically to exclude possibility of stool impaction. Colonic diverticula are seen that do not appear focally inflamed. Assessment of the colon for wall thickening is limited by lack of distention.  No free intraperitoneal gas or free fluid.  Scattered small mesenteric and para-aortic lymph nodes are seen that are not significant by size criteria.  Abdominal aorta is not aneurysmal. Aortic and other arterial calcifications are present.  Degenerative changes are seen in the spine. The T9 compression deformity, with 35% loss of height, is new from 06/17/2019, but age-indeterminate. Multiple other spinal compression deformities appear similar to prior exams. Right hip arthroplasty hardware is partly included.        1. There does appear to be some nonocclusive thrombus in the right lower lobe branch artery, for example axial image 104, in a region of chronic bronchiectasis; this appearance is new from the prior exam from 2018, of uncertain chronicity (possibly chronic). The RV LV ratio is less than 1. 2. The proximal duodenum is abnormally distended up to the midportion of the third segment of  the duodenum (where it passes between the superior mesenteric vein and the aorta), that could be result of obstructive effect, only slightly more prominent from the prior exam, whether mechanical obstructive effect or stricture, endoscopic correlation could be obtained as may be indicated. Much stool in the rectum, correlate clinically to exclude possibility of stool impaction. Colonic diverticula are seen that do not appear focally inflamed.   3. The T9 compression deformity, with 35% loss of height, is new from 06/17/2019, but age-indeterminate. Multiple other spinal compression deformities appear similar to prior exams.  Discussed by telephone with Dr. Lin at 1750, 11/05/2020  This report was finalized on 11/5/2020 5:54 PM by Dr. Jase Singleton M.D.      Xr Chest 1 View    Result Date: 11/5/2020  XR CHEST 1 VW-  HISTORY: Female who is 75 years-old,  short of breath  TECHNIQUE: Frontal view of the chest  COMPARISON: 10/24/2020  FINDINGS: Heart size is normal. Pulmonary vasculature is unremarkable. Small likely atelectasis at the lung bases. No focal pulmonary consolidation, pleural effusion, or pneumothorax. Emphysematous changes of the upper lungs. No acute osseous process.      Small likely atelectasis at the bases.. Follow-up as clinical indications persist.  This report was finalized on 11/5/2020 5:08 PM by Dr. Jase Singleton M.D.      Xr Chest 1 View    Result Date: 10/25/2020  UPRIGHT AP CHEST  HISTORY: Shortness of air, COPD, asthma.  COMPARISON: AP chest 06/27/2019, 06/25/2019, 2 view chest 06/17/2019.  FINDINGS: Lungs are hyperexpanded and there are increased interstitial markings consistent with COPD. There is blunting of the costophrenic angles similar to previous exam due to chronic scarring or small effusions. No superimposed airspace disease is evident and there is no evidence of perihilar edema or pneumothorax. Cardiomediastinal silhouette is not changed. There is pulmonary arterial  enlargement.      Chronic interstitial disease and COPD. Chronic blunting of the costophrenic angles due to scarring or small effusions. No evidence for superimposed infiltrate or pulmonary edema.  This report was finalized on 10/25/2020 7:09 PM by Dr. Rosendo Ferraro M.D.      Ct Angiogram Chest    Result Date: 11/5/2020  CT ANGIOGRAM CHEST-, CT ABDOMEN PELVIS W CONTRAST-  INDICATIONS: Short of breath, pain  Radiation dose reduction techniques were utilized, including automated exposure control and exposure modulation based on body size.  TECHNIQUE: CTA chest with three-dimensional reconstructions. Enhanced CT of the abdomen, pelvis  COMPARISON: Chest CT from 08/02/2018, 01/15/2018; CT abdomen pelvis from 11/17/2005  FINDINGS:  Chest CTA:  There does appear to be some nonocclusive thrombus in the right lower lobe branch artery, for example axial image 104, in a region of chronic bronchiectasis; this appearance is new from the prior exam from 2018, of uncertain chronicity (possibly chronic). The RV LV ratio is less than 1. Prominence of caliber of central pulmonary arteries may reflect pulmonary arterial hypertension, appearance is chronic. No aortic dissection.  The heart size is normal with minimal pericardial effusion. Mild to moderate coronary arterial calcification. A few small subcentimeter short axis mediastinal lymph nodes are seen that are not significant by size criteria.  The airways appear clear.  No pleural effusion or pneumothorax.  The lungs show no focal pulmonary consolidation or mass. Extensive emphysematous changes of the lungs are noted, similar to prior exam, and chronic bronchiectasis is seen medially at the right base. A stable right middle lobe nodular density measuring 6 mm on axial image 101 is noted. Scarlike density inferiorly in the right middle lobe is also stable.     Abdomen pelvis CT:  Structures in the pelvis are partly obscured by right hip arthroplasty hardware artifact.  A right  adrenal myelolipoma is again demonstrated.  Gallbladder is surgically absent. Mild intrahepatic biliary ductal dilatation. Caliber of the main pancreatic duct, 1 cm, may be normal status post cholecystectomy.  Otherwise unremarkable appearance of the liver, spleen, adrenal glands, pancreas, kidneys, bladder.  The proximal duodenum is abnormally distended up to the midportion of the third segment of the duodenum (where it passes between the superior mesenteric vein and the aorta), that could be result of obstructive effect, only slightly more prominent from the prior exam, whether mechanical obstructive effect or stricture, endoscopic correlation could be obtained as may be indicated. Much stool in the rectum, correlate clinically to exclude possibility of stool impaction. Colonic diverticula are seen that do not appear focally inflamed. Assessment of the colon for wall thickening is limited by lack of distention.  No free intraperitoneal gas or free fluid.  Scattered small mesenteric and para-aortic lymph nodes are seen that are not significant by size criteria.  Abdominal aorta is not aneurysmal. Aortic and other arterial calcifications are present.  Degenerative changes are seen in the spine. The T9 compression deformity, with 35% loss of height, is new from 06/17/2019, but age-indeterminate. Multiple other spinal compression deformities appear similar to prior exams. Right hip arthroplasty hardware is partly included.        1. There does appear to be some nonocclusive thrombus in the right lower lobe branch artery, for example axial image 104, in a region of chronic bronchiectasis; this appearance is new from the prior exam from 2018, of uncertain chronicity (possibly chronic). The RV LV ratio is less than 1. 2. The proximal duodenum is abnormally distended up to the midportion of the third segment of the duodenum (where it passes between the superior mesenteric vein and the aorta), that could be result of  obstructive effect, only slightly more prominent from the prior exam, whether mechanical obstructive effect or stricture, endoscopic correlation could be obtained as may be indicated. Much stool in the rectum, correlate clinically to exclude possibility of stool impaction. Colonic diverticula are seen that do not appear focally inflamed.   3. The T9 compression deformity, with 35% loss of height, is new from 06/17/2019, but age-indeterminate. Multiple other spinal compression deformities appear similar to prior exams.  Discussed by telephone with Dr. Lin at 1750, 11/05/2020  This report was finalized on 11/5/2020 5:54 PM by Dr. Jase Singleton M.D.      Xr Spine Lumbar Complete 4+vw    Result Date: 11/6/2020  THREE VIEWS OF THE THORACIC SPINE AND LUMBAR SPINE PLAIN FILM SERIES COMPLETE 4+ VIEWS 11/05/2020  CLINICAL HISTORY: Patient twisted back while getting out of bed and heard a pop, has pain in the mid and low back.  COMPARISON: This is correlated to chest CT 08/02/2018.  LUMBAR SPINE PLAIN FILMS: Five views of lumbar spine including AP bilateral oblique and lateral views of the entire lumbar spine and coned down lateral view of lumbosacral junction are submitted for interpretation. There is mild compression deformity involving the superior body and endplate of L5 with 20-30% loss of anterior and central, 20% loss of posterior vertebral body height, precise age of this compression deformity is uncertain but could be acute to subacute. The L1 through L4 lumbar vertebral body heights are well-maintained. There is also a compression fracture involving superior body and endplate of T12 with about 30% loss of anterior and central, 20% loss of posterior vertebral body height at T12. This T12 compression fracture is seen on prior chest CT 08/02/2018 and is chronic. There is also minimal irregularity in the anterior cortex superior body of S3 sacral level, subtle horizontal upper sacral fracture at the S3 sacral  level is not excluded. Compression fracture at L5 could be further dated with a lumbar spine MRI.  THORACIC SPINE PLAIN FILMS: Three views of the thoracic spine including AP and lateral views of the entire thoracic spine and swimmer's lateral view of lower cervical and upper thoracic spine are submitted for interpretation. There are 4 compression fractures in the thoracic spine,including a moderate compression fracture involving the T7 vertebra with about 40-50% loss of anterior, no loss of posterior vertebral body height. This compression fracture was present on chest CT 08/02/2018 and is chronic. There are mild compression fractures involving superior body and endplate of T9 and T10 with about 30% loss of anterior vertebral body height at both these levels. These are new since chest CT 08/02/2018, but the precise age of these compression deformities is uncertain, could be acute to subacute. Reidentified is old compression fracture at T12.      1. There is a moderate old compression fracture at T7 and mild-to-moderate old compression deformity involving the superior body and endplate of T12, unchanged since chest CT 08/02/2018. 2. There are mild-to-moderate compression fractures involving the superior body and endplate of the T9 and T10 thoracic vertebrae that are new when compared to chest CT 08/02/2018, the precise age is uncertain, could be further dated with a thoracic spine MRI.  This report was finalized on 11/6/2020 1:30 PM by Dr. Burton Cool M.D.      Results for orders placed during the hospital encounter of 11/05/20   Adult Transthoracic Echo Complete W/ Cont if Necessary Per Protocol    Narrative · Calculated left ventricular EF = 54.8% Estimated left ventricular EF was   in agreement with the calculated left ventricular EF. Left ventricular   systolic function is normal.  · Left ventricular wall thickness is consistent with moderate concentric   hypertrophy.  · Mitral annular calcification is  present.  · No significant valvular regurgitation or stenosis.              Active Hospital Problems    Diagnosis  POA   • **Acute right-sided thoracic back pain [M54.6]  Yes   • Right-sided chest pain [R07.9]  Yes   • Chronic respiratory failure with hypoxia (CMS/HCC) [J96.11]  Yes   • Abnormal CT of the chest [R93.89]  Yes   • COPD (chronic obstructive pulmonary disease) (CMS/HCC) [J44.9]  Yes      Resolved Hospital Problems   No resolved problems to display.         Assessment/Plan     1. Possible pulmonary embolus CT not super convincing although there certainly appears to be the possibility of a small clot.  VQ scan not helpful due to her severe lung disease lower extremity Dopplers negative for DVT.  She did come in with right-sided back pain certainly raises these index of suspicion but the pain really is lower than the chest and it occurred with the twisting movement and she heard a pop its not really classic for pulmonary embolus.  She has such severe lung disease however without having an acute decline in her oxygen is hard to believe she has any significant pulmonary embolus.  2. Chronic hypoxemic and hypercapnic respiratory failure he is actually on less oxygen currently than she uses at home  3. COPD severe  4. Multifocal atrial tachycardia  5. T9 vertebral compression fracture neurosurgery is seeing her    I think the question is do we keep her on anticoagulation.  She is relatively immobile that does increase her risk she has severe lung disease that a small clot could be devastating.  The CT is certainly not overwhelmingly convincing for a clot.  The question is the risk benefit she does not have any bleeding diatheses I think probably go ahead and keep her on anticoagulation at least for a few months and see how she does.  When certain no procedure she could transition to [facility]    Plan for disposition:    Alfie Hightower MD  11/07/20  13:20 EST    Time:

## 2020-11-07 NOTE — PLAN OF CARE
Goal Outcome Evaluation:  Plan of Care Reviewed With: patient, spouse  Progress: no change  Outcome Summary: pt sinus tach all day, pt received Cardizem 180mg.  Pt was unable to tolerate MRI, according to MRI tech pt refused to transfer to machine and started crying; pt was given pain medication before going down.  pt did not work with PT today.  Pt has complained of pain all day, received morphine x2, Percocet x2, pt appears very anxious all the time.  was given an ativan this x2.  pt safety maintained and will continue to monitor.

## 2020-11-07 NOTE — PROGRESS NOTES
Name: Siobhan Prakash ADMIT: 2020   : 1945  PCP: Lyudmila Granados APRN    MRN: [id number] LOS: 0 days   AGE/SEX: 75 y.o. female  ROOM: [facility]     Subjective   Subjective   Patient looks much better.  Talkative and smiling.  She says her pain has improved tremendously but is still present when she tries to move much    Review of Systems     Objective   Objective   Vital Signs  Temp:  [97.3 °F (36.3 °C)-97.8 °F (36.6 °C)] 97.3 °F (36.3 °C)  Heart Rate:  [] 101  Resp:  [18-24] 20  BP: (138-163)/() 156/83  SpO2:  [90 %-100 %] 94 %  on  Flow (L/min):  [5-6] 5;   Device (Oxygen Therapy): high-flow nasal cannula  Body mass index is 18.84 kg/m².  Physical Exam  Vitals signs and nursing note reviewed.   Constitutional:       General: She is not in acute distress.     Appearance: She is ill-appearing.      Comments: Very frail and barrel chested   HENT:      Nose: Nose normal.   Neck:      Musculoskeletal: Neck supple.   Cardiovascular:      Rate and Rhythm: Tachycardia present.      Heart sounds: No murmur.   Pulmonary:      Breath sounds: No wheezing or rales.      Comments: Markedly decreased breath sounds  Abdominal:      General: Bowel sounds are normal. There is no distension.      Palpations: Abdomen is soft.      Tenderness: There is no abdominal tenderness.   Musculoskeletal:      Right lower leg: No edema.      Left lower leg: No edema.   Skin:     General: Skin is warm and dry.      Findings: No rash.   Neurological:      General: No focal deficit present.      Mental Status: She is alert.         Results Review     I reviewed the patient's new clinical results.  Results from last 7 days   Lab Units 20  1536   WBC 10*3/mm3 10.28   HEMOGLOBIN g/dL 13.7   PLATELETS 10*3/mm3 410     Results from last 7 days   Lab Units 20  0435 20  1536   SODIUM mmol/L 140 142   POTASSIUM mmol/L 4.4 4.4   CHLORIDE mmol/L 98 102   CO2 mmol/L 32.5* 32.5*   BUN mg/dL 18 14   CREATININE  mg/dL 0.72 0.62   GLUCOSE mg/dL 156* 84   Estimated Creatinine Clearance: 46.2 mL/min (by C-G formula based on SCr of 0.72 mg/dL).  Results from last 7 days   Lab Units 11/05/20  1536   ALBUMIN g/dL 4.00   BILIRUBIN mg/dL 0.2   ALK PHOS U/L 145*   AST (SGOT) U/L 26   ALT (SGPT) U/L 12     Results from last 7 days   Lab Units 11/06/20  0435 11/05/20  1536   CALCIUM mg/dL 10.2 10.1   ALBUMIN g/dL  --  4.00       COVID19   Date Value Ref Range Status   11/05/2020 Not Detected Not Detected - Ref. Range Final     No results found for: HGBA1C, POCGLU    Adult Transthoracic Echo Complete W/ Cont if Necessary Per Protocol  · Calculated left ventricular EF = 54.8% Estimated left ventricular EF was   in agreement with the calculated left ventricular EF. Left ventricular   systolic function is normal.  · Left ventricular wall thickness is consistent with moderate concentric   hypertrophy.  · Mitral annular calcification is present.  · No significant valvular regurgitation or stenosis.       Scheduled Medications  arformoterol, 15 mcg, Nebulization, BID - RT  budesonide, 0.5 mg, Nebulization, BID - RT  cholecalciferol, 1,000 Units, Oral, Daily  dilTIAZem CD, 180 mg, Oral, Q24H  enoxaparin, 1 mg/kg, Subcutaneous, Q12H  famotidine, 20 mg, Oral, BID AC  FLUoxetine, 20 mg, Oral, Daily  guaiFENesin, 1,200 mg, Oral, Q12H  lidocaine, 1 patch, Transdermal, Q24H  loperamide, 2 mg, Oral, BID  mirtazapine, 15 mg, Oral, Nightly  montelukast, 10 mg, Oral, Nightly  roflumilast, 500 mcg, Oral, Daily  sodium chloride, 10 mL, Intravenous, Q12H  theophylline, 400 mg, Oral, Q24H  tiotropium bromide monohydrate, 2 puff, Inhalation, Daily    Infusions   Diet  Diet Regular       Assessment/Plan     Active Hospital Problems    Diagnosis  POA   • **Acute right-sided thoracic back pain [M54.6]  Yes   • Right-sided chest pain [R07.9]  Yes   • Chronic respiratory failure with hypoxia (CMS/HCC) [J96.11]  Yes   • Abnormal CT of the chest [R93.89]  Yes   •  COPD (chronic obstructive pulmonary disease) (CMS/[facility]) [J44.9]  Yes      Resolved Hospital Problems   No resolved problems to display.       · Severe right-sided thoracic back pain-likely musculoskeletal, related to acute thoracic compression fractures and possible rib fracture.  Probably needs an MRI to investigate further but I do not think she would tolerate it, nor does the patient.  · Continue current pain regimen  · Neurosurgery recs noted.  Brace has been ordered, hopefully she can be more mobile once this is in place  · Abnormal CT,? PE-VQ scan indeterminate due to her severe COPD.  She is on therapeutic Lovenox for now.  Defer to pulmonology whether to continue  · Dopplers negative  · Severe/end-stage COPD with chronic bronchiectasis -management per [place].  Continue current respiratory regimen, not wheezing  · Chronic hypoxic respiratory failure-6 L chronic O2 requirement.  Very marginal respiratory status even at baseline  · Tachycardia/MAT-improved today.  Cardiology help appreciated  · Disposition-likely depends on mobility, she has been able to do very little thus far.  Home health is the plan per CCP    Chidi Bonilla MD  [facility]  11/07/20  15:15 EST      I wore protective equipment throughout this patient encounter including a face mask, gloves and protective eyewear.  Hand hygiene was performed before donning protective equipment and after removal when leaving the room.

## 2020-11-08 PROBLEM — I47.1 MULTIFOCAL ATRIAL TACHYCARDIA (HCC): Status: ACTIVE | Noted: 2020-01-01

## 2020-11-08 PROBLEM — I26.99 ACUTE PULMONARY EMBOLISM WITHOUT ACUTE COR PULMONALE (HCC): Status: ACTIVE | Noted: 2020-01-01

## 2020-11-08 PROBLEM — J96.11 CHRONIC RESPIRATORY FAILURE WITH HYPOXIA AND HYPERCAPNIA (HCC): Chronic | Status: ACTIVE | Noted: 2020-01-01

## 2020-11-08 PROBLEM — I47.19 MULTIFOCAL ATRIAL TACHYCARDIA: Status: ACTIVE | Noted: 2020-01-01

## 2020-11-08 PROBLEM — S22.070A COMPRESSION FRACTURE OF T9 VERTEBRA (HCC): Status: ACTIVE | Noted: 2020-01-01

## 2020-11-08 PROBLEM — J96.12 CHRONIC RESPIRATORY FAILURE WITH HYPOXIA AND HYPERCAPNIA (HCC): Chronic | Status: ACTIVE | Noted: 2020-01-01

## 2020-11-08 NOTE — PROGRESS NOTES
LOS: 0 days   Patient Care Team:  Lyudmila Granados APRN as PCP - General (Family Medicine)  Carl Perez MD as Consulting Physician (Orthopedic Surgery)    Subjective     Patient's breathing has been doing pretty good they are just fitting her with her back brace currently she says it seems to be a little more difficult to breathe plus she has a facemask on whether doing this.  Review of Systems:          Objective     Vital Signs  Vital Sign Min/Max for last 24 hours  Temp  Min: 97.5 °F (36.4 °C)  Max: 97.5 °F (36.4 °C)   BP  Min: 118/85  Max: 133/76   Pulse  Min: 92  Max: 119   Resp  Min: 16  Max: 20   SpO2  Min: 92 %  Max: 100 %   Flow (L/min)  Min: 5  Max: 5   No data recorded        Ventilator/Non-Invasive Ventilation Settings (From admission, onward)    None                       Body mass index is 18.84 kg/m².  I/O last 3 completed shifts:  In: 480 [P.O.:480]  Out: 300 [Urine:300]  No intake/output data recorded.        Physical Exam:  General Appearance: Well-developed elderly thin white female she is resting in bed she does not appear in any acute distress she is on 5 L nasal cannula O2 she usually uses 6 at home her oxygen saturations are about 92%.  She is even maintaining this while she is sitting up and they are working on getting the brace in place.  Eyes: Conjunctiva are clear and anicteric  ENT: Mucous membranes are moist no erythema or exudates Mallampati type I airway.  Nasal septum midline.  Neck: No adenopathy or thyromegaly trachea is midline I do not appreciate jugular venous distention with her sitting upright.  Lungs: Very decreased but clear no wheezes no rales no rhonchi nonlabored symmetric expansion  Cardiac: Regular rate rhythm no murmur  Abdomen: Soft no palpable hepatosplenomegaly  : Not examined  Musculoskeletal: Severe thoracic kyphosis  Skin: No jaundice no petechiae she has got thin skin, it is warm and dry  Neuro: She is alert oriented cooperative following commands  grossly intact  Extremities/P Vascular: No clubbing no cyanosis no edema palpable radial and dorsalis pedis pulses bilaterally  MSE: She seems to be in pretty good spirits       Labs:  Results from last 7 days   Lab Units 11/06/20  0435 11/05/20  1536   GLUCOSE mg/dL 156* 84   SODIUM mmol/L 140 142   POTASSIUM mmol/L 4.4 4.4   CO2 mmol/L 32.5* 32.5*   CHLORIDE mmol/L 98 102   ANION GAP mmol/L 9.5 7.5   CREATININE mg/dL 0.72 0.62   BUN mg/dL 18 14   BUN / CREAT RATIO  25.0 22.6   CALCIUM mg/dL 10.2 10.1   EGFR IF NONAFRICN AM mL/min/1.73 79 94   ALK PHOS U/L  --  145*   TOTAL PROTEIN g/dL  --  7.6   ALT (SGPT) U/L  --  12   AST (SGOT) U/L  --  26   BILIRUBIN mg/dL  --  0.2   ALBUMIN g/dL  --  4.00   GLOBULIN gm/dL  --  3.6     Estimated Creatinine Clearance: 46.2 mL/min (by C-G formula based on SCr of 0.72 mg/dL).      Results from last 7 days   Lab Units 11/05/20  1536   WBC 10*3/mm3 10.28   RBC 10*6/mm3 4.55   HEMOGLOBIN g/dL 13.7   HEMATOCRIT % 41.5   MCV fL 91.2   MCH pg 30.1   MCHC g/dL 33.0   RDW % 12.1*   RDW-SD fl 40.3   MPV fL 9.1   PLATELETS 10*3/mm3 410   NEUTROPHIL % % 74.9   LYMPHOCYTE % % 18.1*   MONOCYTES % % 5.4   EOSINOPHIL % % 0.5   BASOPHIL % % 0.4   IMM GRAN % % 0.7*   NEUTROS ABS 10*3/mm3 7.70*   LYMPHS ABS 10*3/mm3 1.86   MONOS ABS 10*3/mm3 0.56   EOS ABS 10*3/mm3 0.05   BASOS ABS 10*3/mm3 0.04   IMMATURE GRANS (ABS) 10*3/mm3 0.07*   NRBC /100 WBC 0.0         Results from last 7 days   Lab Units 11/06/20  0435 11/05/20  1536   TROPONIN T ng/mL <0.010 <0.010                 Results from last 7 days   Lab Units 11/05/20  1536   INR  0.95     Microbiology Results (last 10 days)     Procedure Component Value - Date/Time    COVID PRE-OP / PRE-PROCEDURE SCREENING ORDER (NO ISOLATION) - Swab, Nasopharynx [[id number]]  (Normal) Collected: 11/05/20 1557    Lab Status: Final result Specimen: Swab from Nasopharynx Updated: 11/05/20 1726    Narrative:      The following orders were created for panel order  COVID PRE-OP / PRE-PROCEDURE SCREENING ORDER (NO ISOLATION) - Swab, Nasopharynx.  Procedure                               Abnormality         Status                     ---------                               -----------         ------                     Respiratory Panel PCR w/...[[id number]]  Normal              Final result                 Please view results for these tests on the individual orders.    Respiratory Panel PCR w/COVID-19(SARS-CoV-2) [facility] In-House, NP Swab in UTM/VTM, 3-4 HR TAT - Swab, Nasopharynx [[id number]]  (Normal) Collected: 11/05/20 1557    Lab Status: Final result Specimen: Swab from Nasopharynx Updated: 11/05/20 1725     ADENOVIRUS, PCR Not Detected     Coronavirus 229E Not Detected     Coronavirus HKU1 Not Detected     Coronavirus NL63 Not Detected     Coronavirus OC43 Not Detected     COVID19 Not Detected     Human Metapneumovirus Not Detected     Human Rhinovirus/Enterovirus Not Detected     Influenza A PCR Not Detected     Influenza B PCR Not Detected     Parainfluenza Virus 1 Not Detected     Parainfluenza Virus 2 Not Detected     Parainfluenza Virus 3 Not Detected     Parainfluenza Virus 4 Not Detected     RSV, PCR Not Detected     Bordetella pertussis pcr Not Detected     Bordetella parapertussis PCR Not Detected     Chlamydophila pneumoniae PCR Not Detected     Mycoplasma pneumo by PCR Not Detected    Narrative:      Fact sheet for providers: https://docs.[employer]/wp-content/uploads/KIQ1938-0546-ZM7.1-EUA-Provider-Fact-Sheet-3.pdf    Fact sheet for patients: https://docs.[employer]/wp-content/uploads/EXP7187-0663-KA1.1-EUA-Patient-Fact-Sheet-1.pdf              arformoterol, 15 mcg, Nebulization, BID - RT  budesonide, 0.5 mg, Nebulization, BID - RT  cholecalciferol, 1,000 Units, Oral, Daily  dilTIAZem CD, 180 mg, Oral, Q24H  enoxaparin, 1 mg/kg, Subcutaneous, Q12H  famotidine, 20 mg, Oral, BID AC  FLUoxetine, 20 mg, Oral, Daily  guaiFENesin, 1,200 mg,  Oral, Q12H  lidocaine, 1 patch, Transdermal, Q24H  loperamide, 2 mg, Oral, BID  mirtazapine, 15 mg, Oral, Nightly  montelukast, 10 mg, Oral, Nightly  roflumilast, 500 mcg, Oral, Daily  sodium chloride, 10 mL, Intravenous, Q12H  theophylline, 400 mg, Oral, Q24H  tiotropium bromide monohydrate, 2 puff, Inhalation, Daily           Diagnostics:  Xr Ribs Right With Pa Chest    Result Date: 11/6/2020  PA CHEST AND RIGHT RIB SERIES 11/06/2020  CLINICAL HISTORY: Complains of low back pain status post twisting back getting out of bed yesterday, possible right rib fracture.  COMPARISON: This is correlated to yesterday's chest CT 11/05/2020.  FINDINGS: On the PA view of the chest, the cardiomediastinal silhouette and the pulmonary vasculature are within normal limits. There are prominent emphysematous changes throughout the lungs. There is mild coarsened interstitial markings left lower lung and lung base, unchanged since chest CT yesterday 11/05/2020. No focal dense airspace consolidation seen. Costophrenic angles are sharp. There is minimal cortical regularity of the right 8th lateral rib. There is a subtle fracture at this site, age-indeterminate suspected.      1. No definite active disease is seen in the chest. 2. There are prominent emphysematous changes throughout the lungs, some coarsened interstitial markings in the left lower lung and left lung base. This is unchanged since yesterday's chest CT. 3. There is some minimal cortical irregularity of the right 8th lateral rib raising the possibility of a very subtle nondisplaced rib fracture, age-indeterminate, correlation with physical exam suggested, otherwise no right rib fracture seen.  This report was finalized on 11/6/2020 1:20 PM by Dr. Burton Cool M.D.      Xr Spine Thoracic 3 View    Result Date: 11/6/2020  THREE VIEWS OF THE THORACIC SPINE AND LUMBAR SPINE PLAIN FILM SERIES COMPLETE 4+ VIEWS 11/05/2020  CLINICAL HISTORY: Patient twisted back while getting out of  bed and heard a pop, has pain in the mid and low back.  COMPARISON: This is correlated to chest CT 08/02/2018.  LUMBAR SPINE PLAIN FILMS: Five views of lumbar spine including AP bilateral oblique and lateral views of the entire lumbar spine and coned down lateral view of lumbosacral junction are submitted for interpretation. There is mild compression deformity involving the superior body and endplate of L5 with 20-30% loss of anterior and central, 20% loss of posterior vertebral body height, precise age of this compression deformity is uncertain but could be acute to subacute. The L1 through L4 lumbar vertebral body heights are well-maintained. There is also a compression fracture involving superior body and endplate of T12 with about 30% loss of anterior and central, 20% loss of posterior vertebral body height at T12. This T12 compression fracture is seen on prior chest CT 08/02/2018 and is chronic. There is also minimal irregularity in the anterior cortex superior body of S3 sacral level, subtle horizontal upper sacral fracture at the S3 sacral level is not excluded. Compression fracture at L5 could be further dated with a lumbar spine MRI.  THORACIC SPINE PLAIN FILMS: Three views of the thoracic spine including AP and lateral views of the entire thoracic spine and swimmer's lateral view of lower cervical and upper thoracic spine are submitted for interpretation. There are 4 compression fractures in the thoracic spine,including a moderate compression fracture involving the T7 vertebra with about 40-50% loss of anterior, no loss of posterior vertebral body height. This compression fracture was present on chest CT 08/02/2018 and is chronic. There are mild compression fractures involving superior body and endplate of T9 and T10 with about 30% loss of anterior vertebral body height at both these levels. These are new since chest CT 08/02/2018, but the precise age of these compression deformities is uncertain, could be  acute to subacute. Reidentified is old compression fracture at T12.      1. There is a moderate old compression fracture at T7 and mild-to-moderate old compression deformity involving the superior body and endplate of T12, unchanged since chest CT 08/02/2018. 2. There are mild-to-moderate compression fractures involving the superior body and endplate of the T9 and T10 thoracic vertebrae that are new when compared to chest CT 08/02/2018, the precise age is uncertain, could be further dated with a thoracic spine MRI.  This report was finalized on 11/6/2020 1:30 PM by Dr. Burton Cool M.D.      Nm Lung Ventilation Perfusion    Result Date: 11/6/2020  NUCLEAR MEDICINE VENTILATION AND PERFUSION STUDY  HISTORY: Severe COPD. Recent chest and back discomfort.  COMPARISON: PA chest 11/06/2020, CT angiogram chest 11/05/2020.  TECHNIQUE:  Planar ventilation scan in posterior projection after inhalation of 7.6 mCi Xe-133 gas with wash-in, rebreathing and wash-out phases. Followed by perfusion scan with 6 mCi Tc-MAA IV in multiple projections.  FINDINGS:  There is a matched area of decreased ventilation and perfusion to the posterior right midlung that when correlated with CT angiogram chest is most likely related to the severity of bolus emphysematous disease in this region. Technically, moderate matched defect without corresponding chest x-ray abnormality is consistent with intermediate probability. Lungs are hyperexpanded and there is overall diminished ventilation and perfusion to the right as compared to the left most likely related to the severity of emphysema. No focal segmental mismatch defect is evident.      Severe pulmonary emphysema/COPD limits evaluation. Matched ventilation/perfusion may be related to the severity of emphysema/COPD though a moderate matched defect in the right midlung constitutes intermediate probability ventilation/perfusion scan.  This report was finalized on 11/6/2020 12:43 PM by Dr. Robbins  ODILON Ferraro      Ct Abdomen Pelvis With Contrast    Result Date: 11/5/2020  CT ANGIOGRAM CHEST-, CT ABDOMEN PELVIS W CONTRAST-  INDICATIONS: Short of breath, pain  Radiation dose reduction techniques were utilized, including automated exposure control and exposure modulation based on body size.  TECHNIQUE: CTA chest with three-dimensional reconstructions. Enhanced CT of the abdomen, pelvis  COMPARISON: Chest CT from 08/02/2018, 01/15/2018; CT abdomen pelvis from 11/17/2005  FINDINGS:  Chest CTA:  There does appear to be some nonocclusive thrombus in the right lower lobe branch artery, for example axial image 104, in a region of chronic bronchiectasis; this appearance is new from the prior exam from 2018, of uncertain chronicity (possibly chronic). The RV LV ratio is less than 1. Prominence of caliber of central pulmonary arteries may reflect pulmonary arterial hypertension, appearance is chronic. No aortic dissection.  The heart size is normal with minimal pericardial effusion. Mild to moderate coronary arterial calcification. A few small subcentimeter short axis mediastinal lymph nodes are seen that are not significant by size criteria.  The airways appear clear.  No pleural effusion or pneumothorax.  The lungs show no focal pulmonary consolidation or mass. Extensive emphysematous changes of the lungs are noted, similar to prior exam, and chronic bronchiectasis is seen medially at the right base. A stable right middle lobe nodular density measuring 6 mm on axial image 101 is noted. Scarlike density inferiorly in the right middle lobe is also stable.     Abdomen pelvis CT:  Structures in the pelvis are partly obscured by right hip arthroplasty hardware artifact.  A right adrenal myelolipoma is again demonstrated.  Gallbladder is surgically absent. Mild intrahepatic biliary ductal dilatation. Caliber of the main pancreatic duct, 1 cm, may be normal status post cholecystectomy.  Otherwise unremarkable appearance of  the liver, spleen, adrenal glands, pancreas, kidneys, bladder.  The proximal duodenum is abnormally distended up to the midportion of the third segment of the duodenum (where it passes between the superior mesenteric vein and the aorta), that could be result of obstructive effect, only slightly more prominent from the prior exam, whether mechanical obstructive effect or stricture, endoscopic correlation could be obtained as may be indicated. Much stool in the rectum, correlate clinically to exclude possibility of stool impaction. Colonic diverticula are seen that do not appear focally inflamed. Assessment of the colon for wall thickening is limited by lack of distention.  No free intraperitoneal gas or free fluid.  Scattered small mesenteric and para-aortic lymph nodes are seen that are not significant by size criteria.  Abdominal aorta is not aneurysmal. Aortic and other arterial calcifications are present.  Degenerative changes are seen in the spine. The T9 compression deformity, with 35% loss of height, is new from 06/17/2019, but age-indeterminate. Multiple other spinal compression deformities appear similar to prior exams. Right hip arthroplasty hardware is partly included.        1. There does appear to be some nonocclusive thrombus in the right lower lobe branch artery, for example axial image 104, in a region of chronic bronchiectasis; this appearance is new from the prior exam from 2018, of uncertain chronicity (possibly chronic). The RV LV ratio is less than 1. 2. The proximal duodenum is abnormally distended up to the midportion of the third segment of the duodenum (where it passes between the superior mesenteric vein and the aorta), that could be result of obstructive effect, only slightly more prominent from the prior exam, whether mechanical obstructive effect or stricture, endoscopic correlation could be obtained as may be indicated. Much stool in the rectum, correlate clinically to exclude  possibility of stool impaction. Colonic diverticula are seen that do not appear focally inflamed.   3. The T9 compression deformity, with 35% loss of height, is new from 06/17/2019, but age-indeterminate. Multiple other spinal compression deformities appear similar to prior exams.  Discussed by telephone with Dr. Lin at 1750, 11/05/2020  This report was finalized on 11/5/2020 5:54 PM by Dr. Jase Singleton M.D.      Xr Chest 1 View    Result Date: 11/5/2020  XR CHEST 1 VW-  HISTORY: Female who is 75 years-old,  short of breath  TECHNIQUE: Frontal view of the chest  COMPARISON: 10/24/2020  FINDINGS: Heart size is normal. Pulmonary vasculature is unremarkable. Small likely atelectasis at the lung bases. No focal pulmonary consolidation, pleural effusion, or pneumothorax. Emphysematous changes of the upper lungs. No acute osseous process.      Small likely atelectasis at the bases.. Follow-up as clinical indications persist.  This report was finalized on 11/5/2020 5:08 PM by Dr. Jase Singleton M.D.      Xr Chest 1 View    Result Date: 10/25/2020  UPRIGHT AP CHEST  HISTORY: Shortness of air, COPD, asthma.  COMPARISON: AP chest 06/27/2019, 06/25/2019, 2 view chest 06/17/2019.  FINDINGS: Lungs are hyperexpanded and there are increased interstitial markings consistent with COPD. There is blunting of the costophrenic angles similar to previous exam due to chronic scarring or small effusions. No superimposed airspace disease is evident and there is no evidence of perihilar edema or pneumothorax. Cardiomediastinal silhouette is not changed. There is pulmonary arterial enlargement.      Chronic interstitial disease and COPD. Chronic blunting of the costophrenic angles due to scarring or small effusions. No evidence for superimposed infiltrate or pulmonary edema.  This report was finalized on 10/25/2020 7:09 PM by Dr. Rosendo Ferraro M.D.      Ct Angiogram Chest    Result Date: 11/5/2020  CT ANGIOGRAM CHEST-, CT  ABDOMEN PELVIS W CONTRAST-  INDICATIONS: Short of breath, pain  Radiation dose reduction techniques were utilized, including automated exposure control and exposure modulation based on body size.  TECHNIQUE: CTA chest with three-dimensional reconstructions. Enhanced CT of the abdomen, pelvis  COMPARISON: Chest CT from 08/02/2018, 01/15/2018; CT abdomen pelvis from 11/17/2005  FINDINGS:  Chest CTA:  There does appear to be some nonocclusive thrombus in the right lower lobe branch artery, for example axial image 104, in a region of chronic bronchiectasis; this appearance is new from the prior exam from 2018, of uncertain chronicity (possibly chronic). The RV LV ratio is less than 1. Prominence of caliber of central pulmonary arteries may reflect pulmonary arterial hypertension, appearance is chronic. No aortic dissection.  The heart size is normal with minimal pericardial effusion. Mild to moderate coronary arterial calcification. A few small subcentimeter short axis mediastinal lymph nodes are seen that are not significant by size criteria.  The airways appear clear.  No pleural effusion or pneumothorax.  The lungs show no focal pulmonary consolidation or mass. Extensive emphysematous changes of the lungs are noted, similar to prior exam, and chronic bronchiectasis is seen medially at the right base. A stable right middle lobe nodular density measuring 6 mm on axial image 101 is noted. Scarlike density inferiorly in the right middle lobe is also stable.     Abdomen pelvis CT:  Structures in the pelvis are partly obscured by right hip arthroplasty hardware artifact.  A right adrenal myelolipoma is again demonstrated.  Gallbladder is surgically absent. Mild intrahepatic biliary ductal dilatation. Caliber of the main pancreatic duct, 1 cm, may be normal status post cholecystectomy.  Otherwise unremarkable appearance of the liver, spleen, adrenal glands, pancreas, kidneys, bladder.  The proximal duodenum is abnormally  distended up to the midportion of the third segment of the duodenum (where it passes between the superior mesenteric vein and the aorta), that could be result of obstructive effect, only slightly more prominent from the prior exam, whether mechanical obstructive effect or stricture, endoscopic correlation could be obtained as may be indicated. Much stool in the rectum, correlate clinically to exclude possibility of stool impaction. Colonic diverticula are seen that do not appear focally inflamed. Assessment of the colon for wall thickening is limited by lack of distention.  No free intraperitoneal gas or free fluid.  Scattered small mesenteric and para-aortic lymph nodes are seen that are not significant by size criteria.  Abdominal aorta is not aneurysmal. Aortic and other arterial calcifications are present.  Degenerative changes are seen in the spine. The T9 compression deformity, with 35% loss of height, is new from 06/17/2019, but age-indeterminate. Multiple other spinal compression deformities appear similar to prior exams. Right hip arthroplasty hardware is partly included.        1. There does appear to be some nonocclusive thrombus in the right lower lobe branch artery, for example axial image 104, in a region of chronic bronchiectasis; this appearance is new from the prior exam from 2018, of uncertain chronicity (possibly chronic). The RV LV ratio is less than 1. 2. The proximal duodenum is abnormally distended up to the midportion of the third segment of the duodenum (where it passes between the superior mesenteric vein and the aorta), that could be result of obstructive effect, only slightly more prominent from the prior exam, whether mechanical obstructive effect or stricture, endoscopic correlation could be obtained as may be indicated. Much stool in the rectum, correlate clinically to exclude possibility of stool impaction. Colonic diverticula are seen that do not appear focally inflamed.   3. The T9  compression deformity, with 35% loss of height, is new from 06/17/2019, but age-indeterminate. Multiple other spinal compression deformities appear similar to prior exams.  Discussed by telephone with Dr. Lin at 1750, 11/05/2020  This report was finalized on 11/5/2020 5:54 PM by Dr. Jase Singleton M.D.      Xr Spine Lumbar Complete 4+vw    Result Date: 11/6/2020  THREE VIEWS OF THE THORACIC SPINE AND LUMBAR SPINE PLAIN FILM SERIES COMPLETE 4+ VIEWS 11/05/2020  CLINICAL HISTORY: Patient twisted back while getting out of bed and heard a pop, has pain in the mid and low back.  COMPARISON: This is correlated to chest CT 08/02/2018.  LUMBAR SPINE PLAIN FILMS: Five views of lumbar spine including AP bilateral oblique and lateral views of the entire lumbar spine and coned down lateral view of lumbosacral junction are submitted for interpretation. There is mild compression deformity involving the superior body and endplate of L5 with 20-30% loss of anterior and central, 20% loss of posterior vertebral body height, precise age of this compression deformity is uncertain but could be acute to subacute. The L1 through L4 lumbar vertebral body heights are well-maintained. There is also a compression fracture involving superior body and endplate of T12 with about 30% loss of anterior and central, 20% loss of posterior vertebral body height at T12. This T12 compression fracture is seen on prior chest CT 08/02/2018 and is chronic. There is also minimal irregularity in the anterior cortex superior body of S3 sacral level, subtle horizontal upper sacral fracture at the S3 sacral level is not excluded. Compression fracture at L5 could be further dated with a lumbar spine MRI.  THORACIC SPINE PLAIN FILMS: Three views of the thoracic spine including AP and lateral views of the entire thoracic spine and swimmer's lateral view of lower cervical and upper thoracic spine are submitted for interpretation. There are 4 compression  fractures in the thoracic spine,including a moderate compression fracture involving the T7 vertebra with about 40-50% loss of anterior, no loss of posterior vertebral body height. This compression fracture was present on chest CT 08/02/2018 and is chronic. There are mild compression fractures involving superior body and endplate of T9 and T10 with about 30% loss of anterior vertebral body height at both these levels. These are new since chest CT 08/02/2018, but the precise age of these compression deformities is uncertain, could be acute to subacute. Reidentified is old compression fracture at T12.      1. There is a moderate old compression fracture at T7 and mild-to-moderate old compression deformity involving the superior body and endplate of T12, unchanged since chest CT 08/02/2018. 2. There are mild-to-moderate compression fractures involving the superior body and endplate of the T9 and T10 thoracic vertebrae that are new when compared to chest CT 08/02/2018, the precise age is uncertain, could be further dated with a thoracic spine MRI.  This report was finalized on 11/6/2020 1:30 PM by Dr. Burton Cool M.D.      Results for orders placed during the hospital encounter of 11/05/20   Adult Transthoracic Echo Complete W/ Cont if Necessary Per Protocol    Narrative · Calculated left ventricular EF = 54.8% Estimated left ventricular EF was   in agreement with the calculated left ventricular EF. Left ventricular   systolic function is normal.  · Left ventricular wall thickness is consistent with moderate concentric   hypertrophy.  · Mitral annular calcification is present.  · No significant valvular regurgitation or stenosis.              Active Hospital Problems    Diagnosis  POA   • **Acute right-sided thoracic back pain [M54.6]  Yes   • Right-sided chest pain [R07.9]  Yes   • Chronic respiratory failure with hypoxia (CMS/HCC) [J96.11]  Yes   • Abnormal CT of the chest [R93.89]  Yes   • COPD (chronic obstructive  pulmonary disease) (CMS/[facility]) [J44.9]  Yes      Resolved Hospital Problems   No resolved problems to display.         Assessment/Plan     1. Possible pulmonary embolus CT not super convincing although there certainly appears to be the possibility of a small clot.  VQ scan not helpful due to her severe lung disease lower extremity Dopplers negative for DVT.  She did come in with right-sided back pain certainly raises these index of suspicion but the pain really is lower than the chest and it occurred with the twisting movement and she heard a pop its not really classic for pulmonary embolus.  She has such severe lung disease however without having an acute decline in her oxygen is hard to believe she has any significant pulmonary embolus.  But I have reviewed the images multiple different views certainly looks like there were 1 or 2 little clots present.  2. Chronic hypoxemic and hypercapnic respiratory failure she is actually on less oxygen currently than she uses at home  3. COPD severe  4. Multifocal atrial tachycardia  5. T9 vertebral compression fracture neurosurgery is seeing her    I think the question is do we keep her on anticoagulation.  She is relatively immobile that does increase her risk she has severe lung disease that a small clot could be devastating.  The CT is certainly suggestive of a couple of small clots.  The question is the risk benefit she does not have any bleeding diatheses I think probably go ahead and keep her on anticoagulation at least for a few months and see how she does.  When certain no procedure she could transition to Eliquis.  I discussed with she and her .  I think they seem to understand the risk and benefits.  Stressed how important will be to help her support her so she does not fall when she is transitioning herself to and from    Plan for disposition:    Alfie Hightower MD  11/08/20  13:10 EST    Time:

## 2020-11-08 NOTE — PROGRESS NOTES
"   LOS: 0 days   Patient Care Team:  Lyudmila Granados APRN as PCP - General (Family Medicine)  Carl Perez MD as Consulting Physician (Orthopedic Surgery)    Chief Complaint: legs are itching    Subjective     Feeling better today from pain standpoint. TSLO brace seems to help with pain when OOB. Voiding well. Tolerating diet. No SOA at rest. She is upset today about Addison Velarde's death today. She also c/o severe itching/dry skin back of her legs.       Subjective:  Symptoms:  Improved.  She reports chest pain (improving) and anxiety.  No shortness of breath, malaise, cough (chronic), weakness, headache, chest pressure, anorexia or diarrhea.    Diet:  Adequate intake.  No nausea or vomiting.    Activity level: Impaired due to pain.    Pain:  She complains of pain that is moderate.  She reports pain is improving.  Pain is well controlled and requiring pain medication.        History taken from: patient chart RN    Objective     Vital Signs  Temp:  [97.5 °F (36.4 °C)] 97.5 °F (36.4 °C)  Heart Rate:  [] 114  Resp:  [16-20] 18  BP: (118-133)/(76-85) 122/77    Objective:  General Appearance:  Comfortable and in no acute distress (chronically ill-appearing, cachectic).    Vital signs: (most recent): Blood pressure 122/77, pulse 114, temperature 97.5 °F (36.4 °C), temperature source Oral, resp. rate 18, height 157.5 cm (62\"), weight 46.7 kg (103 lb), SpO2 92 %, not currently breastfeeding.  Vital signs are normal.  No fever.  (Somewhat tachycardic at times).    Output: Producing urine and no stool output.    HEENT: Normal HEENT exam.    Lungs:  Normal effort and normal respiratory rate.  She is not in respiratory distress.  There are decreased breath sounds.  No wheezes.    Heart: Tachycardia.  Regular rhythm.    Abdomen: Abdomen is soft.  Bowel sounds are normal.   There is no abdominal tenderness.     Extremities: There is no dependent edema.    Pulses: Distal pulses are intact.    Neurological: Patient is alert " and oriented to person, place and time.  Normal strength.  Patient has normal muscle tone.    Pupils:  Pupils are equal, round, and reactive to light.    Skin:  Warm and dry.  No rash.             Results Review:     I reviewed the patient's new clinical results.  I reviewed the patient's other test results and agree with the interpretation  I personally viewed and interpreted the patient's EKG/Telemetry data  Discussed with pt and RN    Results from last 7 days   Lab Units 11/05/20  1536   WBC 10*3/mm3 10.28   HEMOGLOBIN g/dL 13.7   PLATELETS 10*3/mm3 410     Results from last 7 days   Lab Units 11/06/20  0435 11/05/20  1536   SODIUM mmol/L 140 142   POTASSIUM mmol/L 4.4 4.4   CHLORIDE mmol/L 98 102   CO2 mmol/L 32.5* 32.5*   BUN mg/dL 18 14   CREATININE mg/dL 0.72 0.62   CALCIUM mg/dL 10.2 10.1   Estimated Creatinine Clearance: 46.2 mL/min (by C-G formula based on SCr of 0.72 mg/dL).    Medication Review: reviewed and adjusted    Assessment/Plan       Acute right-sided thoracic back pain    Mixed anxiety depressive disorder    COPD (chronic obstructive pulmonary disease) (CMS/HCC)    Right-sided chest pain    Abnormal CT of the chest    Chronic respiratory failure with hypoxia and hypercapnia (CMS/HCC)    Acute pulmonary embolism without acute cor pulmonale (CMS/HCC)    Compression fracture of T9 vertebra (CMS/HCC)    Multifocal atrial tachycardia (CMS/HCC)          Plan:   (· Severe right-sided thoracic back pain: likely musculoskeletal, related to acute thoracic compression fractures (T9 +/- T10) and possible right 8th rib fracture.  Needs an MRI to investigate further, but I do not think she would tolerate it--nor does the patient.   Continue current pain regimen   Neurosurgery recs noted.  TSLO brace used today with some success, hopefully she can be more mobile now, PT working with her  · Abnormal CT,? PEs: BLE dopplers negative for DVT, V/Q scan indeterminate due to her severe COPD.  She is on therapeutic  Lovenox for now. Appreciate Pulm attention to pt--Dr. Hightower feels she should be on full AC for at least a few months, could transition to Eliquis at dc   · Severe/end-stage COPD with chronic bronchiectasis: management per Pulm.  Continue current respiratory regimen, not wheezing today  · Chronic hypoxic/hypercapnic respiratory failure: 6L/min chronic O2 requirement.  Very marginal respiratory status even at baseline  · Tachycardia/MAT: improved today.  Cardiology help appreciated, pt now on Cardizem, they have signed off  · Dry skin/Pruritis: continue topical Eucerin, have added PRN oral Hydroxyzine    Disposition: mostly depends on mobility, she has been able to do very little thus far.  Home health is the plan per CCP after some more PT).       Deni Dodson MD  11/08/20  15:26 EST    Time: 25min

## 2020-11-08 NOTE — PROGRESS NOTES
NEUROSURGERY PROGRESS NOTE     LOS: 0 days   Patient Care Team:  Lyudmila Granados APRN as PCP - General (Family Medicine)  Carl Perez MD as Consulting Physician (Orthopedic Surgery)    Chief Complaint: Back pain    Subjective     Interval History:     Patient did not tolerate the MRI for multiple reasons including claustrophobia and could not lay flat to tolerate the study.  She states she still has significant pain described as 20 out of 10 this morning.  She denies any lower extremity complaints.    While in the room and during my examination of the patient I wore a mask and eye protection.  I washed my hands before and after this patient encounter.  The patient was also wearing a mask.     History taken from: patient    Objective      Vital Signs  Temp:  [97.5 °F (36.4 °C)] 97.5 °F (36.4 °C)  Heart Rate:  [] 114  Resp:  [16-20] 18  BP: (118-133)/(76-85) 122/77  Body mass index is 18.84 kg/m².    Intake/Output last 3 shifts:  I/O last 3 completed shifts:  In: 480 [P.O.:480]  Out: 300 [Urine:300]    Intake/Output this shift:  No intake/output data recorded.    Physical    Patient sitting up in bed communicating fluently  No edema of the lower extremities  Motor normal in the lower extremities  Sensory exam normal in the lower extremities  She is alert and oriented x3    Results Review:     I reviewed the patient's new clinical results.    Labs:    Lab Results (last 24 hours)     ** No results found for the last 24 hours. **          Imaging:    Patient did not tolerate nor did she get any images in MRI    Current Medications:   Scheduled Meds:arformoterol, 15 mcg, Nebulization, BID - RT  budesonide, 0.5 mg, Nebulization, BID - RT  cholecalciferol, 1,000 Units, Oral, Daily  dilTIAZem CD, 180 mg, Oral, Q24H  enoxaparin, 1 mg/kg, Subcutaneous, Q12H  famotidine, 20 mg, Oral, BID AC  FLUoxetine, 20 mg, Oral, Daily  guaiFENesin, 1,200 mg, Oral, Q12H  lidocaine, 1 patch, Transdermal, Q24H  loperamide, 2 mg,  Oral, BID  mirtazapine, 15 mg, Oral, Nightly  montelukast, 10 mg, Oral, Nightly  roflumilast, 500 mcg, Oral, Daily  sodium chloride, 10 mL, Intravenous, Q12H  theophylline, 400 mg, Oral, Q24H  tiotropium bromide monohydrate, 2 puff, Inhalation, Daily      Continuous Infusions:     Assessment/Plan       Acute right-sided thoracic back pain    COPD (chronic obstructive pulmonary disease) (CMS/HCC)    Right-sided chest pain    Chronic respiratory failure with hypoxia (CMS/HCC)    Abnormal CT of the chest      Plan: Certainly given the patient's multiple medical comorbidities and severe respiratory status I will defer medical pain management to the admitting physician.  We will see if we can stabilize the fracture with a brace to get her active and mobile and hopefully the brace will come today.  If we fail to mobilize her in the brace I not sure what options are reasonable given her severe pulmonary status.  Since she cannot tolerate an MRI we could determine diagnostic information from a bone scan, mainly to make sure which fractures are acute as the radiologist suggests potentially T9 and/or T10.  If she does not tolerate the brace we would have to determine if she is capable of undergoing general anesthesia for kyphoplasty.  I can ask my partner Dr. Rivera Hoang to offer kyphoplasty in that scenario this week.      Dio Herrera MD  11/08/20  09:55 EST

## 2020-11-08 NOTE — DISCHARGE PLACEMENT REQUEST
"Eli Prakash (75 y.o. Female)     Date of Birth Social Security Number Address Home Phone MRN    1945  [address]  [place] [id number] [phone] [id number]    Orthodox Marital Status          Sikhism        Admission Date Admission Type Admitting Provider Attending Provider Department, Room/Bed    11/5/20 Emergency Lenard Zapata MD Benton, John B, MD [address], [address]    Discharge Date Discharge Disposition Discharge Destination                       Attending Provider: Deni Dodson MD    Allergies: Sulfa Antibiotics, Codeine, Hydrocodone    Isolation: None   Infection: None   Code Status: CPR    Ht: 157.5 cm (62\")   Wt: 46.7 kg (103 lb)    Admission Cmt: None   Principal Problem: Acute right-sided thoracic back pain [M54.6]                 Active Insurance as of 11/5/2020     Primary Coverage     Payor Plan Insurance Group Employer/Plan Group    HUMANA MEDICARE REPLACEMENT HU[employer]NA MEDICARE REPLACEMENT [id number]     Payor Plan Address Payor Plan Phone Number Payor Plan Fax Number Effective Dates    [address] [address] [phone]  1/1/2020 - None Entered    [facility] [id number]       Subscriber Name Subscriber Birth Date Member ID       ELI PRAKASH 1945 [id number]                 Emergency Contacts      (Rel.) Home Phone Work Phone Mobile Phone    Vlad Prakash (Spouse) [phone] -- [phone]            {Outbreak/Travel/Exposure Documentation......;  Question Available Choices Patient Response   Outbreak Screen: Do you currently have a new onset of the following symptoms?        Fever/Chils, Cough, Shortness of air, Loss of taste or smell, No, Unknown  No (11/05/20 3014)   Outbreak Screen: In the last 14 days, have you had contact with anyone who is ill, has show any of the symptoms listed above and/or has been diagnosis with the 2019 Novel Coronavirus? This includes any immediate household members but excludes any " patients with whom you have been in contact within your normal work duties wearing proper PPE, if you are a healthcare worker.  Yes, No, Unknown              No (11/05/20 1459)   Outbreak Screen: Who was notified?    Free text  (not recorded)   Travel Screen: Have you traveled in the last month? If so, to what country have you traveled? If [place] what state? Yes, No, Unknown  List of all countries  List of all States No (11/05/20 1458)  (not recorded)  (not recorded)   Infection Risk: Do you currently have the following symptoms?  (If cough is selected, the Tuberculosis Screen is performed.) Cough, Fever, Rash, No No (11/05/20 1458)   Tuberculosis Screen: Do you have any of the following Tuberculosis Risks?  · Have you lived or spent time with anyone who had or may have TB?  · Have you lived in or visited any of the following areas for more than one month: [place], [place], [place], [place] or [place], the [place] or [place]?  · Do you have HIV/AIDS?  · Have you lived in or worked in a nursing home, homeless shelter, correctional facility, or substance abuse treatment facility?   · No    If Yes do you have any of the following symptoms? Yes responses display to the right    If Yes, symptoms listed are:  Cough greater than or equal to 3 weeks, Loss of appetite, Unexplained weight loss, Night sweats, Bloody sputum or hemoptysis, Hoarseness, Fever, Fatigue, Chest pain, No (not recorded)  (not recorded)   Exposure Screen: Have you been exposed to any of these contagious diseases in the last month? Measles, Chickenpox, Meningitis, Pertussis, Whooping Cough, No No (11/05/20 1458)

## 2020-11-08 NOTE — PROGRESS NOTES
"Siobhan Prakash  1945 75 y.o.  [id number]      Patient Care Team:  Lyudmila Granados APRN as PCP - General (Family Medicine)  Carl Perez MD as Consulting Physician (Orthopedic Surgery)    CC: Severe COPD, tachycardia, back pain    Interval History: Dyspnea which is stable and thoracic chest pain which is a little bit better      Objective   Vital Signs  Temp:  [97.5 °F (36.4 °C)] 97.5 °F (36.4 °C)  Heart Rate:  [] 114  Resp:  [16-20] 18  BP: (118-133)/(76-85) 122/77    Intake/Output Summary (Last 24 hours) at 11/8/2020 0953  Last data filed at 11/8/2020 0157  Gross per 24 hour   Intake 240 ml   Output 300 ml   Net -60 ml     Flowsheet Rows      First Filed Value   Admission Height  157.5 cm (62\") Documented at 11/05/2020 1610   Admission Weight  46.3 kg (102 lb) Documented at 11/05/2020 1610          Physical Exam:   General Appearance:    Alert,oriented, in no acute distress   Lungs:     Clear to auscultation,BS are equal    Heart:    Normal S1 and S2, RRR without murmur, gallop or rub   HEENT:    Sclerae are clear, no JVD or adenopathy   Abdomen:     Normal bowel sounds, soft nontender, nondistended, no HSM   Extremities:   Moves all extremities well, no edema, no cyanosis, no             Redness, no rash     Medication Review:      arformoterol, 15 mcg, Nebulization, BID - RT  budesonide, 0.5 mg, Nebulization, BID - RT  cholecalciferol, 1,000 Units, Oral, Daily  dilTIAZem CD, 180 mg, Oral, Q24H  enoxaparin, 1 mg/kg, Subcutaneous, Q12H  famotidine, 20 mg, Oral, BID AC  FLUoxetine, 20 mg, Oral, Daily  guaiFENesin, 1,200 mg, Oral, Q12H  lidocaine, 1 patch, Transdermal, Q24H  loperamide, 2 mg, Oral, BID  mirtazapine, 15 mg, Oral, Nightly  montelukast, 10 mg, Oral, Nightly  roflumilast, 500 mcg, Oral, Daily  sodium chloride, 10 mL, Intravenous, Q12H  theophylline, 400 mg, Oral, Q24H  tiotropium bromide monohydrate, 2 puff, Inhalation, Daily             I reviewed the patient's new clinical results.  I " personally viewed and interpreted the patient's EKG/Telemetry data    Assessment/Plan  Active Hospital Problems    Diagnosis  POA   • **Acute right-sided thoracic back pain [M54.6]  Yes   • Right-sided chest pain [R07.9]  Yes   • Chronic respiratory failure with hypoxia (CMS/[facility]) [J96.11]  Yes   • Abnormal CT of the chest [R93.89]  Yes   • COPD (chronic obstructive pulmonary disease) (CMS/HCC) [J44.9]  Yes      Resolved Hospital Problems   No resolved problems to display.       Really severe COPD and thoracic compression fractures.  She had a little bit of MAT but now is mainly sinus tach likely related to all of her medical therapies for her COPD which is severe at this point we have her on a little bit of Cardizem which I think she could probably continue and will get a sign off of her and see her as needed    Shahid Lee MD  11/08/20  09:53 EST

## 2020-11-08 NOTE — PLAN OF CARE
Goal Outcome Evaluation:  Plan of Care Reviewed With: patient, spouse  Progress: no change  Outcome Summary: remain 's with rates in 130's with activity. o2 at 6L/HF, sats drop to mid/low 80's% with activity, recovers with rest. medicated per pt request with pain and sleeping meds. no acute distress, cont to monitor

## 2020-11-08 NOTE — THERAPY TREATMENT NOTE
Patient Name: Siobhan Prakash  : 1945    MRN: [id number]                              Today's Date: 2020       Admit Date: 2020    Visit Dx:     ICD-10-CM ICD-9-CM   1. Right-sided chest pain  R07.9 786.50   2. Acute midline thoracic back pain  M54.6 724.1   3. Epigastric pain  R10.13 789.06   4. Closed wedge compression fracture of T9 vertebra, initial encounter (CMS/[facility])  S22.070A 805.2   5. Dilated duodenum  K59.89 564.89   6. Abnormal CT scan  R93.89 793.99   7. End stage COPD (CMS/[facility])  J44.9 496   8. 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   • COPD (chronic obstructive pulmonary disease) (CMS/[facility])   • Right-sided chest pain   • Chronic respiratory failure with hypoxia (CMS/HCC)   • Acute right-sided thoracic back pain   • Abnormal CT of the chest     Past Medical History:   Diagnosis Date   • Allergic 1970   • Anxiety    • Arthritis 10/17   • Asthma    • Cataract    • Cholelithiasis    • COPD (chronic obstructive pulmonary disease) (CMS/[facility])    • Depression    • GERD (gastroesophageal reflux disease)    • HL (hearing loss)    • Hyperlipidemia    • Irritable bowel syndrome    • Low back pain    • Migraine    • Osteopenia    • Osteoporosis    • Pneumonia    • Tremor    • Urinary tract infection      Past Surgical History:   Procedure Laterality Date   • CHOLECYSTECTOMY     • COLONOSCOPY      • EYE SURGERY      bilateral cataracts   • HIP ARTHROPLASTY Right    • JOINT REPLACEMENT     • REDUCTION MAMMAPLASTY     • TONSILLECTOMY       General Information     [facility] Name 11/08/20 1435          Physical Therapy Time and Intention    Document Type  therapy note (daily note)  -     Mode of Treatment  physical therapy;individual therapy  -[facility] Name 11/08/20 1435          General Information    Patient Profile Reviewed  yes  -     Existing Precautions/Restrictions  fall;brace worn when out of bed  -[facility] Name 11/08/20 1435          Cognition    Orientation Status (Cognition)  oriented x 3  -[facility] Name 11/08/20 1435          Safety Issues, Functional Mobility    Impairments Affecting Function (Mobility)  balance;pain;shortness of breath;endurance/activity tolerance  -       User Key  (r) = Recorded By, (t) = Taken By, (c) = Cosigned By    Initials Name Provider Type     Luisa Khalil, PT Physical Therapist        Mobility     [facility] Name 11/08/20 1435          Bed Mobility    Sidelying-Sit [place] (Bed Mobility)  contact guard;nonverbal cues (demo/gesture);verbal cues  -     Sit-Sidelying [place] (Bed Mobility)  contact guard;nonverbal cues (demo/gesture);verbal cues  -     Assistive Device (Bed Mobility)  bed rails  -[facility] Name 11/08/20 1435          Sit-Stand Transfer    Sit-Stand [place] (Transfers)  minimum assist (75% patient effort);verbal cues  -     Assistive Device (Sit-Stand Transfers)  walker, front-wheeled  -[facility] Name 11/08/20 1435          Gait/Stairs (Locomotion)    [place] Level (Gait)  minimum assist (75% patient effort);verbal cues;1 person assist  -     Assistive Device (Gait)  walker, front-wheeled  -     Distance in Feet (Gait)  50  -     Deviations/Abnormal Patterns (Gait)  antalgic;gait speed decreased;stride length decreased  -     Bilateral Gait Deviations  forward flexed posture  -     Comment (Gait/Stairs)  Pt. very  forward flexed; short shuffling steps with narrow URBAN. Pt. became very hot and began shaking limiting further mobility  -       User Key  (r) = Recorded By, (t) = Taken By, (c) = Cosigned By    Initials Name Provider Type     Luisa Khalil, NIKITA Physical Therapist        Obj/Interventions     Row Name 11/08/20 1436          Balance    Balance Assessment  sitting dynamic balance;sitting static balance  -     Balance Interventions  sitting  -     Comment, Balance  Educated and assisted with donning TLSO  -       User Key  (r) = Recorded By, (t) = Taken By, (c) = Cosigned By    Initials Name Provider Type     Luisa Khlail, PT Physical Therapist        Goals/Plan    No documentation.       Clinical Impression     Row Name 11/08/20 1437          Pain    Additional Documentation  Pain Scale: FACES Pre/Post-Treatment (Group)  -[facility] Name 11/08/20 1437          Pain Scale: Numbers Pre/Post-Treatment    Pain Intervention(s)  Repositioned;Ambulation/increased activity  -     Row Name 11/08/20 1437          Pain Scale: FACES Pre/Post-Treatment    Pain: FACES Scale, Pretreatment  6-->hurts even more  -     Posttreatment Pain Rating  6-->hurts even more  -     Pain Location  back  -     Row Name 11/08/20 1437          Plan of Care Review    Plan of Care Reviewed With  patient;spouse  -     Outcome Summary  Pt. agreeable to PT with encouargement. Pt. now with TLSO therefore able to assess ambulation. Educated and assisted pt. in donning TLSO. Pt. able to ambulate 50' with rwx and CGA. Pt. with narrow URBAN and short, shuffling steps. Pt. quickly fatigues and has SOB with mobility (currently on 6L supp O2). Pt. began to c/o becoming hot and shakey limiting further ambulation. Reports of improved back pain with TLSO. Will continue to monitor.  -     Row Name 11/08/20 1437          Positioning and Restraints    Pre-Treatment Position  in bed  -     Post Treatment Position  bed  -     In Bed   notified nsg;side lying right;call light within reach;encouraged to call for assist;exit alarm on;with family/caregiver  -       User Key  (r) = Recorded By, (t) = Taken By, (c) = Cosigned By    Initials Name Provider Type    Luisa Lino, NIKITA Physical Therapist        Outcome Measures     Row Name 11/08/20 1439          How much help from another person do you currently need...    Turning from your back to your side while in flat bed without using bedrails?  3  -MH     Moving from lying on back to sitting on the side of a flat bed without bedrails?  3  -MH     Moving to and from a bed to a chair (including a wheelchair)?  3  -MH     Standing up from a chair using your arms (e.g., wheelchair, bedside chair)?  3  -MH     Climbing 3-5 steps with a railing?  2  -MH     To walk in hospital room?  3  -MH     AM-PAC 6 Clicks Score (PT)  17  -     Row Name 11/08/20 1439          Functional Assessment    Outcome Measure Options  AM-PAC 6 Clicks Basic Mobility (PT)  -       User Key  (r) = Recorded By, (t) = Taken By, (c) = Cosigned By    Initials Name Provider Type    Luisa Lino PT Physical Therapist        Physical Therapy Education                 Title: PT OT SLP Therapies (In Progress)     Topic: Physical Therapy (In Progress)     Point: Mobility training (Done)     Learning Progress Summary           Patient Acceptance, E,TB,D, VU,DU,NR by  at 11/8/2020 1439    Acceptance, E,TB,D, VU,DU,NR by  at 11/7/2020 1347    Acceptance, E, NR by CANDE at 11/6/2020 1318                   Point: Home exercise program (Not Started)     Learner Progress:  Not documented in this visit.          Point: Body mechanics (Done)     Learning Progress Summary           Patient Acceptance, E,TB,D, VU,DU,NR by  at 11/8/2020 1439    Acceptance, E,TB,D, VU,DU,NR by  at 11/7/2020 1347    Acceptance, E, NR by CANDE at 11/6/2020 1318                   Point: Precautions (Done)     Learning Progress Summary           Patient  Acceptance, E,TB,D, VU,DU,NR by  at 11/8/2020 1439    Acceptance, E,TB,D, VU,DU,NR by  at 11/7/2020 1347    Acceptance, E, NR by  at 11/6/2020 1318                               User Key     Initials Effective Dates Name Provider Type Discipline     10/25/19 -  Yue Castellanos, PT Physical Therapist PT     05/26/20 -  Luisa Khalil PT Physical Therapist PT              PT Recommendation and Plan     Plan of Care Reviewed With: patient, spouse  Outcome Summary: Pt. agreeable to PT with encouargement. Pt. now with TLSO therefore able to assess ambulation. Educated and assisted pt. in donning TLSO. Pt. able to ambulate 50' with rwx and CGA. Pt. with narrow URBAN and short, shuffling steps. Pt. quickly fatigues and has SOB with mobility (currently on 6L supp O2). Pt. began to c/o becoming hot and shakey limiting further ambulation. Reports of improved back pain with TLSO. Will continue to monitor.     Time Calculation:   PT Charges     Row Name 11/08/20 1440             Time Calculation    Start Time  1310  -      Stop Time  1339  -      Time Calculation (min)  29 min  -      PT Received On  11/08/20  -      PT - Next Appointment  11/09/20  -         Time Calculation- PT    Total Timed Code Minutes- PT  24 minute(s)  -        User Key  (r) = Recorded By, (t) = Taken By, (c) = Cosigned By    Initials Name Provider Type     Luisa Khalil, NIKITA Physical Therapist        Therapy Charges for Today     Code Description Service Date Service Provider Modifiers Qty    18988208392 HC PT THERAPEUTIC ACT EA 15 MIN 11/7/2020 Luisa Khalil, PT GP 1    26881101544 HC GAIT TRAINING EA 15 MIN 11/8/2020 Luisa Khalil, PT GP 1    71961107035 HC PT THERAPEUTIC ACT EA 15 MIN 11/8/2020 Luisa Khalil, PT GP 1          PT G-Codes  Outcome Measure Options: AM-PAC 6 Clicks Basic Mobility (PT)  AM-PAC 6 Clicks Score (PT): 17    Luisa Khalil PT  11/8/2020

## 2020-11-08 NOTE — PROGRESS NOTES
Continued Stay Note  [facility]     Patient Name: Siobhan Prakash  MRN: [id number]  Today's Date: 11/8/2020    Admit Date: 11/5/2020    Discharge Plan     Row Name 11/08/20 0840       Plan    Plan Comments  Inbound call from staff RN who states pt needs TLSO brace. Called [employer] br[employer] rep at [phone] and left VMM for return call. [employer] called back and will be able to bring brace this AM--states it should be before 10AM. Called and updated staff RN. CCP to follow............JW        Discharge Codes    No documentation.             Marisela Clark, RN

## 2020-11-09 PROBLEM — E87.6 HYPOKALEMIA: Status: ACTIVE | Noted: 2020-01-01

## 2020-11-09 NOTE — PROGRESS NOTES
"   LOS: 1 day   Patient Care Team:  Lyudmila Granados APRN as PCP - General (Family Medicine)  Carl Perez MD as Consulting Physician (Orthopedic Surgery)    Chief Complaint: back pain    Subjective     Feeling better today from pain standpoint. TSLO brace seems to help with pain when OOB. Voiding well. Tolerating diet. No SOA at rest. Severe itching/dry skin back of her legs is improved today. Pt says she is supposed to be taking Zithromax daily per Dr. Hightower and that she forgot to mention it on admission.      Subjective:  Symptoms:  Improved.  She reports chest pain (improving).  No shortness of breath, malaise, cough (chronic), weakness, headache, chest pressure, anorexia, diarrhea or anxiety.    Diet:  Adequate intake.  No nausea or vomiting.    Activity level: Impaired due to pain.    Pain:  She complains of pain that is moderate.  She reports pain is improving.  Pain is well controlled and requiring pain medication.        History taken from: patient chart RN    Objective     Vital Signs  Temp:  [97.9 °F (36.6 °C)-98.3 °F (36.8 °C)] 98.3 °F (36.8 °C)  Heart Rate:  [] 88  Resp:  [16-28] 16  BP: (123-169)/(65-98) 123/65    Objective:  General Appearance:  Comfortable and in no acute distress (chronically ill-appearing, cachectic).    Vital signs: (most recent): Blood pressure 123/65, pulse 88, temperature 98.3 °F (36.8 °C), temperature source Oral, resp. rate 16, height 157.5 cm (62\"), weight 47.6 kg (105 lb), SpO2 90 %, not currently breastfeeding.  Vital signs are normal.  No fever.  (Less tachycardic today).    Output: Producing urine.    HEENT: Normal HEENT exam.    Lungs:  Normal effort and normal respiratory rate.  She is not in respiratory distress.  There are decreased breath sounds.  No wheezes.    Heart: Normal rate.  Regular rhythm.    Abdomen: Abdomen is soft.  Bowel sounds are normal.   There is no abdominal tenderness.     Extremities: There is no dependent edema.    Pulses: Distal pulses " are intact.    Neurological: Patient is alert and oriented to person, place and time.  Normal strength.  Patient has normal muscle tone.    Pupils:  Pupils are equal, round, and reactive to light.    Skin:  Warm and dry.  No rash.           Results Review:     I reviewed the patient's new clinical results.  I reviewed the patient's other test results and agree with the interpretation  I personally viewed and interpreted the patient's EKG/Telemetry data  Discussed with pt, RN, CCP, and Dr. Hightower    Results from last 7 days   Lab Units 11/09/20  0550 11/05/20  1536   WBC 10*3/mm3 5.44 10.28   HEMOGLOBIN g/dL 10.4* 13.7   PLATELETS 10*3/mm3 292 410     Results from last 7 days   Lab Units 11/09/20  0550 11/06/20  0435 11/05/20  1536   SODIUM mmol/L 140 140 142   POTASSIUM mmol/L 2.9* 4.4 4.4   CHLORIDE mmol/L 98 98 102   CO2 mmol/L 35.9* 32.5* 32.5*   BUN mg/dL 11 18 14   CREATININE mg/dL 0.57 0.72 0.62   CALCIUM mg/dL 9.8 10.2 10.1   MAGNESIUM mg/dL 1.8  --   --    Estimated Creatinine Clearance: 47.1 mL/min (by C-G formula based on SCr of 0.57 mg/dL).    Medication Review: reviewed and adjusted    Assessment/Plan       Acute right-sided thoracic back pain    Mixed anxiety depressive disorder    COPD (chronic obstructive pulmonary disease) (CMS/HCC)    Right-sided chest pain    Abnormal CT of the chest    Chronic respiratory failure with hypoxia and hypercapnia (CMS/HCC)    Acute pulmonary embolism without acute cor pulmonale (CMS/HCC)    Compression fracture of T9 vertebra (CMS/HCC)    Multifocal atrial tachycardia (CMS/HCC)    Hypokalemia          Plan:   (· Severe right-sided thoracic back pain: likely musculoskeletal, related to acute thoracic compression fractures (T9 +/- T10) and possible right 8th rib fracture.  Needs an MRI to investigate further, but I do not think she would tolerate it--nor does the patient.   Continue current pain regimen   Neurosurgery recs noted. Okay to dc from their standpoint and f/u  in office in 2-3 weeks. TSLO brace used today again with some success, hopefully she can be more mobile now, PT working with her  · Abnormal CT,? PEs: DDimer quite high. BLE dopplers negative for DVT, V/Q scan indeterminate due to her severe COPD.  She is on therapeutic Lovenox for now. Appreciate Pulm attention to pt--Dr. Hightower feels she should be on full AC for at least a few months, could transition to Eliquis at dc   · Severe/end-stage COPD with chronic bronchiectasis: management per Pulm.  Continue current respiratory regimen, not wheezing today. Will check with Dr. Hightower before restarting her home Zithromax.  · Chronic hypoxic/hypercapnic respiratory failure: 6L/min chronic O2 requirement.  Very marginal respiratory status even at baseline  · Tachycardia/MAT: improved today.  Cardiology help appreciated, pt now on Cardizem, they have signed off  · Dry skin/Pruritis: continue topical Eucerin, have added PRN oral Hydroxyzine  -           Hypokalemia: Mg++ fine, have started oral KCl protocol    Disposition: mostly depends on mobility, she has been able to do very little thus far.  Home health is the plan per CCP after some more PT).       Deni Dodson MD  11/09/20  15:09 EST    Time: 20min

## 2020-11-09 NOTE — THERAPY EVALUATION
Patient Name: Siobhan Prakash  : 1945    MRN: [id number]                              Today's Date: 2020       Admit Date: 2020    Visit Dx:     ICD-10-CM ICD-9-CM   1. Right-sided chest pain  R07.9 786.50   2. Acute midline thoracic back pain  M54.6 724.1   3. Epigastric pain  R10.13 789.06   4. Closed wedge compression fracture of T9 vertebra, initial encounter (CMS/[facility])  S22.070A 805.2   5. Dilated duodenum  K59.89 564.89   6. Abnormal CT scan  R93.89 793.99   7. End stage COPD (CMS/[facility])  J44.9 496   8. Sinus tachycardia  R00.0 427.89     Patient Active Problem List   Diagnosis   • Weight loss, abnormal   • Subcutaneous cyst   • Mixed anxiety depressive disorder   • Gastroesophageal reflux disease   • Insomnia   • Macrocytosis   • Tremor   • Pulmonary emphysema (CMS/[facility])   • Vitamin D deficiency   • Fatigue   • Osteoporosis   • Herpes zoster without complication   • Migraine   • Bilateral leg pain   • Pneumonia of both lungs due to infectious organism   • Abnormal ECG   • Diarrhea   • Fecal urgency   • Irritable bowel syndrome (IBS)   • History of hip replacement   • 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   • COPD (chronic obstructive pulmonary disease) (CMS/[facility])   • Right-sided chest pain   • Acute right-sided thoracic back pain   • Abnormal CT of the chest   • Chronic respiratory failure with hypoxia and hypercapnia (CMS/[facility])   • Acute pulmonary embolism without acute cor pulmonale (CMS/[facility])   • Compression fracture of T9 vertebra (CMS/[facility])   • Multifocal atrial tachycardia (CMS/[facility])   • Hypokalemia     Past Medical History:   Diagnosis Date   • Allergic 1970   • Anxiety    • Arthritis 10/17   • Asthma    • Cataract    • Cholelithiasis    • COPD (chronic obstructive pulmonary disease) (CMS/[facility])    • Depression    • GERD (gastroesophageal reflux disease)    • HL (hearing loss)    • Hyperlipidemia    • Irritable bowel syndrome    • Low back pain    • Migraine     • Osteopenia    • Osteoporosis    • Pneumonia    • Tremor 2017   • Urinary tract infection      Past Surgical History:   Procedure Laterality Date   • CHOLECYSTECTOMY     • COLONOSCOPY  2012   • EYE SURGERY      bilateral cataracts   • HIP ARTHROPLASTY Right    • JOINT REPLACEMENT     • REDUCTION MAMMAPLASTY     • TONSILLECTOMY       General Information     Row Name 11/09/20 1104          OT Time and Intention    Document Type  evaluation;therapy note (daily note) RN asked OT to assist pt up to chair  -LE     Mode of Treatment  individual therapy;occupational therapy  -LE     Row Name 11/09/20 1104          General Information    Patient Profile Reviewed  yes  -LE     Prior Level of Function  min assist: spouse assists pt at home  -LE     Existing Precautions/Restrictions  brace worn when out of bed;fall;spinal  -LE     Row Name 11/09/20 1104          Living Environment    Lives With  spouse  -LE     Row Name 11/09/20 1104          Safety Issues, Functional Mobility    Impairments Affecting Function (Mobility)  pain  -LE       User Key  (r) = Recorded By, (t) = Taken By, (c) = Cosigned By    Initials Name Provider Type    Ruth Chowdhury OTR Occupational Therapist        Mobility/ADL's     Row Name 11/09/20 1105          Bed Mobility    Bed Mobility  supine-sit;sit-supine;rolling left  -LE     Rolling Left [place] (Bed Mobility)  minimum assist (75% patient effort);nonverbal cues (demo/gesture);verbal cues;supervision;standby assist from sidelying on R to supine position then back to R sidelying  -LE     Supine-Sit [place] (Bed Mobility)  moderate assist (50% patient effort);standby assist;set up;supervision;verbal cues  -LE     Sit-Supine [place] (Bed Mobility)  minimum assist (75% patient effort);contact guard;nonverbal cues (demo/gesture);verbal cues;supervision;standby assist;set up  -LE     Bed Mobility, Safety Issues  -- pain  -LE     Assistive Device (Bed Mobility)  bed rails;head of bed  elevated  -LE     Comment (Bed Mobility)  pt moved to sit EOB and immediately layed self back down due to pain.  -     Row Name 11/09/20 1105          Transfers    Comment (Transfers)  NT due to pain.  -     Row Name 11/09/20 1105          Functional Mobility    Functional Mobility- Comment  NT. Pt and  report pt walked in hallway yesterday  -[facility] Name 11/09/20 1105          Activities of Daily Living    BADL Assessment/Intervention  feeding;toileting;grooming;lower body dressing;upper body dressing  -     Row Name 11/09/20 1105          Self-Feeding Assessment/Training    Comment (Feeding)  reports set up  -[facility] Name 11/09/20 1105          Toileting Assessment/Training    Comment (Toileting)   reports pt using BSC.  do not have a 3in1 at home  -[facility] Name 11/09/20 1105          Lower Body Dressing Assessment/Training    Comment (Lower Body Dressing)  not able to attempt due to pain  -     Row Name 11/09/20 1105          Upper Body Dressing Assessment/Training    Comment (Upper Body Dressing)  spouse reports has observed donning brace.  pt not able to tolerate donning brace during OT  due to pain.  encourage spouse to work with staff when present and donning brace to understand how to [place]  -LE       User Key  (r) = Recorded By, (t) = Taken By, (c) = Cosigned By    Initials Name Provider Type    Ruth Chowdhury OTR Occupational Therapist        Obj/Interventions     Row Name 11/09/20 1108          Strength Comprehensive (MMT)    Comment, General Manual Muscle Testing (MMT) Assessment  moving B UE freely, reports mild arthritis  -LE     Row Name 11/09/20 1108          Balance    Balance Assessment  sitting static balance  -LE     Static Sitting Balance  mild impairment  -LE       User Key  (r) = Recorded By, (t) = Taken By, (c) = Cosigned By    Initials Name Provider Type    Ruth Chowdhury OTR Occupational Therapist        Goals/Plan     Row Name 11/09/20 1118           Transfer Goal 1 (OT)    Activity/Assistive Device (Transfer Goal 1, OT)  sit-to-stand/stand-to-sit;bed-to-chair/chair-to-bed;toilet;commode, bedside without drop arms;walker, rolling  -LE     Story Level/Cues Needed (Transfer Goal 1, OT)  minimum assist (75% or more patient effort);standby assist;set-up required;supervision required;tactile cues required  -LE     Time Frame (Transfer Goal 1, OT)  2 weeks  -LE     Progress/Outcome (Transfer Goal 1, OT)  goal ongoing  -LE     Row Name 11/09/20 1116          Bathing Goal 1 (OT)    Activity/Device (Bathing Goal 1, OT)  upper body bathing;lower body bathing  -LE     Story Level/Cues Needed (Bathing Goal 1, OT)  minimum assist (75% or more patient effort);set-up required;standby assist;supervision required;tactile cues required  -LE     Time Frame (Bathing Goal 1, OT)  2 weeks  -LE     Progress/Outcomes (Bathing Goal 1, OT)  goal ongoing  -LE     Row Name 11/09/20 1116          Dressing Goal 1 (OT)    Activity/Device (Dressing Goal 1, OT)  lower body dressing;long-handled shoe horn;reacher;sock-aid  -LE     Story/Cues Needed (Dressing Goal 1, OT)  minimum assist (75% or more patient effort);set-up required;standby assist;supervision required;tactile cues required  -LE     Time Frame (Dressing Goal 1, OT)  2 weeks  -LE     Progress/Outcome (Dressing Goal 1, OT)  goal ongoing  -LE     Row Name 11/09/20 1116          Toileting Goal 1 (OT)    Activity/Device (Toileting Goal 1, OT)  adjust/manage clothing;perform perineal hygiene;grab bar/safety frame  -LE     Story Level/Cues Needed (Toileting Goal 1, OT)  minimum assist (75% or more patient effort);set-up required;standby assist;supervision required  -LE     Time Frame (Toileting Goal 1, OT)  2 weeks  -LE     Progress/Outcome (Toileting Goal 1, OT)  goal ongoing  -     Row Name 11/09/20 1116          Therapy Assessment/Plan (OT)    Planned Therapy Interventions (OT)  activity tolerance  training;adaptive equipment training;BADL retraining;transfer/mobility retraining;occupation/activity based interventions;functional balance retraining  -       User Key  (r) = Recorded By, (t) = Taken By, (c) = Cosigned By    Initials Name Provider Type    Ruth Chowdhury, OTR Occupational Therapist        Clinical Impression     Row Name 11/09/20 1109          Pain Scale: Numbers Pre/Post-Treatment    Pretreatment Pain Rating  -- 12/10  RN aware pt wants pain meds.  -     Pain Intervention(s)  Repositioned;Rest;Emotional support  -     Row Name 11/09/20 1109          Plan of Care Review    Plan of Care Reviewed With  patient;spouse  -     Outcome Summary  Pt admit from home with back pain after movement at home.  Neurosurgery ordered brace and attempts to mobilize in brace.  During OT pt sits briefly at EOB and unable to tolerate further activity due to severe back pain. Pt may benefit from skiled OT to increase safety and independence. Pt and spouse report plan to d/c home.  -     Row Name 11/09/20 1109          Therapy Assessment/Plan (OT)    Patient/Family Therapy Goal Statement (OT)  return to prior leve of assist.  -LE     Rehab Potential (OT)  fair, will monitor progress closely  -     Criteria for Skilled Therapeutic Interventions Met (OT)  meets criteria;yes  -LE     Therapy Frequency (OT)  5 times/wk  -     Row Name 11/09/20 1109          Therapy Plan Review/Discharge Plan (OT)    Equipment Needs Upon Discharge (OT)  -- own walker, rollator, 2 w/c, shower chair.  -LE     Anticipated Discharge Disposition (OT)  home with 24/7 care  -     Row Name 11/09/20 1109          Vital Signs    Pre Patient Position  Supine  -LE     Intra Patient Position  Sitting  -LE     Post Patient Position  Supine  -LE     Activity Duration  -- pain limits tolerance  -     Row Name 11/09/20 1109          Positioning and Restraints    Pre-Treatment Position  in bed  -LE     Post Treatment Position  bed  -LE      In Bed  side lying right;call light within reach;encouraged to call for assist;exit alarm on;with family/caregiver;pillow between legs  -LE       User Key  (r) = Recorded By, (t) = Taken By, (c) = Cosigned By    Initials Name Provider Type    Ruth Chowdhury OTR Occupational Therapist        Outcome Measures     Row Name 11/09/20 1117          How much help from another is currently needed...    Putting on and taking off regular lower body clothing?  1  -LE     Bathing (including washing, rinsing, and drying)  1  -LE     Toileting (which includes using toilet bed pan or urinal)  2  -LE     Putting on and taking off regular upper body clothing  2  -LE     Taking care of personal grooming (such as brushing teeth)  3  -LE     Eating meals  3  -LE     AM-PAC 6 Clicks Score (OT)  12  -LE     Row Name 11/09/20 1117          Functional Assessment    Outcome Measure Options  AM-PAC 6 Clicks Daily Activity (OT)  -LE       User Key  (r) = Recorded By, (t) = Taken By, (c) = Cosigned By    Initials Name Provider Type    Ruth Chowdhury OTR Occupational Therapist        Occupational Therapy Education                 Title: PT OT SLP Therapies (In Progress)     Topic: Occupational Therapy (Done)     Point: ADL training (Done)     Description:   Instruct learner(s) on proper safety adaptation and remediation techniques during self care or transfers.   Instruct in proper use of assistive devices.              Learning Progress Summary           Patient Acceptance, E,D, DU,VU by DANII at 11/9/2020 1118    Comment: role of OT, plan of care, benefit of actiity. verbally discuss brace and review DME for bathrom   Family Acceptance, E,D, DU,VU by DANII at 11/9/2020 1118    Comment: role of OT, plan of care, benefit of actiity. verbally discuss brace and review DME for bathrom                   Point: Precautions (Done)     Description:   Instruct learner(s) on prescribed precautions during self-care and functional transfers.               Learning Progress Summary           Patient Acceptance, E,D, DU,VU by DANII at 11/9/2020 1118    Comment: role of OT, plan of care, benefit of actiity. verbally discuss brace and review DME for bathrom   Family Acceptance, E,D, DU,VU by DANII at 11/9/2020 1118    Comment: role of OT, plan of care, benefit of actiity. verbally discuss brace and review DME for bathrom                   Point: Body mechanics (Done)     Description:   Instruct learner(s) on proper positioning and spine alignment during self-care, functional mobility activities and/or exercises.              Learning Progress Summary           Patient Acceptance, E,D, DU,VU by DANII at 11/9/2020 1118    Comment: role of OT, plan of care, benefit of actiity. verbally discuss brace and review DME for bathrom   Family Acceptance, E,D, DU,VU by DANII at 11/9/2020 1118    Comment: role of OT, plan of care, benefit of actiity. verbally discuss brace and review DME for bathrom                               User Key     Initials Effective Dates Name Provider Type Discipline    DANII 06/08/18 -  Ruth Craig, OTR Occupational Therapist OT              OT Recommendation and Plan  Planned Therapy Interventions (OT): activity tolerance training, adaptive equipment training, BADL retraining, transfer/mobility retraining, occupation/activity based interventions, functional balance retraining  Therapy Frequency (OT): 5 times/wk  Plan of Care Review  Plan of Care Reviewed With: patient, spouse  Outcome Summary: Pt admit from home with back pain after movement at home.  Neurosurgery ordered brace and attempts to mobilize in brace.  During OT pt sits briefly at EOB and unable to tolerate further activity due to severe back pain. Pt may benefit from skiled OT to increase safety and independence. Pt and spouse report plan to d/c home.     Time Calculation:   Time Calculation- OT     Row Name 11/09/20 1119             Time Calculation- OT    OT Start Time  1047  -LE      OT Stop Time  1101   -LE      OT Time Calculation (min)  14 min  -LE      Total Timed Code Minutes- OT  10 minute(s)  -LE      OT Received On  11/09/20  -LE      OT - Next Appointment  11/10/20  -LE      OT Goal Re-Cert Due Date  11/23/20  -LE        User Key  (r) = Recorded By, (t) = Taken By, (c) = Cosigned By    Initials Name Provider Type    Ruth Chowdhury OTR Occupational Therapist        Therapy Charges for Today     Code Description Service Date Service Provider Modifiers Qty    [id number]  OT EVAL MOD COMPLEXITY 2 11/9/2020 Ruth Craig OTR GO 1    [id number]  OT THERAPEUTIC ACT EA 15 MIN 11/9/2020 Ruth Craig OTR GO 1               ELY Roper  11/9/2020

## 2020-11-09 NOTE — PROGRESS NOTES
"[facility] NEUROSURGERY PROGRESS NOTE      CC: T9 and 10, L5 VCF; fall 11/5      Subjective     Interval History: Continues to complain of back pain.  States that she is tolerating the brace and feels that it did help with her pain.  She was able to ambulate 50 feet with rolling walker and contact-guard assist yesterday.  When I mentioned surgery, she specifically states \"no surgery\".  She denies leg pain    ROS:  MS: back pain  Neuro: No numbness, tingling, or weakness,  no balance difficulties  : No difficulty voiding, no incontinence    Objective     Vital signs in last 24 hours:  Temp:  [97.9 °F (36.6 °C)-98.3 °F (36.8 °C)] 98.3 °F (36.8 °C)  Heart Rate:  [] 88  Resp:  [16-28] 16  BP: (123-169)/(65-98) 123/65    Intake/Output this shift:  I/O this shift:  In: 240 [P.O.:240]  Out: -     LABS:  Results from last 7 days   Lab Units 11/09/20  0550 11/05/20  1536   WBC 10*3/mm3 5.44 10.28   HEMOGLOBIN g/dL 10.4* 13.7   HEMATOCRIT % 31.9* 41.5   PLATELETS 10*3/mm3 292 410       IMAGING STUDIES:  No new imaging    I personally viewed and interpreted the patient's chart.    Meds reviewed/changed: Yes    Current Facility-Administered Medications:   •  acetaminophen (TYLENOL) tablet 650 mg, 650 mg, Oral, Q4H PRN **OR** acetaminophen (TYLENOL) 160 MG/5ML solution 650 mg, 650 mg, Oral, Q4H PRN **OR** acetaminophen (TYLENOL) suppository 650 mg, 650 mg, Rectal, Q4H PRN, Lenard Zapata MD  •  albuterol (PROVENTIL) nebulizer solution 0.083% 2.5 mg/3mL, 2.5 mg, Nebulization, Q4H PRN, Sid Minor MD  •  arformoterol (BROVANA) nebulizer solution 15 mcg, 15 mcg, Nebulization, BID - RT, Sid Minor MD, 15 mcg at 11/09/20 0826  •  benzonatate (TESSALON) capsule 200 mg, 200 mg, Oral, TID PRN, Lenard Zapata MD  •  bisacodyl (DULCOLAX) EC tablet 5 mg, 5 mg, Oral, Daily PRN, Lenard Zapata MD  •  budesonide (PULMICORT) nebulizer solution 0.5 mg, 0.5 mg, Nebulization, BID - RT, Sid Minor MD, 0.5 mg at 11/09/20 " 0826  •  cholecalciferol (VITAMIN D3) tablet 1,000 Units, 1,000 Units, Oral, Daily, Lenard Zapata MD, 1,000 Units at 11/09/20 0836  •  dicyclomine (BENTYL) capsule 20 mg, 20 mg, Oral, 4x Daily PRN, Lenard Zapata MD  •  dilTIAZem CD (CARDIZEM CD) 24 hr capsule 180 mg, 180 mg, Oral, Q24H, Shahid Lee MD, 180 mg at 11/09/20 0836  •  enoxaparin (LOVENOX) syringe 50 mg, 1 mg/kg, Subcutaneous, Q12H, Lenard Zapata MD, 50 mg at 11/09/20 0835  •  famotidine (PEPCID) tablet 20 mg, 20 mg, Oral, BID AC, Chidi Bonilla MD, 20 mg at 11/09/20 0835  •  FLUoxetine (PROzac) capsule 20 mg, 20 mg, Oral, Daily, Lenard Zapata MD, 20 mg at 11/09/20 0836  •  guaiFENesin (MUCINEX) 12 hr tablet 1,200 mg, 1,200 mg, Oral, Q12H, Lenard Zapata MD, 1,200 mg at 11/09/20 0836  •  hydrOXYzine (ATARAX) tablet 25 mg, 25 mg, Oral, TID PRN, Deni Dodson MD, 25 mg at 11/09/20 1146  •  lidocaine (LIDODERM) 5 % 1 patch, 1 patch, Transdermal, Q24H, Lenard Zapata MD, 1 patch at 11/09/20 0130  •  loperamide (IMODIUM) capsule 2 mg, 2 mg, Oral, 4x Daily PRN, Lenard Zapata MD  •  loperamide (IMODIUM) capsule 2 mg, 2 mg, Oral, BID, Chidi Bonilla MD, 2 mg at 11/08/20 2007  •  LORazepam (ATIVAN) tablet 0.5 mg, 0.5 mg, Oral, Q6H PRN, Lenard Zapata MD, 0.5 mg at 11/09/20 1314  •  melatonin tablet 3 mg, 3 mg, Oral, Nightly PRN, Tatyana Lopez, APRN  •  mirtazapine (REMERON) tablet 15 mg, 15 mg, Oral, Nightly, Lenard Zapata MD, 15 mg at 11/08/20 2006  •  montelukast (SINGULAIR) tablet 10 mg, 10 mg, Oral, Nightly, Sid Minor MD, 10 mg at 11/08/20 2006  •  morphine injection 4 mg, 4 mg, Intravenous, Q3H PRN, Lenard Zapata MD, 2 mg at 11/08/20 0858  •  nitroglycerin (NITROSTAT) SL tablet 0.4 mg, 0.4 mg, Sublingual, Q5 Min PRN, Lenard Zapata MD  •  ondansetron (ZOFRAN) tablet 4 mg, 4 mg, Oral, Q6H PRN **OR** ondansetron (ZOFRAN) injection 4 mg, 4 mg,  Intravenous, Q6H PRN, Lenard Zapata MD, 4 mg at 11/06/20 0044  •  oxyCODONE-acetaminophen (PERCOCET)  MG per tablet 1 tablet, 1 tablet, Oral, Q4H PRN, Chidi Bonilla MD, 1 tablet at 11/09/20 1142  •  potassium chloride (K-DUR,KLOR-CON) ER tablet 40 mEq, 40 mEq, Oral, PRN, Deni Dodson MD, 40 mEq at 11/09/20 1304  •  potassium chloride (KLOR-CON) packet 40 mEq, 40 mEq, Oral, PRN, Deni Dodson MD  •  pseudoephedrine (SUDAFED) tablet 60 mg, 60 mg, Oral, Q6H PRN, Lenard Zapata MD  •  roflumilast (DALIRESP) tablet 500 mcg, 500 mcg, Oral, Daily, Lenard Zapata MD, 500 mcg at 11/09/20 0835  •  sodium chloride 0.9 % flush 10 mL, 10 mL, Intravenous, Q12H, Lenard Zapata MD, 10 mL at 11/09/20 0836  •  sodium chloride 0.9 % flush 10 mL, 10 mL, Intravenous, PRN, Lenard Zapata MD  •  theophylline (UNIPHYL) 24 hr tablet 400 mg, 400 mg, Oral, Q24H, Sid Minor MD, 400 mg at 11/09/20 0835  •  tiotropium (SPIRIVA RESPIMAT) 2.5 mcg/act aerosol solution inhaler, 2 puff, Inhalation, Daily, Sid Minor MD, 2 puff at 11/09/20 0842  •  traMADol (ULTRAM) tablet 50 mg, 50 mg, Oral, Q6H PRN, Lenard Zapata MD, 50 mg at 11/09/20 0835  •  zolpidem (AMBIEN) tablet 5 mg, 5 mg, Oral, Nightly PRN, Lenard Zapata MD, 5 mg at 11/08/20 2324      Physical Exam:    General:   Resting with eyes closed I entered room.  Awake and during my visit.  Calm and cooperative.  Rolled from side to back unassisted.    Back:    Tenderness lower thoracic.     Motor: Normal strength bilateral lower extremities.  Sensation: Normal to light touch  Station and Gait:             Per PT note 11/9-ambulated 50 feet contact-guard assist with rolling walker  Extremities:   No calf tenderness or swelling      Assessment/Plan     ASSESSMENT:      Acute right-sided thoracic back pain    Mixed anxiety depressive disorder    COPD (chronic obstructive pulmonary disease) (CMS/HCC)    Right-sided chest pain     "Abnormal CT of the chest    Chronic respiratory failure with hypoxia and hypercapnia (CMS/HCC)    Acute pulmonary embolism without acute cor pulmonale (CMS/HCC)    Compression fracture of T9 vertebra (CMS/HCC)    Multifocal atrial tachycardia (CMS/HCC)    Hypokalemia      PLAN: Medically infirmed female with significant respiratory issues.  Continues to have back pain.  Was resting with eyes closed and comfortable appearing upon my entry to room but as I was leaving she stated that she was having severe back pain and requested pain medication for which she is due.  Remains have some tenderness her spine.  She states that the brace was delivered and she tolerated activity with it yesterday and felt that it did help.  We discussed the importance of brace use and making sure it is snug fitting for support.  No weakness on exam.  When I mentioned that we could consider further work-up to discuss surgery.  She adamantly states that she does not want surgery.  Hopefully, her pain can be controlled with medications in the brace and this can be avoided.  From neurosurgical standpoint, okay for discharge when medically ready.  We will arrange follow-up 2 to 3 weeks with x-rays.  Patient should remain in brace as instructed.    No lift, push, pull more than 5 pounds, no swimming, no bending or twisting. No exertional or impact activity- walking is OK. Wear brace when sitting higher than 45 degrees, standing, walking- unless just from bed to bathroom. OK to remove brace for showers.    I discussed the patient's findings and my recommendations with patient, family and Dr. Herrera.       LOS: 1 day       Keri Lilly, APRN  11/9/2020  13:53 EST    \"Dictated utilizing Dragon dictation\".      "

## 2020-11-09 NOTE — PLAN OF CARE
Problem: Adult Inpatient Plan of Care  Goal: Plan of Care Review  Flowsheets (Taken 11/9/2020 2317)  Plan of Care Reviewed With: patient  Outcome Summary: Pt. able to ambulate 40 feet, Min. assist x 2, and HHA x 2 this date.  Pt. requires Min. assist x 2 for bed mobility and Min. assist x 2 for sit <-> stand transfers.  x 2 standing rest breaks during ambulation due to fatigue and back pain.  BLE ther. ex. program x 10 reps completed for general strengthening. Limited this date due to increased back pain.   Patient was wearing a face mask during this therapy encounter. Therapist used appropriate personal protective equipment including eye protection, mask, and gloves.  Mask used was standard procedure mask. Appropriate PPE was worn during the entire therapy session. Hand hygiene was completed before and after therapy session. Patient is not in enhanced droplet precautions.

## 2020-11-09 NOTE — PLAN OF CARE
Problem: Adult Inpatient Plan of Care  Goal: Plan of Care Review  Recent Flowsheet Documentation  Taken 11/9/2020 1109 by Ruth Craig OTR  Plan of Care Reviewed With:   patient   spouse  Outcome Summary: Pt admit from home with back pain after movement at home.  Neurosurgery ordered brace and attempts to mobilize in brace.  During OT pt sits briefly at EOB and unable to tolerate further activity due to severe back pain. Pt may benefit from skiled OT to increase safety and independence. Pt and spouse report plan to d/c home.       Patient was wearing a face mask during this therapy encounter intermittently.  Spouse briefly lowers mask to talk with OT. Therapist used appropriate personal protective equipment including surgical mask, eye shield and gloves during the entire therapy session. Hand hygiene was completed before and after therapy session. Patient is not in enhanced droplet precautions.

## 2020-11-09 NOTE — PROGRESS NOTES
LOS: 1 day   Patient Care Team:  Lyudmila Granados APRN as PCP - General (Family Medicine)  Carl Perez MD as Consulting Physician (Orthopedic Surgery)    Subjective     Breathing pretty stable back pain is pretty severe still.  No significant cough  Review of Systems:          Objective     Vital Signs  Vital Sign Min/Max for last 24 hours  Temp  Min: 97.9 °F (36.6 °C)  Max: 98.3 °F (36.8 °C)   BP  Min: 123/65  Max: 169/75   Pulse  Min: 88  Max: 118   Resp  Min: 16  Max: 28   SpO2  Min: 86 %  Max: 98 %   Flow (L/min)  Min: 6  Max: 6   Weight  Min: 47.6 kg (105 lb)  Max: 47.6 kg (105 lb)        Ventilator/Non-Invasive Ventilation Settings (From admission, onward)    None                       Body mass index is 19.2 kg/m².  I/O last 3 completed shifts:  In: 350 [P.O.:350]  Out: 300 [Urine:300]  I/O this shift:  In: 240 [P.O.:240]  Out: -         Physical Exam:  General Appearance: Well-developed elderly thin white female she is resting in bed she does not appear in any acute distress she is on 6 L nasal cannula O2 she usually uses 6 at home her oxygen saturations are about 97%.    Eyes: Conjunctiva are clear and anicteric  ENT: Mucous membranes are moist no erythema or exudates Mallampati type I airway.  Nasal septum midline.  Neck: No adenopathy or thyromegaly trachea is midline I do not appreciate jugular venous distention with her sitting upright.  Lungs: Very decreased but clear no wheezes no rales no rhonchi nonlabored symmetric expansion  Cardiac: Regular rate rhythm no murmur  Abdomen: Soft no palpable hepatosplenomegaly  : Not examined  Musculoskeletal: Severe thoracic kyphosis  Skin: No jaundice no petechiae she has got thin skin, it is warm and dry  Neuro: She is alert oriented cooperative following commands grossly intact  Extremities/P Vascular: No clubbing no cyanosis no edema palpable radial and dorsalis pedis pulses bilaterally  MSE: She seems to be in pretty good  [facility]       Labs:  Results from last 7 days   Lab Units 11/09/20  0550 11/06/20  0435 11/05/20  1536   GLUCOSE mg/dL 108* 156* 84   SODIUM mmol/L 140 140 142   POTASSIUM mmol/L 2.9* 4.4 4.4   MAGNESIUM mg/dL 1.8  --   --    CO2 mmol/L 35.9* 32.5* 32.5*   CHLORIDE mmol/L 98 98 102   ANION GAP mmol/L 6.1 9.5 7.5   CREATININE mg/dL 0.57 0.72 0.62   BUN mg/dL 11 18 14   BUN / CREAT RATIO  19.3 25.0 22.6   CALCIUM mg/dL 9.8 10.2 10.1   EGFR IF NONAFRICN AM mL/min/1.73 104 79 94   ALK PHOS U/L 109  --  145*   TOTAL PROTEIN g/dL 6.1  --  7.6   ALT (SGPT) U/L 17  --  12   AST (SGOT) U/L 26  --  26   BILIRUBIN mg/dL 0.2  --  0.2   ALBUMIN g/dL 3.50  --  4.00   GLOBULIN gm/dL 2.6  --  3.6     Estimated Creatinine Clearance: 47.1 mL/min (by C-G formula based on SCr of 0.57 mg/dL).      Results from last 7 days   Lab Units 11/09/20  0550 11/05/20  1536   WBC 10*3/mm3 5.44 10.28   RBC 10*6/mm3 3.43* 4.55   HEMOGLOBIN g/dL 10.4* 13.7   HEMATOCRIT % 31.9* 41.5   MCV fL 93.0 91.2   MCH pg 30.3 30.1   MCHC g/dL 32.6 33.0   RDW % 12.0* 12.1*   RDW-SD fl 41.3 40.3   MPV fL 9.4 9.1   PLATELETS 10*3/mm3 292 410   NEUTROPHIL % % 67.3 74.9   LYMPHOCYTE % % 21.3 18.1*   MONOCYTES % % 8.1 5.4   EOSINOPHIL % % 2.2 0.5   BASOPHIL % % 0.4 0.4   IMM GRAN % % 0.7* 0.7*   NEUTROS ABS 10*3/mm3 3.66 7.70*   LYMPHS ABS 10*3/mm3 1.16 1.86   MONOS ABS 10*3/mm3 0.44 0.56   EOS ABS 10*3/mm3 0.12 0.05   BASOS ABS 10*3/mm3 0.02 0.04   IMMATURE GRANS (ABS) 10*3/mm3 0.04 0.07*   NRBC /100 WBC 0.0 0.0         Results from last 7 days   Lab Units 11/06/20  0435 11/05/20  1536   TROPONIN T ng/mL <0.010 <0.010                 Results from last 7 days   Lab Units 11/05/20  1536   INR  0.95     Microbiology Results (last 10 days)     Procedure Component Value - Date/Time    COVID PRE-OP / PRE-PROCEDURE SCREENING ORDER (NO ISOLATION) - Swab, Nasopharynx [[id number]]  (Normal) Collected: 11/05/20 6757    Lab Status: Final result Specimen: Swab from Nasopharynx  Updated: 11/05/20 1726    Narrative:      The following orders were created for panel order COVID PRE-OP / PRE-PROCEDURE SCREENING ORDER (NO ISOLATION) - Swab, Nasopharynx.  Procedure                               Abnormality         Status                     ---------                               -----------         ------                     Respiratory Panel PCR w/...[[id number]]  Normal              Final result                 Please view results for these tests on the individual orders.    Respiratory Panel PCR w/COVID-19(SARS-CoV-2) GUSTAVO/CHUCK/AVILA/PAD/[facility]/MAD/PARAS In-House, NP Swab in UTM/VTM, 3-4 HR TAT - Swab, Nasopharynx [[id number]]  (Normal) Collected: 11/05/20 1557    Lab Status: Final result Specimen: Swab from Nasopharynx Updated: 11/05/20 1726     ADENOVIRUS, PCR Not Detected     Coronavirus 229E Not Detected     Coronavirus HKU1 Not Detected     Coronavirus NL63 Not Detected     Coronavirus OC43 Not Detected     COVID19 Not Detected     Human Metapneumovirus Not Detected     Human Rhinovirus/Enterovirus Not Detected     Influenza A PCR Not Detected     Influenza B PCR Not Detected     Parainfluenza Virus 1 Not Detected     Parainfluenza Virus 2 Not Detected     Parainfluenza Virus 3 Not Detected     Parainfluenza Virus 4 Not Detected     RSV, PCR Not Detected     Bordetella pertussis pcr Not Detected     Bordetella parapertussis PCR Not Detected     Chlamydophila pneumoniae PCR Not Detected     Mycoplasma pneumo by PCR Not Detected    Narrative:      Fact sheet for providers: https://docs.[employer]/wp-content/uploads/GLN3652-4713-YF2.1-EUA-Provider-Fact-Sheet-3.pdf    Fact sheet for patients: https://docs.[employer]/wp-content/uploads/HHU2264-2197-ZH9.1-EUA-Patient-Fact-Sheet-1.pdf              arformoterol, 15 mcg, Nebulization, BID - RT  budesonide, 0.5 mg, Nebulization, BID - RT  cholecalciferol, 1,000 Units, Oral, Daily  dilTIAZem CD, 180 mg, Oral, Q24H  enoxaparin, 1 mg/kg, Subcutaneous,  Q12H  famotidine, 20 mg, Oral, BID AC  FLUoxetine, 20 mg, Oral, Daily  guaiFENesin, 1,200 mg, Oral, Q12H  lidocaine, 1 patch, Transdermal, Q24H  loperamide, 2 mg, Oral, BID  mirtazapine, 15 mg, Oral, Nightly  montelukast, 10 mg, Oral, Nightly  roflumilast, 500 mcg, Oral, Daily  sodium chloride, 10 mL, Intravenous, Q12H  theophylline, 400 mg, Oral, Q24H  tiotropium bromide monohydrate, 2 puff, Inhalation, Daily           Diagnostics:  Xr Ribs Right With Pa Chest    Result Date: 11/6/2020  PA CHEST AND RIGHT RIB SERIES 11/06/2020  CLINICAL HISTORY: Complains of low back pain status post twisting back getting out of bed yesterday, possible right rib fracture.  COMPARISON: This is correlated to yesterday's chest CT 11/05/2020.  FINDINGS: On the PA view of the chest, the cardiomediastinal silhouette and the pulmonary vasculature are within normal limits. There are prominent emphysematous changes throughout the lungs. There is mild coarsened interstitial markings left lower lung and lung base, unchanged since chest CT yesterday 11/05/2020. No focal dense airspace consolidation seen. Costophrenic angles are sharp. There is minimal cortical regularity of the right 8th lateral rib. There is a subtle fracture at this site, age-indeterminate suspected.      1. No definite active disease is seen in the chest. 2. There are prominent emphysematous changes throughout the lungs, some coarsened interstitial markings in the left lower lung and left lung base. This is unchanged since yesterday's chest CT. 3. There is some minimal cortical irregularity of the right 8th lateral rib raising the possibility of a very subtle nondisplaced rib fracture, age-indeterminate, correlation with physical exam suggested, otherwise no right rib fracture seen.  This report was finalized on 11/6/2020 1:20 PM by Dr. Burton Cool M.D.      Xr Spine Thoracic 3 View    Result Date: 11/6/2020  THREE VIEWS OF THE THORACIC SPINE AND LUMBAR SPINE PLAIN FILM  SERIES COMPLETE 4+ VIEWS 11/05/2020  CLINICAL HISTORY: Patient twisted back while getting out of bed and heard a pop, has pain in the mid and low back.  COMPARISON: This is correlated to chest CT 08/02/2018.  LUMBAR SPINE PLAIN FILMS: Five views of lumbar spine including AP bilateral oblique and lateral views of the entire lumbar spine and coned down lateral view of lumbosacral junction are submitted for interpretation. There is mild compression deformity involving the superior body and endplate of L5 with 20-30% loss of anterior and central, 20% loss of posterior vertebral body height, precise age of this compression deformity is uncertain but could be acute to subacute. The L1 through L4 lumbar vertebral body heights are well-maintained. There is also a compression fracture involving superior body and endplate of T12 with about 30% loss of anterior and central, 20% loss of posterior vertebral body height at T12. This T12 compression fracture is seen on prior chest CT 08/02/2018 and is chronic. There is also minimal irregularity in the anterior cortex superior body of S3 sacral level, subtle horizontal upper sacral fracture at the S3 sacral level is not excluded. Compression fracture at L5 could be further dated with a lumbar spine MRI.  THORACIC SPINE PLAIN FILMS: Three views of the thoracic spine including AP and lateral views of the entire thoracic spine and swimmer's lateral view of lower cervical and upper thoracic spine are submitted for interpretation. There are 4 compression fractures in the thoracic spine,including a moderate compression fracture involving the T7 vertebra with about 40-50% loss of anterior, no loss of posterior vertebral body height. This compression fracture was present on chest CT 08/02/2018 and is chronic. There are mild compression fractures involving superior body and endplate of T9 and T10 with about 30% loss of anterior vertebral body height at both these levels. These are new since  chest CT 08/02/2018, but the precise age of these compression deformities is uncertain, could be acute to subacute. Reidentified is old compression fracture at T12.      1. There is a moderate old compression fracture at T7 and mild-to-moderate old compression deformity involving the superior body and endplate of T12, unchanged since chest CT 08/02/2018. 2. There are mild-to-moderate compression fractures involving the superior body and endplate of the T9 and T10 thoracic vertebrae that are new when compared to chest CT 08/02/2018, the precise age is uncertain, could be further dated with a thoracic spine MRI.  This report was finalized on 11/6/2020 1:30 PM by Dr. Burton Cool M.D.      Nm Lung Ventilation Perfusion    Result Date: 11/6/2020  NUCLEAR MEDICINE VENTILATION AND PERFUSION STUDY  HISTORY: Severe COPD. Recent chest and back discomfort.  COMPARISON: PA chest 11/06/2020, CT angiogram chest 11/05/2020.  TECHNIQUE:  Planar ventilation scan in posterior projection after inhalation of 7.6 mCi Xe-133 gas with wash-in, rebreathing and wash-out phases. Followed by perfusion scan with 6 mCi Tc-MAA IV in multiple projections.  FINDINGS:  There is a matched area of decreased ventilation and perfusion to the posterior right midlung that when correlated with CT angiogram chest is most likely related to the severity of bolus emphysematous disease in this region. Technically, moderate matched defect without corresponding chest x-ray abnormality is consistent with intermediate probability. Lungs are hyperexpanded and there is overall diminished ventilation and perfusion to the right as compared to the left most likely related to the severity of emphysema. No focal segmental mismatch defect is evident.      Severe pulmonary emphysema/COPD limits evaluation. Matched ventilation/perfusion may be related to the severity of emphysema/COPD though a moderate matched defect in the right midlung constitutes intermediate  probability ventilation/perfusion scan.  This report was finalized on 11/6/2020 12:43 PM by Dr. Rosendo Ferraro M.D.      Ct Abdomen Pelvis With Contrast    Result Date: 11/5/2020  CT ANGIOGRAM CHEST-, CT ABDOMEN PELVIS W CONTRAST-  INDICATIONS: Short of breath, pain  Radiation dose reduction techniques were utilized, including automated exposure control and exposure modulation based on body size.  TECHNIQUE: CTA chest with three-dimensional reconstructions. Enhanced CT of the abdomen, pelvis  COMPARISON: Chest CT from 08/02/2018, 01/15/2018; CT abdomen pelvis from 11/17/2005  FINDINGS:  Chest CTA:  There does appear to be some nonocclusive thrombus in the right lower lobe branch artery, for example axial image 104, in a region of chronic bronchiectasis; this appearance is new from the prior exam from 2018, of uncertain chronicity (possibly chronic). The RV LV ratio is less than 1. Prominence of caliber of central pulmonary arteries may reflect pulmonary arterial hypertension, appearance is chronic. No aortic dissection.  The heart size is normal with minimal pericardial effusion. Mild to moderate coronary arterial calcification. A few small subcentimeter short axis mediastinal lymph nodes are seen that are not significant by size criteria.  The airways appear clear.  No pleural effusion or pneumothorax.  The lungs show no focal pulmonary consolidation or mass. Extensive emphysematous changes of the lungs are noted, similar to prior exam, and chronic bronchiectasis is seen medially at the right base. A stable right middle lobe nodular density measuring 6 mm on axial image 101 is noted. Scarlike density inferiorly in the right middle lobe is also stable.     Abdomen pelvis CT:  Structures in the pelvis are partly obscured by right hip arthroplasty hardware artifact.  A right adrenal myelolipoma is again demonstrated.  Gallbladder is surgically absent. Mild intrahepatic biliary ductal dilatation. Caliber of the main  pancreatic duct, 1 cm, may be normal status post cholecystectomy.  Otherwise unremarkable appearance of the liver, spleen, adrenal glands, pancreas, kidneys, bladder.  The proximal duodenum is abnormally distended up to the midportion of the third segment of the duodenum (where it passes between the superior mesenteric vein and the aorta), that could be result of obstructive effect, only slightly more prominent from the prior exam, whether mechanical obstructive effect or stricture, endoscopic correlation could be obtained as may be indicated. Much stool in the rectum, correlate clinically to exclude possibility of stool impaction. Colonic diverticula are seen that do not appear focally inflamed. Assessment of the colon for wall thickening is limited by lack of distention.  No free intraperitoneal gas or free fluid.  Scattered small mesenteric and para-aortic lymph nodes are seen that are not significant by size criteria.  Abdominal aorta is not aneurysmal. Aortic and other arterial calcifications are present.  Degenerative changes are seen in the spine. The T9 compression deformity, with 35% loss of height, is new from 06/17/2019, but age-indeterminate. Multiple other spinal compression deformities appear similar to prior exams. Right hip arthroplasty hardware is partly included.        1. There does appear to be some nonocclusive thrombus in the right lower lobe branch artery, for example axial image 104, in a region of chronic bronchiectasis; this appearance is new from the prior exam from 2018, of uncertain chronicity (possibly chronic). The RV LV ratio is less than 1. 2. The proximal duodenum is abnormally distended up to the midportion of the third segment of the duodenum (where it passes between the superior mesenteric vein and the aorta), that could be result of obstructive effect, only slightly more prominent from the prior exam, whether mechanical obstructive effect or stricture, endoscopic correlation  could be obtained as may be indicated. Much stool in the rectum, correlate clinically to exclude possibility of stool impaction. Colonic diverticula are seen that do not appear focally inflamed.   3. The T9 compression deformity, with 35% loss of height, is new from 06/17/2019, but age-indeterminate. Multiple other spinal compression deformities appear similar to prior exams.  Discussed by telephone with Dr. Lin at 1750, 11/05/2020  This report was finalized on 11/5/2020 5:54 PM by Dr. Jase Singleton M.D.      Xr Chest 1 View    Result Date: 11/5/2020  XR CHEST 1 VW-  HISTORY: Female who is 75 years-old,  short of breath  TECHNIQUE: Frontal view of the chest  COMPARISON: 10/24/2020  FINDINGS: Heart size is normal. Pulmonary vasculature is unremarkable. Small likely atelectasis at the lung bases. No focal pulmonary consolidation, pleural effusion, or pneumothorax. Emphysematous changes of the upper lungs. No acute osseous process.      Small likely atelectasis at the bases.. Follow-up as clinical indications persist.  This report was finalized on 11/5/2020 5:08 PM by Dr. Jase Singleton M.D.      Xr Chest 1 View    Result Date: 10/25/2020  UPRIGHT AP CHEST  HISTORY: Shortness of air, COPD, asthma.  COMPARISON: AP chest 06/27/2019, 06/25/2019, 2 view chest 06/17/2019.  FINDINGS: Lungs are hyperexpanded and there are increased interstitial markings consistent with COPD. There is blunting of the costophrenic angles similar to previous exam due to chronic scarring or small effusions. No superimposed airspace disease is evident and there is no evidence of perihilar edema or pneumothorax. Cardiomediastinal silhouette is not changed. There is pulmonary arterial enlargement.      Chronic interstitial disease and COPD. Chronic blunting of the costophrenic angles due to scarring or small effusions. No evidence for superimposed infiltrate or pulmonary edema.  This report was finalized on 10/25/2020 7:09 PM by   Rosendo Ferraro M.D.      Ct Angiogram Chest    Result Date: 11/5/2020  CT ANGIOGRAM CHEST-, CT ABDOMEN PELVIS W CONTRAST-  INDICATIONS: Short of breath, pain  Radiation dose reduction techniques were utilized, including automated exposure control and exposure modulation based on body size.  TECHNIQUE: CTA chest with three-dimensional reconstructions. Enhanced CT of the abdomen, pelvis  COMPARISON: Chest CT from 08/02/2018, 01/15/2018; CT abdomen pelvis from 11/17/2005  FINDINGS:  Chest CTA:  There does appear to be some nonocclusive thrombus in the right lower lobe branch artery, for example axial image 104, in a region of chronic bronchiectasis; this appearance is new from the prior exam from 2018, of uncertain chronicity (possibly chronic). The RV LV ratio is less than 1. Prominence of caliber of central pulmonary arteries may reflect pulmonary arterial hypertension, appearance is chronic. No aortic dissection.  The heart size is normal with minimal pericardial effusion. Mild to moderate coronary arterial calcification. A few small subcentimeter short axis mediastinal lymph nodes are seen that are not significant by size criteria.  The airways appear clear.  No pleural effusion or pneumothorax.  The lungs show no focal pulmonary consolidation or mass. Extensive emphysematous changes of the lungs are noted, similar to prior exam, and chronic bronchiectasis is seen medially at the right base. A stable right middle lobe nodular density measuring 6 mm on axial image 101 is noted. Scarlike density inferiorly in the right middle lobe is also stable.     Abdomen pelvis CT:  Structures in the pelvis are partly obscured by right hip arthroplasty hardware artifact.  A right adrenal myelolipoma is again demonstrated.  Gallbladder is surgically absent. Mild intrahepatic biliary ductal dilatation. Caliber of the main pancreatic duct, 1 cm, may be normal status post cholecystectomy.  Otherwise unremarkable appearance of the  liver, spleen, adrenal glands, pancreas, kidneys, bladder.  The proximal duodenum is abnormally distended up to the midportion of the third segment of the duodenum (where it passes between the superior mesenteric vein and the aorta), that could be result of obstructive effect, only slightly more prominent from the prior exam, whether mechanical obstructive effect or stricture, endoscopic correlation could be obtained as may be indicated. Much stool in the rectum, correlate clinically to exclude possibility of stool impaction. Colonic diverticula are seen that do not appear focally inflamed. Assessment of the colon for wall thickening is limited by lack of distention.  No free intraperitoneal gas or free fluid.  Scattered small mesenteric and para-aortic lymph nodes are seen that are not significant by size criteria.  Abdominal aorta is not aneurysmal. Aortic and other arterial calcifications are present.  Degenerative changes are seen in the spine. The T9 compression deformity, with 35% loss of height, is new from 06/17/2019, but age-indeterminate. Multiple other spinal compression deformities appear similar to prior exams. Right hip arthroplasty hardware is partly included.        1. There does appear to be some nonocclusive thrombus in the right lower lobe branch artery, for example axial image 104, in a region of chronic bronchiectasis; this appearance is new from the prior exam from 2018, of uncertain chronicity (possibly chronic). The RV LV ratio is less than 1. 2. The proximal duodenum is abnormally distended up to the midportion of the third segment of the duodenum (where it passes between the superior mesenteric vein and the aorta), that could be result of obstructive effect, only slightly more prominent from the prior exam, whether mechanical obstructive effect or stricture, endoscopic correlation could be obtained as may be indicated. Much stool in the rectum, correlate clinically to exclude possibility of  stool impaction. Colonic diverticula are seen that do not appear focally inflamed.   3. The T9 compression deformity, with 35% loss of height, is new from 06/17/2019, but age-indeterminate. Multiple other spinal compression deformities appear similar to prior exams.  Discussed by telephone with Dr. Lin at 1750, 11/05/2020  This report was finalized on 11/5/2020 5:54 PM by Dr. Jase Singleton M.D.      Xr Spine Lumbar Complete 4+vw    Result Date: 11/6/2020  THREE VIEWS OF THE THORACIC SPINE AND LUMBAR SPINE PLAIN FILM SERIES COMPLETE 4+ VIEWS 11/05/2020  CLINICAL HISTORY: Patient twisted back while getting out of bed and heard a pop, has pain in the mid and low back.  COMPARISON: This is correlated to chest CT 08/02/2018.  LUMBAR SPINE PLAIN FILMS: Five views of lumbar spine including AP bilateral oblique and lateral views of the entire lumbar spine and coned down lateral view of lumbosacral junction are submitted for interpretation. There is mild compression deformity involving the superior body and endplate of L5 with 20-30% loss of anterior and central, 20% loss of posterior vertebral body height, precise age of this compression deformity is uncertain but could be acute to subacute. The L1 through L4 lumbar vertebral body heights are well-maintained. There is also a compression fracture involving superior body and endplate of T12 with about 30% loss of anterior and central, 20% loss of posterior vertebral body height at T12. This T12 compression fracture is seen on prior chest CT 08/02/2018 and is chronic. There is also minimal irregularity in the anterior cortex superior body of S3 sacral level, subtle horizontal upper sacral fracture at the S3 sacral level is not excluded. Compression fracture at L5 could be further dated with a lumbar spine MRI.  THORACIC SPINE PLAIN FILMS: Three views of the thoracic spine including AP and lateral views of the entire thoracic spine and swimmer's lateral view of lower  cervical and upper thoracic spine are submitted for interpretation. There are 4 compression fractures in the thoracic spine,including a moderate compression fracture involving the T7 vertebra with about 40-50% loss of anterior, no loss of posterior vertebral body height. This compression fracture was present on chest CT 08/02/2018 and is chronic. There are mild compression fractures involving superior body and endplate of T9 and T10 with about 30% loss of anterior vertebral body height at both these levels. These are new since chest CT 08/02/2018, but the precise age of these compression deformities is uncertain, could be acute to subacute. Reidentified is old compression fracture at T12.      1. There is a moderate old compression fracture at T7 and mild-to-moderate old compression deformity involving the superior body and endplate of T12, unchanged since chest CT 08/02/2018. 2. There are mild-to-moderate compression fractures involving the superior body and endplate of the T9 and T10 thoracic vertebrae that are new when compared to chest CT 08/02/2018, the precise age is uncertain, could be further dated with a thoracic spine MRI.  This report was finalized on 11/6/2020 1:30 PM by Dr. Burton Cool M.D.      Results for orders placed during the hospital encounter of 11/05/20   Adult Transthoracic Echo Complete W/ Cont if Necessary Per Protocol    Narrative · Calculated left ventricular EF = 54.8% Estimated left ventricular EF was   in agreement with the calculated left ventricular EF. Left ventricular   systolic function is normal.  · Left ventricular wall thickness is consistent with moderate concentric   hypertrophy.  · Mitral annular calcification is present.  · No significant valvular regurgitation or stenosis.              Active Hospital Problems    Diagnosis  POA   • **Acute right-sided thoracic back pain [M54.6]  Yes   • Hypokalemia [E87.6]  No   • Chronic respiratory failure with hypoxia and hypercapnia  (CMS/[facility]) [J96.11, J96.12]  Yes   • Acute pulmonary embolism without acute cor pulmonale (CMS/[facility]) [I26.99]  Yes   • Compression fracture of T9 vertebra (CMS/[facility]) [S22.070A]  Yes   • Multifocal atrial tachycardia (CMS/[facility]) [I47.1]  Yes   • Right-sided chest pain [R07.9]  Yes   • Abnormal CT of the chest [R93.89]  Yes   • COPD (chronic obstructive pulmonary disease) (CMS/[facility]) [J44.9]  Yes   • Mixed anxiety depressive disorder [F41.8]  Yes      Resolved Hospital Problems   No resolved problems to display.         Assessment/Plan     1. Possible pulmonary embolus CT not super convincing although there certainly appears to be the possibility of a small clot.  VQ scan not helpful due to her severe lung disease lower extremity Dopplers negative for DVT.  She did come in with right-sided back pain certainly raises these index of suspicion but the pain really is lower than the chest and it occurred with the twisting movement and she heard a pop its not really classic for pulmonary embolus.  She has such severe lung disease however without having an acute decline in her oxygen is hard to believe she has any significant pulmonary embolus.  But I have reviewed the images multiple different views certainly looks like there were 1 or 2 little clots present.  2. Chronic hypoxemic and hypercapnic respiratory failure she is on her home O2.  3. COPD severe, patient is on chronic azithromycin for reduction of frequent exacerbations.  4. Multifocal atrial tachycardia  5. T9 vertebral compression fracture neurosurgery is seeing her    I think the question is do we keep her on anticoagulation.  She is relatively immobile that does increase her risk she has severe lung disease that a small clot could be devastating.  The CT is certainly suggestive of a couple of small clots.  The question is the risk benefit she does not have any bleeding diatheses I think probably go ahead and keep her on anticoagulation at least for a few months and  see how she does.  When certain no procedure she could transition to Eliquis.  I discussed with she and her .  I think they seem to understand the risk and benefits.  Stressed how important will be to help her support her so she does not fall when she is transitioning herself to and from    Plan for disposition:    Alfie Hightower MD  11/09/20  15:08 EST    Time:

## 2020-11-09 NOTE — PLAN OF CARE
Goal Outcome Evaluation:  Plan of Care Reviewed With: patient, spouse  Progress: no change  Outcome Summary: medicated with pain meds per pt request. remains on HF6L/NC. sats mid 90's at rest and drop to mid 80's with activity. no acute distress, cont to monitor

## 2020-11-09 NOTE — THERAPY TREATMENT NOTE
Patient Name: Siobhan Prakash  : 1945    MRN: [id number]                              Today's Date: 2020       Admit Date: 2020    Visit Dx:     ICD-10-CM ICD-9-CM   1. Right-sided chest pain  R07.9 786.50   2. Acute midline thoracic back pain  M54.6 724.1   3. Epigastric pain  R10.13 789.06   4. Closed wedge compression fracture of T9 vertebra, initial encounter (CMS/[facility])  S22.070A 805.2   5. Dilated duodenum  K59.89 564.89   6. Abnormal CT scan  R93.89 793.99   7. End stage COPD (CMS/[facility])  J44.9 496   8. Sinus tachycardia  R00.0 427.89     Patient Active Problem List   Diagnosis   • Weight loss, abnormal   • Subcutaneous cyst   • Mixed anxiety depressive disorder   • Gastroesophageal reflux disease   • Insomnia   • Macrocytosis   • Tremor   • Pulmonary emphysema (CMS/[facility])   • Vitamin D deficiency   • Fatigue   • Osteoporosis   • Herpes zoster without complication   • Migraine   • Bilateral leg pain   • Pneumonia of both lungs due to infectious organism   • Abnormal ECG   • Diarrhea   • Fecal urgency   • Irritable bowel syndrome (IBS)   • History of hip replacement   • 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   • COPD (chronic obstructive pulmonary disease) (CMS/[facility])   • Right-sided chest pain   • Acute right-sided thoracic back pain   • Abnormal CT of the chest   • Chronic respiratory failure with hypoxia and hypercapnia (CMS/[facility])   • Acute pulmonary embolism without acute cor pulmonale (CMS/[facility])   • Compression fracture of T9 vertebra (CMS/[facility])   • Multifocal atrial tachycardia (CMS/[facility])   • Hypokalemia     Past Medical History:   Diagnosis Date   • Allergic 1970   • Anxiety    • Arthritis 10/17   • Asthma    • Cataract    • Cholelithiasis    • COPD (chronic obstructive pulmonary disease) (CMS/[facility])    • Depression    • GERD (gastroesophageal reflux disease)    • HL (hearing loss)    • Hyperlipidemia    • Irritable bowel syndrome    • Low back pain    • Migraine     • Osteopenia    • Osteoporosis    • Pneumonia    • Tremor 2017   • Urinary tract infection      Past Surgical History:   Procedure Laterality Date   • CHOLECYSTECTOMY     • COLONOSCOPY  2012   • EYE SURGERY      bilateral cataracts   • HIP ARTHROPLASTY Right    • JOINT REPLACEMENT     • REDUCTION MAMMAPLASTY     • TONSILLECTOMY       General Information     Row Name 11/09/20 1532          Physical Therapy Time and Intention    Document Type  therapy note (daily note)  -MS     Mode of Treatment  physical therapy;individual therapy  -MS     Row Name 11/09/20 1532          General Information    Patient Profile Reviewed  yes  -MS     Existing Precautions/Restrictions  (S) fall;oxygen therapy device and L/min TLSO back brace on when OOB; Exit Alarm  -MS     Barriers to Rehab  none identified  -MS     Row Name 11/09/20 1532          Cognition    Orientation Status (Cognition)  oriented x 3  -MS     Row Name 11/09/20 1532          Safety Issues, Functional Mobility    Comment, Safety Issues/Impairments (Mobility)  Gait belt used for safety.  -MS       User Key  (r) = Recorded By, (t) = Taken By, (c) = Cosigned By    Initials Name Provider Type    MS Chidi Sarabia, PT Physical Therapist        Mobility     Row Name 11/09/20 1533          Bed Mobility    Sit-Supine [place] (Bed Mobility)  minimum assist (75% patient effort);2 person assist  -MS     Comment (Bed Mobility)  Via logroll  -MS     Row Name 11/09/20 1533          Transfers    Comment (Transfers)  Upon entering room, pt. already sitting upright in chair with TLSO donned  -MS     Row Name 11/09/20 1533          Sit-Stand Transfer    Sit-Stand [place] (Transfers)  minimum assist (75% patient effort);2 person assist  -MS     Assistive Device (Sit-Stand Transfers)  -- HHA x 2 and use of gait belt  -MS     Row Name 11/09/20 1533          Gait/Stairs (Locomotion)    [place] Level (Gait)  minimum assist (75% patient effort);2 person assist  -MS      Assistive Device (Gait)  -- HHA x 2 and use of gait belt  -MS     Distance in Feet (Gait)  40 feet  -MS     Deviations/Abnormal Patterns (Gait)  antalgic;ashley decreased;stride length decreased  -MS     Bilateral Gait Deviations  forward flexed posture  -MS     Comment (Gait/Stairs)  x 2 standing rest breaks due to pain and fatigue. Verbal/tactile cues for posture correction.  -MS       User Key  (r) = Recorded By, (t) = Taken By, (c) = Cosigned By    Initials Name Provider Type    Chidi Lopez, PT Physical Therapist        Obj/Interventions     Row Name 11/09/20 1535          Motor Skills    Therapeutic Exercise  -- BLE ther. ex. program x 10 reps completed (Ankle Pumps, LAQ's, Hip Flexion)  -MS       User Key  (r) = Recorded By, (t) = Taken By, (c) = Cosigned By    Initials Name Provider Type    Chidi Lopez, PT Physical Therapist        Goals/Plan    No documentation.       Clinical Impression     Row Name 11/09/20 1536          Pain Scale: Numbers Pre/Post-Treatment    Pretreatment Pain Rating  8/10  -MS     Posttreatment Pain Rating  6/10  -MS     Pain Location - Orientation  lower  -MS     Pain Location  back  -MS     Pain Intervention(s)  Medication (See MAR);Rest;Repositioned  -MS     Row Name 11/09/20 1536          Positioning and Restraints    Pre-Treatment Position  sitting in chair/recliner  -MS     Post Treatment Position  bed  -MS     In Bed  notified nsg;supine;call light within reach;encouraged to call for assist;exit alarm on All lines intact.  -MS       User Key  (r) = Recorded By, (t) = Taken By, (c) = Cosigned By    Initials Name Provider Type    Chidi Lopez, PT Physical Therapist        Outcome Measures     Row Name 11/09/20 1537          How much help from another person do you currently need...    Turning from your back to your side while in flat bed without using bedrails?  2  -MS     Moving from lying on back to sitting on the side of a flat bed without bedrails?   2  -MS     Moving to and from a bed to a chair (including a wheelchair)?  2  -MS     Standing up from a chair using your arms (e.g., wheelchair, bedside chair)?  2  -MS     Climbing 3-5 steps with a railing?  2  -MS     To walk in hospital room?  2  -MS     AM-PAC 6 Clicks Score (PT)  12  -MS       User Key  (r) = Recorded By, (t) = Taken By, (c) = Cosigned By    Initials Name Provider Type    MS SarabiaChidi, PT Physical Therapist        Physical Therapy Education                 Title: PT OT SLP Therapies (Done)     Topic: Physical Therapy (Done)     Point: Mobility training (Done)     Learning Progress Summary           Patient Acceptance, E,D, VU,NR by MS at 11/9/2020 1537    Acceptance, E,TB,D, VU,DU,NR by  at 11/8/2020 1439    Acceptance, E,TB,D, VU,DU,NR by  at 11/7/2020 1347    Acceptance, E, NR by  at 11/6/2020 1318                   Point: Home exercise program (Done)     Learning Progress Summary           Patient Acceptance, E,D, VU,NR by MS at 11/9/2020 1537                   Point: Body mechanics (Done)     Learning Progress Summary           Patient Acceptance, E,D, VU,NR by MS at 11/9/2020 1537    Acceptance, E,TB,D, VU,DU,NR by  at 11/8/2020 1439    Acceptance, E,TB,D, VU,DU,NR by  at 11/7/2020 1347    Acceptance, E, NR by  at 11/6/2020 1318                   Point: Precautions (Done)     Learning Progress Summary           Patient Acceptance, E,D, VU,NR by MS at 11/9/2020 1537    Acceptance, E,TB,D, VU,DU,NR by  at 11/8/2020 1439    Acceptance, E,TB,D, VU,DU,NR by  at 11/7/2020 1347    Acceptance, E, NR by  at 11/6/2020 1318                               User Key     Initials Effective Dates Name Provider Type Discipline    MS 04/03/18 -  Chidi Sarabia, PT Physical Therapist PT     10/25/19 -  Yue Castellanos, PT Physical Therapist PT     05/26/20 -  Hammer, Luisa, PT Physical Therapist PT              PT Recommendation and Plan     Plan of Care Reviewed With:  patient  Outcome Summary: Pt. able to ambulate 40 feet, Min. assist x 2, and HHA x 2 this date.  Pt. requires Min. assist x 2 for bed mobility and Min. assist x 2 for sit <-> stand transfers.  x 2 standing rest breaks during ambulation due to fatigue and back pain.  BLE ther. ex. program x 10 reps completed for general strengthening. Limited this date due to increased back pain.     Time Calculation:   PT Charges     Row Name 11/09/20 1539             Time Calculation    Start Time  1425  -MS      Stop Time  1440  -MS      Time Calculation (min)  15 min  -MS      PT Received On  11/09/20  -MS      PT - Next Appointment  11/10/20  -MS         Time Calculation- PT    Total Timed Code Minutes- PT  13 minute(s)  -MS        User Key  (r) = Recorded By, (t) = Taken By, (c) = Cosigned By    Initials Name Provider Type    Chidi Lopez, PT Physical Therapist        Therapy Charges for Today     Code Description Service Date Service Provider Modifiers Qty    [id number]  PT THER PROC EA 15 MIN 11/9/2020 Chidi Sarabia, PT GP 1    08762124943 HC PT THER SUPP EA 15 MIN 11/9/2020 Chidi Sarabia, PT GP 1          PT G-Codes  Outcome Measure Options: AM-PAC 6 Clicks Daily Activity (OT)  AM-PAC 6 Clicks Score (PT): 12  AM-PAC 6 Clicks Score (OT): 12    Chidi Sarabia PT  11/9/2020

## 2020-11-09 NOTE — PLAN OF CARE
Goal Outcome Evaluation:  Plan of Care Reviewed With: patient  Progress: improving  Outcome Summary: pt vss and afebrile during shift, pt up in chair x2 hour w/back brace during shift.  Pt K 2.9 and put on K protocol, received 40mEq x2 during shift, pt was asymtomatic.  Pt had not acute issues during shift.  pt due to go home in the am.  pt safety maintained.

## 2020-11-09 NOTE — PLAN OF CARE
Goal Outcome Evaluation:  Plan of Care Reviewed With: patient, spouse  Progress: no change  Outcome Summary: pt vss and afebrile during shift.  Pt received TLSO back brace, worked w/PT and was able to walk about 50feet.  Pt still c/o back pain and was given Perocet 10mg x2, Morphine 2mg x1, and Tramadol 60mg x1 and was given a heating pad.  Pt has a really bad tremors and is c/o itching.   Pt states she has tremors from the pain and the itching is new but sometimes itches at home.  This nurse is concerned pt may be detoxing.  Pt denies she drinks alcohol; however, pt does take anxiety medication, pain medication, and benadryl at home.  This nurse maintained pt safety and continued to monitor her actions.

## 2020-11-10 NOTE — PROGRESS NOTES
Continued Stay Note  [facility]     Patient Name: Siobhan Prakash  MRN: [id number]  Today's Date: 11/10/2020    Admit Date: 11/5/2020    Discharge Plan     Row Name 11/10/20 1502       Plan    Plan  Home with  and [facility]    Patient/Family in Agreement with Plan  yes    Plan Comments  Patient is being discharged to home with  and [facility].  She is wearing her brace and has DME and home O2 at home from [place] already.   will transport.        Discharge Codes    No documentation.             Shannon Epley, RN

## 2020-11-10 NOTE — PROGRESS NOTES
LOS: 2 days   Patient Care Team:  Lyudmila Granados APRN as PCP - General (Family Medicine)  Carl Perez MD as Consulting Physician (Orthopedic Surgery)    Subjective     Breathing pretty stable back pain is pretty severe still.  No significant cough  Review of Systems:          Objective     Vital Signs  Vital Sign Min/Max for last 24 hours  Temp  Min: 97 °F (36.1 °C)  Max: 98.1 °F (36.7 °C)   BP  Min: 110/84  Max: 158/97   Pulse  Min: 88  Max: 110   Resp  Min: 14  Max: 22   SpO2  Min: 91 %  Max: 99 %   Flow (L/min)  Min: 2  Max: 6   Weight  Min: 47.2 kg (104 lb)  Max: 47.2 kg (104 lb)        Ventilator/Non-Invasive Ventilation Settings (From admission, onward)    None                       Body mass index is 19.02 kg/m².  I/O last 3 completed shifts:  In: 360 [P.O.:360]  Out: -   I/O this shift:  In: 600 [P.O.:600]  Out: -         Physical Exam:  General Appearance: Well-developed elderly thin white female she is resting in bed she does not appear in any acute distress she is on 6 L nasal cannula O2 she usually uses 6 at home her oxygen saturations are about 97%.    Eyes: Conjunctiva are clear and anicteric  ENT: Mucous membranes are moist no erythema or exudates Mallampati type I airway.  Nasal septum midline.  Neck: No adenopathy or thyromegaly trachea is midline I do not appreciate jugular venous distention with her sitting upright.  Lungs: Very decreased but clear no wheezes no rales no rhonchi nonlabored symmetric expansion  Cardiac: Regular rate rhythm no murmur  Abdomen: Soft no palpable hepatosplenomegaly  : Not examined  Musculoskeletal: Severe thoracic kyphosis  Skin: No jaundice no petechiae she has got thin skin, it is warm and dry  Neuro: She is alert oriented cooperative following commands grossly intact  Extremities/P Vascular: No clubbing no cyanosis no edema palpable radial and dorsalis pedis pulses bilaterally  MSE: She seems to be in pretty good spirits       Labs:  Results from  last 7 days   Lab Units 11/10/20  0110 11/09/20  0550 11/06/20  0435 11/05/20  1536   GLUCOSE mg/dL 146* 108* 156* 84   SODIUM mmol/L 139 140 140 142   POTASSIUM mmol/L 4.7 2.9* 4.4 4.4   MAGNESIUM mg/dL 1.8 1.8  --   --    CO2 mmol/L 35.3* 35.9* 32.5* 32.5*   CHLORIDE mmol/L 102 98 98 102   ANION GAP mmol/L 1.7* 6.1 9.5 7.5   CREATININE mg/dL 0.60 0.57 0.72 0.62   BUN mg/dL 10 11 18 14   BUN / CREAT RATIO  16.7 19.3 25.0 22.6   CALCIUM mg/dL 9.1 9.8 10.2 10.1   EGFR IF NONAFRICN AM mL/min/1.73 98 104 79 94   ALK PHOS U/L  --  109  --  145*   TOTAL PROTEIN g/dL  --  6.1  --  7.6   ALT (SGPT) U/L  --  17  --  12   AST (SGOT) U/L  --  26  --  26   BILIRUBIN mg/dL  --  0.2  --  0.2   ALBUMIN g/dL  --  3.50  --  4.00   GLOBULIN gm/dL  --  2.6  --  3.6     Estimated Creatinine Clearance: 46.7 mL/min (by C-G formula based on SCr of 0.6 mg/dL).      Results from last 7 days   Lab Units 11/10/20  0110 11/09/20  0550 11/05/20  1536   WBC 10*3/mm3 6.92 5.44 10.28   RBC 10*6/mm3 3.32* 3.43* 4.55   HEMOGLOBIN g/dL 10.1* 10.4* 13.7   HEMATOCRIT % 31.5* 31.9* 41.5   MCV fL 94.9 93.0 91.2   MCH pg 30.4 30.3 30.1   MCHC g/dL 32.1 32.6 33.0   RDW % 12.0* 12.0* 12.1*   RDW-SD fl 41.9 41.3 40.3   MPV fL 9.4 9.4 9.1   PLATELETS 10*3/mm3 286 292 410   NEUTROPHIL % %  --  67.3 74.9   LYMPHOCYTE % %  --  21.3 18.1*   MONOCYTES % %  --  8.1 5.4   EOSINOPHIL % %  --  2.2 0.5   BASOPHIL % %  --  0.4 0.4   IMM GRAN % %  --  0.7* 0.7*   NEUTROS ABS 10*3/mm3  --  3.66 7.70*   LYMPHS ABS 10*3/mm3  --  1.16 1.86   MONOS ABS 10*3/mm3  --  0.44 0.56   EOS ABS 10*3/mm3  --  0.12 0.05   BASOS ABS 10*3/mm3  --  0.02 0.04   IMMATURE GRANS (ABS) 10*3/mm3  --  0.04 0.07*   NRBC /100 WBC  --  0.0 0.0         Results from last 7 days   Lab Units 11/06/20  0435 11/05/20  1536   TROPONIN T ng/mL <0.010 <0.010                 Results from last 7 days   Lab Units 11/05/20  1536   INR  0.95     Microbiology Results (last 10 days)     Procedure Component Value -  Date/Time    COVID PRE-OP / PRE-PROCEDURE SCREENING ORDER (NO ISOLATION) - Swab, Nasopharynx [[id number]]  (Normal) Collected: 11/05/20 1557    Lab Status: Final result Specimen: Swab from Nasopharynx Updated: 11/05/20 1726    Narrative:      The following orders were created for panel order COVID PRE-OP / PRE-PROCEDURE SCREENING ORDER (NO ISOLATION) - Swab, Nasopharynx.  Procedure                               Abnormality         Status                     ---------                               -----------         ------                     Respiratory Panel PCR w/...[[id number]]  Normal              Final result                 Please view results for these tests on the individual orders.    Respiratory Panel PCR w/COVID-19(SARS-CoV-2) [facility] In-House, NP Swab in UTM/VTM, 3-4 HR TAT - Swab, Nasopharynx [[id number]]  (Normal) Collected: 11/05/20 1557    Lab Status: Final result Specimen: Swab from Nasopharynx Updated: 11/05/20 1726     ADENOVIRUS, PCR Not Detected     Coronavirus 229E Not Detected     Coronavirus HKU1 Not Detected     Coronavirus NL63 Not Detected     Coronavirus OC43 Not Detected     COVID19 Not Detected     Human Metapneumovirus Not Detected     Human Rhinovirus/Enterovirus Not Detected     Influenza A PCR Not Detected     Influenza B PCR Not Detected     Parainfluenza Virus 1 Not Detected     Parainfluenza Virus 2 Not Detected     Parainfluenza Virus 3 Not Detected     Parainfluenza Virus 4 Not Detected     RSV, PCR Not Detected     Bordetella pertussis pcr Not Detected     Bordetella parapertussis PCR Not Detected     Chlamydophila pneumoniae PCR Not Detected     Mycoplasma pneumo by PCR Not Detected    Narrative:      Fact sheet for providers: https://docs.[employer]/wp-content/uploads/IJB9691-5528-MC8.1-EUA-Provider-Fact-Sheet-3.pdf    Fact sheet for patients: https://docs.[employer]/wp-content/uploads/TIR8874-7381-SO8.1-EUA-Patient-Fact-Sheet-1.pdf               apixaban, 10 mg, Oral, Q12H    Followed by  [START ON 11/17/2020] apixaban, 5 mg, Oral, Q12H  arformoterol, 15 mcg, Nebulization, BID - RT  azithromycin, 250 mg, Oral, Q24H  budesonide, 0.5 mg, Nebulization, BID - RT  cholecalciferol, 1,000 Units, Oral, Daily  dilTIAZem CD, 180 mg, Oral, Q24H  famotidine, 20 mg, Oral, BID AC  FLUoxetine, 20 mg, Oral, Daily  guaiFENesin, 1,200 mg, Oral, Q12H  lidocaine, 1 patch, Transdermal, Q24H  loperamide, 2 mg, Oral, BID  mirtazapine, 15 mg, Oral, Nightly  montelukast, 10 mg, Oral, Nightly  roflumilast, 500 mcg, Oral, Daily  sodium chloride, 10 mL, Intravenous, Q12H  theophylline, 400 mg, Oral, Q24H  tiotropium bromide monohydrate, 2 puff, Inhalation, Daily           Diagnostics:  Xr Ribs Right With Pa Chest    Result Date: 11/6/2020  PA CHEST AND RIGHT RIB SERIES 11/06/2020  CLINICAL HISTORY: Complains of low back pain status post twisting back getting out of bed yesterday, possible right rib fracture.  COMPARISON: This is correlated to yesterday's chest CT 11/05/2020.  FINDINGS: On the PA view of the chest, the cardiomediastinal silhouette and the pulmonary vasculature are within normal limits. There are prominent emphysematous changes throughout the lungs. There is mild coarsened interstitial markings left lower lung and lung base, unchanged since chest CT yesterday 11/05/2020. No focal dense airspace consolidation seen. Costophrenic angles are sharp. There is minimal cortical regularity of the right 8th lateral rib. There is a subtle fracture at this site, age-indeterminate suspected.      1. No definite active disease is seen in the chest. 2. There are prominent emphysematous changes throughout the lungs, some coarsened interstitial markings in the left lower lung and left lung base. This is unchanged since yesterday's chest CT. 3. There is some minimal cortical irregularity of the right 8th lateral rib raising the possibility of a very subtle nondisplaced rib  fracture, age-indeterminate, correlation with physical exam suggested, otherwise no right rib fracture seen.  This report was finalized on 11/6/2020 1:20 PM by Dr. Burton Cool M.D.      Xr Spine Thoracic 3 View    Result Date: 11/6/2020  THREE VIEWS OF THE THORACIC SPINE AND LUMBAR SPINE PLAIN FILM SERIES COMPLETE 4+ VIEWS 11/05/2020  CLINICAL HISTORY: Patient twisted back while getting out of bed and heard a pop, has pain in the mid and low back.  COMPARISON: This is correlated to chest CT 08/02/2018.  LUMBAR SPINE PLAIN FILMS: Five views of lumbar spine including AP bilateral oblique and lateral views of the entire lumbar spine and coned down lateral view of lumbosacral junction are submitted for interpretation. There is mild compression deformity involving the superior body and endplate of L5 with 20-30% loss of anterior and central, 20% loss of posterior vertebral body height, precise age of this compression deformity is uncertain but could be acute to subacute. The L1 through L4 lumbar vertebral body heights are well-maintained. There is also a compression fracture involving superior body and endplate of T12 with about 30% loss of anterior and central, 20% loss of posterior vertebral body height at T12. This T12 compression fracture is seen on prior chest CT 08/02/2018 and is chronic. There is also minimal irregularity in the anterior cortex superior body of S3 sacral level, subtle horizontal upper sacral fracture at the S3 sacral level is not excluded. Compression fracture at L5 could be further dated with a lumbar spine MRI.  THORACIC SPINE PLAIN FILMS: Three views of the thoracic spine including AP and lateral views of the entire thoracic spine and swimmer's lateral view of lower cervical and upper thoracic spine are submitted for interpretation. There are 4 compression fractures in the thoracic spine,including a moderate compression fracture involving the T7 vertebra with about 40-50% loss of anterior, no  loss of posterior vertebral body height. This compression fracture was present on chest CT 08/02/2018 and is chronic. There are mild compression fractures involving superior body and endplate of T9 and T10 with about 30% loss of anterior vertebral body height at both these levels. These are new since chest CT 08/02/2018, but the precise age of these compression deformities is uncertain, could be acute to subacute. Reidentified is old compression fracture at T12.      1. There is a moderate old compression fracture at T7 and mild-to-moderate old compression deformity involving the superior body and endplate of T12, unchanged since chest CT 08/02/2018. 2. There are mild-to-moderate compression fractures involving the superior body and endplate of the T9 and T10 thoracic vertebrae that are new when compared to chest CT 08/02/2018, the precise age is uncertain, could be further dated with a thoracic spine MRI.  This report was finalized on 11/6/2020 1:30 PM by Dr. Burton Cool M.D.      Nm Lung Ventilation Perfusion    Result Date: 11/6/2020  NUCLEAR MEDICINE VENTILATION AND PERFUSION STUDY  HISTORY: Severe COPD. Recent chest and back discomfort.  COMPARISON: PA chest 11/06/2020, CT angiogram chest 11/05/2020.  TECHNIQUE:  Planar ventilation scan in posterior projection after inhalation of 7.6 mCi Xe-133 gas with wash-in, rebreathing and wash-out phases. Followed by perfusion scan with 6 mCi Tc-MAA IV in multiple projections.  FINDINGS:  There is a matched area of decreased ventilation and perfusion to the posterior right midlung that when correlated with CT angiogram chest is most likely related to the severity of bolus emphysematous disease in this region. Technically, moderate matched defect without corresponding chest x-ray abnormality is consistent with intermediate probability. Lungs are hyperexpanded and there is overall diminished ventilation and perfusion to the right as compared to the left most likely related  to the severity of emphysema. No focal segmental mismatch defect is evident.      Severe pulmonary emphysema/COPD limits evaluation. Matched ventilation/perfusion may be related to the severity of emphysema/COPD though a moderate matched defect in the right midlung constitutes intermediate probability ventilation/perfusion scan.  This report was finalized on 11/6/2020 12:43 PM by Dr. Rosendo Ferraro M.D.      Ct Abdomen Pelvis With Contrast    Result Date: 11/5/2020  CT ANGIOGRAM CHEST-, CT ABDOMEN PELVIS W CONTRAST-  INDICATIONS: Short of breath, pain  Radiation dose reduction techniques were utilized, including automated exposure control and exposure modulation based on body size.  TECHNIQUE: CTA chest with three-dimensional reconstructions. Enhanced CT of the abdomen, pelvis  COMPARISON: Chest CT from 08/02/2018, 01/15/2018; CT abdomen pelvis from 11/17/2005  FINDINGS:  Chest CTA:  There does appear to be some nonocclusive thrombus in the right lower lobe branch artery, for example axial image 104, in a region of chronic bronchiectasis; this appearance is new from the prior exam from 2018, of uncertain chronicity (possibly chronic). The RV LV ratio is less than 1. Prominence of caliber of central pulmonary arteries may reflect pulmonary arterial hypertension, appearance is chronic. No aortic dissection.  The heart size is normal with minimal pericardial effusion. Mild to moderate coronary arterial calcification. A few small subcentimeter short axis mediastinal lymph nodes are seen that are not significant by size criteria.  The airways appear clear.  No pleural effusion or pneumothorax.  The lungs show no focal pulmonary consolidation or mass. Extensive emphysematous changes of the lungs are noted, similar to prior exam, and chronic bronchiectasis is seen medially at the right base. A stable right middle lobe nodular density measuring 6 mm on axial image 101 is noted. Scarlike density inferiorly in the right  middle lobe is also stable.     Abdomen pelvis CT:  Structures in the pelvis are partly obscured by right hip arthroplasty hardware artifact.  A right adrenal myelolipoma is again demonstrated.  Gallbladder is surgically absent. Mild intrahepatic biliary ductal dilatation. Caliber of the main pancreatic duct, 1 cm, may be normal status post cholecystectomy.  Otherwise unremarkable appearance of the liver, spleen, adrenal glands, pancreas, kidneys, bladder.  The proximal duodenum is abnormally distended up to the midportion of the third segment of the duodenum (where it passes between the superior mesenteric vein and the aorta), that could be result of obstructive effect, only slightly more prominent from the prior exam, whether mechanical obstructive effect or stricture, endoscopic correlation could be obtained as may be indicated. Much stool in the rectum, correlate clinically to exclude possibility of stool impaction. Colonic diverticula are seen that do not appear focally inflamed. Assessment of the colon for wall thickening is limited by lack of distention.  No free intraperitoneal gas or free fluid.  Scattered small mesenteric and para-aortic lymph nodes are seen that are not significant by size criteria.  Abdominal aorta is not aneurysmal. Aortic and other arterial calcifications are present.  Degenerative changes are seen in the spine. The T9 compression deformity, with 35% loss of height, is new from 06/17/2019, but age-indeterminate. Multiple other spinal compression deformities appear similar to prior exams. Right hip arthroplasty hardware is partly included.        1. There does appear to be some nonocclusive thrombus in the right lower lobe branch artery, for example axial image 104, in a region of chronic bronchiectasis; this appearance is new from the prior exam from 2018, of uncertain chronicity (possibly chronic). The RV LV ratio is less than 1. 2. The proximal duodenum is abnormally distended up to  the midportion of the third segment of the duodenum (where it passes between the superior mesenteric vein and the aorta), that could be result of obstructive effect, only slightly more prominent from the prior exam, whether mechanical obstructive effect or stricture, endoscopic correlation could be obtained as may be indicated. Much stool in the rectum, correlate clinically to exclude possibility of stool impaction. Colonic diverticula are seen that do not appear focally inflamed.   3. The T9 compression deformity, with 35% loss of height, is new from 06/17/2019, but age-indeterminate. Multiple other spinal compression deformities appear similar to prior exams.  Discussed by telephone with Dr. Lin at 1750, 11/05/2020  This report was finalized on 11/5/2020 5:54 PM by Dr. Jase Singleton M.D.      Xr Chest 1 View    Result Date: 11/5/2020  XR CHEST 1 VW-  HISTORY: Female who is 75 years-old,  short of breath  TECHNIQUE: Frontal view of the chest  COMPARISON: 10/24/2020  FINDINGS: Heart size is normal. Pulmonary vasculature is unremarkable. Small likely atelectasis at the lung bases. No focal pulmonary consolidation, pleural effusion, or pneumothorax. Emphysematous changes of the upper lungs. No acute osseous process.      Small likely atelectasis at the bases.. Follow-up as clinical indications persist.  This report was finalized on 11/5/2020 5:08 PM by Dr. Jase Singleton M.D.      Xr Chest 1 View    Result Date: 10/25/2020  UPRIGHT AP CHEST  HISTORY: Shortness of air, COPD, asthma.  COMPARISON: AP chest 06/27/2019, 06/25/2019, 2 view chest 06/17/2019.  FINDINGS: Lungs are hyperexpanded and there are increased interstitial markings consistent with COPD. There is blunting of the costophrenic angles similar to previous exam due to chronic scarring or small effusions. No superimposed airspace disease is evident and there is no evidence of perihilar edema or pneumothorax. Cardiomediastinal silhouette is not  changed. There is pulmonary arterial enlargement.      Chronic interstitial disease and COPD. Chronic blunting of the costophrenic angles due to scarring or small effusions. No evidence for superimposed infiltrate or pulmonary edema.  This report was finalized on 10/25/2020 7:09 PM by Dr. Rosendo Ferraro M.D.      Ct Angiogram Chest    Result Date: 11/5/2020  CT ANGIOGRAM CHEST-, CT ABDOMEN PELVIS W CONTRAST-  INDICATIONS: Short of breath, pain  Radiation dose reduction techniques were utilized, including automated exposure control and exposure modulation based on body size.  TECHNIQUE: CTA chest with three-dimensional reconstructions. Enhanced CT of the abdomen, pelvis  COMPARISON: Chest CT from 08/02/2018, 01/15/2018; CT abdomen pelvis from 11/17/2005  FINDINGS:  Chest CTA:  There does appear to be some nonocclusive thrombus in the right lower lobe branch artery, for example axial image 104, in a region of chronic bronchiectasis; this appearance is new from the prior exam from 2018, of uncertain chronicity (possibly chronic). The RV LV ratio is less than 1. Prominence of caliber of central pulmonary arteries may reflect pulmonary arterial hypertension, appearance is chronic. No aortic dissection.  The heart size is normal with minimal pericardial effusion. Mild to moderate coronary arterial calcification. A few small subcentimeter short axis mediastinal lymph nodes are seen that are not significant by size criteria.  The airways appear clear.  No pleural effusion or pneumothorax.  The lungs show no focal pulmonary consolidation or mass. Extensive emphysematous changes of the lungs are noted, similar to prior exam, and chronic bronchiectasis is seen medially at the right base. A stable right middle lobe nodular density measuring 6 mm on axial image 101 is noted. Scarlike density inferiorly in the right middle lobe is also stable.     Abdomen pelvis CT:  Structures in the pelvis are partly obscured by right hip  arthroplasty hardware artifact.  A right adrenal myelolipoma is again demonstrated.  Gallbladder is surgically absent. Mild intrahepatic biliary ductal dilatation. Caliber of the main pancreatic duct, 1 cm, may be normal status post cholecystectomy.  Otherwise unremarkable appearance of the liver, spleen, adrenal glands, pancreas, kidneys, bladder.  The proximal duodenum is abnormally distended up to the midportion of the third segment of the duodenum (where it passes between the superior mesenteric vein and the aorta), that could be result of obstructive effect, only slightly more prominent from the prior exam, whether mechanical obstructive effect or stricture, endoscopic correlation could be obtained as may be indicated. Much stool in the rectum, correlate clinically to exclude possibility of stool impaction. Colonic diverticula are seen that do not appear focally inflamed. Assessment of the colon for wall thickening is limited by lack of distention.  No free intraperitoneal gas or free fluid.  Scattered small mesenteric and para-aortic lymph nodes are seen that are not significant by size criteria.  Abdominal aorta is not aneurysmal. Aortic and other arterial calcifications are present.  Degenerative changes are seen in the spine. The T9 compression deformity, with 35% loss of height, is new from 06/17/2019, but age-indeterminate. Multiple other spinal compression deformities appear similar to prior exams. Right hip arthroplasty hardware is partly included.        1. There does appear to be some nonocclusive thrombus in the right lower lobe branch artery, for example axial image 104, in a region of chronic bronchiectasis; this appearance is new from the prior exam from 2018, of uncertain chronicity (possibly chronic). The RV LV ratio is less than 1. 2. The proximal duodenum is abnormally distended up to the midportion of the third segment of the duodenum (where it passes between the superior mesenteric vein and  the aorta), that could be result of obstructive effect, only slightly more prominent from the prior exam, whether mechanical obstructive effect or stricture, endoscopic correlation could be obtained as may be indicated. Much stool in the rectum, correlate clinically to exclude possibility of stool impaction. Colonic diverticula are seen that do not appear focally inflamed.   3. The T9 compression deformity, with 35% loss of height, is new from 06/17/2019, but age-indeterminate. Multiple other spinal compression deformities appear similar to prior exams.  Discussed by telephone with Dr. Lin at 1750, 11/05/2020  This report was finalized on 11/5/2020 5:54 PM by Dr. Jase Singleton M.D.      Xr Spine Lumbar Complete 4+vw    Result Date: 11/6/2020  THREE VIEWS OF THE THORACIC SPINE AND LUMBAR SPINE PLAIN FILM SERIES COMPLETE 4+ VIEWS 11/05/2020  CLINICAL HISTORY: Patient twisted back while getting out of bed and heard a pop, has pain in the mid and low back.  COMPARISON: This is correlated to chest CT 08/02/2018.  LUMBAR SPINE PLAIN FILMS: Five views of lumbar spine including AP bilateral oblique and lateral views of the entire lumbar spine and coned down lateral view of lumbosacral junction are submitted for interpretation. There is mild compression deformity involving the superior body and endplate of L5 with 20-30% loss of anterior and central, 20% loss of posterior vertebral body height, precise age of this compression deformity is uncertain but could be acute to subacute. The L1 through L4 lumbar vertebral body heights are well-maintained. There is also a compression fracture involving superior body and endplate of T12 with about 30% loss of anterior and central, 20% loss of posterior vertebral body height at T12. This T12 compression fracture is seen on prior chest CT 08/02/2018 and is chronic. There is also minimal irregularity in the anterior cortex superior body of S3 sacral level, subtle horizontal upper  sacral fracture at the S3 sacral level is not excluded. Compression fracture at L5 could be further dated with a lumbar spine MRI.  THORACIC SPINE PLAIN FILMS: Three views of the thoracic spine including AP and lateral views of the entire thoracic spine and swimmer's lateral view of lower cervical and upper thoracic spine are submitted for interpretation. There are 4 compression fractures in the thoracic spine,including a moderate compression fracture involving the T7 vertebra with about 40-50% loss of anterior, no loss of posterior vertebral body height. This compression fracture was present on chest CT 08/02/2018 and is chronic. There are mild compression fractures involving superior body and endplate of T9 and T10 with about 30% loss of anterior vertebral body height at both these levels. These are new since chest CT 08/02/2018, but the precise age of these compression deformities is uncertain, could be acute to subacute. Reidentified is old compression fracture at T12.      1. There is a moderate old compression fracture at T7 and mild-to-moderate old compression deformity involving the superior body and endplate of T12, unchanged since chest CT 08/02/2018. 2. There are mild-to-moderate compression fractures involving the superior body and endplate of the T9 and T10 thoracic vertebrae that are new when compared to chest CT 08/02/2018, the precise age is uncertain, could be further dated with a thoracic spine MRI.  This report was finalized on 11/6/2020 1:30 PM by Dr. Burton Cool M.D.      Results for orders placed during the hospital encounter of 11/05/20   Adult Transthoracic Echo Complete W/ Cont if Necessary Per Protocol    Narrative · Calculated left ventricular EF = 54.8% Estimated left ventricular EF was   in agreement with the calculated left ventricular EF. Left ventricular   systolic function is normal.  · Left ventricular wall thickness is consistent with moderate concentric   hypertrophy.  · Mitral  annular calcification is present.  · No significant valvular regurgitation or stenosis.              Active Hospital Problems    Diagnosis  POA   • **Acute right-sided thoracic back pain [M54.6]  Yes   • Hypokalemia [E87.6]  No   • Chronic respiratory failure with hypoxia and hypercapnia (CMS/HCC) [J96.11, J96.12]  Yes   • Acute pulmonary embolism without acute cor pulmonale (CMS/HCC) [I26.99]  Yes   • Compression fracture of T9 vertebra (CMS/HCC) [S22.070A]  Yes   • Multifocal atrial tachycardia (CMS/HCC) [I47.1]  Yes   • Right-sided chest pain [R07.9]  Yes   • Abnormal CT of the chest [R93.89]  Yes   • COPD (chronic obstructive pulmonary disease) (CMS/HCC) [J44.9]  Yes   • Mixed anxiety depressive disorder [F41.8]  Yes      Resolved Hospital Problems   No resolved problems to display.         Assessment/Plan     1. Possible pulmonary embolus CT not super convincing although there certainly appears to be the possibility of a small clot.  VQ scan not helpful due to her severe lung disease lower extremity Dopplers negative for DVT.  She did come in with right-sided back pain certainly raises these index of suspicion but the pain really is lower than the chest and it occurred with the twisting movement and she heard a pop its not really classic for pulmonary embolus.  She has such severe lung disease however without having an acute decline in her oxygen is hard to believe she has any significant pulmonary embolus.  But I have reviewed the images multiple different views certainly looks like there were 1 or 2 little clots present.  2. Chronic hypoxemic and hypercapnic respiratory failure she is on her home O2.  Patient is at some risk with her severe pain with any sedatives causing hypoventilation.  I talked with patient and her  was there about using the noninvasive ventilator if this occurs as she had previously told that she never wanted to see one again.  I told her things could get really bad if she did use  1 not that she needs it right now and she was very adamant she did not want to ever be placed on a noninvasive ventilator.  I do not think that is a great decision because I think if she gets placed on invasive ventilator very poor chance of coming off  3. COPD severe, patient is on chronic azithromycin for reduction of frequent exacerbations.  4. Multifocal atrial tachycardia  5. T9 vertebral compression fracture neurosurgery is seeing her  6. Possible right eighth rib fracture  7. Severe back pain and muscle spasms probably related to fractures above    I think the question is do we keep her on anticoagulation.  She is relatively immobile that does increase her risk she has severe lung disease that a small clot could be devastating.  The CT is certainly suggestive of a couple of small clots.  The question is the risk benefit she does not have any bleeding diatheses I think probably go ahead and keep her on anticoagulation at least for a few months and see how she does.  When certain no procedure she could transition to Eliquis.  I discussed with she and her .  I think they seem to understand the risk and benefits.  Stressed how important will be to help  support her so she does not fall when she is transitioning herself to and from    Plan for disposition:    Alfie Hightower MD  11/10/20  13:58 EST    Time:

## 2020-11-10 NOTE — PLAN OF CARE
Progression on Care plan waxes and wains with pt level of pain.   Goal Outcome Evaluation:  Plan of Care Reviewed With: patient  Progress: improving     Problem: Adult Inpatient Plan of Care  Goal: Plan of Care Review  Outcome: Ongoing, Progressing  Goal: Patient-Specific Goal (Individualized)  Outcome: Ongoing, Progressing  Goal: Absence of Hospital-Acquired Illness or Injury  Outcome: Ongoing, Progressing  Goal: Optimal Comfort and Wellbeing  Outcome: Ongoing, Progressing  Goal: Readiness for Transition of Care  Outcome: Ongoing, Progressing     Problem: Fall Injury Risk  Goal: Absence of Fall and Fall-Related Injury  Outcome: Ongoing, Progressing

## 2020-11-10 NOTE — DISCHARGE INSTR - ACTIVITY
No lift, push, pull more than 5 pounds, no swimming, no bending or twisting.   No exertional or impact activity- walking is OK.   Wear brace when sitting higher than 45 degrees, standing, walking- unless just from bed to bathroom.   OK to remove brace for showers.

## 2020-11-10 NOTE — PROGRESS NOTES
"   LOS: 2 days   Patient Care Team:  Lyudmila Granados APRN as PCP - General (Family Medicine)  Carl Perez MD as Consulting Physician (Orthopedic Surgery)    Chief Complaint: pain in chest wall    Subjective     Feeling much worse today, sitting on edge of bed in pain--off and on all day now, spasm-type pain in upper back and in ribs anteriorly. Worse with deep breath. No SOA.       Subjective:  Symptoms:  Worsening.  She reports chest pain (improving).  No shortness of breath, malaise, cough (chronic), weakness, headache, chest pressure, anorexia, diarrhea or anxiety.    Diet:  Adequate intake.  No nausea or vomiting.    Activity level: Impaired due to pain.    Pain:  She complains of pain that is severe.  She reports pain is worsening.  Pain is requiring pain medication and partially controlled.        History taken from: patient chart family RN    Objective     Vital Signs  Temp:  [97 °F (36.1 °C)-98.1 °F (36.7 °C)] 97 °F (36.1 °C)  Heart Rate:  [] 96  Resp:  [14-22] 14  BP: (110-158)/(71-97) 145/81    Objective:  General Appearance:  Uncomfortable and in pain (chronically ill-appearing, cachectic).    Vital signs: (most recent): Blood pressure 145/81, pulse 96, temperature 97 °F (36.1 °C), temperature source Oral, resp. rate 14, height 157.5 cm (62\"), weight 47.2 kg (104 lb), SpO2 95 %, not currently breastfeeding.  Vital signs are normal.  No fever.  (Less tachycardic today).    Output: Producing urine.    HEENT: Normal HEENT exam.    Lungs:  Normal effort and normal respiratory rate.  She is not in respiratory distress.  There are decreased breath sounds.  No wheezes.    Heart: Normal rate.  Regular rhythm.    Abdomen: Abdomen is soft.  Bowel sounds are normal.   There is no abdominal tenderness.     Extremities: There is no dependent edema.    Pulses: Distal pulses are intact.    Neurological: Patient is alert and oriented to person, place and time.  Normal strength.  Patient has normal muscle tone.  "   Pupils:  Pupils are equal, round, and reactive to light.    Skin:  Warm and dry.  No rash.             Results Review:     I reviewed the patient's new clinical results.  I reviewed the patient's other test results and agree with the interpretation  I personally viewed and interpreted the patient's EKG/Telemetry data  Discussed with pt, , RN, and CCP    Results from last 7 days   Lab Units 11/10/20  0110 11/09/20  0550 11/05/20  1536   WBC 10*3/mm3 6.92 5.44 10.28   HEMOGLOBIN g/dL 10.1* 10.4* 13.7   PLATELETS 10*3/mm3 286 292 410     Results from last 7 days   Lab Units 11/10/20  0110 11/09/20  0550 11/06/20  0435 11/05/20  1536   SODIUM mmol/L 139 140 140 142   POTASSIUM mmol/L 4.7 2.9* 4.4 4.4   CHLORIDE mmol/L 102 98 98 102   CO2 mmol/L 35.3* 35.9* 32.5* 32.5*   BUN mg/dL 10 11 18 14   CREATININE mg/dL 0.60 0.57 0.72 0.62   CALCIUM mg/dL 9.1 9.8 10.2 10.1   MAGNESIUM mg/dL 1.8 1.8  --   --    Estimated Creatinine Clearance: 46.7 mL/min (by C-G formula based on SCr of 0.6 mg/dL).    Medication Review: reviewed and adjusted    Assessment/Plan       Acute right-sided thoracic back pain    Mixed anxiety depressive disorder    COPD (chronic obstructive pulmonary disease) (CMS/HCC)    Right-sided chest pain    Abnormal CT of the chest    Chronic respiratory failure with hypoxia and hypercapnia (CMS/HCC)    Acute pulmonary embolism without acute cor pulmonale (CMS/HCC)    Compression fracture of T9 vertebra (CMS/HCC)    Multifocal atrial tachycardia (CMS/HCC)    Hypokalemia          Plan:   (· Severe right-sided thoracic back pain: likely musculoskeletal, related to acute thoracic compression fractures (T9 +/- T10) and possible right 8th rib fracture.  Ideally needs an MRI to investigate further, but I do not think she would tolerate it--nor does the patient.   Continue current pain regimen, have added Baclofen today for worsening spasm-type pain   Neurosurgery recs noted. Okay to dc from their standpoint and  f/u in office in 2-3 weeks. TSLO brace used today again but pain worse, PT working with her  · Abnormal CT,? PEs: DDimer quite high. BLE dopplers negative for DVT, V/Q scan indeterminate due to her severe COPD.  She is on therapeutic Lovenox for now. Appreciate Pulm attention to pt--Dr. Hightower feels she should be on full AC for at least a few months, have now transitioned to Eliquis  · Severe/end-stage COPD with chronic bronchiectasis: management per Pulm.  Continue current respiratory regimen, not wheezing today. Restarted home Zithromax after d/w Dr. Hightower  · Chronic hypoxic/hypercapnic respiratory failure: 6L/min chronic O2 requirement.  Very marginal respiratory status even at baseline  · Tachycardia/MAT: improved today.  Cardiology help appreciated, pt now on Cardizem, they have signed off  · Dry skin/Pruritis: continue topical Eucerin, have added PRN oral Hydroxyzine  -           Hypokalemia: Mg++ fine, have started oral KCl protocol and K+ fine today    Disposition: mostly depends on mobility, she has been able to do very little thus far.  Home health is the plan per CCP after some more PT).       Deni Dodson MD  11/10/20  13:52 EST    Time: 25min

## 2020-11-10 NOTE — PLAN OF CARE
Goal Outcome Evaluation:  Plan of Care Reviewed With: patient  Progress: improving  Outcome Summary: VSS EXCEPT DESATS WITH MINIMAL EXERTION. REMAINS ON 6L/M HF. ASKING  FREQUENTLY FOR PAIN MEDICATION, ATIVAN, AND ATARAX WITH RELIEF AT TIMES.

## 2020-11-11 NOTE — DISCHARGE SUMMARY
Patient Name: Siobhan Prakash  : 1945  MRN: [id number]    Date of Admission: 2020  Date of Discharge:  2020  Primary Care Physician: Lyudmila Granados APRN      Chief Complaint:   Abdominal Pain      Discharge Diagnoses     Active Hospital Problems    Diagnosis  POA   • **Acute right-sided thoracic back pain [M54.6]  Yes   • Hypokalemia [E87.6]  No   • Chronic respiratory failure with hypoxia and hypercapnia (CMS/HCC) [J96.11, J96.12]  Yes   • Acute pulmonary embolism without acute cor pulmonale (CMS/HCC) [I26.99]  Yes   • Compression fracture of T9 vertebra (CMS/HCC) [S22.070A]  Yes   • Multifocal atrial tachycardia (CMS/HCC) [I47.1]  Yes   • Right-sided chest pain [R07.9]  Yes   • Abnormal CT of the chest [R93.89]  Yes   • COPD (chronic obstructive pulmonary disease) (CMS/HCC) [J44.9]  Yes   • Mixed anxiety depressive disorder [F41.8]  Yes      Resolved Hospital Problems   No resolved problems to display.        Hospital Course     Very pleasant 74yo woman with a history of severe COPD with chronic respiratory failure on 6 L of chronic oxygen at baseline, that presented to [facility] complaining of right flank/back pain.  Symptoms started the night prior  after she had gotten out of the shower and while turning felt a pop in her chest/abdomen/back. Afterward she had severe pain. The pain was worse with movement.  It was somewhat of a back/thoracic/right flank pain but she said that at times it had wrapped around her whole body like a belt.  She was unable to ambulate because of this.  EMS brought her to the emergency room.  She was given IV pain medicine with improvement of her symptoms. Please see below for details of admission:    ·          Severe right-sided thoracic back pain: musculoskeletal, related to acute thoracic compression fractures (T9 +/- T10) and possible right 8th rib fracture.  Ideally needed an MRI to investigate further, but I do not think she would  tolerate it--nor does the patient.              Continue current pain regimen, have added Baclofen for worsening spasm-type pain              Neurosurgery recs noted. Okay to dc from their standpoint and f/u in office in 2-3 weeks. TSLO brace used today again with good result, PT working with her and will continue to follow at home.  ·           Abnormal CT,? PEs: DDimer quite high. BLE dopplers negative for DVT, V/Q scan indeterminate due to her severe COPD. Appreciate Pulm attention to pt--Dr. Hightower feels she should be on full AC for at least a few months, have now transitioned to Eliquis  ·           Severe/end-stage COPD with chronic bronchiectasis: management per Pulm.  Continue current respiratory regimen, not wheezing today. Restarted home Zithromax after d/w Dr. Hightower  ·           Chronic hypoxic/hypercapnic respiratory failure: 6L/min chronic O2 requirement.  Very marginal respiratory status even at baseline  ·           Tachycardia/MAT: improved today.  Cardiology help appreciated, pt now on Cardizem, they have signed off  ·           Dry skin/Pruritis: continue topical Eucerin, have added PRN oral Hydroxyzine  -           Hypokalemia: Mg++ fine, have started oral KCl protocol and K+ fine today    Home today with [facility]    Day of Discharge     Subjective:  Feeling better today from pain standpoint. TSLO brace seems to help with pain when OOB. Voiding well. Tolerating diet. No SOA at rest. Severe itching/dry skin back of her legs is improved today. Very adamant that she go home today.    Review of Systems    Physical Exam:  Temp:  [97.4 °F (36.3 °C)-98.2 °F (36.8 °C)] 98 °F (36.7 °C)  Heart Rate:  [] 125  Resp:  [18-20] 18  BP: (143-167)/(75-86) 143/75  Body mass index is 19.02 kg/m².  Physical Exam  General Appearance:  looks much better today. Not in pain.  Vital signs:  Vital signs are normal.  No fever.  (Less tachycardic today).    Output: Producing urine.    HEENT: Normal HEENT  exam.    Lungs:  Normal effort and normal respiratory rate.  She is not in respiratory distress.  There are decreased breath sounds.  No wheezes.   Chest: wearing TLSO brace   Heart: Normal rate.  Regular rhythm.    Abdomen: Abdomen is soft.  Bowel sounds are normal.   There is no abdominal tenderness.     Extremities: There is no dependent edema.    Pulses: Distal pulses are intact.    Neurological: Patient is alert and oriented to person, place and time.  Normal strength.  Patient has normal muscle tone.    Pupils:  Pupils are equal, round, and reactive to light.    Skin:  Warm and dry.  No rash.      Consultants     Consult Orders (all) (From admission, onward)     Start     Ordered    11/06/20 1732  Inpatient Neurosurgery Consult  Once     Specialty:  Neurosurgery  Provider:  Santos Vázquez MD    11/06/20 1732    11/06/20 0948  Inpatient Cardiology Consult  Once     Specialty:  Cardiology  Provider:  Tiago Fajardo MD    11/06/20 0949    11/05/20 2352  Inpatient Case Management  Consult  Once     Provider:  (Not yet assigned)    11/05/20 2351    11/05/20 2059  Inpatient Pulmonology Consult  Once     Specialty:  Pulmonary Disease  Provider:  Sid Minor MD    11/05/20 2100    11/05/20 1918  [facility] (on-call MD unless specified) Details  Once     Specialty:  Hospitalist  Provider:  Lenard Zapata MD    11/05/20 1917 11/05/20 1855  Pulmonology (on-call MD unless specified)  Once     Specialty:  Pulmonary Disease  Provider:  (Not yet assigned)    11/05/20 1854              Procedures     Imaging Results (All)     Procedure Component Value Units Date/Time    XR Spine Lumbar Complete 4+VW [[id number]] Collected: 11/06/20 0638     Updated: 11/06/20 1334    Narrative:      THREE VIEWS OF THE THORACIC SPINE AND LUMBAR SPINE PLAIN FILM SERIES  COMPLETE 4+ VIEWS 11/05/2020     CLINICAL HISTORY: Patient twisted back while getting out of bed and  heard a pop, has pain in the mid and low back.      COMPARISON: This is correlated to chest CT 08/02/2018.     LUMBAR SPINE PLAIN FILMS: Five views of lumbar spine including AP  bilateral oblique and lateral views of the entire lumbar spine and coned  down lateral view of lumbosacral junction are submitted for  interpretation. There is mild compression deformity involving the  superior body and endplate of L5 with 20-30% loss of anterior and  central, 20% loss of posterior vertebral body height, precise age of  this compression deformity is uncertain but could be acute to subacute.  The L1 through L4 lumbar vertebral body heights are well-maintained.  There is also a compression fracture involving superior body and  endplate of T12 with about 30% loss of anterior and central, 20% loss of  posterior vertebral body height at T12. This T12 compression fracture is  seen on prior chest CT 08/02/2018 and is chronic. There is also minimal  irregularity in the anterior cortex superior body of S3 sacral level,  subtle horizontal upper sacral fracture at the S3 sacral level is not  excluded. Compression fracture at L5 could be further dated with a  lumbar spine MRI.     THORACIC SPINE PLAIN FILMS: Three views of the thoracic spine including  AP and lateral views of the entire thoracic spine and swimmer's lateral  view of lower cervical and upper thoracic spine are submitted for  interpretation. There are 4 compression fractures in the thoracic  spine,including a moderate compression fracture involving the T7  vertebra with about 40-50% loss of anterior, no loss of posterior  vertebral body height. This compression fracture was present on chest CT  08/02/2018 and is chronic. There are mild compression fractures  involving superior body and endplate of T9 and T10 with about 30% loss  of anterior vertebral body height at both these levels. These are new  since chest CT 08/02/2018, but the precise age of these compression  deformities is uncertain, could be acute to subacute.  Reidentified is  old compression fracture at T12.        Impression:      1. There is a moderate old compression fracture at T7 and  mild-to-moderate old compression deformity involving the superior body  and endplate of T12, unchanged since chest CT 08/02/2018.  2. There are mild-to-moderate compression fractures involving the  superior body and endplate of the T9 and T10 thoracic vertebrae that are  new when compared to chest CT 08/02/2018, the precise age is uncertain,  could be further dated with a thoracic spine MRI.     This report was finalized on 11/6/2020 1:30 PM by Dr. Burton Cool M.D.       XR Spine Thoracic 3 View [[id number]] Collected: 11/06/20 0638     Updated: 11/06/20 1334    Narrative:      THREE VIEWS OF THE THORACIC SPINE AND LUMBAR SPINE PLAIN FILM SERIES  COMPLETE 4+ VIEWS 11/05/2020     CLINICAL HISTORY: Patient twisted back while getting out of bed and  heard a pop, has pain in the mid and low back.     COMPARISON: This is correlated to chest CT 08/02/2018.     LUMBAR SPINE PLAIN FILMS: Five views of lumbar spine including AP  bilateral oblique and lateral views of the entire lumbar spine and coned  down lateral view of lumbosacral junction are submitted for  interpretation. There is mild compression deformity involving the  superior body and endplate of L5 with 20-30% loss of anterior and  central, 20% loss of posterior vertebral body height, precise age of  this compression deformity is uncertain but could be acute to subacute.  The L1 through L4 lumbar vertebral body heights are well-maintained.  There is also a compression fracture involving superior body and  endplate of T12 with about 30% loss of anterior and central, 20% loss of  posterior vertebral body height at T12. This T12 compression fracture is  seen on prior chest CT 08/02/2018 and is chronic. There is also minimal  irregularity in the anterior cortex superior body of S3 sacral level,  subtle horizontal upper sacral fracture at  the S3 sacral level is not  excluded. Compression fracture at L5 could be further dated with a  lumbar spine MRI.     THORACIC SPINE PLAIN FILMS: Three views of the thoracic spine including  AP and lateral views of the entire thoracic spine and swimmer's lateral  view of lower cervical and upper thoracic spine are submitted for  interpretation. There are 4 compression fractures in the thoracic  spine,including a moderate compression fracture involving the T7  vertebra with about 40-50% loss of anterior, no loss of posterior  vertebral body height. This compression fracture was present on chest CT  08/02/2018 and is chronic. There are mild compression fractures  involving superior body and endplate of T9 and T10 with about 30% loss  of anterior vertebral body height at both these levels. These are new  since chest CT 08/02/2018, but the precise age of these compression  deformities is uncertain, could be acute to subacute. Reidentified is  old compression fracture at T12.        Impression:      1. There is a moderate old compression fracture at T7 and  mild-to-moderate old compression deformity involving the superior body  and endplate of T12, unchanged since chest CT 08/02/2018.  2. There are mild-to-moderate compression fractures involving the  superior body and endplate of the T9 and T10 thoracic vertebrae that are  new when compared to chest CT 08/02/2018, the precise age is uncertain,  could be further dated with a thoracic spine MRI.     This report was finalized on 11/6/2020 1:30 PM by Dr. Burton Cool M.D.       XR Ribs Right With PA Chest [[id number]] Collected: 11/06/20 0930     Updated: 11/06/20 1323    Narrative:      PA CHEST AND RIGHT RIB SERIES 11/06/2020     CLINICAL HISTORY: Complains of low back pain status post twisting back  getting out of bed yesterday, possible right rib fracture.     COMPARISON: This is correlated to yesterday's chest CT 11/05/2020.     FINDINGS: On the PA view of the chest,  the cardiomediastinal silhouette  and the pulmonary vasculature are within normal limits. There are  prominent emphysematous changes throughout the lungs. There is mild  coarsened interstitial markings left lower lung and lung base, unchanged  since chest CT yesterday 11/05/2020. No focal dense airspace  consolidation seen. Costophrenic angles are sharp. There is minimal  cortical regularity of the right 8th lateral rib. There is a subtle  fracture at this site, age-indeterminate suspected.       Impression:      1. No definite active disease is seen in the chest.  2. There are prominent emphysematous changes throughout the lungs, some  coarsened interstitial markings in the left lower lung and left lung  base. This is unchanged since yesterday's chest CT.   3. There is some minimal cortical irregularity of the right 8th lateral  rib raising the possibility of a very subtle nondisplaced rib fracture,  age-indeterminate, correlation with physical exam suggested, otherwise  no right rib fracture seen.     This report was finalized on 11/6/2020 1:20 PM by Dr. Burton Cool M.D.       NM Lung Ventilation Perfusion [[id number]] Collected: 11/06/20 1112     Updated: 11/06/20 1246    Narrative:      NUCLEAR MEDICINE VENTILATION AND PERFUSION STUDY     HISTORY: Severe COPD. Recent chest and back discomfort.     COMPARISON: PA chest 11/06/2020, CT angiogram chest 11/05/2020.     TECHNIQUE:  Planar ventilation scan in posterior projection after  inhalation of 7.6 mCi Xe-133 gas with wash-in, rebreathing and wash-out  phases. Followed by perfusion scan with 6 mCi Tc-MAA IV in multiple  projections.      FINDINGS:  There is a matched area of decreased ventilation and  perfusion to the posterior right midlung that when correlated with CT  angiogram chest is most likely related to the severity of bolus  emphysematous disease in this region. Technically, moderate matched  defect without corresponding chest x-ray abnormality is  consistent with  intermediate probability. Lungs are hyperexpanded and there is overall  diminished ventilation and perfusion to the right as compared to the  left most likely related to the severity of emphysema. No focal  segmental mismatch defect is evident.       Impression:      Severe pulmonary emphysema/COPD limits evaluation. Matched  ventilation/perfusion may be related to the severity of emphysema/COPD  though a moderate matched defect in the right midlung constitutes  intermediate probability ventilation/perfusion scan.     This report was finalized on 11/6/2020 12:43 PM by Dr. Rosendo Ferraro M.D.       CT Angiogram Chest [[id number]] Collected: 11/05/20 1735     Updated: 11/05/20 1758    Narrative:      CT ANGIOGRAM CHEST-, CT ABDOMEN PELVIS W CONTRAST-     INDICATIONS: Short of breath, pain  Radiation dose reduction techniques  were utilized, including automated exposure control and exposure  modulation based on body size.     TECHNIQUE: CTA chest with three-dimensional reconstructions. Enhanced CT  of the abdomen, pelvis     COMPARISON: Chest CT from 08/02/2018, 01/15/2018; CT abdomen pelvis from  11/17/2005     FINDINGS:     Chest CTA:     There does appear to be some nonocclusive thrombus in the right lower  lobe branch artery, for example axial image 104, in a region of chronic  bronchiectasis; this appearance is new from the prior exam from 2018, of  uncertain chronicity (possibly chronic). The RV LV ratio is less than 1.  Prominence of caliber of central pulmonary arteries may reflect  pulmonary arterial hypertension, appearance is chronic. No aortic  dissection.     The heart size is normal with minimal pericardial effusion. Mild to  moderate coronary arterial calcification. A few small subcentimeter  short axis mediastinal lymph nodes are seen that are not significant by  size criteria.     The airways appear clear.     No pleural effusion or pneumothorax.     The lungs show no focal pulmonary  consolidation or mass. Extensive  emphysematous changes of the lungs are noted, similar to prior exam, and  chronic bronchiectasis is seen medially at the right base. A stable  right middle lobe nodular density measuring 6 mm on axial image 101 is  noted. Scarlike density inferiorly in the right middle lobe is also  stable.              Abdomen pelvis CT:     Structures in the pelvis are partly obscured by right hip arthroplasty  hardware artifact.     A right adrenal myelolipoma is again demonstrated.     Gallbladder is surgically absent. Mild intrahepatic biliary ductal  dilatation. Caliber of the main pancreatic duct, 1 cm, may be normal  status post cholecystectomy.     Otherwise unremarkable appearance of the liver, spleen, adrenal glands,  pancreas, kidneys, bladder.     The proximal duodenum is abnormally distended up to the midportion of  the third segment of the duodenum (where it passes between the superior  mesenteric vein and the aorta), that could be result of obstructive  effect, only slightly more prominent from the prior exam, whether  mechanical obstructive effect or stricture, endoscopic correlation could  be obtained as may be indicated. Much stool in the rectum, correlate  clinically to exclude possibility of stool impaction. Colonic  diverticula are seen that do not appear focally inflamed. Assessment of  the colon for wall thickening is limited by lack of distention.     No free intraperitoneal gas or free fluid.     Scattered small mesenteric and para-aortic lymph nodes are seen that are  not significant by size criteria.     Abdominal aorta is not aneurysmal. Aortic and other arterial  calcifications are present.     Degenerative changes are seen in the spine. The T9 compression  deformity, with 35% loss of height, is new from 06/17/2019, but  age-indeterminate. Multiple other spinal compression deformities appear  similar to prior exams. Right hip arthroplasty hardware is partly  included.        Impression:            1. There does appear to be some nonocclusive thrombus in the right lower  lobe branch artery, for example axial image 104, in a region of chronic  bronchiectasis; this appearance is new from the prior exam from 2018, of  uncertain chronicity (possibly chronic). The RV LV ratio is less than 1.  2. The proximal duodenum is abnormally distended up to the midportion of  the third segment of the duodenum (where it passes between the superior  mesenteric vein and the aorta), that could be result of obstructive  effect, only slightly more prominent from the prior exam, whether  mechanical obstructive effect or stricture, endoscopic correlation could  be obtained as may be indicated. Much stool in the rectum, correlate  clinically to exclude possibility of stool impaction. Colonic  diverticula are seen that do not appear focally inflamed.        3. The T9 compression deformity, with 35% loss of height, is new from  06/17/2019, but age-indeterminate. Multiple other spinal compression  deformities appear similar to prior exams.     Discussed by telephone with Dr. Lin at 1750, 11/05/2020     This report was finalized on 11/5/2020 5:54 PM by Dr. Jase Singleton M.D.       CT Abdomen Pelvis With Contrast [[id number]] Collected: 11/05/20 1735     Updated: 11/05/20 4587    Narrative:      CT ANGIOGRAM CHEST-, CT ABDOMEN PELVIS W CONTRAST-     INDICATIONS: Short of breath, pain  Radiation dose reduction techniques  were utilized, including automated exposure control and exposure  modulation based on body size.     TECHNIQUE: CTA chest with three-dimensional reconstructions. Enhanced CT  of the abdomen, pelvis     COMPARISON: Chest CT from 08/02/2018, 01/15/2018; CT abdomen pelvis from  11/17/2005     FINDINGS:     Chest CTA:     There does appear to be some nonocclusive thrombus in the right lower  lobe branch artery, for example axial image 104, in a region of chronic  bronchiectasis; this  appearance is new from the prior exam from 2018, of  uncertain chronicity (possibly chronic). The RV LV ratio is less than 1.  Prominence of caliber of central pulmonary arteries may reflect  pulmonary arterial hypertension, appearance is chronic. No aortic  dissection.     The heart size is normal with minimal pericardial effusion. Mild to  moderate coronary arterial calcification. A few small subcentimeter  short axis mediastinal lymph nodes are seen that are not significant by  size criteria.     The airways appear clear.     No pleural effusion or pneumothorax.     The lungs show no focal pulmonary consolidation or mass. Extensive  emphysematous changes of the lungs are noted, similar to prior exam, and  chronic bronchiectasis is seen medially at the right base. A stable  right middle lobe nodular density measuring 6 mm on axial image 101 is  noted. Scarlike density inferiorly in the right middle lobe is also  stable.              Abdomen pelvis CT:     Structures in the pelvis are partly obscured by right hip arthroplasty  hardware artifact.     A right adrenal myelolipoma is again demonstrated.     Gallbladder is surgically absent. Mild intrahepatic biliary ductal  dilatation. Caliber of the main pancreatic duct, 1 cm, may be normal  status post cholecystectomy.     Otherwise unremarkable appearance of the liver, spleen, adrenal glands,  pancreas, kidneys, bladder.     The proximal duodenum is abnormally distended up to the midportion of  the third segment of the duodenum (where it passes between the superior  mesenteric vein and the aorta), that could be result of obstructive  effect, only slightly more prominent from the prior exam, whether  mechanical obstructive effect or stricture, endoscopic correlation could  be obtained as may be indicated. Much stool in the rectum, correlate  clinically to exclude possibility of stool impaction. Colonic  diverticula are seen that do not appear focally inflamed.  Assessment of  the colon for wall thickening is limited by lack of distention.     No free intraperitoneal gas or free fluid.     Scattered small mesenteric and para-aortic lymph nodes are seen that are  not significant by size criteria.     Abdominal aorta is not aneurysmal. Aortic and other arterial  calcifications are present.     Degenerative changes are seen in the spine. The T9 compression  deformity, with 35% loss of height, is new from 06/17/2019, but  age-indeterminate. Multiple other spinal compression deformities appear  similar to prior exams. Right hip arthroplasty hardware is partly  included.       Impression:            1. There does appear to be some nonocclusive thrombus in the right lower  lobe branch artery, for example axial image 104, in a region of chronic  bronchiectasis; this appearance is new from the prior exam from 2018, of  uncertain chronicity (possibly chronic). The RV LV ratio is less than 1.  2. The proximal duodenum is abnormally distended up to the midportion of  the third segment of the duodenum (where it passes between the superior  mesenteric vein and the aorta), that could be result of obstructive  effect, only slightly more prominent from the prior exam, whether  mechanical obstructive effect or stricture, endoscopic correlation could  be obtained as may be indicated. Much stool in the rectum, correlate  clinically to exclude possibility of stool impaction. Colonic  diverticula are seen that do not appear focally inflamed.        3. The T9 compression deformity, with 35% loss of height, is new from  06/17/2019, but age-indeterminate. Multiple other spinal compression  deformities appear similar to prior exams.     Discussed by telephone with Dr. Lin at 1750, 11/05/2020     This report was finalized on 11/5/2020 5:54 PM by Dr. Jase Singleton M.D.       XR Chest 1 View [[id number]] Collected: 11/05/20 1706     Updated: 11/05/20 1711    Narrative:      XR CHEST 1 VW-      HISTORY: Female who is 75 years-old,  short of breath     TECHNIQUE: Frontal view of the chest     COMPARISON: 10/24/2020     FINDINGS: Heart size is normal. Pulmonary vasculature is unremarkable.  Small likely atelectasis at the lung bases. No focal pulmonary  consolidation, pleural effusion, or pneumothorax. Emphysematous changes  of the upper lungs. No acute osseous process.       Impression:      Small likely atelectasis at the bases.. Follow-up as  clinical indications persist.     This report was finalized on 11/5/2020 5:08 PM by Dr. Jase Singleton M.D.             Pertinent Labs     Results from last 7 days   Lab Units 11/11/20  0419 11/10/20  0110 11/09/20  0550 11/05/20  1536   WBC 10*3/mm3 5.90 6.92 5.44 10.28   HEMOGLOBIN g/dL 9.9* 10.1* 10.4* 13.7   PLATELETS 10*3/mm3 306 286 292 410     Results from last 7 days   Lab Units 11/11/20  0419 11/10/20  0110 11/09/20  0550 11/06/20  0435   SODIUM mmol/L 140 139 140 140   POTASSIUM mmol/L 4.0 4.7 2.9* 4.4   CHLORIDE mmol/L 101 102 98 98   CO2 mmol/L 35.4* 35.3* 35.9* 32.5*   BUN mg/dL 10 10 11 18   CREATININE mg/dL 0.63 0.60 0.57 0.72   GLUCOSE mg/dL 108* 146* 108* 156*   Estimated Creatinine Clearance: 46.7 mL/min (by C-G formula based on SCr of 0.63 mg/dL).  Results from last 7 days   Lab Units 11/09/20 0550 11/05/20  1536   ALBUMIN g/dL 3.50 4.00   BILIRUBIN mg/dL 0.2 0.2   ALK PHOS U/L 109 145*   AST (SGOT) U/L 26 26   ALT (SGPT) U/L 17 12     Results from last 7 days   Lab Units 11/11/20  0419 11/10/20  0110 11/09/20  0550 11/06/20  0435 11/05/20  1536   CALCIUM mg/dL 9.6 9.1 9.8 10.2 10.1   ALBUMIN g/dL  --   --  3.50  --  4.00   MAGNESIUM mg/dL  --  1.8 1.8  --   --      Results from last 7 days   Lab Units 11/05/20  1536   LIPASE U/L 10*     Results from last 7 days   Lab Units 11/06/20  0435 11/05/20  1930 11/05/20  1536   TROPONIN T ng/mL <0.010  --  <0.010   D DIMER QUANT MCGFEU/mL  --  3.67*  --            Invalid input(s): LDLCALC         Test Results Pending at Discharge       Discharge Details        Discharge Medications      New Medications      Instructions Start Date   baclofen 10 MG tablet  Commonly known as: LIORESAL   10 mg, Oral, 3 Times Daily PRN      dilTIAZem  MG 24 hr capsule  Commonly known as: CARDIZEM CD   180 mg, Oral, Every 24 Hours Scheduled   Start Date: November 12, 2020     Eliquis DVT/PE Starter Pack tablet therapy pack  Generic drug: Apixaban Starter Pack   10 mg, Oral, Every 12 Hours Scheduled      apixaban 5 MG tablet tablet  Commonly known as: ELIQUIS   5 mg, Oral, Every 12 Hours Scheduled   Start Date: November 17, 2020     hydrOXYzine 25 MG tablet  Commonly known as: ATARAX   25 mg, Oral, 3 Times Daily PRN      oxyCODONE-acetaminophen  MG per tablet  Commonly known as: PERCOCET   1 tablet, Oral, Every 4 Hours PRN         Changes to Medications      Instructions Start Date   dicyclomine 20 MG tablet  Commonly known as: Bentyl  What changed: additional instructions   20 mg, Oral, Every 6 Hours         Continue These Medications      Instructions Start Date   albuterol sulfate  (90 Base) MCG/ACT inhaler  Commonly known as: PROVENTIL HFA;VENTOLIN HFA;PROAIR HFA   2 puffs, Inhalation, Every 4 Hours PRN      arformoterol 15 MCG/2ML nebulizer solution  Commonly known as: BROVANA   15 mcg, Inhalation      azithromycin 500 MG tablet  Commonly known as: ZITHROMAX   No dose, route, or frequency recorded.      benzonatate 100 MG capsule  Commonly known as: Tessalon Perles   200 mg, Oral, 3 Times Daily PRN      BIOTIN PO   1 tablet, Oral, Daily      budesonide 0.5 MG/2ML nebulizer solution  Commonly known as: PULMICORT   0.5 mg      ferrous sulfate 325 (65 FE) MG tablet   325 mg, Oral, 2 Times Daily With Meals      FLUoxetine 20 MG capsule  Commonly known as: PROzac   20 mg, Oral, Daily      guaiFENesin 600 MG 12 hr tablet  Commonly known as: MUCINEX   1,200 mg, Oral, Every 12 Hours Scheduled      lansoprazole  15 MG capsule  Commonly known as: PREVACID   15 mg, Oral, Daily      lidocaine 5 %  Commonly known as: LIDODERM   1 patch, Transdermal, Every 24 Hours, Remove & Discard patch within 12 hours or as directed by MD      loperamide 2 MG capsule  Commonly known as: IMODIUM   2 mg, Oral, 4 Times Daily PRN      LORazepam 0.5 MG tablet  Commonly known as: ATIVAN   TK ONE T PO Q 4 TO 6 H PRA      meloxicam 15 MG tablet  Commonly known as: MOBIC   15 mg, Oral, Daily      mirtazapine 15 MG tablet  Commonly known as: REMERON   15 mg, Oral, Nightly      montelukast 10 MG tablet  Commonly known as: SINGULAIR   10 mg, Oral, Nightly      mupirocin 2 % cream  Commonly known as: Bactroban   Topical, 3 Times Daily      O2  Commonly known as: OXYGEN   Inhalation, Once      pseudoephedrine 60 MG tablet  Commonly known as: SUDAFED   60 mg, Oral, Every 6 Hours PRN      roflumilast 500 MCG tablet tablet  Commonly known as: DALIRESP   500 mcg, Oral, Daily      Spiriva Respimat 2.5 MCG/ACT aerosol solution inhaler  Generic drug: tiotropium bromide monohydrate   2 puffs, Inhalation, Daily      SUMAtriptan 50 MG tablet  Commonly known as: Imitrex   Take one tablet at onset of headache. May repeat dose one time in 2 hours if headache not relieved.      theophylline 400 MG 24 hr tablet  Commonly known as: UNIPHYL   No dose, route, or frequency recorded.      vitamin D3 125 MCG (5000 UT) capsule capsule   5,000 Units, Oral, Daily      zolpidem 10 MG tablet  Commonly known as: AMBIEN   10 mg, Oral, Nightly PRN         Stop These Medications    predniSONE 20 MG tablet  Commonly known as: DELTASONE     traMADol 50 MG tablet  Commonly known as: ULTRAM            Allergies   Allergen Reactions   • Sulfa Antibiotics Other (See Comments)     Severe weakness   • Strawberry Hives   • Codeine Rash and Other (See Comments)     Chills   • Hydrocodone Rash and Other (See Comments)     Chills         Discharge Disposition:  Home-Health Care [facility] [place]    Discharge  Diet:  Diet Order   Procedures   • Diet Regular       Discharge Activity:   Activity Instructions     No lift, push, pull more than 5 pounds, no swimming, no bending or twisting.   No exertional or impact activity- walking is OK.   Wear brace when sitting higher than 45 degrees, standing, walking- unless just from bed to bathroom.   OK to remove brace for showers.               CODE STATUS:    Code Status and Medical Interventions:   Ordered at: 11/05/20 2057     Code Status:    CPR     Medical Interventions (Level of Support Prior to Arrest):    Full       Future Appointments   Date Time Provider Department Center   11/25/2020  2:00 PM Alicia Conway PA-C [facility]     Additional Instructions for the Follow-ups that You Need to Schedule     Ambulatory Referral to Home Health   As directed      Face to Face Visit Date: 11/11/2020    Follow-up provider for Plan of Care?: I treated the patient in an acute care facility and will not continue treatment after discharge.    Follow-up provider: LYUDMILA SHEA [[id number]]    Reason/Clinical Findings: rib fracture, vertebral compression fracture    Describe mobility limitations that make leaving home difficult: requires the assistance of another to leave the home    Nursing/Therapeutic Services Requested: Skilled Nursing Physical Therapy    Skilled nursing orders: Medication education    PT orders: Therapeutic exercise Strengthening    Frequency: 1 Week 1         Discharge Follow-up with PCP   As directed       Currently Documented PCP:    Lyudmila Shea, YEN    PCP Phone Number:    [phone]     Follow Up Details: Roberta (PCP) in 1-2 weeks         Discharge Follow-up with Specified Provider: Dr. Herrera ([facility]); 2 Weeks   As directed      To: Dr. Herrera ([facility])    Follow Up: 2 Weeks         Discharge Follow-up with Specified Provider: Dr. Lee ([facility]); 3 Weeks   As directed      To: Dr. Lee ([facility])    Follow Up: 3 Weeks         Discharge Follow-up with  Specified Provider: Dr. Hightower ([facility]); 2 Weeks   As directed      To: Dr. Hightower ([facility])    Follow Up: 2 Weeks            Contact information for follow-up providers     Deonna Shea APRN .    Specialty: Family Medicine  Why: Roberta (PCP) in 1-2 weeks  Contact information:  [address] [facility] [address] [phone]                   Contact information for after-discharge care     Home Medical Care     [facility] .    Service: Home Health Services  Contact information:  [address]  [facility] [address] [phone]                             Additional Instructions for the Follow-ups that You Need to Schedule     Ambulatory Referral to Home Health   As directed      Face to Face Visit Date: 11/11/2020    Follow-up provider for Plan of Care?: I treated the patient in an acute care facility and will not continue treatment after discharge.    Follow-up provider: DEONNA SHEA [[id number]]    Reason/Clinical Findings: rib fracture, vertebral compression fracture    Describe mobility limitations that make leaving home difficult: requires the assistance of another to leave the home    Nursing/Therapeutic Services Requested: Skilled Nursing Physical Therapy    Skilled nursing orders: Medication education    PT orders: Therapeutic exercise Strengthening    Frequency: 1 Week 1         Discharge Follow-up with PCP   As directed       Currently Documented PCP:    Deonna Shea APRN    PCP Phone Number:    [phone]     Follow Up Details: Roberta (PCP) in 1-2 weeks         Discharge Follow-up with Specified Provider: Dr. Herrera ([facility]); 2 Weeks   As directed      To: Dr. Herrera ([facility])    Follow Up: 2 Weeks         Discharge Follow-up with Specified Provider: Dr. Lee ([facility]); 3 Weeks   As directed      To: Dr. Lee ([facility])    Follow Up: 3 Weeks         Discharge Follow-up with Specified Provider: Dr. Hightower ([facility]); 2 Weeks   As directed      To:   Campbell ([facility])    Follow Up: 2 Weeks           Time Spent on Discharge:  Greater than 30 minutes      Deni Dodson MD  [facility]  11/11/20  13:44 EST

## 2020-11-11 NOTE — NURSING NOTE
Discharge instructions, home meds, and follow-up care reviewed with pt and guest.   All questions and concerns addressed.  IV and telli removed.   All belongings returned.   Pt to be transported off unit by staff when  brings back O2 tank for transport.

## 2020-11-11 NOTE — PLAN OF CARE
Goal Outcome Evaluation:  Plan of Care Reviewed With: patient  Progress: no change  Outcome Summary: Desatting into 80s on exertion. Percocet, tramodol administered for pain. Brace on when OOB or 45 degrees in bed. Possible discharge today.

## 2020-11-11 NOTE — PLAN OF CARE
Adequate for discharge,   Goal Outcome Evaluation:  Plan of Care Reviewed With: patient  Progress: no change     Problem: Adult Inpatient Plan of Care  Goal: Plan of Care Review  Outcome: Adequate for Care Transition  Goal: Patient-Specific Goal (Individualized)  Outcome: Adequate for Care Transition  Goal: Absence of Hospital-Acquired Illness or Injury  Outcome: Adequate for Care Transition  Goal: Optimal Comfort and Wellbeing  Outcome: Adequate for Care Transition  Goal: Readiness for Transition of Care  Outcome: Adequate for Care Transition     Problem: Fall Injury Risk  Goal: Absence of Fall and Fall-Related Injury  Outcome: Adequate for Care Transition

## 2020-11-12 PROBLEM — R07.89 CHEST WALL PAIN: Status: ACTIVE | Noted: 2020-01-01

## 2020-11-12 NOTE — OUTREACH NOTE
Prep Survey      Responses   [facility] [place] patient discharged from?  [place]   Is LACE score < 7 ?  No   Eligibility  [facility]   Date of Admission  11/05/20   Date of Discharge  11/11/20   Discharge Disposition  Home or Self Care   Discharge diagnosis  Severe right-sided thoracic back pain   Does the patient have one of the following disease processes/diagnoses(primary or secondary)?  Other   Does the patient have Home health ordered?  Yes   What is the Home health agency?   [facility]   Is there a DME ordered?  No   Prep survey completed?  Yes          Marisela Garcia RN

## 2020-11-12 NOTE — ED PROVIDER NOTES
EMERGENCY DEPARTMENT ENCOUNTER  Patient was placed in face mask in first look and the following protective measures were taken unless additional measures were taken and documented below in the ED course. Patient was wearing facemask when I entered the room and throughout our encounter. I wore full protective equipment throughout this patient encounter including a face mask, and gloves. Hand hygiene was performed before donning protective equipment and after removal when leaving the room.    Room Number:  39/39  Date of encounter:  11/12/2020  PCP: Lyudmila Granados APRN    HPI:  Context: Siobhan Prakash is a 75 y.o. female who presents to the ED c/o chief complaint of chest pain.  Patient complains of sharp sternal well localized pain that is been constant for extended period of time, denies any acute worsening.  Patient denies any radiation of the pain, has chronic shortness of breath but no increased shortness of breath, no nausea vomiting, no diaphoresis.  Patient states she has had the pain since she was fitted with her TLSO.  Patient denies any new fall or trauma, no strain or inciting event.  Patient does not believe that she occurred any new injury.  Patient is requesting morphine for pain.  Patient has chronic cough but denies any changes.  No fever or systemic symptoms.    MEDICAL HISTORY REVIEW  Reviewed in EPIC    PAST MEDICAL HISTORY  Active Ambulatory Problems     Diagnosis Date Noted   • Weight loss, abnormal 05/13/2016   • Subcutaneous cyst 05/13/2016   • Mixed anxiety depressive disorder 05/13/2016   • Gastroesophageal reflux disease 05/13/2016   • Insomnia 05/13/2016   • Macrocytosis 05/13/2016   • Tremor 05/13/2016   • Pulmonary emphysema (CMS/HCC) 05/13/2016   • Vitamin D deficiency 05/13/2016   • Fatigue 05/13/2016   • Osteoporosis 05/13/2016   • Herpes zoster without complication 02/24/2017   • Migraine 07/26/2017   • Bilateral leg pain 07/26/2017   • Pneumonia of both lungs due to infectious  organism 01/15/2018   • Abnormal ECG 2019   • Diarrhea 2019   • Fecal urgency 07/10/2019   • Irritable bowel syndrome (IBS) 2019   • History of hip replacement 2019   • 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   • COPD (chronic obstructive pulmonary disease) (CMS/[facility]) 10/01/2019   • Right-sided chest pain 2020   • Acute right-sided thoracic back pain 2020   • Abnormal CT of the chest 2020   • Chronic respiratory failure with hypoxia and hypercapnia (CMS/[facility]) 2020   • Acute pulmonary embolism without acute cor pulmonale (CMS/[facility]) 2020   • Compression fracture of T9 vertebra (CMS/[facility]) 2020   • Multifocal atrial tachycardia (CMS/[facility]) 2020   • Hypokalemia 2020     Resolved Ambulatory Problems     Diagnosis Date Noted   • Osteopenia 2016   • Anemia 2016   • Postartificial menopausal syndrome 2016   • Hyperlipidemia 2016   • Iron deficiency 2016   • Pruritus 2016   • Urinary tract infection 2016   • Glucose intolerance (impaired glucose tolerance) 2016   • Achilles tendon sprain 2017   • Acute respiratory failure with hypoxia (CMS/[facility]) 2019     Past Medical History:   Diagnosis Date   • Allergic 1970   • Anxiety    • Arthritis 10/17   • Asthma    • Cataract    • Cholelithiasis    • Depression    • GERD (gastroesophageal reflux disease)    • HL (hearing loss)    • Irritable bowel syndrome    • Low back pain    • Pneumonia        PAST SURGICAL HISTORY  Past Surgical History:   Procedure Laterality Date   • CHOLECYSTECTOMY     • COLONOSCOPY     • EYE SURGERY      bilateral cataracts   • HIP ARTHROPLASTY Right    • JOINT REPLACEMENT     • REDUCTION MAMMAPLASTY     • TONSILLECTOMY         FAMILY HISTORY  Family History   Problem Relation Age of Onset   • Alcohol abuse Father             • Heart attack Father    • Birth defects Brother    •  Hearing loss Brother    • Heart disease Son         Aortic stenosis   • Hyperlipidemia Sister         Hbp       SOCIAL HISTORY  Social History     Socioeconomic History   • Marital status:      Spouse name: Vlad   • Number of children: 2   • Years of education: Not on file   • Highest education level: Not on file   Occupational History   • Occupation: retired   Tobacco Use   • Smoking status: Former Smoker     Packs/day: 1.00     Years: 15.00     Pack years: 15.00     Start date: 1963     Quit date:      Years since quittin.8   • Smokeless tobacco: Never Used   • Tobacco comment: Ultra light menthol brand   Substance and Sexual Activity   • Alcohol use: Yes     Comment: Approx. 1 or 2 a month   • Drug use: No   • Sexual activity: Not Currently     Partners: Male     Birth control/protection: Post-menopausal       ALLERGIES  Sulfa antibiotics, Strawberry, Codeine, and Hydrocodone    The patient's allergies have been reviewed    REVIEW OF SYSTEMS  All systems reviewed and negative except for those discussed in HPI.     PHYSICAL EXAM  I have reviewed the triage vital signs and nursing notes.  ED Triage Vitals [20 0828]   Temp Heart Rate Resp BP SpO2   98.4 °F (36.9 °C) 100 25 118/52 98 %      Temp src Heart Rate Source Patient Position BP Location FiO2 (%)   Tympanic Monitor Lying Right arm --     General: No acute distress  HENT: NCAT, PERRL, Nares patent  Eyes: no scleral icterus  Neck: trachea midline, no ROM limitations  CV: regular rhythm, regular rate  Chest wall: Patient has sternal tenderness to palpation with reproduction of pain of chief complaint.  Respiratory: normal effort, slightly diminished with end expiratory wheezing, requiring 6 L which is patient's baseline to maintain oxygen sats greater than 90%  Abdomen: soft, nondistended, nontender to palpation, no rebound tenderness, no guarding or rigidity  : deferred  Musculoskeletal: no deformity  Neuro: alert, moves all  extremities, follows commands  Skin: warm, dry    LAB RESULTS  Recent Results (from the past 24 hour(s))   ECG 12 Lead    Collection Time: 11/12/20  9:03 AM   Result Value Ref Range    QT Interval 354 ms   Comprehensive Metabolic Panel    Collection Time: 11/12/20  9:13 AM    Specimen: Blood   Result Value Ref Range    Glucose 127 (H) 65 - 99 mg/dL    BUN 11 8 - 23 mg/dL    Creatinine 0.75 0.57 - 1.00 mg/dL    Sodium 139 136 - 145 mmol/L    Potassium 4.4 3.5 - 5.2 mmol/L    Chloride 95 (L) 98 - 107 mmol/L    CO2 37.0 (H) 22.0 - 29.0 mmol/L    Calcium 10.5 8.6 - 10.5 mg/dL    Total Protein 7.2 6.0 - 8.5 g/dL    Albumin 4.30 3.50 - 5.20 g/dL    ALT (SGPT) 24 1 - 33 U/L    AST (SGOT) 27 1 - 32 U/L    Alkaline Phosphatase 150 (H) 39 - 117 U/L    Total Bilirubin <0.2 0.0 - 1.2 mg/dL    eGFR Non African Amer 76 >60 mL/min/1.73    Globulin 2.9 gm/dL    A/G Ratio 1.5 g/dL    BUN/Creatinine Ratio 14.7 7.0 - 25.0    Anion Gap 7.0 5.0 - 15.0 mmol/L   Protime-INR    Collection Time: 11/12/20  9:13 AM    Specimen: Blood   Result Value Ref Range    Protime 15.2 (H) 11.7 - 14.2 Seconds    INR 1.21 (H) 0.90 - 1.10   aPTT    Collection Time: 11/12/20  9:13 AM    Specimen: Blood   Result Value Ref Range    PTT 29.4 22.7 - 35.4 seconds   Troponin    Collection Time: 11/12/20  9:13 AM    Specimen: Blood   Result Value Ref Range    Troponin T <0.010 0.000 - 0.030 ng/mL   BNP    Collection Time: 11/12/20  9:13 AM    Specimen: Blood   Result Value Ref Range    proBNP 296.9 0.0 - 900.0 pg/mL   Magnesium    Collection Time: 11/12/20  9:13 AM    Specimen: Blood   Result Value Ref Range    Magnesium 2.1 1.6 - 2.4 mg/dL   CBC Auto Differential    Collection Time: 11/12/20  9:13 AM    Specimen: Blood   Result Value Ref Range    WBC 9.99 3.40 - 10.80 10*3/mm3    RBC 3.85 3.77 - 5.28 10*6/mm3    Hemoglobin 11.7 (L) 12.0 - 15.9 g/dL    Hematocrit 36.3 34.0 - 46.6 %    MCV 94.3 79.0 - 97.0 fL    MCH 30.4 26.6 - 33.0 pg    MCHC 32.2 31.5 - 35.7 g/dL     RDW 12.0 (L) 12.3 - 15.4 %    RDW-SD 41.1 37.0 - 54.0 fl    MPV 9.1 6.0 - 12.0 fL    Platelets 402 140 - 450 10*3/mm3    Neutrophil % 82.1 (H) 42.7 - 76.0 %    Lymphocyte % 10.4 (L) 19.6 - 45.3 %    Monocyte % 5.7 5.0 - 12.0 %    Eosinophil % 0.7 0.3 - 6.2 %    Basophil % 0.4 0.0 - 1.5 %    Immature Grans % 0.7 (H) 0.0 - 0.5 %    Neutrophils, Absolute 8.20 (H) 1.70 - 7.00 10*3/mm3    Lymphocytes, Absolute 1.04 0.70 - 3.10 10*3/mm3    Monocytes, Absolute 0.57 0.10 - 0.90 10*3/mm3    Eosinophils, Absolute 0.07 0.00 - 0.40 10*3/mm3    Basophils, Absolute 0.04 0.00 - 0.20 10*3/mm3    Immature Grans, Absolute 0.07 (H) 0.00 - 0.05 10*3/mm3    nRBC 0.0 0.0 - 0.2 /100 WBC       I ordered the above labs and reviewed the results.    RADIOLOGY  Xr Chest 1 View    Result Date: 11/12/2020  XR CHEST 1 VW-  11/12/2020  HISTORY: Shortness of breath. Congestion.  Heart size is within normal limits. There are some minimal linear stranding in the right midlung consistent with parenchymal scarring also seen on the 11/6/2020 study. Mildly prominent lung markings are also seen in the left base also stable since 11/6/2020 study.  No definite new infiltrates are seen.      Mild chronic parenchymal changes as described. 2. No definite evidence of acute pneumonia.  This report was finalized on 11/12/2020 9:58 AM by Dr. Corky Nelson M.D.        I ordered the above noted radiological studies. I reviewed the images and results. I agree with the radiologist interpretation.    PROCEDURES  Procedures    MEDICATIONS GIVEN IN ER  Medications   lidocaine (LIDODERM) 5 % 1 patch (1 patch Transdermal Not Given 11/12/20 1051)   morphine injection 2 mg (2 mg Intravenous Given 11/12/20 1050)   morphine injection 2 mg (2 mg Intravenous Given 11/12/20 0916)       PROGRESS, DATA ANALYSIS, CONSULTS, AND MEDICAL DECISION MAKING  A complete history and physical exam have been performed.  All available laboratory and imaging results have been reviewed by  myself prior to disposition.    MDM  After the initial H&P, I discussed pertinent information from history and physical exam with patient/family.  Discussed differential diagnosis.  Discussed plan for ED evaluation/work-up/treatment.  All questions answered.  Patient/family is agreeable with plan.  ED Course as of Nov 12 1143   Thu Nov 12, 2020   0842 Patient was placed in face mask in first look. Patient was wearing facemask when I entered the room and throughout our encounter. I wore full protective equipment throughout this patient encounter including a face mask, eye shield, gown and gloves. Hand hygiene was performed before donning protective equipment and after removal when leaving the room.        [JG]   0854 History reviewed and significant for: Patient was just admitted to the hospital on the fifth of this month, discharged on the 11th.  Patient was complaining of severe right-sided thoracic and back pain.  Patient was found to have compression fractures of T9 and T10, possible right 8 rib fracture.  Patient was evaluated by neurosurgery who recommended TLSO brace, physical therapy.  Patient was indeterminate for PEs, evaluated by pulmonology, recommended full anticoagulation for several months, on Eliquis.  Patient is on oxycodone 10 mg every 4 hours as needed pain, also on baclofen 10 mg 3 times daily as needed.    [JG]   0913 EKG independently viewed and contemporaneously interpreted by ED physician. Time: 9:03 AM.  Rate 98.  Interpretation: Normal sinus rhythm, left axis deviation, inferior Q waves, no acute ST changes.  Single PVC noted.    [JG]   1020 Pain seems musculoskeletal in nature.  EKG unremarkable, troponin negative, chest x-ray unremarkable.  ED work-up is negative other than chronic anemia, hemoglobin at baseline, mild hyperglycemia, no DKA.  Plan for discharge with primary care and pain management follow-up.    [JG]   1021 The patient was reexamined.  They have had symptomatic improvement  during their ED stay.  I discussed today's findings with the patient, explaining the pertinent positives and negatives from today's visit, and the plan of care.  Discussed plan for discharge as there is no emergent indication for admission.  Discussed limitation of the ED work-up and that this is to rule out life-threatening emergencies but that they could require further testing as determined by their primary care and or any referred specialist patient is agreeable and understands need for follow-up and repeat exam/testing.  Patient is aware that discharge does not mean there is nothing wrong, indicates no emergency is present, and that they must continue their care with their primary care physician and/or any referred specialist.  They were given appropriate follow-up with their primary care physician and/or specialist.  I had an extensive discussion on the expected clinical course and return precautions.  Patient understands to return to the emergency department for continuation, worsening, or new symptoms.  I answered any of the patient's questions. Patient was discharged home in a stable condition.        [JG]   1027 Patient's  now at bedside.  He states he cannot care for the patient at home.  He is requesting  to evaluate patient for possible placement.    [JG]   1047 CCP nurse at bedside.    [JG]   1049 Phone call from patient's nurse practitioner who is requesting evaluation for possible placement as she does not feel patient is safe to go home with patient's .    [JG]   1107 CCP nurse states patient will not be able to be placed at the emergency department but does not appear safe for discharge home.  She recommends admission to hospital for pain control as well as for placement.    [JG]   1108 Obtain rapid Covid swab.  Consulting hospitalist for admission.    [JG]   1108 Placing inpatient palliative care consult.    [JG]   1115 Patient reassessed.  Discussed ED findings,  differential diagnosis, and the need for admission for evaluation/treatment.  They are agreeable to admission and all questions were answered.        [JG]   1142 Phone call with Dr Johnson.  Discussed the patient, relevant history, exam, diagnostics, ED findings/progress, and concerns. They agree to admit the patient to tele obs. Care assumed by the admitting physician at this time.        [JG]      ED Course User Index  [JG] Eduardo Benavides MD       AS OF 11:43 EST VITALS:    BP - 118/52  HR - 100  TEMP - 98.4 °F (36.9 °C) (Tympanic)  O2 SATS - 98%    DIAGNOSIS  Final diagnoses:   Chest wall pain   Intractable pain   Chronic obstructive pulmonary disease, unspecified COPD type (CMS/HCC)   Immobility   Physical deconditioning         DISPOSITION  ADMISSION    Discussed treatment plan and reason for admission with pt/family and admitting physician.  Pt/family voiced understanding of the plan for admission for further testing/treatment as needed.          Eduardo Benavides MD  11/12/20 9945

## 2020-11-12 NOTE — ED NOTES
was offered a number to call to check on wife and declined.      Luana Jorge, RN  11/12/20 9681

## 2020-11-12 NOTE — PROGRESS NOTES
Clinical Pharmacy Services: Medication History    Siobhan Prakash is a 75 y.o. female presenting to [facility] for   Chief Complaint   Patient presents with   • Shortness of Breath       She  has a past medical history of Allergic (1970), Anxiety, Arthritis (10/17), Asthma (2004), Cataract (1996), Cholelithiasis (1990), COPD (chronic obstructive pulmonary disease) (CMS/[facility]), Depression, GERD (gastroesophageal reflux disease), HL (hearing loss) (2015), Hyperlipidemia, Irritable bowel syndrome (1990), Low back pain, Migraine, Osteopenia, Osteoporosis, Pneumonia, Tremor (2017), and Urinary tract infection.    Allergies as of 11/12/2020 - Reviewed 11/12/2020   Allergen Reaction Noted   • Sulfa antibiotics Other (See Comments) 03/02/2018   • [place] Hives 11/08/2020   • Codeine Rash and Other (See Comments) 05/13/2016   • Hydrocodone Rash and Other (See Comments) 05/13/2016       Medication information was obtained from: spouse and pharmacies  Pharmacy and Phone Number:   [id number] - [place], [place] - [address] [facility] [address] [facility] - [phone]  - [phone] FX  [address] [facility] [address]  Phone: [phone] Fax: [phone]    [facility] [address] [place], [place] - [address] [place] [address] [phone]  - [phone]  [address] [place] [address]  Phone: [phone] Fax: [phone]    [facility] - [facility] [address] [facility] - [phone]  - [phone] FX  [address] [facility] [address]  Phone: [phone] Fax: [phone]    [facility] [place]  [address] [facility] [address]  Phone: [phone] Fax: [phone]        Prior to Admission Medications     Prescriptions Last Dose Informant Patient Reported? Taking?    albuterol sulfate  (90 Base) MCG/ACT inhaler  Spouse/Significant Other No Yes    Inhale 2 puffs Every 4 (Four) Hours As Needed for Wheezing.     Apixaban Starter Pack (Eliquis DVT/PE Starter Pack) tablet therapy pack  Pharmacy No Yes    Take two 5 mg tablets by mouth every 12 hours for 7 days. Followed by one 5 mg tablet every 12 hours. (Dispense starter pack if available)    azithromycin (ZITHROMAX) 500 MG tablet  Pharmacy Yes Yes    Take 500 mg by mouth Daily.    baclofen (LIORESAL) 10 MG tablet  Pharmacy No Yes    Take 1 tablet by mouth 3 (Three) Times a Day As Needed for Muscle Spasms.    Biotin 1 MG capsule  Pharmacy Yes Yes    Take 1 tablet by mouth Daily.    budesonide (PULMICORT) 0.5 MG/2ML nebulizer solution  Pharmacy Yes Yes    Take 0.5 mg by nebulization 2 (two) times a day.    Cholecalciferol (VITAMIN D3) 5000 UNITS capsule capsule  Spouse/Significant Other Yes Yes    Take 5,000 Units by mouth Daily.    dicyclomine (Bentyl) 20 MG tablet  Spouse/Significant Other No Yes    Take 1 tablet by mouth Every 6 (Six) Hours.    Patient taking differently:  Take 20 mg by mouth Every 6 (Six) Hours As Needed.    dilTIAZem CD (CARDIZEM CD) 180 MG 24 hr capsule  Pharmacy No Yes    Take 1 capsule by mouth Daily.    ferrous sulfate 325 (65 FE) MG tablet  Spouse/Significant Other Yes Yes    Take 325 mg by mouth 2 (Two) Times a Day With Meals.    FLUoxetine (PROzac) 20 MG capsule  Pharmacy Yes Yes    Take 20 mg by mouth Daily.    guaiFENesin (MUCINEX) 600 MG 12 hr tablet  Spouse/Significant Other No Yes    Take 2 tablets by mouth Every 12 (Twelve) Hours.    hydrOXYzine (ATARAX) 25 MG tablet  Pharmacy No Yes    Take 1 tablet by mouth 3 (Three) Times a Day As Needed for Itching.    lansoprazole (PREVACID) 15 MG capsule  Spouse/Significant Other No Yes    Take 1 capsule by mouth Daily.    lidocaine (LIDODERM) 5 %  Pharmacy No Yes    Place 1 patch on the skin as directed by provider Daily. Remove & Discard patch within 12 hours or as directed by MD    loperamide (IMODIUM) 2 MG capsule  Spouse/Significant Other Yes Yes    Take 2 mg by mouth 4 (Four) Times a Day As  Needed for Diarrhea.    LORazepam (ATIVAN) 0.5 MG tablet  Pharmacy Yes Yes    Take 0.5 mg by mouth Every 4 (Four) Hours As Needed for Anxiety.    meloxicam (MOBIC) 15 MG tablet  Spouse/Significant Other No Yes    Take 1 tablet by mouth Daily.    metaxalone (SKELAXIN) 800 MG tablet  Pharmacy Yes Yes    Take 800 mg by mouth 3 (Three) Times a Day As Needed for Muscle Spasms.    mirtazapine (REMERON) 15 MG tablet  Pharmacy No Yes    Take 1 tablet by mouth Every Night.    montelukast (SINGULAIR) 10 MG tablet  Pharmacy Yes Yes    Take 10 mg by mouth Every Night.    oxyCODONE-acetaminophen (PERCOCET)  MG per tablet  Pharmacy No Yes    Take 1 tablet by mouth Every 4 (Four) Hours As Needed for Moderate Pain  for up to 4 days.    Perforomist 20 MCG/2ML nebulizer solution  Pharmacy Yes Yes    Inhale 20 mcg 2 (two) times a day.    pseudoephedrine (SUDAFED) 60 MG tablet  Pharmacy No Yes    Take 1 tablet by mouth Every 6 (Six) Hours As Needed for Congestion.    roflumilast (DALIRESP) 500 MCG tablet tablet  Pharmacy Yes Yes    Take 500 mcg by mouth Daily.    SUMAtriptan (IMITREX) 50 MG tablet  Pharmacy Yes Yes    Take 50 mg by mouth Every 2 (Two) Hours As Needed for Migraine. Take one tablet at onset of headache. May repeat dose one time in 2 hours if headache not relieved.    theophylline (UNIPHYL) 400 MG 24 hr tablet  Pharmacy Yes Yes    Take 400 mg by mouth Daily.    Tiotropium Bromide Monohydrate (SPIRIVA RESPIMAT) 2.5 MCG/ACT aerosol solution  Spouse/Significant Other Yes Yes    Inhale 2 puffs Daily.    zolpidem (AMBIEN) 10 MG tablet  Spouse/Significant Other No Yes    Take 1 tablet by mouth At Night As Needed for Sleep.    apixaban (ELIQUIS) 5 MG tablet tablet  Pharmacy No No    Take 1 tablet by mouth Every 12 (Twelve) Hours. Indications: DVT/PE (active thrombosis)            Medication notes:     This medication list is complete to the best of my knowledge as of 11/12/2020    Please call if questions.    Cinthya  Sharron MARLOW  Medication History Technician  [phone]    11/12/2020 16:12 EST

## 2020-11-12 NOTE — OUTREACH NOTE
Call Center TCM Note      Responses   [facility] [place] patient discharged from?  [place]   Does the patient have one of the following disease processes/diagnoses(primary or secondary)?  Other   TCM attempt successful?  No   Change in Health Status  Readmitted          Wilma Byers RN    11/12/2020, 11:51 EST

## 2020-11-12 NOTE — ED NOTES
Pt's  okay to visit per Dr. Benavides, not a covid ruleout at this time      Luana Jorge, RN  11/12/20 0915

## 2020-11-12 NOTE — PROGRESS NOTES
Case Management Discharge Note      Final Note: Home with [facility] and .    Provided Post Acute Provider List?: Yes  Post Acute Provider List: Home Health  Provided Post Acute Provider Quality & Resource List?: Refused  Delivered To: Support Person  Method of Delivery: In person    Selected Continued Care - Discharged on 11/11/2020 Admission date: 11/5/2020 - Discharge disposition: Home-Health Care Svc    Destination    No services have been selected for the patient.              Durable Medical Equipment    No services have been selected for the patient.              Dialysis/Infusion    No services have been selected for the patient.              Home Medical Care     Service Provider Selected Services Address Phone Fax    [facility]  Home Health Services [address] [address] [phone]          Therapy    No services have been selected for the patient.              Community Resources    No services have been selected for the patient.                  Transportation Services  Private: Car    Final Discharge Disposition Code: 06 - home with home health care

## 2020-11-12 NOTE — PROGRESS NOTES
"Discharge Planning Assessment  [facility]     Patient Name: Siobhan Prakash  MRN: [id number]  Today's Date: 11/12/2020    Admit Date: 11/12/2020    Discharge Needs Assessment    No documentation.       Discharge Plan     Row Name 11/12/20 1135       Plan    Provided Post Acute Provider List?  Yes    Post Acute Provider List  Inpatient Rehab    Delivered To  Support Person    Method of Delivery  In person    Plan Comments  Entered room, introduced self and explained role w/mask in place on myself and spouse Vlad - patient had removed hers and was coughing- reminded patient to please cover mouth when coughing- spouse advised patient is claustrophobic. Patient is verbalizing she is in pain \"all over\"- RN is at bedside administering medication. Spouse advises patient is in significant pain and she fell this morning in the bathroom- he is unable to assist her in this condition- he further mentioned she received her pain medication last night. Spouse advises they have not heard from home health yet and are now verbalizing interest in inpatient rehab- Provided RTR to review- spouse and patient request [facility]- referral placed to [facility]- will send information via in basket, however [facility] advised their insurance is out of network- will send just for verification.    Final Discharge Disposition Code  03 - skilled nursing facility (SNF)        Continued Care and Services - Admitted Since 11/12/2020    Coordination has not been started for this encounter.     Selected Continued Care - Prior Encounters Includes selections from prior encounters from 8/14/2020 to 11/12/2020    Discharged on 11/11/2020 Admission date: 11/5/2020 - Discharge disposition: Home-Health Care [facility]    Home Medical Care     Service Provider Selected Services Address Phone Fax    [facility] [place]  [facility] [address] [address] [phone]                "       Demographic Summary    No documentation.       Functional Status    No documentation.       Psychosocial    No documentation.       Abuse/Neglect    No documentation.       Legal    No documentation.       Substance Abuse    No documentation.       Patient Forms    No documentation.           Jennifer Pulido RN

## 2020-11-12 NOTE — DISCHARGE PLACEMENT REQUEST
"Eli Prakash (75 y.o. Female)     Date of Birth Social Security Number Address Home Phone MRN    1945  [id number]  [place] [id number] [phone] [id number]    Gnosticist Marital Status          Orthodoxy        Admission Date Admission Type Admitting Provider Attending Provider Department, Room/Bed    11/12/20 Emergency Alberto Johnson MD Geralds, Justin T, MD [facility] Emergency Department, 39/39    Discharge Date Discharge Disposition Discharge Destination                       Attending Provider: Eduardo Benavides MD    Allergies: Sulfa Antibiotics, Strawberry, Codeine, Hydrocodone    Isolation: None   Infection: COVID (rule out) (11/12/20)   Code Status: Prior    Ht: 157.5 cm (62\")   Wt: 47.2 kg (104 lb)    Admission Cmt: None   Principal Problem: None                Active Insurance as of 11/12/2020     Primary Coverage     Payor Plan Insurance Group Employer/Plan Group    HUMANA MEDICARE REPLACEMENT HU[employer]NA MEDICARE REPLACEMENT [id number]     Payor Plan Address Payor Plan Phone Number Payor Plan Fax Number Effective Dates    [address] [address] [phone]  1/1/2020 - None Entered    [facility] [address]       Subscriber Name Subscriber Birth Date Member ID       ELI PRAKASH 1945 [id number]                 Emergency Contacts      (Rel.) Home Phone Work Phone Mobile Phone    Vlad Prakash (Spouse) [phone] -- [phone]              "

## 2020-11-12 NOTE — H&P
Patient Name:  Siobhan Prakash  YOB: 1945  MRN:  [id number]  Admit Date:  11/12/2020  Patient Care Team:  Lyudmila Granados APRN as PCP - General (Family Medicine)  Carl Perez MD as Consulting Physician (Orthopedic Surgery)      Subjective   History Present Illness     Chief Complaint   Patient presents with   • Shortness of Breath       Ms. Prakash is a 75 y.o. former smoker with a history of low back pain, GERD, COPD, osteoporosis, asthma who recently admitted on 11/5/2020 and discharged on 11/11/2020 for abdominal pain and severe right-sided thoracic back pain related to acute thoracic compression fractures T9 +/-T10 and possible right eighth rib fracture.  Patient evaluated by neurosurgery who recommended TSLO brace and follow-up in office in 2 to 3 weeks, continue physical therapy at home.      Pulmonary consulted during previous hospital admission for severe end-stage COPD with chronic bronchiectasis and restarted on home Zithromax per pulmonary and for anticoagulation given elevated D-dimer during previous admission.      Patient returns to [facility] with shortness of breath.  Initial appearance rather obtunded; however, with persistence the patient did arouse & maintained wakeful state to answer all questions appropriately.  AVSS on 6 liters nasal cannula with no evidence of leukocytosis yet noted neutrophilia 82%, VRP pending.  Chest x-ray report indicates mild chronic parenchymal changes and no definite evidence of acute pneumonia.  Patient reports pleuritic-like chest pain, chills, congestion, cough, and generalized body ache.     Further recommendation hospital course please see additional details below in assessment/plan.    History of Present Illness  Review of Systems   Constitutional: Positive for chills. Negative for fever.   HENT: Positive for congestion. Negative for rhinorrhea.    Eyes: Negative for visual disturbance.   Respiratory: Positive for cough and shortness of  breath.    Cardiovascular: Positive for chest pain (pleuritic). Negative for leg swelling.   Gastrointestinal: Negative for abdominal pain, constipation, diarrhea, nausea and vomiting.   Endocrine: Negative for polydipsia, polyphagia and polyuria.   Genitourinary: Negative for difficulty urinating and dysuria.   Musculoskeletal: Positive for back pain (stable, chronic), gait problem and myalgias.   Skin: Negative for rash and wound.   Neurological: Positive for weakness (generalized). Negative for dizziness and headaches.   Psychiatric/Behavioral: Negative for confusion and hallucinations.        Personal History     Past Medical History:   Diagnosis Date   • Allergic 1970   • Anxiety    • Arthritis 10/17   • Asthma    • Cataract    • Cholelithiasis    • COPD (chronic obstructive pulmonary disease) (CMS/[facility])    • Depression    • GERD (gastroesophageal reflux disease)    • HL (hearing loss)    • Hyperlipidemia    • Irritable bowel syndrome    • Low back pain    • Migraine    • Osteopenia    • Osteoporosis    • Pneumonia    • Tremor    • Urinary tract infection      Past Surgical History:   Procedure Laterality Date   • CHOLECYSTECTOMY     • COLONOSCOPY     • EYE SURGERY      bilateral cataracts   • HIP ARTHROPLASTY Right    • JOINT REPLACEMENT     • REDUCTION MAMMAPLASTY     • TONSILLECTOMY       Family History   Problem Relation Age of Onset   • Alcohol abuse Father             • Heart attack Father    • Birth defects Brother    • Hearing loss Brother    • Heart disease Son         Aortic stenosis   • Hyperlipidemia Sister         Hbp     Social History     Tobacco Use   • Smoking status: Former Smoker     Packs/day: 1.00     Years: 15.00     Pack years: 15.00     Start date: 1963     Quit date:      Years since quittin.8   • Smokeless tobacco: Never Used   • Tobacco comment: Ultra light menthol brand   Substance Use Topics   • Alcohol use: Yes     Comment: Approx. 1  or 2 a month   • Drug use: No     Current Facility-Administered Medications on File Prior to Encounter   Medication Dose Route Frequency Provider Last Rate Last Dose   • [DISCONTINUED] acetaminophen (TYLENOL) 160 MG/5ML solution 650 mg  650 mg Oral Q4H PRN Lenard Zapata MD       • [DISCONTINUED] acetaminophen (TYLENOL) suppository 650 mg  650 mg Rectal Q4H PRN Lenard Zapata MD       • [DISCONTINUED] acetaminophen (TYLENOL) tablet 650 mg  650 mg Oral Q4H PRN Lenard Zapata MD       • [DISCONTINUED] albuterol (PROVENTIL) nebulizer solution 0.083% 2.5 mg/3mL  2.5 mg Nebulization Q4H PRN Sid Minor MD       • [DISCONTINUED] apixaban (ELIQUIS) tablet 10 mg  10 mg Oral Q12H Deni Dodson MD   10 mg at 11/11/20 0857   • [DISCONTINUED] apixaban (ELIQUIS) tablet 5 mg  5 mg Oral Q12H Deni Dodson MD       • [DISCONTINUED] arformoterol (BROVANA) nebulizer solution 15 mcg  15 mcg Nebulization BID - RT Sid Minor MD   15 mcg at 11/11/20 0825   • [DISCONTINUED] azithromycin (ZITHROMAX) tablet 250 mg  250 mg Oral Q24H Deni Dodson MD   250 mg at 11/11/20 0856   • [DISCONTINUED] baclofen (LIORESAL) tablet 10 mg  10 mg Oral TID PRN Deni Dodson MD   10 mg at 11/11/20 1104   • [DISCONTINUED] benzonatate (TESSALON) capsule 200 mg  200 mg Oral TID PRN Lenard Zapata MD       • [DISCONTINUED] bisacodyl (DULCOLAX) EC tablet 5 mg  5 mg Oral Daily PRN Lenard Zapata MD       • [DISCONTINUED] bisacodyl (DULCOLAX) suppository 10 mg  10 mg Rectal Daily PRN Deni Dodson MD   10 mg at 11/11/20 1333   • [DISCONTINUED] budesonide (PULMICORT) nebulizer solution 0.5 mg  0.5 mg Nebulization BID - RT Sid Minor MD   0.5 mg at 11/11/20 0825   • [DISCONTINUED] cholecalciferol (VITAMIN D3) tablet 1,000 Units  1,000 Units Oral Daily Lenard Zapata MD   1,000 Units at 11/11/20 0856   • [DISCONTINUED] dicyclomine (BENTYL) capsule 20 mg  20 mg Oral 4x Daily PRN Lenard Zapata MD       •  [DISCONTINUED] dilTIAZem CD (CARDIZEM CD) 24 hr capsule 180 mg  180 mg Oral Q24H Shahid Lee MD   180 mg at 11/11/20 0857   • [DISCONTINUED] famotidine (PEPCID) tablet 20 mg  20 mg Oral BID AC Chidi Bonilla MD   20 mg at 11/11/20 0803   • [DISCONTINUED] FLUoxetine (PROzac) capsule 20 mg  20 mg Oral Daily Lenard Zapata MD   20 mg at 11/11/20 0857   • [DISCONTINUED] guaiFENesin (MUCINEX) 12 hr tablet 1,200 mg  1,200 mg Oral Q12H Lenard Zapata MD   1,200 mg at 11/11/20 0856   • [DISCONTINUED] hydrOXYzine (ATARAX) tablet 25 mg  25 mg Oral TID PRN Deni Dodson MD   25 mg at 11/10/20 0937   • [DISCONTINUED] lidocaine (LIDODERM) 5 % 1 patch  1 patch Transdermal Q24H Lenard Zapata MD   1 patch at 11/11/20 0043   • [DISCONTINUED] loperamide (IMODIUM) capsule 2 mg  2 mg Oral 4x Daily PRN Lenard Zapata MD       • [DISCONTINUED] loperamide (IMODIUM) capsule 2 mg  2 mg Oral BID Chidi Bonilla MD   Stopped at 11/11/20 0858   • [DISCONTINUED] LORazepam (ATIVAN) tablet 0.5 mg  0.5 mg Oral Q6H PRN Lenard Zapata MD   0.5 mg at 11/11/20 0803   • [DISCONTINUED] melatonin tablet 3 mg  3 mg Oral Nightly PRN Tatyana Lopez APRN       • [DISCONTINUED] mirtazapine (REMERON) tablet 15 mg  15 mg Oral Nightly Lenard Zapata MD   15 mg at 11/10/20 2015   • [DISCONTINUED] montelukast (SINGULAIR) tablet 10 mg  10 mg Oral Nightly Sid Minor MD   10 mg at 11/10/20 2015   • [DISCONTINUED] morphine injection 4 mg  4 mg Intravenous Q3H PRN Lenard Zapata MD   2 mg at 11/08/20 0858   • [DISCONTINUED] nitroglycerin (NITROSTAT) SL tablet 0.4 mg  0.4 mg Sublingual Q5 Min PRN Lenard Zapata MD       • [DISCONTINUED] ondansetron (ZOFRAN) injection 4 mg  4 mg Intravenous Q6H PRN Lenard Zapata MD   4 mg at 11/06/20 0044   • [DISCONTINUED] ondansetron (ZOFRAN) tablet 4 mg  4 mg Oral Q6H PRN Lenard Zapata MD   4 mg at 11/11/20 0856   • [DISCONTINUED]  oxyCODONE-acetaminophen (PERCOCET)  MG per tablet 1 tablet  1 tablet Oral Q4H PRN Chidi Bonilla MD   1 tablet at 11/11/20 1333   • [DISCONTINUED] polyethylene glycol (MIRALAX) packet 17 g  17 g Oral Daily Deni Dodson MD   17 g at 11/11/20 0856   • [DISCONTINUED] potassium chloride (K-DUR,KLOR-CON) ER tablet 40 mEq  40 mEq Oral PRN Deni Dodson MD   40 mEq at 11/09/20 2145   • [DISCONTINUED] potassium chloride (KLOR-CON) packet 40 mEq  40 mEq Oral PRN Dnei Dodson MD       • [DISCONTINUED] pseudoephedrine (SUDAFED) tablet 60 mg  60 mg Oral Q6H PRN Lenard Zapata MD       • [DISCONTINUED] roflumilast (DALIRESP) tablet 500 mcg  500 mcg Oral Daily Lenard Zapata MD   500 mcg at 11/11/20 0856   • [DISCONTINUED] sodium chloride 0.9 % flush 10 mL  10 mL Intravenous Q12H Lenard Zapata MD   10 mL at 11/11/20 0902   • [DISCONTINUED] sodium chloride 0.9 % flush 10 mL  10 mL Intravenous PRN Lenard Zapata MD       • [DISCONTINUED] theophylline (UNIPHYL) 24 hr tablet 400 mg  400 mg Oral Q24H Sid Minor MD   400 mg at 11/11/20 0857   • [DISCONTINUED] tiotropium (SPIRIVA RESPIMAT) 2.5 mcg/act aerosol solution inhaler  2 puff Inhalation Daily Sid Minor MD   2 puff at 11/11/20 0825   • [DISCONTINUED] traMADol (ULTRAM) tablet 50 mg  50 mg Oral Q6H PRN Lenard Zapata MD   50 mg at 11/11/20 1104   • [DISCONTINUED] zolpidem (AMBIEN) tablet 5 mg  5 mg Oral Nightly PRN Lenard Zapata MD   5 mg at 11/11/20 0220     Current Outpatient Medications on File Prior to Encounter   Medication Sig Dispense Refill   • albuterol sulfate  (90 Base) MCG/ACT inhaler Inhale 2 puffs Every 4 (Four) Hours As Needed for Wheezing. 1 inhaler 0   • Apixaban Starter Pack (Eliquis DVT/PE Starter Pack) tablet therapy pack Take two 5 mg tablets by mouth every 12 hours for 7 days. Followed by one 5 mg tablet every 12 hours. (Dispense starter pack if available) 74 tablet 0   • [START ON  11/17/2020] apixaban (ELIQUIS) 5 MG tablet tablet Take 1 tablet by mouth Every 12 (Twelve) Hours. Indications: DVT/PE (active thrombosis) 60 tablet 0   • arformoterol (BROVANA) 15 MCG/2ML nebulizer solution Inhale 15 mcg.     • azithromycin (ZITHROMAX) 500 MG tablet      • baclofen (LIORESAL) 10 MG tablet Take 1 tablet by mouth 3 (Three) Times a Day As Needed for Muscle Spasms. 15 tablet 0   • benzonatate (TESSALON PERLES) 100 MG capsule Take 2 capsules by mouth 3 (Three) Times a Day As Needed (cough). 30 capsule 0   • BIOTIN PO Take 1 tablet by mouth Daily.     • budesonide (PULMICORT) 0.5 MG/2ML nebulizer solution 0.5 mg.     • Cholecalciferol (VITAMIN D3) 5000 UNITS capsule capsule Take 5,000 Units by mouth Daily.     • dicyclomine (Bentyl) 20 MG tablet Take 1 tablet by mouth Every 6 (Six) Hours. (Patient taking differently: Take 20 mg by mouth Every 6 (Six) Hours. prn) 90 tablet 1   • dilTIAZem CD (CARDIZEM CD) 180 MG 24 hr capsule Take 1 capsule by mouth Daily. 30 capsule 0   • ferrous sulfate 325 (65 FE) MG tablet Take 325 mg by mouth 2 (Two) Times a Day With Meals.     • FLUoxetine (PROzac) 20 MG capsule Take 1 capsule by mouth Daily for 30 days. 90 capsule 2   • guaiFENesin (MUCINEX) 600 MG 12 hr tablet Take 2 tablets by mouth Every 12 (Twelve) Hours. 60 tablet 11   • hydrOXYzine (ATARAX) 25 MG tablet Take 1 tablet by mouth 3 (Three) Times a Day As Needed for Itching. 25 tablet 0   • lansoprazole (PREVACID) 15 MG capsule Take 1 capsule by mouth Daily. 90 capsule 0   • lidocaine (LIDODERM) 5 % Place 1 patch on the skin as directed by provider Daily. Remove & Discard patch within 12 hours or as directed by MD 30 patch 0   • loperamide (IMODIUM) 2 MG capsule Take 2 mg by mouth 4 (Four) Times a Day As Needed for Diarrhea.     • LORazepam (ATIVAN) 0.5 MG tablet TK ONE T PO Q 4 TO 6 H PRA  0   • meloxicam (MOBIC) 15 MG tablet Take 1 tablet by mouth Daily. 90 tablet 1   • mirtazapine (REMERON) 15 MG tablet Take 1  tablet by mouth Every Night. 30 tablet 1   • montelukast (SINGULAIR) 10 MG tablet Take 10 mg by mouth Every Night.     • mupirocin (BACTROBAN) 2 % cream Apply  topically to the appropriate area as directed 3 (Three) Times a Day. 30 g 0   • O2 (OXYGEN) Inhale 1 (One) Time.     • oxyCODONE-acetaminophen (PERCOCET)  MG per tablet Take 1 tablet by mouth Every 4 (Four) Hours As Needed for Moderate Pain  for up to 4 days. 20 tablet 0   • pseudoephedrine (SUDAFED) 60 MG tablet Take 1 tablet by mouth Every 6 (Six) Hours As Needed for Congestion. 60 tablet 3   • roflumilast (DALIRESP) 500 MCG tablet tablet Take 500 mcg by mouth Daily.     • SUMAtriptan (Imitrex) 50 MG tablet Take one tablet at onset of headache. May repeat dose one time in 2 hours if headache not relieved. 9 tablet 2   • theophylline (UNIPHYL) 400 MG 24 hr tablet      • Tiotropium Bromide Monohydrate (SPIRIVA RESPIMAT) 2.5 MCG/ACT aerosol solution Inhale 2 puffs Daily.     • zolpidem (AMBIEN) 10 MG tablet Take 1 tablet by mouth At Night As Needed for Sleep. 30 tablet 5     Allergies   Allergen Reactions   • Sulfa Antibiotics Other (See Comments)     Severe weakness   • Strawberry Hives   • Codeine Rash and Other (See Comments)     Chills   • Hydrocodone Rash and Other (See Comments)     Chills       Objective    Objective     Vital Signs  Temp:  [98.4 °F (36.9 °C)] 98.4 °F (36.9 °C)  Heart Rate:  [] 93  Resp:  [25] 25  BP: (118-134)/(52-75) 134/75  SpO2:  [98 %-100 %] 98 %  on  Flow (L/min):  [6-12] 6;   Device (Oxygen Therapy): nasal cannula  Body mass index is 20.31 kg/m².    Physical Exam  Constitutional:       Appearance: She is ill-appearing.   HENT:      Head: Normocephalic and atraumatic.   Eyes:      Extraocular Movements: Extraocular movements intact.      Conjunctiva/sclera: Conjunctivae normal.   Neck:      Musculoskeletal: Normal range of motion and neck supple.   Cardiovascular:      Rate and Rhythm: Normal rate.      Heart sounds:  Normal heart sounds.   Pulmonary:      Effort: No respiratory distress.      Breath sounds: No rhonchi.      Comments: Moist cough  Abdominal:      General: There is no distension.      Tenderness: There is no abdominal tenderness.   Musculoskeletal:      Right lower leg: No edema.      Left lower leg: No edema.   Skin:     General: Skin is warm and dry.   Neurological:      Mental Status: She is alert and oriented to person, place, and time.      Cranial Nerves: No cranial nerve deficit.   Psychiatric:         Behavior: Behavior normal.         Thought Content: Thought content normal.         Results Review:  I reviewed the patient's new clinical results.  I reviewed the patient's new imaging results and agree with the interpretation.  I reviewed the patient's other test results and agree with the interpretation  I personally viewed and interpreted the patient's EKG/Telemetry data  Discussed with ED provider.    Lab Results (last 24 hours)     Procedure Component Value Units Date/Time    CBC & Differential [[id number]]  (Abnormal) Collected: 11/12/20 0913    Specimen: Blood Updated: 11/12/20 0953    Narrative:      The following orders were created for panel order CBC & Differential.  Procedure                               Abnormality         Status                     ---------                               -----------         ------                     CBC Auto Differential[[id number]]        Abnormal            Final result                 Please view results for these tests on the individual orders.    Comprehensive Metabolic Panel [[id number]]  (Abnormal) Collected: 11/12/20 0913    Specimen: Blood Updated: 11/12/20 0959     Glucose 127 mg/dL      BUN 11 mg/dL      Creatinine 0.75 mg/dL      Sodium 139 mmol/L      Potassium 4.4 mmol/L      Chloride 95 mmol/L      CO2 37.0 mmol/L      Calcium 10.5 mg/dL      Total Protein 7.2 g/dL      Albumin 4.30 g/dL      ALT (SGPT) 24 U/L      AST (SGOT) 27 U/L      Alkaline  Phosphatase 150 U/L      Total Bilirubin <0.2 mg/dL      eGFR Non African Amer 76 mL/min/1.73      Globulin 2.9 gm/dL      A/G Ratio 1.5 g/dL      BUN/Creatinine Ratio 14.7     Anion Gap 7.0 mmol/L     Narrative:      GFR Normal >60  Chronic Kidney Disease <60  Kidney Failure <15      Protime-INR [[id number]]  (Abnormal) Collected: 11/12/20 0913    Specimen: Blood Updated: 11/12/20 0957     Protime 15.2 Seconds      INR 1.21    aPTT [[id number]]  (Normal) Collected: 11/12/20 0913    Specimen: Blood Updated: 11/12/20 0957     PTT 29.4 seconds     Troponin [[id number]]  (Normal) Collected: 11/12/20 0913    Specimen: Blood Updated: 11/12/20 0956     Troponin T <0.010 ng/mL     Narrative:      Troponin T Reference Range:  <= 0.03 ng/mL-   Negative for AMI  >0.03 ng/mL-     Abnormal for myocardial necrosis.  Clinicians would have to utilize clinical acumen, EKG, Troponin and serial changes to determine if it is an Acute Myocardial Infarction or myocardial injury due to an underlying chronic condition.       Results may be falsely decreased if patient taking Biotin.      BNP [[id number]]  (Normal) Collected: 11/12/20 0913    Specimen: Blood Updated: 11/12/20 0954     proBNP 296.9 pg/mL     Narrative:      Among patients with dyspnea, NT-proBNP is highly sensitive for the detection of acute congestive heart failure. In addition NT-proBNP of <300 pg/ml effectively rules out acute congestive heart failure with 99% negative predictive value.    Results may be falsely decreased if patient taking Biotin.      Magnesium [[id number]]  (Normal) Collected: 11/12/20 0913    Specimen: Blood Updated: 11/12/20 0956     Magnesium 2.1 mg/dL     CBC Auto Differential [[id number]]  (Abnormal) Collected: 11/12/20 0913    Specimen: Blood Updated: 11/12/20 0953     WBC 9.99 10*3/mm3      RBC 3.85 10*6/mm3      Hemoglobin 11.7 g/dL      Hematocrit 36.3 %      MCV 94.3 fL      MCH 30.4 pg      MCHC 32.2 g/dL      RDW 12.0 %      RDW-SD 41.1 fl       MPV 9.1 fL      Platelets 402 10*3/mm3      Neutrophil % 82.1 %      Lymphocyte % 10.4 %      Monocyte % 5.7 %      Eosinophil % 0.7 %      Basophil % 0.4 %      Immature Grans % 0.7 %      Neutrophils, Absolute 8.20 10*3/mm3      Lymphocytes, Absolute 1.04 10*3/mm3      Monocytes, Absolute 0.57 10*3/mm3      Eosinophils, Absolute 0.07 10*3/mm3      Basophils, Absolute 0.04 10*3/mm3      Immature Grans, Absolute 0.07 10*3/mm3      nRBC 0.0 /100 WBC     Respiratory Panel PCR w/COVID-19(SARS-CoV-2) [facility] In-House, NP Swab in UTM/VTM, 3-4 HR TAT - Swab, Nasopharynx [[id number]] Collected: 11/12/20 1200    Specimen: Swab from Nasopharynx Updated: 11/12/20 1307          Imaging Results (Last 24 Hours)     Procedure Component Value Units Date/Time    XR Chest 1 View [[id number]] Collected: 11/12/20 0957     Updated: 11/12/20 1001    Narrative:      XR CHEST 1 VW-  11/12/2020     HISTORY: Shortness of breath. Congestion.     Heart size is within normal limits. There are some minimal linear  stranding in the right midlung consistent with parenchymal scarring also  seen on the 11/6/2020 study. Mildly prominent lung markings are also  seen in the left base also stable since 11/6/2020 study.     No definite new infiltrates are seen.       Impression:      Mild chronic parenchymal changes as described.  2. No definite evidence of acute pneumonia.     This report was finalized on 11/12/2020 9:58 AM by Dr. Corky Nelson M.D.             Results for orders placed during the hospital encounter of 11/05/20   Adult Transthoracic Echo Complete W/ Cont if Necessary Per Protocol    Narrative · Calculated left ventricular EF = 54.8% Estimated left ventricular EF was   in agreement with the calculated left ventricular EF. Left ventricular   systolic function is normal.  · Left ventricular wall thickness is consistent with moderate concentric   hypertrophy.  · Mitral annular calcification is present.  · No  significant valvular regurgitation or stenosis.          ECG 12 Lead   Preliminary Result   HEART RATE= 98  bpm   RR Interval= 612  ms   [place] Interval= 193  ms   P Horizontal Axis= 1  deg   P Front Axis= 83  deg   QRSD Interval= 91  ms   QT Interval= 354  ms   QRS Axis= -62  deg   T Wave Axis= 61  deg   - ABNORMAL ECG -   Sinus rhythm   Ventricular premature complex   Inferior infarct, old   Electronically Signed By:    Date and Time of Study: 2020-11-12 09:03:37           Assessment/Plan     Active Hospital Problems    Diagnosis  POA   • **Chest wall pain [R07.89]  Yes   • Compression fracture of T9 vertebra (CMS/[facility]) [S22.070A]  Yes   • Acute pulmonary embolism without acute cor pulmonale (CMS/[facility]) [I26.99]  Yes   • COPD (chronic obstructive pulmonary disease) (CMS/[facility]) [J44.9]  Yes   • Gastroesophageal reflux disease [K21.9]  Yes   • Mixed anxiety depressive disorder [F41.8]  Yes      Resolved Hospital Problems   No resolved problems to display.       Ms. Prakash is a 75 y.o. former smoker with a history of low back pain, GERD, COPD, osteoporosis, asthma who recently admitted on 11/5/2020 and discharged on 11/20/2020 for abdominal pain and severe right-sided thoracic back pain related to acute thoracic compression fractures T9 +/-T10 and possible right eighth rib fracture and returns to [facility] with CC shortness of breath and admitted for chest wall pain.        Chest wall pain.  Unremarkable troponin <0.010 and EKG sinus rhythm.  Pleuritic-like chest pain.  Does not appear with ACS & patient reports resolution of chest wall pain in the absence of cough.    Mixed anxiety depressive disorder.  Currently with mild anxiety yet redirects easily.     Gastroesophageal reflux disease.  Stable appearance, no evidence of hematemesis & denies dyspepsia at present.  PPI.    COPD (chronic obstructive pulmonary disease) (CMS/[facility]).  Baseline 6 L NC.  No evidence of increased respiratory work effort.     Acute pulmonary  embolism without acute cor pulmonale (CMS/HCC).  Apixaban PTA per CTA chest 11/5/2020.    Compression fracture of T9 vertebra (CMS/HCC).  Aspen brace.     I discussed the patient's findings and my recommendations with Dr. Johnson.  Plan inpatient rehab--CCP following.    VTE Prophylaxis - apixaban  Code Status - CPR    *Home medications pending RN verification prior to complete reconciliation*       YEN Hairston  [facility]  11/12/20  13:53 EST

## 2020-11-12 NOTE — ED NOTES
Pt noted to have mask on when this RN entered the room.  This RN wore appropriate PPE throughout our encounter. Hand hygiene performed upon entering and exiting room.       Krysten Garcia RN  11/12/20 2845

## 2020-11-13 NOTE — PLAN OF CARE
Problem: Adult Inpatient Plan of Care  Goal: Plan of Care Review  Recent Flowsheet Documentation  Taken 11/13/2020 1254 by Yue Castellanos, PT  Plan of Care Reviewed With: patient  Outcome Summary: 76yo white female admitted 11/12/20 after falling at home and c/o chest pain after recently being d/c from [facility]. PMH includes anxiety, reflux, air obstruction, acute PE, T9 compression fx, osteoporosis, IBS, emphesema. PLOF: lived at home used r wx for mobility, O2. She is primarily limited at this time by generalized weakness and poor activity tolerance. She req min A for bed mobility and transfers. She was only able to amb 15' before becoming very dizzy and requesting to sit. She would certainly benefit from skilled PT for ther ex, gt/transfer training, bed mobility, balance, and activity tolerance as appropriate. Pt recently d/c'd home and sustained fall before returning to hosp. I dont feel she can safely return home at this time.  Patient was intermittently wearing a face mask during this therapy encounter. Therapist used appropriate personal protective equipment including eye protection, mask, and gloves.  Mask used was standard procedure mask. Appropriate PPE was worn during the entire therapy session. Hand hygiene was completed before and after therapy session. Patient is not in enhanced droplet precautions.

## 2020-11-13 NOTE — PROGRESS NOTES
Discharge Planning Assessment  [facility]     Patient Name: Siobhan Prakash  MRN: [id number]  Today's Date: 11/13/2020    Admit Date: 11/12/2020    Discharge Needs Assessment     Row Name 11/13/20 1029       Living Environment    Lives With  spouse    Current Living Arrangements  home/apartment/condo    Primary Care Provided by  spouse/significant other    Provides Primary Care For  no one, unable/limited ability to care for self    Family Caregiver if Needed  spouse    Quality of Family Relationships  helpful;involved;supportive    Able to Return to Prior Arrangements  yes       Resource/Environmental Concerns    Resource/Environmental Concerns  none    Transportation Concerns  car, none       Transition Planning    Patient/Family Anticipates Transition to  inpatient rehabilitation facility    Patient/Family Anticipated Services at Transition  skilled nursing    Transportation Anticipated  family or friend will provide       Discharge Needs Assessment    Readmission Within the Last 30 Days  previous discharge plan unsuccessful    Current Outpatient/Agency/Support Group  skilled nursing facility    Equipment Currently Used at Home  oxygen;walker, rolling;nebulizer;shower chair    Concerns to be Addressed  discharge planning    Anticipated Changes Related to Illness  none    Equipment Needed After Discharge  none    Outpatient/Agency/Support Group Needs  skilled nursing facility    Discharge Facility/Level of Care Needs  nursing facility, skilled    Provided Post Acute Provider List?  Yes    Post Acute Provider List  Nursing Home    Provided Post Acute Provider Quality & Resource List?  Yes    Post Acute Provider Quality and Resource List  Nursing Home    Delivered To  Support Person;Patient    Support Person  Vlad Prakash ()    Method of Delivery  In person    Current Discharge Risk  chronically ill        Discharge Plan     Row Name 11/13/20 1032       Plan    Plan  SNF (pending referrals)     Patient/Family in Agreement with Plan  yes    Plan Comments  Facesheet verified, MACK given and CCP role explained.  Patient and  are in the room and they do want the patient to go to a skilled rehab.  She normally lives at home with her  and he takes care of her.  She has home O2, rolling walker, shower chair at home.  She is wearing a back brace that seems to be helping.  I went over provider list for SNF that I gave to them along with the road to recovery.  I also pulled up CMS compare in the room and went over ratings.  They gave me several referrals that I can check with.  CCP will continue to follow.        Continued Care and Services - Admitted Since 11/12/2020     Destination     Service Provider Request Status Selected Services Address Phone Fax    [facility]  [facility] - Request Sent N/A [address] [facility] [address] [phone]       Internal Comment last updated by Jennifer Pulido RN 11/12/2020 1152    Spoke to Caio loaiza/Jennifer [facility]- referral placed. Jennifer Pulido RN                 [place] [facility] [place]  Pending - Request Sent N/A [address] [facility] [id number] [phone]    [facility]  Pending - Request Sent N/A [address] [facility] [address] [phone]    [facility]  Pending - Request Sent N/A [address] [facility] [address] [phone]    [facility]  Pending - Request Sent N/A [address] [facility] [address] [phone]            Selected Continued Care - Prior Encounters Includes selections from prior encounters from 8/14/2020 to 11/13/2020    Discharged on 11/11/2020 Admission date: 11/5/2020 - Discharge disposition: Home-Health Care c    Home Medical Care     Service Provider Selected Services Address Phone Fax    [facility] [place]  [facility] Services [address] [address] [phone]                      Demographic Summary     Row Name 11/13/20 1027       General Information    Admission Type  observation    Arrived From  home    Reason for Consult  discharge planning    Preferred Language  English     Used During This Interaction  no       Contact Information    Permission Granted to Share Info With  family/designee    Contact Information Comments          Functional Status     Row Name 11/13/20 1027       Functional Status    Usual Activity Tolerance  fair    Current Activity Tolerance  poor       Functional Status, IADL    Medications  assistive equipment and person    Meal Preparation  assistive equipment and person    Housekeeping  assistive equipment and person    Laundry  assistive equipment and person    Shopping  assistive equipment and person       Mental Status    General Appearance WDL  WDL       Mental Status Summary    Recent Changes in Mental Status/Cognitive Functioning  no changes        Psychosocial     Row Name 11/13/20 1028       Values/Beliefs    Spiritual, Cultural Beliefs, Zoroastrian Practices, Values that Affect Care  no       Behavior WDL    Behavior WDL  WDL       Emotion Mood WDL    Emotion/Mood/Affect WDL  WDL       Speech WDL    Speech WDL  WDL       Perceptual State WDL    Perceptual State WDL  WDL       Thought Process WDL    Thought Process WDL  WDL       Intellectual Performance WDL    Intellectual Performance WDL  WDL    Level of Consciousness  Alert       Coping/Stress    Major Change/Loss/Stressor  none    Patient Personal Strengths  strong support system;able to adapt    Sources of Support  spouse    Techniques to [place] with Loss/Stress/Change  medication    Reaction to Health Status  accepting;adjusting    Understanding of Condition and Treatment  adequate understanding of medical condition;adequate understanding of treatment       Developmental Stage (Erikjesseniaon's)     Developmental Stage  Stage 8 (65 years-death/Late Adulthood) Integrity vs. Despair        Abuse/Neglect     Row Name 11/13/20 1029       Personal Safety    Feels Unsafe at Home or Work/School  no    Feels Threatened by Someone  no    Does Anyone Try to Keep You From Having Contact with Others or Doing Things Outside Your Home?  no    Physical Signs of Abuse Present  no        Legal    No documentation.       Substance Abuse    No documentation.       Patient Forms    No documentation.           Shannon Epley, RN

## 2020-11-13 NOTE — PROGRESS NOTES
[facility] NEUROSURGERY PROGRESS NOTE      CC: T9 and 10, L5 VCF; fall 11/5      Subjective     Interval History: patient and  state that she was unable to do any activity at home due to weakness.  She now realizes that she needs rehab.  Readmitted with ongoing anterior and posterior chest pain. Has T9 and 10 VCF (age indeterminate as she was intolerant of MRI) as well as suspected right 8th rib fx. On Eliquis for PE and has severe/end stage COPD.  She reports that she has been compliant with wearing her brace.  She is uncertain whether it gives her relief or not.  She states that the fit bothers her.  She states that she is able to move about some without significant back discomfort.  No leg pain numbness tingling or weakness.  No bowel or bladder issues.  She again states that she is unable to tolerate an MRI and also states that she would never want to have a procedure where she may not come off of a ventilator.    ROS:  MS: back pain  Neuro: No numbness, tingling, or weakness,  no balance difficulties  : No difficulty voiding, no incontinence    Objective     Vital signs in last 24 hours:  Temp:  [97.6 °F (36.4 °C)-98.9 °F (37.2 °C)] 98.9 °F (37.2 °C)  Heart Rate:  [] 96  Resp:  [18] 18  BP: (137-147)/(63-79) 147/73    Intake/Output this shift:  I/O this shift:  In: 240 [P.O.:240]  Out: -     LABS:  Results from last 7 days   Lab Units 11/13/20  0954 11/12/20  0913 11/11/20  0419   WBC 10*3/mm3 8.18 9.99 5.90   HEMOGLOBIN g/dL 10.8* 11.7* 9.9*   HEMATOCRIT % 33.2* 36.3 31.6*   PLATELETS 10*3/mm3 391 402 306       IMAGING STUDIES:  No new imaging    I personally viewed and interpreted the patient's chart.    Meds reviewed/changed: Yes    Current Facility-Administered Medications:   •  acetaminophen (TYLENOL) tablet 650 mg, 650 mg, Oral, Q4H PRN, Michael Amezcua APRN  •  albuterol (PROVENTIL) nebulizer solution 0.083% 2.5 mg/3mL, 2.5 mg, Nebulization, Q4H PRN, Michael Amezcua, YEN  •  apixaban  (ELIQUIS) tablet 5 mg, 5 mg, Oral, Q12H, Alberto Johnson MD, 5 mg at 11/13/20 1034  •  arformoterol (BROVANA) nebulizer solution 15 mcg, 15 mcg, Nebulization, BID - RT, Michael Amezcua APRN, 15 mcg at 11/13/20 0701  •  azithromycin (ZITHROMAX) tablet 500 mg, 500 mg, Oral, Q24H, Michael Amezcua APRN, 500 mg at 11/13/20 1034  •  baclofen (LIORESAL) tablet 10 mg, 10 mg, Oral, TID PRN, Michael Amezcua APRN  •  budesonide (PULMICORT) nebulizer solution 0.5 mg, 0.5 mg, Nebulization, Daily - RT, Michael Amezcua APRN, 0.5 mg at 11/13/20 0701  •  cholecalciferol (VITAMIN D3) tablet 800 Units, 800 Units, Oral, Daily, Alberto Johnson MD, 800 Units at 11/13/20 1035  •  dicyclomine (BENTYL) capsule 10 mg, 10 mg, Oral, TID PRN, Alberto Johnson MD  •  dilTIAZem CD (CARDIZEM CD) 24 hr capsule 180 mg, 180 mg, Oral, Q24H, Michael Amezcua APRN, 180 mg at 11/12/20 1956  •  docusate sodium (COLACE) capsule 100 mg, 100 mg, Oral, BID PRN, Michael Amezcua APRN  •  ferrous sulfate tablet 325 mg, 325 mg, Oral, BID With Meals, Alberto Johnson MD, 325 mg at 11/13/20 1035  •  FLUoxetine (PROzac) capsule 20 mg, 20 mg, Oral, Daily, Michael Amezcua APRN, 20 mg at 11/12/20 1956  •  guaiFENesin (MUCINEX) 12 hr tablet 1,200 mg, 1,200 mg, Oral, Q12H, Alberto Johnson MD, 1,200 mg at 11/13/20 1034  •  hydrOXYzine (ATARAX) tablet 25 mg, 25 mg, Oral, TID PRN, Alberto Johnson MD  •  lidocaine (LIDODERM) 5 % 1 patch, 1 patch, Transdermal, Once, Eduardo Benavides MD  •  loperamide (IMODIUM) capsule 2 mg, 2 mg, Oral, 4x Daily PRN, Alberto Johnson MD  •  LORazepam (ATIVAN) tablet 0.5 mg, 0.5 mg, Oral, Q4H PRN, Alberto Johnson MD, 0.5 mg at 11/13/20 0848  •  mirtazapine (REMERON) tablet 15 mg, 15 mg, Oral, Nightly, Alberto Johnson MD, 15 mg at 11/12/20 2003  •  montelukast (SINGULAIR) tablet 10 mg, 10 mg, Oral, Nightly, Michael Amezcua APRN, 10 mg at 11/12/20 2003  •  morphine injection 2 mg, 2 mg,  Intravenous, Q2H PRN, Alberto Johnson MD, 2 mg at 11/13/20 0848  •  ondansetron (ZOFRAN) tablet 4 mg, 4 mg, Oral, Q6H PRN **OR** ondansetron (ZOFRAN) injection 4 mg, 4 mg, Intravenous, Q6H PRN, Michael Amezcua APRN, 4 mg at 11/13/20 0854  •  oxyCODONE-acetaminophen (PERCOCET)  MG per tablet 1 tablet, 1 tablet, Oral, Q4H PRN, Michael Amezcua APRN, 1 tablet at 11/13/20 1243  •  pantoprazole (PROTONIX) EC tablet 40 mg, 40 mg, Oral, QAM, Alberto Johnson MD, 40 mg at 11/13/20 0510  •  roflumilast (DALIRESP) tablet 500 mcg, 500 mcg, Oral, Daily, Michael Amezcua APRN, 500 mcg at 11/13/20 1035  •  sodium chloride 0.9 % flush 10 mL, 10 mL, Intravenous, Q12H, Michael Amezcua APRN, 10 mL at 11/13/20 0845  •  sodium chloride 0.9 % flush 10 mL, 10 mL, Intravenous, PRN, Michael Amezcua APRN  •  theophylline (UNIPHYL) 24 hr tablet 400 mg, 400 mg, Oral, Q24H, Michael Amezcua APRN, 400 mg at 11/13/20 1035  •  tiotropium (SPIRIVA RESPIMAT) 2.5 mcg/act aerosol solution inhaler, 2 puff, Inhalation, Daily, Michael Amezcua APRN, 2 puff at 11/13/20 0703  •  zolpidem (AMBIEN) tablet 5 mg, 5 mg, Oral, Nightly PRN, Alberto Johnson MD      Physical Exam:    General:   Sitting up in chair.  Awake and alert.  No acute distress.  Sits forward and moves about in chair without obvious discomfort.  Respiratory:   6 L nasal cannula.  Positive productive cough.  Back:    Generalized mild thoracic tenderness.  Does not experience much discomfort with compression of her thorax laterally or AP; TLSO brace on.     Motor: Normal strength bilateral lower extremities.  Sensation: Normal to light touch  Station and Gait:             Per PT note 11/13-min A for bed mobility and transfers. She was only able to amb 15' before becoming very dizzy and requesting to sit  Extremities:   No calf tenderness or swelling      Assessment/Plan     ASSESSMENT:      Chest wall pain    Mixed anxiety depressive disorder    Gastroesophageal reflux  disease    COPD (chronic obstructive pulmonary disease) (CMS/HCC)    Acute pulmonary embolism without acute cor pulmonale (CMS/HCC)    Compression fracture of T9 vertebra (CMS/[facility])      PLAN: Patient returns to hospital with ongoing pain but mainly due to the fact that she and her  were unable to manage her care at home.  She was not able to mobilize well.  Continues to have back pain and is unable to say whether the brace helps or not.  She was able to work with occupational therapy and although she has pain states it was not necessarily aggravated by moving.  She again declines ability to have MRI.  We could obtain bone scan for fracture aging. We discussed her options being conservative management with brace and pain management or surgery.  Unfortunately not only is she end-stage COPD but she also has a acute PE that just began recent treatment with anticoagulation.  I explained that surgery would require intubation and that due to her end-stage pulmonary issues even if she was cleared to hold her anticoagulation, there was a chance that she would require prolonged ventilatory support.  She and her  both understand that risk likely outweighs benefit in this situation.  I do not feel that there is any need to further work-up her fracture as she has not had any increased pain since her last hospitalization and we are not planning to proceed with any surgical management. I agree with rehab following hospital discharge.  Hopefully she can gain some strength.  From neurosurgical standpoint, okay for discharge when medically ready. follow-up 2 to 3 weeks with x-rays as previously planned.  Patient should remain in brace as instructed.  Neurosurgery will sign off.    No lift, push, pull more than 5 pounds, no swimming, no bending or twisting. No exertional or impact activity- walking is OK. Wear brace when sitting higher than 45 degrees, standing, walking- unless just from bed to bathroom. OK to remove  "brace for showers.    I discussed the patient's findings and my recommendations with patient, family, nursing staff and Dr. Singh       LOS: 0 days       Keri Lilly, APRN  11/13/2020  14:19 EST    \"Dictated utilizing Dragon dictation\".      "

## 2020-11-13 NOTE — DISCHARGE SUMMARY
PHYSICIAN DISCHARGE SUMMARY                                                                        [facility]    Patient Identification:  Name: Siobhan Prakash  Age: 75 y.o.  Sex: female  :  1945  MRN: [id number]  Primary Care Physician: Lyudmila Granados APRN    Admit date: 2020  Discharge date and time: 2020     Discharged Condition: good    Discharge Diagnoses:       Compression fracture of T9 vertebra with intractable pain    Mixed anxiety depressive disorder    Gastroesophageal reflux disease    COPD (chronic obstructive pulmonary disease) (CMS/[facility])    Acute pulmonary embolism without acute cor pulmonale (CMS/[facility])           Hospital Course:  Pleasant 75-year-old female with a recent thoracic compression fracture.  She presents back with intractable pain and immobility.  Please H&P for full details.  On presentation she was in significant pain but we were able to bring this under control with IV morphine.  Today we have been able to transition over the oral pain medications and she is doing much better.  She has got a need inpatient care and rehab to see her through this.  She has remained afebrile vital signs stable throughout the hospitalization.      Consults:     Consults     Date and Time Order Name Status Description    2020 1844 Inpatient Neurosurgery Consult      2020 1110 Inpatient Palliative Care MD Consult Completed     2020 1110 [facility] (on-call MD unless specified) Details Completed     2020 0949 Inpatient Cardiology Consult Completed     2020 2100 Inpatient Pulmonology Consult Completed     2020 1917 [facility] (on-call MD unless specified) Details Completed     2020 1854 Pulmonology (on-call MD unless specified) Completed             Discharge Exam:  Afebrile vital signs stable.  Well-developed but very thin and frail female in no apparent distress.  Lungs clear to  auscultation good air movement.  Heart regular rate and rhythm.  Extremities no clubbing cyanosis or edema.  Presently alert oriented and looking much more comfortable.  Pleasant cooperative.     Disposition:  Rehab    Patient Instructions:      Discharge Medications      New Medications      Instructions Start Date   acetaminophen 325 MG tablet  Commonly known as: TYLENOL   650 mg, Oral, Every 4 Hours PRN         Changes to Medications      Instructions Start Date   dicyclomine 20 MG tablet  Commonly known as: Bentyl  What changed:   · when to take this  · reasons to take this   20 mg, Oral, Every 6 Hours      LORazepam 0.5 MG tablet  Commonly known as: ATIVAN  What changed: when to take this   0.5 mg, Oral, Every 6 Hours PRN         Continue These Medications      Instructions Start Date   albuterol sulfate  (90 Base) MCG/ACT inhaler  Commonly known as: PROVENTIL HFA;VENTOLIN HFA;PROAIR HFA   2 puffs, Inhalation, Every 4 Hours PRN      arformoterol 15 MCG/2ML nebulizer solution  Commonly known as: BROVANA   15 mcg, Nebulization, 2 Times Daily - RT      azithromycin 500 MG tablet  Commonly known as: ZITHROMAX   500 mg, Oral, Daily      baclofen 10 MG tablet  Commonly known as: LIORESAL   10 mg, Oral, 3 Times Daily PRN      Biotin 1 MG capsule   1 tablet, Oral, Daily      budesonide 0.5 MG/2ML nebulizer solution  Commonly known as: PULMICORT   0.5 mg, Nebulization, 2 times daily      dilTIAZem  MG 24 hr capsule  Commonly known as: CARDIZEM CD   180 mg, Oral, Every 24 Hours Scheduled      Eliquis DVT/PE Starter Pack tablet therapy pack  Generic drug: Apixaban Starter Pack   10 mg, Oral, Every 12 Hours Scheduled      apixaban 5 MG tablet tablet  Commonly known as: ELIQUIS   5 mg, Oral, Every 12 Hours Scheduled   Start Date: November 17, 2020     ferrous sulfate 325 (65 FE) MG tablet   325 mg, Oral, 2 Times Daily With Meals      FLUoxetine 20 MG capsule  Commonly known as: PROzac   20 mg, Oral, Daily       guaiFENesin 600 MG 12 hr tablet  Commonly known as: MUCINEX   1,200 mg, Oral, Every 12 Hours Scheduled      hydrOXYzine 25 MG tablet  Commonly known as: ATARAX   25 mg, Oral, 3 Times Daily PRN      lansoprazole 15 MG capsule  Commonly known as: PREVACID   15 mg, Oral, Daily      lidocaine 5 %  Commonly known as: LIDODERM   1 patch, Transdermal, Every 24 Hours, Remove & Discard patch within 12 hours or as directed by MD      loperamide 2 MG capsule  Commonly known as: IMODIUM   2 mg, Oral, 4 Times Daily PRN      meloxicam 15 MG tablet  Commonly known as: MOBIC   15 mg, Oral, Daily      mirtazapine 15 MG tablet  Commonly known as: REMERON   15 mg, Oral, Nightly      montelukast 10 MG tablet  Commonly known as: SINGULAIR   10 mg, Oral, Nightly      oxyCODONE-acetaminophen  MG per tablet  Commonly known as: PERCOCET   1 tablet, Oral, Every 4 Hours PRN      pseudoephedrine 60 MG tablet  Commonly known as: SUDAFED   60 mg, Oral, Every 6 Hours PRN      roflumilast 500 MCG tablet tablet  Commonly known as: DALIRESP   500 mcg, Oral, Daily      Spiriva Respimat 2.5 MCG/ACT aerosol solution inhaler  Generic drug: tiotropium bromide monohydrate   2 puffs, Inhalation, Daily      theophylline 400 MG 24 hr tablet  Commonly known as: UNIPHYL   400 mg, Oral, Daily      vitamin D3 125 MCG (5000 UT) capsule capsule   5,000 Units, Oral, Daily      zolpidem 10 MG tablet  Commonly known as: AMBIEN   10 mg, Oral, Nightly PRN         Stop These Medications    metaxalone 800 MG tablet  Commonly known as: SKELAXIN     Perforomist 20 MCG/2ML nebulizer solution  Generic drug: formoterol     SUMAtriptan 50 MG tablet  Commonly known as: IMITREX          Diet Instructions     Diet: Cardiac      Discharge Diet: Cardiac        Future Appointments   Date Time Provider Department Center   11/16/2020  3:30 PM Lyudmila Granados APRN [facility]   11/25/2020  2:00 PM Alicia Conway PA-C [facility]     Additional Instructions for the  Follow-ups that You Need to Schedule     Discharge Follow-up with PCP   As directed       Currently Documented PCP:    Lyudmila Granados APRN    PCP Phone Number:    [phone]     Follow Up Details: 1 week         Discharge Follow-up with Specialty: Neurosurgery   As directed      Specialty: Neurosurgery    Follow Up Details: As directed.            Contact information for follow-up providers     Lyudmila Granados APRN. Schedule an appointment as soon as possible for a visit in 2 days.    Specialty: Family Medicine  Why: even if well  Contact information:  [address] [facility] [address] [phone]             Omar Tobin MD. Schedule an appointment as soon as possible for a visit in 2 days.    Specialty: Pain Medicine  Why: for further management of your pain  Contact information:  [address] [place], [place] [id number] [phone]             Lyudmila Granados APRN .    Specialty: Family Medicine  Why: 1 week  Contact information:  [address] [facility] [address] [phone]                   Contact information for after-discharge care     Destination     [facility] .    Service: Skilled Nursing  Contact information:  [address] [facility] [address] [phone]                           Discharge Order (From admission, onward)     Start     Ordered    11/13/20 1659  Discharge patient  Once     Expected Discharge Date: 11/13/20    Discharge Disposition: Skilled Nursing Facility (DC - External)    Physician of Record for Attribution - Please select from Treatment Team: ENDY GARCIA [[id number]]    Review needed by CMO to determine Physician of Record: No       Question Answer Comment   Physician of Record for Attribution - Please select from Treatment Team ENDY GARCIA    Review needed by CMO to determine Physician of Record No        11/13/20 1705                  Total time spent discharging patient including evaluation,post  hospitalization follow up,  medication and post hospitalization instructions and education total time exceeds 30 minutes.    Signed:  Alberto Johnson MD  11/13/2020  17:05 EST    EMR Dragon/Transcription disclaimer:   Much of this encounter note is an electronic transcription/translation of spoken language to printed text. The electronic translation of spoken language may permit erroneous, or at times, nonsensical words or phrases to be inadvertently transcribed; Although I have reviewed the note for such errors, some may still exist.

## 2020-11-13 NOTE — PLAN OF CARE
Problem: Adult Inpatient Plan of Care  Goal: Plan of Care Review  Recent Flowsheet Documentation  Taken 11/13/2020 0906 by Ruth Craig OTR  Plan of Care Reviewed With: patient  Outcome Summary: Pt presents back to hospital after home one day from admit for T9-10 non-operative and bracing for  compression fracture.  Per chart and pt, unable to mobilize at home and c/o chest pain. Today with OT pt is mod A to move to EOB and min A with mod/max cueing and encouragement to move to EOB, xfer to recliner.  Pt activity and ADL limited due to pain, spinal precuation.  Agree with   plan for SNU at d/c.       Patient was wearing a face mask only briefly as states claustraphobic and cannot tolerate mask.  Therapist used appropriate personal protective equipment including surgical mask, eye shield and gloves during the entire therapy session. Hand hygiene was completed before and after therapy session. Patient is not in enhanced droplet precautions.

## 2020-11-13 NOTE — DISCHARGE PLACEMENT REQUEST
"Eli Prakash (75 y.o. Female)     Date of Birth Social Security Number Address Home Phone MRN    1945  [address]  [facility] [address] [phone] [id number]    [employer] Marital Status          Denominational        Admission Date Admission Type Admitting Provider Attending Provider Department, Room/Bed    11/12/20 Emergency Alberto Johnson MD Broaddus, Emmett J., MD [address], [address]    Discharge Date Discharge Disposition Discharge Destination                       Attending Provider: Alberto Johnson MD    Allergies: Sulfa Antibiotics, Strawberry, Codeine, Hydrocodone    Isolation: None   Infection: None   Code Status: CPR    Ht: 152.4 cm (60\")   Wt: 47.2 kg (104 lb)    Admission Cmt: None   Principal Problem: Chest wall pain [R07.89]                 Active Insurance as of 11/12/2020     Primary Coverage     Payor Plan Insurance Group Employer/Plan Group    HUMANA MEDICARE REPLACEMENT HUMANA MEDICARE REPLACEMENT [id number]     Payor Plan Address Payor Plan Phone Number Payor Plan Fax Number Effective Dates    [address] [address] [phone]  1/1/2020 - None Entered    [facility] [id number]       Subscriber Name Subscriber Birth Date Member ID       ELI PRAKASH 1945 [id number]                 Emergency Contacts      (Rel.) Home Phone Work Phone Mobile Phone    Vlad Prakash (Spouse) [phone] -- [phone]              "

## 2020-11-13 NOTE — THERAPY EVALUATION
Patient Name: Siobhan Prakash  : 1945    MRN: [id number]                              Today's Date: 2020       Admit Date: 2020    Visit Dx:     ICD-10-CM ICD-9-CM   1. Chest wall pain  R07.89 786.52   2. Intractable pain  R52 780.96   3. Chronic obstructive pulmonary disease, unspecified COPD type (CMS/HCC)  J44.9 496   4. Immobility  Z74.09 799.89   5. 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   • COPD (chronic obstructive pulmonary disease) (CMS/HCC)   • Right-sided chest pain   • Acute right-sided thoracic back pain   • Abnormal CT of the chest   • Chronic respiratory failure with hypoxia and hypercapnia (CMS/HCC)   • Acute pulmonary embolism without acute cor pulmonale (CMS/HCC)   • Compression fracture of T9 vertebra (CMS/HCC)   • Multifocal atrial tachycardia (CMS/HCC)   • Hypokalemia   • Chest wall pain     Past Medical History:   Diagnosis Date   • Allergic 1970   • Anxiety    • Arthritis 10/17   • Asthma    • Cataract    • Cholelithiasis    • COPD (chronic obstructive pulmonary disease) (CMS/HCC)    • Depression    • GERD (gastroesophageal reflux disease)    • HL (hearing loss)    • Hyperlipidemia    • Irritable bowel syndrome    • Low back pain    • Migraine    • Osteopenia    • Osteoporosis    • Pneumonia    • Tremor    • Urinary tract infection      Past Surgical History:    Procedure Laterality Date   • CHOLECYSTECTOMY     • COLONOSCOPY  2012   • EYE SURGERY      bilateral cataracts   • HIP ARTHROPLASTY Right    • JOINT REPLACEMENT     • REDUCTION MAMMAPLASTY     • TONSILLECTOMY       General Information     Row Name 11/13/20 1216          Physical Therapy Time and Intention    Document Type  evaluation  -DJ     Mode of Treatment  individual therapy;physical therapy  -DJ     Row Name 11/13/20 1216          General Information    Patient Profile Reviewed  yes  -DJ     Prior Level of Function  min assist:;ADL's;all household mobility  -DJ     Existing Precautions/Restrictions  fall;brace worn when out of bed;spinal  -DJ     Barriers to Rehab  medically complex;previous functional deficit;cognitive status  -DJ     Row Name 11/13/20 1216          Living Environment    Lives With  spouse  -DJ     Row Name 11/13/20 1216          Cognition    Orientation Status (Cognition)  oriented x 3 Pleasantly confused carlee regarding previous level of function and back brace  -DJ     Row Name 11/13/20 1216          Safety Issues, Functional Mobility    Safety Issues Affecting Function (Mobility)  safety precaution awareness  -DJ     Impairments Affecting Function (Mobility)  balance;cognition;pain  -DJ     Cognitive Impairments, Mobility Safety/Performance  insight into deficits/self-awareness  -DJ     Comment, Safety Issues/Impairments (Mobility)  gt belt, grippy socks, back brace donned  -DJ       User Key  (r) = Recorded By, (t) = Taken By, (c) = Cosigned By    Initials Name Provider Type    Yue Samuels, PT Physical Therapist        Mobility     Row Name 11/13/20 1240          Bed Mobility    Bed Mobility  supine-sit;sit-supine  -DJ     Supine-Sit [place] (Bed Mobility)  minimum assist (75% patient effort);moderate assist (50% patient effort);verbal cues  -DJ     Sit-Supine [place] (Bed Mobility)  moderate assist (50% patient effort);verbal cues  -DJ     Assistive Device (Bed Mobility)   head of bed elevated;bed rails  -DJ     Comment (Bed Mobility)  log rolling reviewed  -DJ     Row Name 11/13/20 1240          Transfers    Comment (Transfers)  sit/stand from recliner chair x 1 and EOB x 1  -DJ     Row Name 11/13/20 1240          Bed-Chair Transfer    Bed-Chair [place] (Transfers)  not tested  -DJ     Assistive Device (Bed-Chair Transfers)  walker, front-wheeled  -DJ     Row Name 11/13/20 1240          Sit-Stand Transfer    Sit-Stand [place] (Transfers)  minimum assist (75% patient effort);verbal cues  -DJ     Assistive Device (Sit-Stand Transfers)  walker, front-wheeled  -DJ     Row Name 11/13/20 1240          Gait/Stairs (Locomotion)    [place] Level (Gait)  minimum assist (75% patient effort);2 person assist;verbal cues;1 person to manage equipment  -DJ     Assistive Device (Gait)  walker, front-wheeled  -DJ     Distance in Feet (Gait)  15-20'  -DJ     Deviations/Abnormal Patterns (Gait)  festinating/shuffling;gait speed decreased;ashley decreased;stride length decreased  -DJ     Bilateral Gait Deviations  forward flexed posture  -DJ     [place] Level (Stairs)  not tested  -DJ     Comment (Gait/Stairs)  Pt amb 15-20' from recliner chair around bed. Attempted to amb to bathroom but became very dizzy and requested to sit. Pace is slow and cautious, balance is fair, posture is flexed, endurance is poor. Amb distance limited by poor endurance, dizziness, and discomfort  -DJ       User Key  (r) = Recorded By, (t) = Taken By, (c) = Cosigned By    Initials Name Provider Type    Yue Samuels, PT Physical Therapist        Obj/Interventions     Row Name 11/13/20 1246          Range of Motion Comprehensive    Comment, General Range of Motion  Grossly WFL  -DJ     Row Name 11/13/20 1246          Strength Comprehensive (MMT)    Comment, General Manual Muscle Testing (MMT) Assessment  Moves all 4s, at least 4-/5  -DJ     Row Name 11/13/20 1246          Motor Skills    Motor Skills   functional endurance  -DJ     Therapeutic Exercise  -- reviewed how to doff brace and that pt is to have brace on whenever out of bed  -DJ     Row Name 11/13/20 1246          Balance    Balance Assessment  sitting static balance;standing static balance  -DJ     Static Sitting Balance  WFL  -DJ     Static Standing Balance  mild impairment  -DJ     Balance Interventions  standing;weight shifting activity  -DJ     Row Name 11/13/20 1246          Sensory Assessment (Somatosensory)    Sensory Assessment (Somatosensory)  sensation intact  -DJ       User Key  (r) = Recorded By, (t) = Taken By, (c) = Cosigned By    Initials Name Provider Type    Yue Samuels, PT Physical Therapist        Goals/Plan     Row Name 11/13/20 1307          Bed Mobility Goal 1 (PT)    Activity/Assistive Device (Bed Mobility Goal 1, PT)  sit to supine;supine to sit  -DJ     [place] Level/Cues Needed (Bed Mobility Goal 1, PT)  supervision required  -DJ     Time Frame (Bed Mobility Goal 1, PT)  1 week  -DJ     Row Name 11/13/20 1307          Transfer Goal 1 (PT)    Activity/Assistive Device (Transfer Goal 1, PT)  sit-to-stand/stand-to-sit;toilet  -DJ     [place] Level/Cues Needed (Transfer Goal 1, PT)  supervision required  -DJ     Time Frame (Transfer Goal 1, PT)  1 week  -DJ     Row Name 11/13/20 1307          Gait Training Goal 1 (PT)    Activity/Assistive Device (Gait Training Goal 1, PT)  gait (walking locomotion);assistive device use;improve balance and speed;increase endurance/gait distance;increase energy conservation;walker, rolling  -DJ     Distance (Gait Training Goal 1, PT)  75'  -DJ     Time Frame (Gait Training Goal 1, PT)  1 week  -DJ     Row Name 11/13/20 1307          Patient Education Goal (PT)    Activity (Patient Education Goal, PT)  HEP  -DJ     [place]/Cues/Accuracy (Memory Goal 2, PT)  demonstrates adequately;verbalizes understanding  -DJ     Time Frame (Patient Education Goal, PT)  1 week  -DJ       User Key   (r) = Recorded By, (t) = Taken By, (c) = Cosigned By    Initials Name Provider Type    DJ Yue Castellanos, PT Physical Therapist        Clinical Impression     Row Name 11/13/20 1258          Pain    Additional Documentation  Pain Scale: Numbers Pre/Post-Treatment (Group)  -DJ     Row Name 11/13/20 1255          Pain Scale: Numbers Pre/Post-Treatment    Pretreatment Pain Rating  8/10  -DJ     Pain Location - Side  Right  -DJ     Pain Location - Orientation  lateral  -DJ     Pain Location  chest  -DJ     Row Name 11/13/20 1256          Plan of Care Review    Plan of Care Reviewed With  patient  -DJ     Outcome Summary  74yo white female admitted 11/12/20 after falling at home and c/o chest pain after recently being d/c from [facility]. PMH includes anxiety, reflux, air obstruction, acute PE, T9 compression fx, osteoporosis, IBS, emphesema. PLOF: lived at home used r wx for mobility, O2. She is primarily limited at this time by generalized weakness and poor activity tolerance. She would benefit from skiled PT for ther ex, gt/transfer training, bed mobility, balance, and activity tolerance as appropriate. Pt recently d/c'd home and sustained fall before returning to hosp. I dont feel she can safely return home at this time.  -     Row Name 11/13/20 1255          Therapy Assessment/Plan (PT)    Rehab Potential (PT)  good, to achieve stated therapy goals  -DJ     Criteria for Skilled Interventions Met (PT)  yes;meets criteria;skilled treatment is necessary  -     Row Name 11/13/20 1254          Vital Signs    Pre SpO2 (%)  92 6L  -DJ     O2 Delivery Pre Treatment  supplemental O2  -DJ     Intra SpO2 (%)  -- 6L  -DJ     O2 Delivery Intra Treatment  supplemental O2 6L  -DJ     O2 Delivery Post Treatment  supplemental O2 6L  -DJ     Pre Patient Position  Sitting  -DJ     Intra Patient Position  Standing  -DJ     Post Patient Position  Supine  -DJ     Row Name 11/13/20 1252          Positioning and Restraints    Pre-Treatment  Position  sitting in chair/recliner  -DJ     Post Treatment Position  bed  -DJ     In Bed  supine;notified nsg;call light within reach;encouraged to call for assist;exit alarm on brace removed  -DJ       User Key  (r) = Recorded By, (t) = Taken By, (c) = Cosigned By    Initials Name Provider Type    Yue Samuels, PT Physical Therapist        Outcome Measures     Row Name 11/13/20 1308          How much help from another person do you currently need...    Turning from your back to your side while in flat bed without using bedrails?  3  -DJ     Moving from lying on back to sitting on the side of a flat bed without bedrails?  3  -DJ     Moving to and from a bed to a chair (including a wheelchair)?  2  -DJ     Standing up from a chair using your arms (e.g., wheelchair, bedside chair)?  3  -DJ     Climbing 3-5 steps with a railing?  2  -DJ     To walk in hospital room?  2  -DJ     AM-PAC 6 Clicks Score (PT)  15  -DJ     Row Name 11/13/20 1308          Functional Assessment    Outcome Measure Options  AM-PAC 6 Clicks Basic Mobility (PT)  -DJ       User Key  (r) = Recorded By, (t) = Taken By, (c) = Cosigned By    Initials Name Provider Type    Yue Samuels PT Physical Therapist        Physical Therapy Education                 Title: PT OT SLP Therapies (Done)     Topic: Physical Therapy (Done)     Point: Mobility training (Done)     Learning Progress Summary           Patient Acceptance, E, VU,NR by CANDE at 11/13/2020 1310   Family Acceptance, E, VU,NR by CANDE at 11/13/2020 1310                   Point: Home exercise program (Done)     Learning Progress Summary           Patient Acceptance, E, VU,NR by CANDE at 11/13/2020 1310   Family Acceptance, E, VU,NR by CANDE at 11/13/2020 1310                   Point: Body mechanics (Done)     Learning Progress Summary           Patient Acceptance, E, VU,NR by CANDE at 11/13/2020 1310   Family Acceptance, E, VU,NR by CANDE at 11/13/2020 1310                   Point: Precautions (Done)      Learning Progress Summary           Patient Acceptance, E, VU,NR by CANDE at 11/13/2020 1310   Family Acceptance, E, VU,NR by  at 11/13/2020 1310                               User Key     Initials Effective Dates Name Provider Type Discipline     10/25/19 -  Yue Castellanos PT Physical Therapist PT              PT Recommendation and Plan  Planned Therapy Interventions (PT): balance training, bed mobility training, gait training, home exercise program, postural re-education, patient/family education, transfer training, strengthening  Plan of Care Reviewed With: patient  Outcome Summary: 74yo white female admitted 11/12/20 after falling at home and c/o chest pain after recently being d/c from [facility]. PMH includes anxiety, reflux, air obstruction, acute PE, T9 compression fx, osteoporosis, IBS, emphesema. PLOF: lived at home used r wx for mobility, O2. She is primarily limited at this time by generalized weakness and poor activity tolerance. She would benefit from skiled PT for ther ex, gt/transfer training, bed mobility, balance, and activity tolerance as appropriate. Pt recently d/c'd home and sustained fall before returning to hosp. I dont feel she can safely return home at this time.     Time Calculation:   PT Charges     Row Name 11/13/20 1312             Time Calculation    Start Time  1106  -DJ      Stop Time  1129  -DJ      Time Calculation (min)  23 min  -DJ      PT Non-Billable Time (min)  15 min  -DJ      PT Received On  11/13/20  -DJ      PT - Next Appointment  11/14/20  -DJ      PT Goal Re-Cert Due Date  11/20/20  -        User Key  (r) = Recorded By, (t) = Taken By, (c) = Cosigned By    Initials Name Provider Type    Yue Samuels PT Physical Therapist        Therapy Charges for Today     Code Description Service Date Service Provider Modifiers Qty    29158797913 HC PT EVAL LOW COMPLEXITY 3 11/13/2020 Yue Castellanos, PT GP 1    30929169040 HC PT THER PROC EA 15 MIN 11/13/2020 Yue Castellanos, PT GP 2     [id number]  PT THER SUPP EA 15 MIN 11/13/2020 Yue Castellanos, PT GP 1          PT G-Codes  Outcome Measure Options: AM-PAC 6 Clicks Basic Mobility (PT)  AM-PAC 6 Clicks Score (PT): 15  AM-PAC 6 Clicks Score (OT): 12    Yue Castellanos, PT  11/13/2020

## 2020-11-13 NOTE — THERAPY EVALUATION
Patient Name: Siobhan Prakash  : 1945    MRN: [id number]                              Today's Date: 2020       Admit Date: 2020    Visit Dx:     ICD-10-CM ICD-9-CM   1. Chest wall pain  R07.89 786.52   2. Intractable pain  R52 780.96   3. Chronic obstructive pulmonary disease, unspecified COPD type (CMS/HCC)  J44.9 496   4. Immobility  Z74.09 799.89   5. 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   • COPD (chronic obstructive pulmonary disease) (CMS/HCC)   • Right-sided chest pain   • Acute right-sided thoracic back pain   • Abnormal CT of the chest   • Chronic respiratory failure with hypoxia and hypercapnia (CMS/HCC)   • Acute pulmonary embolism without acute cor pulmonale (CMS/HCC)   • Compression fracture of T9 vertebra (CMS/HCC)   • Multifocal atrial tachycardia (CMS/HCC)   • Hypokalemia   • Chest wall pain     Past Medical History:   Diagnosis Date   • Allergic 1970   • Anxiety    • Arthritis 10/17   • Asthma    • Cataract    • Cholelithiasis    • COPD (chronic obstructive pulmonary disease) (CMS/HCC)    • Depression    • GERD (gastroesophageal reflux disease)    • HL (hearing loss)    • Hyperlipidemia    • Irritable bowel syndrome    • Low back pain    • Migraine    • Osteopenia    • Osteoporosis    • Pneumonia    • Tremor    • Urinary tract infection      Past Surgical History:  "  Procedure Laterality Date   • CHOLECYSTECTOMY     • COLONOSCOPY  2012   • EYE SURGERY      bilateral cataracts   • HIP ARTHROPLASTY Right    • JOINT REPLACEMENT     • REDUCTION MAMMAPLASTY     • TONSILLECTOMY       General Information     Row Name 11/13/20 0857          OT Time and Intention    Document Type  evaluation;therapy note (daily note)  -LE     Mode of Treatment  individual therapy;occupational therapy  -     Row Name 11/13/20 0857          General Information    Patient Profile Reviewed  yes  -LE     Prior Level of Function  -- d/c home one day from [facility] and return to ER with c/o chest pain and  inability to provide care for pt.  -LE     Existing Precautions/Restrictions  fall;brace worn when out of bed;spinal TLSO brace in place when enter room  -LE     Barriers to Rehab  previous functional deficit pt anxious, states \"I don't know anything about anything\".  -     Row Name 11/13/20 0857          Living Environment    Lives With  spouse  -     Row Name 11/13/20 0857          Cognition    Orientation Status (Cognition)  oriented to;person;place  -     Row Name 11/13/20 0857          Safety Issues, Functional Mobility    Safety Issues Affecting Function (Mobility)  awareness of need for assistance;impulsivity;insight into deficits/self-awareness;judgment;safety precaution awareness  -LE     Impairments Affecting Function (Mobility)  balance;cognition;pain  -LE       User Key  (r) = Recorded By, (t) = Taken By, (c) = Cosigned By    Initials Name Provider Type    Ruth Chowdhury OTR Occupational Therapist        Mobility/ADL's     Row Name 11/13/20 0901          Bed Mobility    Bed Mobility  supine-sit  -LE     Supine-Sit [place] (Bed Mobility)  moderate assist (50% patient effort);standby assist;set up;supervision;verbal cues  -LE     Bed Mobility, Safety Issues  impaired trunk control for bed mobility  -LE     Assistive Device (Bed Mobility)  head of bed elevated;bed rails  -LE     " "Comment (Bed Mobility)  ed pt on log roll tech to reduce pain in back.  -DANII     Row Name 11/13/20 0901          Transfers    Transfers  bed-chair transfer;sit-stand transfer  -DANII     Comment (Transfers)  VC and assist for hand placement, body mechanics, step back to chair and turn fully before sitting.  pt xfer to chair from bed, states \"I have to get back to bed right now\", RN enters room and encourages pt to sit up in chair.  Pt agrees.  -LE     Bed-Chair [place] (Transfers)  minimum assist (75% patient effort);nonverbal cues (demo/gesture);verbal cues;supervision;standby assist;set up  -DANII     Assistive Device (Bed-Chair Transfers)  walker, front-wheeled  -LE     Sit-Stand [place] (Transfers)  set up;standby assist;supervision;verbal cues;nonverbal cues (demo/gesture);minimum assist (75% patient effort)  -DANII     Row Name 11/13/20 0901          Sit-Stand Transfer    Assistive Device (Sit-Stand Transfers)  walker, front-wheeled  -DANII     Row Name 11/13/20 0901          Functional Mobility    Functional Mobility-Distance (Feet)  2  -LE     Functional Mobility- Safety Issues  sequencing ability decreased;step length decreased;balance decreased during turns;weight-shifting ability decreased  -LE     Functional Mobility- Comment  few steps to chair with walker. cues for upright posture to increase stabilty and reduce back strain.  cue for activating trunk muscles to increase stabilty.  pt return demo with cueing  -DANII     Row Name 11/13/20 0901          Activities of Daily Living    BADL Assessment/Intervention  toileting;feeding pt declines work on ADL tasks due to focus on pain.  -DANII     Row Name 11/13/20 0901          Toileting Assessment/Training    [place] Level (Toileting)  dependent (less than 25% patient effort)  -DANII     Comment (Toileting)  puwick in place.  -DANII       User Key  (r) = Recorded By, (t) = Taken By, (c) = Cosigned By    Initials Name Provider Type    Ruth Chowdhury OTR Occupational " Therapist        Obj/Interventions     Row Name 11/13/20 0905          Range of Motion Comprehensive    Comment, General Range of Motion  B UE 2/3 AROM  -LE       User Key  (r) = Recorded By, (t) = Taken By, (c) = Cosigned By    Initials Name Provider Type    Ruth Chowdhury OTR Occupational Therapist        Goals/Plan     Row Name 11/13/20 0913          Transfer Goal 1 (OT)    Activity/Assistive Device (Transfer Goal 1, OT)  sit-to-stand/stand-to-sit;bed-to-chair/chair-to-bed;toilet;commode, 3-in-1;walker, rolling  -LE     [place] Level/Cues Needed (Transfer Goal 1, OT)  set-up required;supervision required;standby assist  -LE     Time Frame (Transfer Goal 1, OT)  2 weeks  -LE     Progress/Outcome (Transfer Goal 1, OT)  goal ongoing  -     Row Name 11/13/20 0913          Bathing Goal 1 (OT)    Activity/Device (Bathing Goal 1, OT)  upper body bathing;lower body bathing  -LE     [place] Level/Cues Needed (Bathing Goal 1, OT)  set-up required;standby assist;supervision required;moderate assist (50-74% patient effort)  -LE     Time Frame (Bathing Goal 1, OT)  2 weeks  -LE     Progress/Outcomes (Bathing Goal 1, OT)  goal ongoing  -LE     Row Name 11/13/20 0913          Dressing Goal 1 (OT)    Activity/Device (Dressing Goal 1, OT)  upper body dressing;lower body dressing;long-handled shoe horn;reacher;sock-aid  -LE     [place]/Cues Needed (Dressing Goal 1, OT)  set-up required;standby assist;supervision required;minimum assist (75% or more patient effort) with ed on AE  -LE     Time Frame (Dressing Goal 1, OT)  2 weeks  -LE     Progress/Outcome (Dressing Goal 1, OT)  goal ongoing  -LE     Row Name 11/13/20 0913          Toileting Goal 1 (OT)    Activity/Device (Toileting Goal 1, OT)  perform perineal hygiene;adjust/manage clothing;commode, 3-in-1  -LE     [place] Level/Cues Needed (Toileting Goal 1, OT)  minimum assist (75% or more patient effort);set-up required;standby assist;supervision  "required;tactile cues required  -LE     Time Frame (Toileting Goal 1, OT)  2 weeks  -LE     Progress/Outcome (Toileting Goal 1, OT)  goal ongoing  -DANII     Row Name 11/13/20 0913          Therapy Assessment/Plan (OT)    Planned Therapy Interventions (OT)  activity tolerance training;adaptive equipment training;BADL retraining;functional balance retraining;occupation/activity based interventions;patient/caregiver education/training;transfer/mobility retraining  -LE       User Key  (r) = Recorded By, (t) = Taken By, (c) = Cosigned By    Initials Name Provider Type    Ruth Chowdhury OTR Occupational Therapist        Clinical Impression     Row Name 11/13/20 0906          Pain Assessment    Additional Documentation  Pain Scale: Numbers Pre/Post-Treatment (Group)  -DANII     Row Name 11/13/20 0906          Pain Scale: Numbers Pre/Post-Treatment    Pretreatment Pain Rating  0/10 - no pain denies pain when asked when laying supine in bed, brace in place.  -LE     Posttreatment Pain Rating  -- when sitting EOB pt states \"100/10\".  once reclined in chair states \"15/10\".  RN present to give pain meds.  -LE     Pain Location - Orientation  -- back and chest  -LE     Pre/Posttreatment Pain Comment  OT reclines pt in chair and positions to comfort.  OT cues for body mech to reduce back strain during movement.  -LE     Pain Intervention(s)  Repositioned;Rest;Emotional support  -DANII     Row Name 11/13/20 0906          Plan of Care Review    Plan of Care Reviewed With  patient  -LE     Outcome Summary  Pt presents back to hospital after home one day from admit for T9-10 non-operative and bracing for  compression fracture.  Per chart and pt, unable to mobilize at home and c/o chest pain. Today with OT pt is mod A to move to EOB and min A with mod/max cueing and encouragement to move to EOB, xfer to recliner.  Pt activity and ADL limited due to pain, spinal precuation.  Agree with   plan for SNU at d/c.  -DANII     Row Name 11/13/20 " 0906          Therapy Assessment/Plan (OT)    Patient/Family Therapy Goal Statement (OT)  pain relief.  get back to baseline before compression fracture.  -LE     Rehab Potential (OT)  fair, will monitor progress closely  -LE     Criteria for Skilled Therapeutic Interventions Met (OT)  meets criteria;yes  -LE     Therapy Frequency (OT)  5 times/wk  -LE     Row Name 11/13/20 0906          Therapy Plan Review/Discharge Plan (OT)    Equipment Needs Upon Discharge (OT)  -- recommend 3in1, walker, shower chair if not already owned.  -LE     Anticipated Discharge Disposition (OT)  skilled nursing facility  -LE     Row Name 11/13/20 0906          Vital Signs    Pre SpO2 (%)  94  -LE     O2 Delivery Pre Treatment  supplemental O2 6L  -LE     Intra SpO2 (%)  87 when up to chair.  RN present and states to increase to 7L  -LE     O2 Delivery Intra Treatment  supplemental O2  -LE     Post SpO2 (%)  93  -LE     O2 Delivery Post Treatment  supplemental O2 return to 6L  -LE     Pre Patient Position  Supine  -LE     Intra Patient Position  Standing  -LE     Post Patient Position  Sitting  -LE     Row Name 11/13/20 0906          Positioning and Restraints    Pre-Treatment Position  in bed  -LE     Post Treatment Position  chair  -LE     In Chair  exit alarm on;encouraged to call for assist;call light within reach;reclined;with nsg  -LE       User Key  (r) = Recorded By, (t) = Taken By, (c) = Cosigned By    Initials Name Provider Type    Ruth Chowdhury OTR Occupational Therapist        Outcome Measures     Row Name 11/13/20 0915          How much help from another is currently needed...    Putting on and taking off regular lower body clothing?  1  -LE     Bathing (including washing, rinsing, and drying)  2  -LE     Toileting (which includes using toilet bed pan or urinal)  1  -LE     Putting on and taking off regular upper body clothing  2  -LE     Taking care of personal grooming (such as brushing teeth)  3  -LE     Eating meals   3  -LE     AM-PAC 6 Clicks Score (OT)  12  -LE     Row Name 11/13/20 0915          Functional Assessment    Outcome Measure Options  AM-PAC 6 Clicks Daily Activity (OT)  -DANII       User Key  (r) = Recorded By, (t) = Taken By, (c) = Cosigned By    Initials Name Provider Type    Ruth Chowdhury OTR Occupational Therapist        Occupational Therapy Education                 Title: PT OT SLP Therapies (Done)     Topic: Occupational Therapy (Done)     Point: ADL training (Done)     Description:   Instruct learner(s) on proper safety adaptation and remediation techniques during self care or transfers.   Instruct in proper use of assistive devices.              Learning Progress Summary           Patient Acceptance, E,D, DU,VU by DANII at 11/13/2020 0916    Comment: ed role of OT, plan of care, need for assist to get up. body mechancis and hand placement for xfer tech.  concern for d/c home and recommend SNU.    Acceptance, E, VU by DANII at 11/13/2020 0916    Comment: review spinal precautions, intent of brace.                   Point: Precautions (Done)     Description:   Instruct learner(s) on prescribed precautions during self-care and functional transfers.              Learning Progress Summary           Patient Acceptance, E,D, DU,VU by DANII at 11/13/2020 0916    Comment: ed role of OT, plan of care, need for assist to get up. body mechancis and hand placement for xfer tech.  concern for d/c home and recommend SNU.    Acceptance, E, VU by DANII at 11/13/2020 0916    Comment: review spinal precautions, intent of brace.                   Point: Body mechanics (Done)     Description:   Instruct learner(s) on proper positioning and spine alignment during self-care, functional mobility activities and/or exercises.              Learning Progress Summary           Patient Acceptance, E,D, DU,VU by DANII at 11/13/2020 0916    Comment: ed role of OT, plan of care, need for assist to get up. body mechancis and hand placement for xfer tech.   concern for d/c home and recommend SNU.    Acceptance, E, VU by DANII at 11/13/2020 0916    Comment: review spinal precautions, intent of brace.                               User Key     Initials Effective Dates Name Provider Type Discipline    DANII 06/08/18 -  Ruth Craig OTR Occupational Therapist OT              OT Recommendation and Plan  Planned Therapy Interventions (OT): activity tolerance training, adaptive equipment training, BADL retraining, functional balance retraining, occupation/activity based interventions, patient/caregiver education/training, transfer/mobility retraining  Therapy Frequency (OT): 5 times/wk  Plan of Care Review  Plan of Care Reviewed With: patient  Outcome Summary: Pt presents back to hospital after home one day from admit for T9-10 non-operative and bracing for  compression fracture.  Per chart and pt, unable to mobilize at home and c/o chest pain. Today with OT pt is mod A to move to EOB and min A with mod/max cueing and encouragement to move to EOB, xfer to recliner.  Pt activity and ADL limited due to pain, spinal precuation.  Agree with   plan for SNU at d/c.     Time Calculation:   Time Calculation- OT     Row Name 11/13/20 0918             Time Calculation- OT    OT Start Time  0826  -LE      OT Stop Time  0851  -LE      OT Time Calculation (min)  25 min  -LE      Total Timed Code Minutes- OT  19 minute(s)  -LE      OT Received On  11/13/20  -LE      OT - Next Appointment  11/16/20  -LE      OT Goal Re-Cert Due Date  11/27/20  -LE        User Key  (r) = Recorded By, (t) = Taken By, (c) = Cosigned By    Initials Name Provider Type    Ruth Chowdhury OTR Occupational Therapist        Therapy Charges for Today     Code Description Service Date Service Provider Modifiers Qty    [id number] HC OT EVAL MOD COMPLEXITY 2 11/13/2020 Ruth Craig OTR GO 1    [id number]  OT THERAPEUTIC ACT EA 15 MIN 11/13/2020 Ruth Craig OTR GO 1               ELY Roper  11/13/2020

## 2020-11-14 NOTE — NURSING NOTE
Spoke with patient spouse at this time to notify of discharge, per discussion he would be pulling into parking lot soon.  Discussed spouse transporting patient to [facility], he stated he would be unable to do so.  Attempts to reach on-call CCP unsuccessful.     1920- MD paged as awaiting prescription signatures and to notify of  inability to provide transportation.  MD notified nurse to postpone discharge until morning.  Oncoming nurse informed of need to discuss with morning shift so they can arrange transportation with CCP in the morning.  Discussed with patient and spouse who are both in agreement.

## 2020-11-14 NOTE — PROGRESS NOTES
"Physicians Statement of Medical Necessity for  Ambulance Transportation    GENERAL INFORMATION     Name: Siobhan Prakash  YOB: 1945  Medicare #: [employer] [facility] [id number]   Transport Date: 11/14/2020  (Valid for round trips this date, or for scheduled repetitive trips for 60 days from the date signed below.)  [facility]  Destination: [place] [address]  Is the Patient's stay covered under Medicare Part A (PPS/DRG?)Yes   Closest appropriate facility? Yes  If this a hosp-hosp transfer? No  Is this a hospice patient? No    MEDICAL NECESSITY QUESTIONAIRE    Ambulance Transportation is medically necessary only if other means of transportation are contraindicated or would be potentially harmful to the patient.  To meet this requirement, the patient must be either \"bed confined\" or suffer from a condition such that transport by means other than an ambulance is contraindicated by the patient's condition.  The following questions must be answered by the healthcare professional signing below for this form to be valid:     1) Describe the MEDICAL CONDITION (physical and/or mental) of this patient AT THE TIME OF AMBULANCE TRANSPORT that requires the patient to be transported in an ambulance, and why transport by other means is contraindicated by the patient's condition:   unable to tolerate a seated position d/t severe pain, wears a back brace  Past Medical History:   Diagnosis Date   • Allergic 1970   • Anxiety    • Arthritis 10/17   • Asthma 2004   • Cataract 1996   • Cholelithiasis 1990   • COPD (chronic obstructive pulmonary disease) (CMS/[facility])    • Depression    • GERD (gastroesophageal reflux disease)    • HL (hearing loss) 2015   • Hyperlipidemia    • Irritable bowel syndrome 1990   • Low back pain    • Migraine    • Osteopenia    • Osteoporosis    • Pneumonia    • Tremor 2017   • Urinary tract infection       Past Surgical History:   Procedure Laterality " "Date   • CHOLECYSTECTOMY     • COLONOSCOPY  2012   • EYE SURGERY      bilateral cataracts   • HIP ARTHROPLASTY Right    • JOINT REPLACEMENT     • REDUCTION MAMMAPLASTY     • TONSILLECTOMY        2) Is this patient \"bed confined\" as defined below?Yes    To be \"bed confined\" the patient must satisfy all three of the following criteria:  (1) unable to get up from bed without assistance; AND (2) unable to ambulate;  AND (3) unable to sit in a chair or wheelchair.  3) Can this patient safely be transported by car or wheelchair van (I.e., may safely sit during transport, without an attendant or monitoring?)No   4. In addition to completing questions 1-3 above, please check any of the following conditions that apply*:          *Note: supporting documentation for any boxes checked must be maintained in the patient's medical records Unable to tolerate seated position for time needed to transport      SIGNATURE OF PHYSICIAN OR OTHER AUTHORIZED HEALTHCARE PROFESSIONAL    I certify that the above information is true and correct based on my evaluation of this patient, and represent that the patient requires transport by ambulance and that other forms of transport are contraindicated.  I understand that this information will be used by the Centers for Medicare and Medicaid Services (CMS) to support the determiniation of medical necessity for ambulance services, and I represent that I have personal knowledge of the patient's condition at the time of transport.       If this box is checked, I also certify that the patient is physically or mentally incapable of signing the ambulance service's claim form and that the institution with which I am affiliated has furnished care, services or assistance to the patient.  My signature below is made on behalf of the patient pursuant to 42 .36(b)(4). In accordance with 42 .37, the specific reason(s) that the patient is physically or mentally incapable of signing the claim for is " as follows: per pt's spouse, pt is not able to tolerate sitting up without severe pain and pt has to wear a back brace    Signature of Physician or Healthcare Professional  Date/Time:     11/14/2020     (For Scheduled repetitive transport, this form is not valid for transports performed more than 60 days after this date).                                                                                                                                            --------------------------Marisela Clark RN/NOLVIA------------------------------------------------------------------  Printed Name and Credentials of Physician or Authorized Healthcare Professional     *Form must be signed by patient's attending physician for scheduled, repetitive transports,.  For non-repetitive ambulance transports, if unable to obtain the signature of the attending physician, any of the following may sign (please select below):     Physician  Clinical Nurse Specialist  Registered Nurse     Physician Assistant  Discharge Planner  Licensed Practical Nurse     Nurse Practitioner   X

## 2020-11-14 NOTE — PLAN OF CARE
Problem: Adult Inpatient Plan of Care  Goal: Plan of Care Review  Flowsheets (Taken 11/14/2020 0500)  Progress: no change  Plan of Care Reviewed With: patient  Outcome Summary: Pt expected to be discharged today. CCP to arrange transportation to [place]. No acute distress. Slept off/on throughout the night. c/o pain and prn pain meds given with good results. Pt states pain from 10/10 to 7/10 after medication given. VSS. Afebrile. Will continue to monitor.

## 2020-11-16 NOTE — PROGRESS NOTES
Case Management Discharge Note      Final Note: Discharged to [facility] .    Provided Post Acute Provider List?: Yes  Post Acute Provider List: Nursing Home  Provided Post Acute Provider Quality & Resource List?: Yes  Post Acute Provider Quality and Resource List: Nursing Home  Delivered To: Support Person, Patient  Support Person: Vlad Prakash ()  Method of Delivery: In person    Selected Continued Care - Discharged on 11/14/2020 Admission date: 11/12/2020 - Discharge disposition: Skilled Nursing Facility (DC - External)    Destination Coordination complete    Service Provider Selected Services Address Phone Fax    [facility]  Skilled Nursing [address] [facility] [address] [phone]          Durable Medical Equipment    No services have been selected for the patient.              Dialysis/Infusion    No services have been selected for the patient.              Home Medical Care    No services have been selected for the patient.              Therapy    No services have been selected for the patient.              Community Resources    No services have been selected for the patient.                Selected Continued Care - Prior Encounters Includes selections from prior encounters from 8/14/2020 to 11/14/2020    Discharged on 11/11/2020 Admission date: 11/5/2020 - Discharge disposition: Home-Health Care [facility]    Home Medical Care     Service Provider Selected Services Address Phone Fax    [facility] [place]  Home Health Services [address] [address] [phone]                    Transportation Services  Private: Car  Ambulance: Other(spouse arranged to have ambulance take pt to SNF and CCP just needed to call when pt was ready to leave)    Final Discharge Disposition Code: 03 - skilled nursing facility (SNF)

## 2020-11-23 PROBLEM — K92.2 ACUTE UPPER GI BLEED: Status: ACTIVE | Noted: 2020-01-01

## 2020-11-23 PROBLEM — D62 ACUTE POSTHEMORRHAGIC ANEMIA: Status: ACTIVE | Noted: 2020-01-01

## 2020-11-23 PROBLEM — D68.32 HEMORRHAGIC DISORDER DUE TO EXTRINSIC CIRCULATING ANTICOAGULANTS (HCC): Status: ACTIVE | Noted: 2020-01-01

## 2020-11-23 PROBLEM — Z86.711 HISTORY OF PULMONARY EMBOLUS (PE): Status: ACTIVE | Noted: 2020-01-01

## 2020-11-25 PROBLEM — J96.21 ACUTE ON CHRONIC RESPIRATORY FAILURE WITH HYPOXIA AND HYPERCAPNIA (HCC): Status: ACTIVE | Noted: 2020-01-01

## 2020-11-25 PROBLEM — Z66 DNR (DO NOT RESUSCITATE): Status: ACTIVE | Noted: 2020-01-01

## 2020-11-25 PROBLEM — J96.22 ACUTE ON CHRONIC RESPIRATORY FAILURE WITH HYPOXIA AND HYPERCAPNIA (HCC): Status: ACTIVE | Noted: 2020-01-01

## 2020-11-29 LAB
BACTERIA SPEC AEROBE CULT: NORMAL
BACTERIA SPEC AEROBE CULT: NORMAL

## 2022-01-15 NOTE — TELEPHONE ENCOUNTER
Patient called asking about changes you were going to make to Wellbutrin.   
Patient made aware about labs. She states that you had discussed stepping her down from Wellbutrin and starting something new.   
Patient made aware, call her in one hour.   
Chronic stable  s/p watchman device  Continue metoprolol 12.5mg daily
K: 6  Pt not on any hyperkalemic causing meds  Counselled on being cautious of consuming too much potassium rich diet  Advised to FU with PMD for repeat BMP on monday
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 Merrill
Chronic stable  s/p watchman device  Continue metoprolol 12.5mg daily

## 2022-12-29 NOTE — PROGRESS NOTES
Continued Stay Note  [facility]     Patient Name: Siobhan Prakash  MRN: [id number]  Today's Date: 11/14/2020    Admit Date: 11/12/2020    Discharge Plan     Row Name 11/14/20 0935       Plan    Plan  DC to SNF at [facility] via ambulance arranged by family.    Patient/Family in Agreement with Plan  yes    Plan Comments  Placed call to staff RN to inquire as to why pt did not DC on Friday. RN states she will talk to pt/spouse at bedside. Per RN, spouse is not able to transport pt from hospital to SNF d/t severe pain and pt would not be able to tolerate sitting in a WC for transport. Spouse gave staff RN the name and phone number for [facility]enny [facility]ji [phone] who has an ambulance crew ready to pick pt up. Ambulance crew will need PCS/Ambulance Necessity Form completed and given to them on arrival to pick pt up. Called and spoke with Lenny Mendoza and he states he will have a crew to the hospital by 1030. Called and updated staff RN and she will update pt/spouse at bedside. CCP to follow.............JW        Discharge Codes    No documentation.       Expected Discharge Date and Time     Expected Discharge Date Expected Discharge Time    Nov 13, 2020             Marisela Clark, RN     29-Dec-2022 23:44

## 2024-01-12 NOTE — PROGRESS NOTES
Continued Stay Note  [facility]     Patient Name: Siobhan Prakash  MRN: [id number]  Today's Date: 1/23/2018    Admit Date: 1/15/2018          Discharge Plan       01/23/18 7740    Case Management/Social Work Plan    Plan Home with  and [employer]      Additional Comments Pt has an order for a 3n1 commode. Order, FS, HP and DC summary faxed to [facility], to be delivered to pts home per her request. Pt given [facility] phone number to contact them once she gets home so they may deliver the 3n1 commode.              Discharge Codes     None        Expected Discharge Date and Time     Expected Discharge Date Expected Discharge Time    Jan 23, 2018             Cinthya Aldridge RN     HPI   Chief Complaint   Patient presents with    UTI     UTI +EVR culture       Patient is an 87-year-old female with significant PMH of COPD, dementia presents to the ED with cc of positive urine culture at nursing home.  Nursing home states the positive culture is EVR.  Upon further review patient is positive for VRE.  Patient has no symptoms.  Patient is unsure why she is here.  Patient is ANO x 3.  Patient denies any fever chills congestion cough pharyngitis nausea vomiting diarrhea constipation abdominal pain.  Patient denies any alcohol or street drug abuse.  Patient smokes half a pack per day denies any alcohol or street drug abuse.                          Ari Coma Scale Score: 15                  Patient History   Past Medical History:   Diagnosis Date    COPD (chronic obstructive pulmonary disease) (CMS/[facility])     Coronary artery disease      Past Surgical History:   Procedure Laterality Date    APPENDECTOMY       Family History   Problem Relation Name Age of Onset    No Known Problems Mother      No Known Problems Father      No Known Problems Sister      No Known Problems Brother      No Known Problems Daughter      No Known Problems Son      No Known Problems Mother's Sister      No Known Problems Mother's Brother      No Known Problems Father's Sister      No Known Problems Father's Brother      No Known Problems Maternal Grandmother      No Known Problems Maternal Grandfather      No Known Problems Paternal Grandmother      No Known Problems Paternal Grandfather      No Known Problems Other       Social History     Tobacco Use    Smoking status: Never     Passive exposure: Never    Smokeless tobacco: Never   Vaping Use    Vaping Use: Every day   Substance Use Topics    Alcohol use: Defer    Drug use: Defer       Physical Exam   ED Triage Vitals [01/12/24 1431]   Temp Heart Rate Resp BP   36.6 °C (97.9 °F) 81 16 148/90      SpO2 Temp Source Heart Rate Source Patient Position   97 % Tympanic Monitor  Sitting      BP Location FiO2 (%)     -- --       Physical Exam  HENT:      Head: Normocephalic.   Eyes:      Extraocular Movements: Extraocular movements intact.      Pupils: Pupils are equal, round, and reactive to light.   Cardiovascular:      Rate and Rhythm: Normal rate and regular rhythm.      Pulses: Normal pulses.      Heart sounds: Normal heart sounds.   Pulmonary:      Effort: Pulmonary effort is normal.      Breath sounds: No wheezing, rhonchi or rales.   Abdominal:      Palpations: Abdomen is soft.      Tenderness: There is no abdominal tenderness. There is no right CVA tenderness, left CVA tenderness, guarding or rebound.   Musculoskeletal:         General: No tenderness. Normal range of motion.      Cervical back: Normal range of motion.   Skin:     General: Skin is warm.      Capillary Refill: Capillary refill takes less than 2 seconds.   Neurological:      General: No focal deficit present.      Mental Status: She is alert and oriented to person, place, and time. Mental status is at baseline.      Cranial Nerves: No cranial nerve deficit.      Motor: No weakness.   Psychiatric:         Mood and Affect: Mood normal.         ED Course & MDM   Diagnoses as of 01/12/24 1623   Acute cystitis without hematuria       Medical Decision Making  Medical Decision Making:  Patient presented as described in HPI. Patient case including ROS, PE, and treatment and plan discussed with ED attending if attached as cosigner. Due to patients presentation orders completed include as documented.  Patient presents to the ED with cc of UTI with positive VRE from nursing home.  Patient has no complaints and is unsure why she is here.  Nontoxic-appearing abdomen is soft and nontender no CVA tenderness lung sounds are clear bilaterally.  Urine analysis was printed for me and reveals patient is only susceptible to linezolid.  Patient given linezolid IV CBC CMP unremarkable.  Pending UA.  Patient will be discharged home with oral  linezolid and educated follow-up with primary doctor.  Patient remained stable and discharged.  Patient was advised to follow up with PCP or recommended provider in 2-3 days for another evaluation and exam. I advised patient/guardian to return or go to closest emergency room immediately if symptoms change, get worse, new symptoms develop prior to follow up. If there is no improvement in symptoms in the next 24 hours they are advised to return for further evaluation and exam. I also explained the plan and treatment course. Patient/guardian is in agreement with plan, treatment course, and follow up and states verbally that they will comply.      Patient care discussed with: N/A  Social Determinants affecting care: N/A    Final diagnosis and disposition as below.  See CI    Homegoing. I discussed the differential; results and discharge plan with the patient and/or family/friend/caregiver if present.  I emphasized the importance of follow-up with the physician I referred them to in the timeframe recommended.  I explained reasons for the patient to return to the Emergency Department. They agreed that if they feel their condition is worsening or if they have any other concern they should call 911 immediately for further assistance. I gave the patient an opportunity to ask all questions they had and answered all of them accordingly. They understand return precautions and discharge instructions. The patient and/or family/friend/caregiver expressed understanding verbally and that they would comply.       Disposition:  Discharge      This note has been transcribed using voice recognition and may contain grammatical errors, misplaced words, incorrect words, incorrect phrases or other errors.        Labs Reviewed   CBC WITH AUTO DIFFERENTIAL - Abnormal       Result Value    WBC 6.7      nRBC 0.0      RBC 4.48      Hemoglobin 12.8      Hematocrit 40.6      MCV 91      MCH 28.6      MCHC 31.5 (*)     RDW 14.6 (*)     Platelets  296      Neutrophils % 59.8      Immature Granulocytes %, Automated 0.3      Lymphocytes % 26.9      Monocytes % 6.5      Eosinophils % 5.8      Basophils % 0.7      Neutrophils Absolute 4.03      Immature Granulocytes Absolute, Automated 0.02      Lymphocytes Absolute 1.81      Monocytes Absolute 0.44      Eosinophils Absolute 0.39      Basophils Absolute 0.05     COMPREHENSIVE METABOLIC PANEL - Abnormal    Glucose 87      Sodium 138      Potassium 4.0      Chloride 102      Bicarbonate 24      Anion Gap 16      Urea Nitrogen 13      Creatinine 0.84      eGFR 68      Calcium 8.5 (*)     Albumin 3.3 (*)     Alkaline Phosphatase 76      Total Protein 7.5      AST 9      Bilirubin, Total 0.9      ALT 3 (*)    URINALYSIS WITH REFLEX CULTURE AND MICROSCOPIC - Normal    Color, Urine Yellow      Appearance, Urine Clear      Specific Gravity, Urine 1.011      pH, Urine 6.0      Protein, Urine NEGATIVE      Glucose, Urine NEGATIVE      Blood, Urine NEGATIVE      Ketones, Urine NEGATIVE      Bilirubin, Urine NEGATIVE      Urobilinogen, Urine <2.0      Nitrite, Urine NEGATIVE      Leukocyte Esterase, Urine NEGATIVE     URINALYSIS WITH REFLEX CULTURE AND MICROSCOPIC    Narrative:     The following orders were created for panel order Urinalysis with Reflex Culture and Microscopic.  Procedure                               Abnormality         Status                     ---------                               -----------         ------                     Urinalysis with Reflex C...[[id number]]  Normal              Final result               Extra Urine Gray Tube[[id number]]                            In process                   Please view results for these tests on the individual orders.   EXTRA URINE GRAY TUBE        Procedure  Procedures     Orly Kenyon PA-C  01/12/24 0512

## 2024-03-30 NOTE — PROGRESS NOTES
"Chief Complaint   Patient presents with   • Irritable Bowel Syndrome       Subjective     HPI    Siobhan Prakash is a 74 y.o. female with a past medical history noted below who presents for evaluation of irritable bowel syndrome.  She has had chronic issues with IBS but had the \"worst flare\" she's ever had starting about 6 weeks ago.  She says she was having diarrhea to the point that she \"couldn't get off the toilet.\" It was occurring daily.  However the diarrhea is described as occurring about 1 time per day-- she says it was copious stools, liquidy, lasting 15 to 20 minutes at a time.  Episodes occurring after eating.  She denies that she was having any more than one bowel movement in a day.  She was not having any stools are waking her from sleep.  She was having a little nausea, but no vomiting.  She did lose 10# that she attributes to overall not eating well.  She says that food did not taste good to her.  She took imodium which seemed to help.    She now tells me that since last night, she is totally fine.  She says her appetite is better and she has had no further diarrhea.    She has severe emphysema, on continuous O2.  Followed by Dr Hightower.  Plans to evaluate at the [facility].      No family history of IBD or  GI malignancies.    Last colonoscopy with Cheyenne Meeks in 2011, she reports it was normal.        Past Medical History:   Diagnosis Date   • Allergic 1970   • Anxiety    • Arthritis 10/17   • Asthma 2004   • Cataract 1996   • Cholelithiasis 1990   • COPD (chronic obstructive pulmonary disease) (CMS/[facility])    • Depression    • GERD (gastroesophageal reflux disease)    • HL (hearing loss) 2015   • Hyperlipidemia    • Irritable bowel syndrome 1990   • Low back pain    • Migraine    • Osteopenia    • Osteoporosis    • Pneumonia    • Tremor 2017   • Urinary tract infection          Current Outpatient Medications:   •  arformoterol (BROVANA) 15 MCG/2ML nebulizer solution, Inhale 15 mcg., Disp: , Rfl: "   •  baclofen (LIORESAL) 20 MG tablet, TAKE 1 TABLET BY MOUTH THREE TIMES DAILY, Disp: 270 tablet, Rfl: 3  •  benzonatate (TESSALON PERLES) 100 MG capsule, Take 2 capsules by mouth 3 (Three) Times a Day As Needed (cough)., Disp: 30 capsule, Rfl: 0  •  BIOTIN PO, Take 1 tablet by mouth Daily., Disp: , Rfl:   •  budesonide (PULMICORT) 0.5 MG/2ML nebulizer solution, 0.5 mg., Disp: , Rfl:   •  Cholecalciferol (VITAMIN D3) 5000 UNITS capsule capsule, Take 5,000 Units by mouth Daily., Disp: , Rfl:   •  ferrous sulfate 325 (65 FE) MG tablet, Take 325 mg by mouth 2 (Two) Times a Day With Meals., Disp: , Rfl:   •  guaiFENesin (MUCINEX) 600 MG 12 hr tablet, Take 2 tablets by mouth Every 12 (Twelve) Hours., Disp: 60 tablet, Rfl: 11  •  lansoprazole (PREVACID) 15 MG capsule, Take 1 capsule by mouth Daily., Disp: 90 capsule, Rfl: 0  •  meloxicam (MOBIC) 15 MG tablet, Take 1 tablet by mouth Daily., Disp: 90 tablet, Rfl: 1  •  montelukast (SINGULAIR) 10 MG tablet, Take 10 mg by mouth Every Night., Disp: , Rfl:   •  O2 (OXYGEN), Inhale 1 (One) Time., Disp: , Rfl:   •  roflumilast (DALIRESP) 500 MCG tablet tablet, Take 500 mcg by mouth Daily., Disp: , Rfl:   •  sertraline (ZOLOFT) 100 MG tablet, Take 1 tablet by mouth 2 (Two) Times a Day., Disp: 60 tablet, Rfl: 5  •  SUMAtriptan (IMITREX) 50 MG tablet, Take one tablet at onset of headache. May repeat dose one time in 2 hours if headache not relieved., Disp: 9 tablet, Rfl: 2  •  theophylline (UNIPHYL) 400 MG 24 hr tablet, , Disp: , Rfl:   •  Tiotropium Bromide Monohydrate (SPIRIVA RESPIMAT) 2.5 MCG/ACT aerosol solution, Inhale 2 puffs Daily., Disp: , Rfl:   •  VENTOLIN  (90 BASE) MCG/ACT inhaler, Inhale 2 puffs Every 4 (Four) Hours As Needed for Wheezing or Shortness of Air., Disp: , Rfl:   •  zolpidem (AMBIEN) 10 MG tablet, Take 1 tablet by mouth At Night As Needed for Sleep., Disp: 30 tablet, Rfl: 0    Allergies   Allergen Reactions   • Sulfa Antibiotics Other (See Comments)      Severe weakness   • Codeine Rash and Other (See Comments)     Chills   • Hydrocodone Rash and Other (See Comments)     Chills       Social History     Socioeconomic History   • Marital status:      Spouse name: Vlad   • Number of children: 2   • Years of education: Not on file   • Highest education level: Not on file   Social Needs   • Financial resource strain: Not on file   • Food insecurity - worry: Not on file   • Food insecurity - inability: Not on file   • Transportation needs - medical: Not on file   • Transportation needs - non-medical: Not on file   Occupational History   • Occupation: retired   Tobacco Use   • Smoking status: Former Smoker     Packs/day: 1.00     Years: 15.00     Pack years: 15.00     Start date: 1963     Last attempt to quit:      Years since quittin.1   • Smokeless tobacco: Never Used   • Tobacco comment: Ultra light menthol brand   Substance and Sexual Activity   • Alcohol use: Yes     Comment: Approx. 1 or 2 a month   • Drug use: No   • Sexual activity: Not Currently     Partners: Male     Birth control/protection: Post-menopausal   Other Topics Concern   • Not on file   Social History Narrative   • Not on file       Family History   Problem Relation Age of Onset   • Alcohol abuse Father             • Heart attack Father    • Birth defects Brother    • Hearing loss Brother    • Heart disease Son         Aortic stenosis   • Hyperlipidemia Sister         Hbp       Review of Systems   Constitutional: Positive for unexpected weight change. Negative for activity change, appetite change and fatigue.   HENT: Negative for sore throat and trouble swallowing.    Respiratory: Negative.    Cardiovascular: Negative.    Gastrointestinal: Positive for diarrhea. Negative for abdominal distention, abdominal pain and blood in stool.   Endocrine: Negative for cold intolerance and heat intolerance.   Genitourinary: Negative for difficulty urinating, dysuria and  frequency.   Musculoskeletal: Negative for arthralgias, back pain and myalgias.   Skin: Negative.    Hematological: Negative for adenopathy. Does not bruise/bleed easily.   All other systems reviewed and are negative.      Objective     Vitals:    03/01/19 1254   BP: 138/82   Temp: 97.8 °F (36.6 °C)         03/01/19  1254   Weight: 44.5 kg (98 lb)     Body mass index is 17.92 kg/m².    Physical Exam   Constitutional: She is oriented to person, place, and time. No distress.   Chronically ill, frail, cachectic appearing woman   HENT:   Head: Normocephalic and atraumatic.   Right Ear: External ear normal.   Left Ear: External ear normal.   Nose: Nose normal.   Mouth/Throat: Oropharynx is clear and moist.   Eyes: Conjunctivae and EOM are normal. Right eye exhibits no discharge. Left eye exhibits no discharge. No scleral icterus.   Neck: Normal range of motion. Neck supple. No thyromegaly present.   No supraclavicular adenopathy   Cardiovascular: Normal rate, regular rhythm, normal heart sounds and intact distal pulses. Exam reveals no gallop.   No murmur heard.  No lower extremity edema   Pulmonary/Chest: No respiratory distress. She has no wheezes.   Diminished breath sounds bilaterally, she is on continuous O2   Abdominal: Soft. Normal appearance and bowel sounds are normal. She exhibits no distension and no mass. There is no hepatosplenomegaly. There is no tenderness. There is no rigidity, no rebound and no guarding. No hernia.   Genitourinary:   Genitourinary Comments: Rectal exam deferred   Musculoskeletal: Normal range of motion. She exhibits no edema or tenderness.   Atrophic extremities, she is in a wheelchair   Lymphadenopathy:     She has no cervical adenopathy.   Neurological: She is alert and oriented to person, place, and time. She displays no atrophy. Coordination normal.   Skin: Skin is warm and dry. No rash noted. She is not diaphoretic. No erythema.   Psychiatric: She has a normal mood and affect. Her  behavior is normal. Judgment and thought content normal.   Vitals reviewed.      WBC   Date Value Ref Range Status   09/12/2018 7.2 3.4 - 10.8 x10E3/uL Final     RBC   Date Value Ref Range Status   09/12/2018 4.23 3.77 - 5.28 x10E6/uL Final     Hemoglobin   Date Value Ref Range Status   09/12/2018 13.4 11.1 - 15.9 g/dL Final   02/18/2018 10.0 (L) 11.9 - 15.5 g/dL Final     Hematocrit   Date Value Ref Range Status   09/12/2018 40.3 34.0 - 46.6 % Final   02/18/2018 33.4 (L) 35.6 - 45.5 % Final     MCV   Date Value Ref Range Status   09/12/2018 95 79 - 97 fL Final   02/18/2018 102.1 (H) 80.5 - 98.2 fL Final     MCH   Date Value Ref Range Status   09/12/2018 31.7 26.6 - 33.0 pg Final   02/18/2018 30.6 26.9 - 32.0 pg Final     MCHC   Date Value Ref Range Status   09/12/2018 33.3 31.5 - 35.7 g/dL Final   02/18/2018 29.9 (L) 32.4 - 36.3 g/dL Final     RDW   Date Value Ref Range Status   09/12/2018 13.2 12.3 - 15.4 % Final   02/18/2018 14.7 (H) 11.7 - 13.0 % Final     RDW-SD   Date Value Ref Range Status   02/18/2018 54.4 (H) 37.0 - 54.0 fl Final     MPV   Date Value Ref Range Status   02/18/2018 8.9 6.0 - 12.0 fL Final     Platelets   Date Value Ref Range Status   09/12/2018 410 (H) 150 - 379 x10E3/uL Final   02/18/2018 507 (H) 140 - 500 10*3/mm3 Final     Neutrophil Rel %   Date Value Ref Range Status   09/12/2018 69 Not Estab. % Final     Neutrophil %   Date Value Ref Range Status   02/18/2018 74.1 42.7 - 76.0 % Final     Lymphocyte Rel %   Date Value Ref Range Status   09/12/2018 23 Not Estab. % Final     Lymphocyte %   Date Value Ref Range Status   02/18/2018 19.3 (L) 19.6 - 45.3 % Final     Monocyte Rel %   Date Value Ref Range Status   09/12/2018 7 Not Estab. % Final     Monocyte %   Date Value Ref Range Status   02/18/2018 6.0 5.0 - 12.0 % Final     Eosinophil Rel %   Date Value Ref Range Status   09/12/2018 1 Not Estab. % Final     Eosinophil %   Date Value Ref Range Status   02/18/2018 0.1 (L) 0.3 - 6.2 % Final      Basophil Rel %   Date Value Ref Range Status   09/12/2018 0 Not Estab. % Final     Basophil %   Date Value Ref Range Status   02/18/2018 0.1 0.0 - 1.5 % Final     Immature Grans %   Date Value Ref Range Status   02/18/2018 0.4 0.0 - 0.5 % Final     Neutrophils Absolute   Date Value Ref Range Status   09/12/2018 4.9 1.4 - 7.0 x10E3/uL Final     Neutrophils, Absolute   Date Value Ref Range Status   02/18/2018 7.27 1.90 - 8.10 10*3/mm3 Final     Lymphocytes Absolute   Date Value Ref Range Status   09/12/2018 1.6 0.7 - 3.1 x10E3/uL Final     Lymphocytes, Absolute   Date Value Ref Range Status   02/18/2018 1.90 0.90 - 4.80 10*3/mm3 Final     Monocytes Absolute   Date Value Ref Range Status   09/12/2018 0.5 0.1 - 0.9 x10E3/uL Final     Monocytes, Absolute   Date Value Ref Range Status   02/18/2018 0.59 0.20 - 1.20 10*3/mm3 Final     Eosinophils Absolute   Date Value Ref Range Status   09/12/2018 0.1 0.0 - 0.4 x10E3/uL Final     Eosinophils, Absolute   Date Value Ref Range Status   02/18/2018 0.01 0.00 - 0.70 10*3/mm3 Final     Basophils Absolute   Date Value Ref Range Status   09/12/2018 0.0 0.0 - 0.2 x10E3/uL Final     Basophils, Absolute   Date Value Ref Range Status   02/18/2018 0.01 0.00 - 0.20 10*3/mm3 Final     Immature Grans, Absolute   Date Value Ref Range Status   02/18/2018 0.04 (H) 0.00 - 0.03 10*3/mm3 Final     nRBC   Date Value Ref Range Status   11/02/2015 0.0 /100 WBC Final       Glucose   Date Value Ref Range Status   01/20/2018 99 65 - 99 mg/dL Final     Sodium   Date Value Ref Range Status   09/12/2018 144 134 - 144 mmol/L Final   01/20/2018 138 136 - 145 mmol/L Final     Potassium   Date Value Ref Range Status   09/12/2018 3.5 3.5 - 5.2 mmol/L Final   01/20/2018 3.8 3.5 - 5.2 mmol/L Final     CO2   Date Value Ref Range Status   01/20/2018 34.4 (H) 22.0 - 29.0 mmol/L Final     Total CO2   Date Value Ref Range Status   09/12/2018 29 20 - 29 mmol/L Final     Chloride   Date Value Ref Range Status    09/12/2018 100 96 - 106 mmol/L Final   01/20/2018 95 (L) 98 - 107 mmol/L Final     Anion Gap   Date Value Ref Range Status   01/20/2018 8.6 mmol/L Final     Creatinine   Date Value Ref Range Status   09/12/2018 0.71 0.57 - 1.00 mg/dL Final   01/20/2018 0.65 0.57 - 1.00 mg/dL Final     BUN   Date Value Ref Range Status   09/12/2018 19 8 - 27 mg/dL Final   01/20/2018 16 8 - 23 mg/dL Final     BUN/Creatinine Ratio   Date Value Ref Range Status   09/12/2018 27 12 - 28 Final   01/20/2018 24.6 7.0 - 25.0 Final     Calcium   Date Value Ref Range Status   09/12/2018 10.0 8.7 - 10.3 mg/dL Final   01/20/2018 8.3 (L) 8.6 - 10.5 mg/dL Final     eGFR Non  Am   Date Value Ref Range Status   09/12/2018 86 >59 mL/min/1.73 Final     eGFR Non  Amer   Date Value Ref Range Status   01/20/2018 90 >60 mL/min/1.73 Final     Alkaline Phosphatase   Date Value Ref Range Status   09/12/2018 90 39 - 117 IU/L Final   01/15/2018 94 39 - 117 U/L Final     Total Protein   Date Value Ref Range Status   01/15/2018 7.6 6.0 - 8.5 g/dL Final     ALT (SGPT)   Date Value Ref Range Status   09/12/2018 9 0 - 32 IU/L Final   01/15/2018 10 1 - 33 U/L Final     AST (SGOT)   Date Value Ref Range Status   09/12/2018 14 0 - 40 IU/L Final   01/15/2018 15 1 - 32 U/L Final     Total Bilirubin   Date Value Ref Range Status   09/12/2018 <0.2 0.0 - 1.2 mg/dL Final   01/15/2018 0.3 0.1 - 1.2 mg/dL Final     Albumin   Date Value Ref Range Status   09/12/2018 4.2 3.5 - 4.8 g/dL Final   01/15/2018 3.10 (L) 3.50 - 5.20 g/dL Final     Globulin   Date Value Ref Range Status   01/15/2018 4.5 gm/dL Final     A/G Ratio   Date Value Ref Range Status   09/12/2018 1.4 1.2 - 2.2 Final         Imaging Results (last 7 days)     ** No results found for the last 168 hours. **            No notes on file    Assessment/Plan    Diarrhea: Acute worsening of her chronic issue.  However she was really only having 1 large bowel movement per day.  I think the bigger concern was  No that she has had some weight loss.  I do not really think this was related to her diarrhea.  Perhaps she had some infectious symptoms leading to anorexia    Weight loss: I really think this is multifactorial.  She has end-stage lung disease and expends a lot of her energy working on breathing.  She may also have had a bit of an infection associated with the diarrhea leading to the anorexia    Pulmonary emphysema: Severe, on continuous oxygen.  Plans for the [facility] evaluation    Plan  She is telling me she has no further symptoms at this time.  Additionally, her symptoms are not described as very severe.  I think her weight loss is more multifactorial.    At this point, I advised using Imodium-liquid form-as needed to control her diarrhea.  I have reinforced to her and her  that she needs to eat at minimum 6 small meals a day--essentially she needs to be snacking on something every hour or so.    I have advised for her to call back should she have any recurrence of the diarrhea that she was complaining of, nausea or other GI symptoms and we can assess what needs to happen at that time.    Ultimately, her pulmonary status is the limiting factor in her overall health status.            Siobhan was seen today for irritable bowel syndrome.    Diagnoses and all orders for this visit:    Diarrhea, unspecified type    Weight loss, abnormal    Pulmonary emphysema, unspecified emphysema type (CMS/HCC)        I have discussed the above plan with the patient.  They verbalize understanding and are in agreement with the plan.  They have been advised to contact the office for any questions, concerns, or changes related to their health.    Dictated utilizing Dragon dictation

## 2024-06-19 NOTE — PLAN OF CARE
Problem: Adult Inpatient Plan of Care  Goal: Plan of Care Review  Flowsheets  Taken 11/8/2020 1441  Plan of Care Reviewed With: patient  Taken 11/8/2020 1437  Plan of Care Reviewed With:   patient   spouse  Outcome Summary: Pt. agreeable to PT with encouargement. Pt. now with TLSO therefore able to assess ambulation. Educated and assisted pt. in donning TLSO. Pt. able to ambulate 50' with rwx and CGA. Pt. with narrow URBAN and short, shuffling steps. Pt. quickly fatigues and has SOB with mobility (currently on 6L supp O2). Pt. began to c/o becoming hot and shakey limiting further ambulation. Reports of improved back pain with TLSO. Will continue to monitor.  Patient was intermittently wearing a face mask during this therapy encounter. Therapist used appropriate personal protective equipment including eye protection, mask, and gloves.  Mask used was standard procedure mask. Appropriate PPE was worn during the entire therapy session. Hand hygiene was completed before and after therapy session. Patient is not in enhanced droplet precautions.      unremarkable urinary bladder ultrasound     XR FOOT LEFT (MIN 3 VIEWS)    Result Date: 6/17/2024  EXAMINATION: THREE XRAY VIEWS OF THE LEFT FOOT 6/17/2024 11:45 am COMPARISON: None. HISTORY: ORDERING SYSTEM PROVIDED HISTORY: gangrene of L toe TECHNOLOGIST PROVIDED HISTORY: gangrene of L toe FINDINGS: Bones: There is demineralization of the tuft of the 1st distal phalanx. 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 Joints: Normal alignment. Soft Tissues: Soft tissue irregularity involving the distal 1st phalanx.     Demineralization involving the tuft of the 1st distal phalanx which can be seen in the setting of osteomyelitis.     Vascular duplex lower extremity venous bilateral    Result Date: 6/17/2024    No evidence of deep vein or superficial vein thrombosis in the right lower extremity.   No evidence of deep vein or superficial vein thrombosis in the left lower extremity.     XR CHEST (2 VW)    Result Date: 6/17/2024  EXAMINATION: TWO XRAY VIEWS OF THE CHEST 6/17/2024 2:45 pm COMPARISON: Chest x-ray dated 06/02/2024.  Chest x-ray dated 06/02/2024. HISTORY: ORDERING SYSTEM PROVIDED HISTORY: Chest pain, SOB TECHNOLOGIST PROVIDED HISTORY: Chest pain, SOB FINDINGS: MEDICAL DEVICES: There is a right arm PICC. HEART/MEDIASTINUM: The cardiac silhouette is enlarged, but stable. PLEURA/LUNGS: There is a small to moderate right pleural effusion with adjacent compressive atelectasis.  There is no appreciable pneumothorax. BONES/SOFT TISSUE: No acute abnormality.     Small to moderate right pleural effusion with adjacent compressive atelectasis.       Medical Decision [id number]-Iatur:     Results       Procedure Component Value Units Date/Time    Culture, Anaerobic and Aerobic [[id number]]  (Abnormal) Collected: 06/17/24 3546    Order Status: Completed Specimen: Foot Updated: 06/18/24 1153     Specimen Description .FOOT     Direct Exam MANY NEUTROPHILS      MANY GRAM POSITIVE  and pictures of the patient and the key elements of the encounter have been performed by me. I have reviewed the laboratory data, other diagnostic studies and discussed them with the APRN. I have updated the medical record where necessary.    I agree with the assessment, plan and orders as documented by the APRN.    In addition diagnostic and decision making elements, performed by the Attending Physician, are included in the Diagnostic and Decision Making Section of the text.    Josesito Richardson MD               Office: [phone]